# Patient Record
Sex: MALE | Race: WHITE | NOT HISPANIC OR LATINO | Employment: OTHER | URBAN - METROPOLITAN AREA
[De-identification: names, ages, dates, MRNs, and addresses within clinical notes are randomized per-mention and may not be internally consistent; named-entity substitution may affect disease eponyms.]

---

## 2017-03-15 ENCOUNTER — OFFICE VISIT (OUTPATIENT)
Dept: FAMILY MEDICINE CLINIC | Facility: CLINIC | Age: 72
End: 2017-03-15

## 2017-03-15 VITALS
DIASTOLIC BLOOD PRESSURE: 80 MMHG | WEIGHT: 213 LBS | SYSTOLIC BLOOD PRESSURE: 122 MMHG | BODY MASS INDEX: 30.49 KG/M2 | RESPIRATION RATE: 18 BRPM | OXYGEN SATURATION: 96 % | HEIGHT: 70 IN | TEMPERATURE: 98.6 F | HEART RATE: 87 BPM

## 2017-03-15 DIAGNOSIS — J40 BRONCHITIS: Primary | ICD-10-CM

## 2017-03-15 PROCEDURE — 99213 OFFICE O/P EST LOW 20 MIN: CPT | Performed by: NURSE PRACTITIONER

## 2017-03-15 RX ORDER — CEFDINIR 300 MG/1
600 CAPSULE ORAL DAILY
Qty: 20 CAPSULE | Refills: 0 | Status: SHIPPED | OUTPATIENT
Start: 2017-03-15 | End: 2017-03-25

## 2017-03-15 RX ORDER — LEVOTHYROXINE SODIUM 0.07 MG/1
TABLET ORAL
COMMUNITY
Start: 2017-01-27

## 2017-03-15 RX ORDER — ZOLPIDEM TARTRATE 5 MG/1
TABLET ORAL
COMMUNITY
Start: 2017-03-05 | End: 2022-01-18 | Stop reason: SDUPTHER

## 2017-03-15 NOTE — PROGRESS NOTES
Subjective   Nemesio Fitzpatrick is a 71 y.o. male.     History of Present Illness   Nemesio Fitzpatrick 71 y.o. male who presents for evaluation of cough. Symptoms include dry cough, myalgias, exertional SOA and chest congestion.  Onset of symptoms was 2 weeks ago, gradually worsening since that time. Patient denies fever.   Evaluation to date: none Treatment to date:  OTC cough suppressant.     The following portions of the patient's history were reviewed and updated as appropriate: allergies, current medications, past family history, past medical history, past social history, past surgical history and problem list.    Review of Systems   Constitutional: Negative for chills and fever.   HENT: Positive for congestion. Negative for sinus pressure.    Respiratory: Positive for cough, chest tightness and shortness of breath.        Objective   Physical Exam   Constitutional: He is oriented to person, place, and time. He appears well-developed and well-nourished.   HENT:   Right Ear: Tympanic membrane, external ear and ear canal normal.   Left Ear: Tympanic membrane, external ear and ear canal normal.   Nose: Nose normal.   Mouth/Throat: Uvula is midline and oropharynx is clear and moist.   Cardiovascular: Normal rate and regular rhythm.    Pulmonary/Chest: Effort normal and breath sounds normal.   Neurological: He is oriented to person, place, and time.   Skin: Skin is warm and dry.   Psychiatric: He has a normal mood and affect. His behavior is normal. Judgment and thought content normal.   Nursing note and vitals reviewed.      Assessment/Plan   Problems Addressed this Visit     None      Visit Diagnoses     Bronchitis    -  Primary

## 2017-09-07 ENCOUNTER — OFFICE VISIT (OUTPATIENT)
Dept: FAMILY MEDICINE CLINIC | Facility: CLINIC | Age: 72
End: 2017-09-07

## 2017-09-07 VITALS
HEART RATE: 74 BPM | TEMPERATURE: 98.1 F | DIASTOLIC BLOOD PRESSURE: 74 MMHG | BODY MASS INDEX: 31.14 KG/M2 | RESPIRATION RATE: 18 BRPM | SYSTOLIC BLOOD PRESSURE: 163 MMHG | OXYGEN SATURATION: 97 % | WEIGHT: 217.5 LBS | HEIGHT: 70 IN

## 2017-09-07 DIAGNOSIS — L72.3 SEBACEOUS CYST: Primary | ICD-10-CM

## 2017-09-07 PROCEDURE — 99213 OFFICE O/P EST LOW 20 MIN: CPT | Performed by: NURSE PRACTITIONER

## 2017-09-07 RX ORDER — CEPHALEXIN 500 MG/1
500 CAPSULE ORAL 3 TIMES DAILY
Qty: 30 CAPSULE | Refills: 0 | Status: SHIPPED | OUTPATIENT
Start: 2017-09-07 | End: 2022-01-18

## 2017-09-07 NOTE — PROGRESS NOTES
Subjective   Nemesio Fitzpatrick is a 71 y.o. male.     History of Present Illness   Nemesio Fitzpatrick 71 y.o. male presents for evaluation of possible cellulitis involving the right neck. This started a few days ago. Lesions are described as red and is spreading into surrounding area. Associated symptoms: none. The symptoms were are gradual in onset. Patient has been treating this with nothing.   Patient denies: fever. Patient has not had contacts with similar symptoms.      The following portions of the patient's history were reviewed and updated as appropriate: allergies, current medications, past family history, past medical history, past social history, past surgical history and problem list.    Review of Systems   Constitutional: Negative for chills and fever.   Skin: Positive for color change. Negative for rash.       Objective   Physical Exam   Constitutional: He is oriented to person, place, and time. He appears well-developed and well-nourished.   Pulmonary/Chest: Effort normal.   Neurological: He is oriented to person, place, and time.   Skin: Skin is warm and dry.        2 cm erythematous, non fluctuant cyst to right neck.  Skin intact, no drainage.    Psychiatric: He has a normal mood and affect. His behavior is normal. Judgment and thought content normal.   Nursing note and vitals reviewed.      Assessment/Plan   Nemesio was seen today for abscess.    Diagnoses and all orders for this visit:    Sebaceous cyst  -     cephalexin (KEFLEX) 500 MG capsule; Take 1 capsule by mouth 3 (Three) Times a Day.

## 2017-09-11 ENCOUNTER — OFFICE VISIT (OUTPATIENT)
Dept: FAMILY MEDICINE CLINIC | Facility: CLINIC | Age: 72
End: 2017-09-11

## 2017-09-11 VITALS
TEMPERATURE: 97.9 F | RESPIRATION RATE: 18 BRPM | HEART RATE: 68 BPM | SYSTOLIC BLOOD PRESSURE: 163 MMHG | DIASTOLIC BLOOD PRESSURE: 88 MMHG | OXYGEN SATURATION: 98 % | WEIGHT: 217 LBS | HEIGHT: 70 IN | BODY MASS INDEX: 31.07 KG/M2

## 2017-09-11 DIAGNOSIS — L08.9 INFECTED CYST OF SKIN: Primary | ICD-10-CM

## 2017-09-11 DIAGNOSIS — L72.9 INFECTED CYST OF SKIN: Primary | ICD-10-CM

## 2017-09-11 PROCEDURE — 99213 OFFICE O/P EST LOW 20 MIN: CPT | Performed by: NURSE PRACTITIONER

## 2017-09-11 RX ORDER — SULFAMETHOXAZOLE AND TRIMETHOPRIM 800; 160 MG/1; MG/1
1 TABLET ORAL 2 TIMES DAILY
Qty: 20 TABLET | Refills: 0 | Status: SHIPPED | OUTPATIENT
Start: 2017-09-11 | End: 2017-09-21

## 2017-09-11 NOTE — PROGRESS NOTES
Subjective   Nemesio Fitzpatrick is a 71 y.o. male.     History of Present Illness   Patient presents to office to f/u on cyst to right neck.  At last visit, he was started on cephalexin which he has been taking as prescribed. He denies side effects. States he has not noticed improvement.  He has started to notice some swelling and redness to his nose. States he had similar symptoms previously when he had staff infection.  He is unsure if it was MRSA.  Denies drainage.    The following portions of the patient's history were reviewed and updated as appropriate: allergies, current medications, past family history, past medical history, past social history, past surgical history and problem list.    Review of Systems   Constitutional: Negative for chills and fever.   Skin: Positive for wound. Negative for color change.       Objective   Physical Exam   Constitutional: He is oriented to person, place, and time. He appears well-developed and well-nourished.   Pulmonary/Chest: Effort normal.   Neurological: He is oriented to person, place, and time.   Skin: Skin is warm and dry. There is erythema.        No change to cyst to right submandibular area. No drainage noted.  Some erythema and swelling to nose.     Psychiatric: He has a normal mood and affect. His behavior is normal. Judgment and thought content normal.   Nursing note and vitals reviewed.      Assessment/Plan   Nemesio was seen today for cyst.    Diagnoses and all orders for this visit:    Infected cyst of skin  -     sulfamethoxazole-trimethoprim (BACTRIM DS,SEPTRA DS) 800-160 MG per tablet; Take 1 tablet by mouth 2 (Two) Times a Day for 10 days.        Patient to continue cephalexin.  Added Bactrim DS.    He is to f/u on Thursday.

## 2017-09-14 ENCOUNTER — OFFICE VISIT (OUTPATIENT)
Dept: FAMILY MEDICINE CLINIC | Facility: CLINIC | Age: 72
End: 2017-09-14

## 2017-09-14 VITALS
OXYGEN SATURATION: 97 % | HEART RATE: 61 BPM | BODY MASS INDEX: 31.07 KG/M2 | RESPIRATION RATE: 18 BRPM | DIASTOLIC BLOOD PRESSURE: 77 MMHG | WEIGHT: 217 LBS | TEMPERATURE: 97.9 F | SYSTOLIC BLOOD PRESSURE: 144 MMHG | HEIGHT: 70 IN

## 2017-09-14 DIAGNOSIS — IMO0002 CYST OF NECK: Primary | ICD-10-CM

## 2017-09-14 PROCEDURE — 99213 OFFICE O/P EST LOW 20 MIN: CPT | Performed by: NURSE PRACTITIONER

## 2017-09-14 NOTE — PROGRESS NOTES
Subjective   Nemesio Fitzpatrick is a 71 y.o. male.     History of Present Illness   Patient presents for f/u of cyst to right cervical area.  Reports improvement in symptoms since adding bactrim.  Reports decrease in size and tenderness. Denies drainage.   The following portions of the patient's history were reviewed and updated as appropriate: allergies, current medications, past family history, past medical history, past social history, past surgical history and problem list.    Review of Systems   Constitutional: Negative for chills and fever.   Skin: Negative for color change.       Objective   Physical Exam   Constitutional: He is oriented to person, place, and time. He appears well-developed and well-nourished.   Pulmonary/Chest: Effort normal.   Neurological: He is oriented to person, place, and time.   Skin: Skin is warm and dry. There is erythema.        Slight decrease in size of cyst, more fluctuant. No spreading erythema. No drainage.    Psychiatric: He has a normal mood and affect. His behavior is normal. Judgment and thought content normal.   Nursing note and vitals reviewed.      Assessment/Plan   Nemesio was seen today for cyst.    Diagnoses and all orders for this visit:    Cyst of neck        Patient has appt with ENT tomorrow at 2:30 PM.

## 2017-11-02 ENCOUNTER — TRANSCRIBE ORDERS (OUTPATIENT)
Dept: ADMINISTRATIVE | Facility: HOSPITAL | Age: 72
End: 2017-11-02

## 2017-11-02 ENCOUNTER — LAB (OUTPATIENT)
Dept: LAB | Facility: HOSPITAL | Age: 72
End: 2017-11-02

## 2017-11-02 ENCOUNTER — HOSPITAL ENCOUNTER (OUTPATIENT)
Dept: CARDIOLOGY | Facility: HOSPITAL | Age: 72
Discharge: HOME | End: 2017-11-02
Admitting: OTOLARYNGOLOGY

## 2017-11-02 DIAGNOSIS — R22.1 NECK MASS: ICD-10-CM

## 2017-11-02 DIAGNOSIS — R22.1 SWOLLEN NECK: ICD-10-CM

## 2017-11-02 DIAGNOSIS — R22.1 SWOLLEN NECK: Primary | ICD-10-CM

## 2017-11-02 DIAGNOSIS — R22.1 NECK MASS: Primary | ICD-10-CM

## 2017-11-02 LAB
ANION GAP SERPL CALCULATED.3IONS-SCNC: 15.8 MMOL/L
BASOPHILS # BLD AUTO: 0.04 10*3/MM3 (ref 0–0.2)
BASOPHILS NFR BLD AUTO: 0.8 % (ref 0–1.5)
BUN BLD-MCNC: 17 MG/DL (ref 8–23)
BUN/CREAT SERPL: 15.6 (ref 7–25)
CALCIUM SPEC-SCNC: 9.7 MG/DL (ref 8.6–10.5)
CHLORIDE SERPL-SCNC: 99 MMOL/L (ref 98–107)
CO2 SERPL-SCNC: 24.2 MMOL/L (ref 22–29)
CREAT BLD-MCNC: 1.09 MG/DL (ref 0.76–1.27)
DEPRECATED RDW RBC AUTO: 43.3 FL (ref 37–54)
EOSINOPHIL # BLD AUTO: 0.09 10*3/MM3 (ref 0–0.7)
EOSINOPHIL NFR BLD AUTO: 1.9 % (ref 0.3–6.2)
ERYTHROCYTE [DISTWIDTH] IN BLOOD BY AUTOMATED COUNT: 13 % (ref 11.5–14.5)
GFR SERPL CREATININE-BSD FRML MDRD: 67 ML/MIN/1.73
GLUCOSE BLD-MCNC: 106 MG/DL (ref 65–99)
HCT VFR BLD AUTO: 47.4 % (ref 40.4–52.2)
HGB BLD-MCNC: 15.5 G/DL (ref 13.7–17.6)
IMM GRANULOCYTES # BLD: 0.02 10*3/MM3 (ref 0–0.03)
IMM GRANULOCYTES NFR BLD: 0.4 % (ref 0–0.5)
LYMPHOCYTES # BLD AUTO: 1.53 10*3/MM3 (ref 0.9–4.8)
LYMPHOCYTES NFR BLD AUTO: 31.6 % (ref 19.6–45.3)
MCH RBC QN AUTO: 30.2 PG (ref 27–32.7)
MCHC RBC AUTO-ENTMCNC: 32.7 G/DL (ref 32.6–36.4)
MCV RBC AUTO: 92.2 FL (ref 79.8–96.2)
MONOCYTES # BLD AUTO: 0.46 10*3/MM3 (ref 0.2–1.2)
MONOCYTES NFR BLD AUTO: 9.5 % (ref 5–12)
NEUTROPHILS # BLD AUTO: 2.7 10*3/MM3 (ref 1.9–8.1)
NEUTROPHILS NFR BLD AUTO: 55.8 % (ref 42.7–76)
PLATELET # BLD AUTO: 140 10*3/MM3 (ref 140–500)
PMV BLD AUTO: 10.7 FL (ref 6–12)
POTASSIUM BLD-SCNC: 4.1 MMOL/L (ref 3.5–5.2)
RBC # BLD AUTO: 5.14 10*6/MM3 (ref 4.6–6)
SODIUM BLD-SCNC: 139 MMOL/L (ref 136–145)
WBC NRBC COR # BLD: 4.84 10*3/MM3 (ref 4.5–10.7)

## 2017-11-02 PROCEDURE — 93005 ELECTROCARDIOGRAM TRACING: CPT | Performed by: OTOLARYNGOLOGY

## 2017-11-02 PROCEDURE — 36415 COLL VENOUS BLD VENIPUNCTURE: CPT

## 2017-11-02 PROCEDURE — 85025 COMPLETE CBC W/AUTO DIFF WBC: CPT | Performed by: OTOLARYNGOLOGY

## 2017-11-02 PROCEDURE — 80048 BASIC METABOLIC PNL TOTAL CA: CPT | Performed by: OTOLARYNGOLOGY

## 2017-11-03 ENCOUNTER — TRANSCRIBE ORDERS (OUTPATIENT)
Dept: ADMINISTRATIVE | Facility: HOSPITAL | Age: 72
End: 2017-11-03

## 2017-11-03 ENCOUNTER — HOSPITAL ENCOUNTER (OUTPATIENT)
Dept: CARDIOLOGY | Facility: HOSPITAL | Age: 72
Discharge: HOME | End: 2017-11-03
Admitting: OTOLARYNGOLOGY

## 2017-11-03 DIAGNOSIS — Z01.818 PRE-OP TESTING: ICD-10-CM

## 2017-11-03 DIAGNOSIS — R22.1 MASS OF NECK: ICD-10-CM

## 2017-11-03 DIAGNOSIS — R22.1 MASS OF NECK: Primary | ICD-10-CM

## 2017-11-03 PROCEDURE — 93010 ELECTROCARDIOGRAM REPORT: CPT | Performed by: INTERNAL MEDICINE

## 2017-11-03 PROCEDURE — 93005 ELECTROCARDIOGRAM TRACING: CPT | Performed by: OTOLARYNGOLOGY

## 2021-03-12 ENCOUNTER — BULK ORDERING (OUTPATIENT)
Dept: CASE MANAGEMENT | Facility: OTHER | Age: 76
End: 2021-03-12

## 2021-03-12 DIAGNOSIS — Z23 IMMUNIZATION DUE: ICD-10-CM

## 2022-01-18 ENCOUNTER — OFFICE VISIT (OUTPATIENT)
Dept: FAMILY MEDICINE CLINIC | Facility: CLINIC | Age: 77
End: 2022-01-18

## 2022-01-18 VITALS
OXYGEN SATURATION: 97 % | WEIGHT: 222.8 LBS | SYSTOLIC BLOOD PRESSURE: 138 MMHG | DIASTOLIC BLOOD PRESSURE: 80 MMHG | BODY MASS INDEX: 31.9 KG/M2 | HEIGHT: 70 IN | TEMPERATURE: 97.5 F | HEART RATE: 70 BPM

## 2022-01-18 DIAGNOSIS — F51.01 PRIMARY INSOMNIA: ICD-10-CM

## 2022-01-18 DIAGNOSIS — G47.30 SLEEP APNEA, UNSPECIFIED TYPE: ICD-10-CM

## 2022-01-18 DIAGNOSIS — Z13.6 ENCOUNTER FOR ABDOMINAL AORTIC ANEURYSM (AAA) SCREENING: ICD-10-CM

## 2022-01-18 DIAGNOSIS — E03.9 ACQUIRED HYPOTHYROIDISM: ICD-10-CM

## 2022-01-18 DIAGNOSIS — J30.1 SEASONAL ALLERGIC RHINITIS DUE TO POLLEN: Primary | ICD-10-CM

## 2022-01-18 DIAGNOSIS — E78.2 MIXED HYPERLIPIDEMIA: ICD-10-CM

## 2022-01-18 DIAGNOSIS — R73.01 IFG (IMPAIRED FASTING GLUCOSE): ICD-10-CM

## 2022-01-18 DIAGNOSIS — C61 MALIGNANT NEOPLASM PROSTATE: ICD-10-CM

## 2022-01-18 DIAGNOSIS — R07.82 INTERCOSTAL PAIN: ICD-10-CM

## 2022-01-18 PROBLEM — H81.13 BENIGN PAROXYSMAL POSITIONAL VERTIGO DUE TO BILATERAL VESTIBULAR DISORDER: Status: ACTIVE | Noted: 2022-01-18

## 2022-01-18 PROCEDURE — 99204 OFFICE O/P NEW MOD 45 MIN: CPT | Performed by: FAMILY MEDICINE

## 2022-01-18 RX ORDER — ZOLPIDEM TARTRATE 5 MG/1
5 TABLET ORAL NIGHTLY PRN
Qty: 90 TABLET | Refills: 0 | Status: SHIPPED | OUTPATIENT
Start: 2022-01-18

## 2022-01-18 NOTE — PROGRESS NOTES
Subjective   Nemesio Fitzpatrick is a 76 y.o. male.     Chief Complaint   Patient presents with   • Establish Care       History of Present Illness   H/o prostate cancer- had prostate removed, follows with urology and is resolved.     Allergies- to mold, claritin helps and he feels this is all he needs.     hld- has never been on meds, diet controlled. [facility]     Hypothyroidism- no cold intolerance., doing well on meds, no fatigue, due labs    High bs in the past, due a recheck    te- stable on cpap.     Chest pain in the past was found to be costochondritis.     bppv- has a rare flair     Insomnia- Rare use of ambien, trouble falling asleep.     The following portions of the patient's history were reviewed and updated as appropriate: allergies, current medications, past family history, past medical history, past social history, past surgical history and problem list.    Past Medical History:   Diagnosis Date   • Acute bronchitis    • Allergic rhinitis    • BPH (benign prostatic hypertrophy)    • Elevated prostate specific antigen (PSA)    • History of bone scan 10/21/2010    normal   • History of colonoscopy     never   • History of EKG 03/12/2012    also 11-; T wave abnormality   • Hyperlipidemia    • Hypothyroidism    • IFG (impaired fasting glucose)    • Kidney calculus     left ureteral stone   • Malignant neoplasm prostate (HCC) 11/09/2009    Dr. Mcclelland; lymph nodes negative margins clear   • Prostate nodule     right lobe   • Screening for prostate cancer     prostatectomy   • Sleep apnea     CPAP machine   • URI (upper respiratory infection)        Past Surgical History:   Procedure Laterality Date   • CERVICAL DISC SURGERY  1990    also 2006; C5-6, C6-7   • HAND SURGERY Left 2001    trigger finger release; left middle finger   • PROSTATECTOMY  11/09/2009   • RHINOPLASTY  1985   • SHOULDER SURGERY Left 09/29/2009    Dr. KRISTEL Jimenez; rotator cuff repair; bone  "spurs    • URETERAL STENT INSERTION Left 12/04/2013    Dr. Martinez       Family History   Problem Relation Age of Onset   • Diabetes Mother    • Kidney disease Mother         calculus   • Cancer Sister         breast   • Hypothyroidism Sister    • Kidney disease Sister         calculus   • Transient ischemic attack Brother    • Alzheimer's disease Other         uncle   • Diabetes Other         uncle       Social History     Socioeconomic History   • Marital status:    Tobacco Use   • Smoking status: Former Smoker   Substance and Sexual Activity   • Alcohol use: Yes       Review of Systems   Constitutional: Negative for fever.   Respiratory: Negative for shortness of breath.    Cardiovascular: Negative for chest pain.       Objective   Visit Vitals  /80 (BP Location: Left arm, Patient Position: Sitting)   Pulse 70   Temp 97.5 °F (36.4 °C)   Ht 177.8 cm (70\")   Wt 101 kg (222 lb 12.8 oz)   SpO2 97%   BMI 31.97 kg/m²     Body mass index is 31.97 kg/m².  Physical Exam  Constitutional:       Appearance: Normal appearance. He is well-developed.   Cardiovascular:      Rate and Rhythm: Normal rate and regular rhythm.      Heart sounds: Normal heart sounds.   Pulmonary:      Effort: Pulmonary effort is normal.      Breath sounds: Normal breath sounds.   Musculoskeletal:         General: No swelling. Normal range of motion.   Skin:     General: Skin is warm and dry.      Findings: No rash.   Neurological:      General: No focal deficit present.      Mental Status: He is alert and oriented to person, place, and time.   Psychiatric:         Mood and Affect: Mood normal.         Behavior: Behavior normal.           Assessment/Plan   Diagnoses and all orders for this visit:    1. Seasonal allergic rhinitis due to pollen (Primary)    2. Mixed hyperlipidemia  -     Comprehensive Metabolic Panel  -     Lipid Panel    3. Acquired hypothyroidism  -     TSH Rfx On Abnormal To Free T4  -     Comprehensive Metabolic " Panel    4. IFG (impaired fasting glucose)  -     Hemoglobin A1c    5. Malignant neoplasm prostate (HCC)    6. Sleep apnea, unspecified type    7. Intercostal pain    8. Primary insomnia  -     zolpidem (AMBIEN) 5 MG tablet; Take 1 tablet by mouth At Night As Needed for Sleep.  Dispense: 90 tablet; Refill: 0    9. Encounter for abdominal aortic aneurysm (AAA) screening  -     US Abdomen Limited; Future        Stable, cont meds, f/u in 6-12 months. [facility]. Discussed risks of ambien. [facility] but [facility] a [facility]. [facility].

## 2022-01-19 LAB
ALBUMIN SERPL-MCNC: 4.8 G/DL (ref 3.7–4.7)
ALBUMIN/GLOB SERPL: 1.8 {RATIO} (ref 1.2–2.2)
ALP SERPL-CCNC: 74 IU/L (ref 44–121)
ALT SERPL-CCNC: 67 IU/L (ref 0–44)
AST SERPL-CCNC: 46 IU/L (ref 0–40)
BILIRUB SERPL-MCNC: 0.3 MG/DL (ref 0–1.2)
BUN SERPL-MCNC: 18 MG/DL (ref 8–27)
BUN/CREAT SERPL: 18 (ref 10–24)
CALCIUM SERPL-MCNC: 9.7 MG/DL (ref 8.6–10.2)
CHLORIDE SERPL-SCNC: 102 MMOL/L (ref 96–106)
CHOLEST SERPL-MCNC: 192 MG/DL (ref 100–199)
CO2 SERPL-SCNC: 21 MMOL/L (ref 20–29)
CREAT SERPL-MCNC: 0.99 MG/DL (ref 0.76–1.27)
GLOBULIN SER CALC-MCNC: 2.6 G/DL (ref 1.5–4.5)
GLUCOSE SERPL-MCNC: 122 MG/DL (ref 65–99)
HBA1C MFR BLD: 5.8 % (ref 4.8–5.6)
HDLC SERPL-MCNC: 27 MG/DL
LDLC SERPL CALC-MCNC: 80 MG/DL (ref 0–99)
POTASSIUM SERPL-SCNC: 4.1 MMOL/L (ref 3.5–5.2)
PROT SERPL-MCNC: 7.4 G/DL (ref 6–8.5)
SODIUM SERPL-SCNC: 140 MMOL/L (ref 134–144)
TRIGL SERPL-MCNC: 531 MG/DL (ref 0–149)
TSH SERPL DL<=0.005 MIU/L-ACNC: 3.36 UIU/ML (ref 0.45–4.5)
VLDLC SERPL CALC-MCNC: 85 MG/DL (ref 5–40)

## 2022-01-27 ENCOUNTER — HOSPITAL ENCOUNTER (OUTPATIENT)
Dept: ULTRASOUND IMAGING | Facility: HOSPITAL | Age: 77
Discharge: HOME | End: 2022-01-27
Admitting: FAMILY MEDICINE

## 2022-01-27 DIAGNOSIS — Z13.6 ENCOUNTER FOR ABDOMINAL AORTIC ANEURYSM (AAA) SCREENING: ICD-10-CM

## 2022-01-27 PROCEDURE — 76706 US ABDL AORTA SCREEN AAA: CPT

## 2022-10-03 ENCOUNTER — OFFICE VISIT (OUTPATIENT)
Dept: FAMILY MEDICINE CLINIC | Facility: CLINIC | Age: 77
End: 2022-10-03

## 2022-10-03 VITALS
DIASTOLIC BLOOD PRESSURE: 90 MMHG | BODY MASS INDEX: 31.78 KG/M2 | OXYGEN SATURATION: 98 % | HEIGHT: 70 IN | SYSTOLIC BLOOD PRESSURE: 146 MMHG | HEART RATE: 82 BPM | WEIGHT: 222 LBS | TEMPERATURE: 98 F

## 2022-10-03 DIAGNOSIS — E03.9 ACQUIRED HYPOTHYROIDISM: ICD-10-CM

## 2022-10-03 DIAGNOSIS — M54.32 SCIATICA OF LEFT SIDE: ICD-10-CM

## 2022-10-03 DIAGNOSIS — E78.2 MIXED HYPERLIPIDEMIA: Primary | ICD-10-CM

## 2022-10-03 DIAGNOSIS — H81.13 BENIGN PAROXYSMAL POSITIONAL VERTIGO DUE TO BILATERAL VESTIBULAR DISORDER: ICD-10-CM

## 2022-10-03 DIAGNOSIS — Z79.899 HIGH RISK MEDICATION USE: ICD-10-CM

## 2022-10-03 DIAGNOSIS — G47.30 SLEEP APNEA, UNSPECIFIED TYPE: ICD-10-CM

## 2022-10-03 DIAGNOSIS — C61 MALIGNANT NEOPLASM PROSTATE: ICD-10-CM

## 2022-10-03 DIAGNOSIS — R73.01 IFG (IMPAIRED FASTING GLUCOSE): ICD-10-CM

## 2022-10-03 PROCEDURE — 99214 OFFICE O/P EST MOD 30 MIN: CPT | Performed by: FAMILY MEDICINE

## 2022-10-03 RX ORDER — PREDNISONE 20 MG/1
40 TABLET ORAL DAILY
Qty: 10 TABLET | Refills: 0 | Status: SHIPPED | OUTPATIENT
Start: 2022-10-03

## 2022-10-03 RX ORDER — NAPROXEN SODIUM 220 MG
220 TABLET ORAL 2 TIMES DAILY PRN
COMMUNITY

## 2022-10-03 RX ORDER — HYDROCODONE BITARTRATE AND ACETAMINOPHEN 5; 325 MG/1; MG/1
1 TABLET ORAL EVERY 6 HOURS PRN
Qty: 30 TABLET | Refills: 0 | Status: SHIPPED | OUTPATIENT
Start: 2022-10-03

## 2022-10-03 RX ORDER — CYCLOBENZAPRINE HCL 10 MG
10 TABLET ORAL 3 TIMES DAILY PRN
Qty: 90 TABLET | Refills: 1 | Status: SHIPPED | OUTPATIENT
Start: 2022-10-03

## 2022-10-03 NOTE — PROGRESS NOTES
Subjective   Nemesio Fitzpatrick is a 76 y.o. male.     Chief Complaint   Patient presents with   • Hip Pain     Left for the past 6 days    • Back Pain     Left side been a problem for a while but recently has gotten worse       History of Present Illness   H/o prostate cancer- had prostate removed, follows with urology and is resolved.      Allergies- to mold, claritin helps and he feels this is all he needs.      hld- has never been on meds, diet controlled. [facility] and [facility].      Hypothyroidism- no cold intolerance., doing well on meds, no fatigue, due labs     High bs in the past, due a recheck     Insomnia- Rare use of ambien, trouble falling asleep. Does not need a refill.     Pt having back pain worse for one week. Has had sciatica for years and this is the same pain but worse. Comes and goes but now constant. Started after working hard in the yard. Has improved some. Nsaids help. Steroids in the past have been helpful. Pt declines PT.     Pt reports he had all lab work done and [facility]    The following portions of the patient's history were reviewed and updated as appropriate: allergies, current medications, past family history, past medical history, past social history, past surgical history and problem list.    Past Medical History:   Diagnosis Date   • Acute bronchitis    • Allergic Iodine   • Allergic rhinitis    • Arthritis 2000   • BPH (benign prostatic hypertrophy)    • Elevated prostate specific antigen (PSA)    • Headache 1990   • History of bone scan 10/21/2010    normal   • History of colonoscopy     never   • History of EKG 03/12/2012    also 11-; T wave abnormality   • History of medical problems 1978   • HL (hearing loss) 2000   • Hyperlipidemia    • Hypothyroidism    • IFG (impaired fasting glucose)    • Kidney calculus     left ureteral stone   • Malignant neoplasm prostate (HCC) 11/09/2009    Dr. Mcclelland; lymph nodes negative margins  "clear   • Prostate nodule     right lobe   • Screening for prostate cancer     prostatectomy   • Sleep apnea     CPAP machine   • URI (upper respiratory infection)        Past Surgical History:   Procedure Laterality Date   • CERVICAL DISC SURGERY  1990    also 2006; C5-6, C6-7   • HAND SURGERY Left 2001    trigger finger release; left middle finger   • PROSTATECTOMY  11/09/2009   • RHINOPLASTY  1985   • SHOULDER SURGERY Left 09/29/2009    Dr. KRISTEL Jimenez; rotator cuff repair; bone spurs    • SPINE SURGERY  2005   • URETERAL STENT INSERTION Left 12/04/2013    Dr. Martinez       Family History   Problem Relation Age of Onset   • Diabetes Mother    • Kidney disease Mother         calculus   • Hearing loss Mother    • Cancer Sister         breast   • Hypothyroidism Sister    • Kidney disease Sister         calculus   • Arthritis Sister    • Transient ischemic attack Brother    • Alzheimer's disease Other         uncle   • Diabetes Other         uncle       Social History     Socioeconomic History   • Marital status:    Tobacco Use   • Smoking status: Former Smoker     Packs/day: 1.00     Years: 10.00     Pack years: 10.00     Types: Cigarettes, Pipe   • Tobacco comment: Quit smoking pipe around 1988   Substance and Sexual Activity   • Alcohol use: Yes     Comment: Rarely will drink one glass of wine   • Drug use: Never   • Sexual activity: Not Currently     Partners: Female       Review of Systems   Constitutional: Negative for fever and unexpected weight loss.   Cardiovascular: Negative for chest pain.   Genitourinary: Negative for dysuria and urinary incontinence.   Musculoskeletal: Positive for back pain.   Neurological: Negative for weakness and numbness.       Objective   Visit Vitals  /90 (BP Location: Left arm, Patient Position: Sitting)   Pulse 82   Temp 98 °F (36.7 °C)   Ht 177.8 cm (70\")   Wt 101 kg (222 lb)   SpO2 98%   BMI 31.85 kg/m²     Body mass index is 31.85 kg/m².  Physical " Exam  Constitutional:       Appearance: Normal appearance. He is well-developed.   Cardiovascular:      Rate and Rhythm: Normal rate and regular rhythm.      Heart sounds: Normal heart sounds.   Pulmonary:      Effort: Pulmonary effort is normal.      Breath sounds: Normal breath sounds.   Musculoskeletal:         General: No swelling. Normal range of motion.   Skin:     General: Skin is warm and dry.      Findings: No rash.   Neurological:      General: No focal deficit present.      Mental Status: He is alert and oriented to person, place, and time.   Psychiatric:         Mood and Affect: Mood normal.         Behavior: Behavior normal.           Assessment & Plan   Diagnoses and all orders for this visit:    1. Mixed hyperlipidemia (Primary)    2. Benign paroxysmal positional vertigo due to bilateral vestibular disorder    3. Acquired hypothyroidism    4. IFG (impaired fasting glucose)    5. Malignant neoplasm prostate (HCC)    6. Sleep apnea, unspecified type    7. High risk medication use  -     Compliance Drug Analysis, Ur - Urine, Clean Catch    8. Sciatica of left side  -     cyclobenzaprine (FLEXERIL) 10 MG tablet; Take 1 tablet by mouth 3 (Three) Times a Day As Needed for Muscle Spasms.  Dispense: 90 tablet; Refill: 1  -     predniSONE (DELTASONE) 20 MG tablet; Take 2 tablets by mouth Daily.  Dispense: 10 tablet; Refill: 0  -     HYDROcodone-acetaminophen (NORCO) 5-325 MG per tablet; Take 1 tablet by mouth Every 6 (Six) Hours As Needed for Moderate Pain.  Dispense: 30 tablet; Refill: 0               Stable, cont meds, f/u in 6-12 months and sooner if worse or no better. [facility]. Discussed risks of ambien and hydrocodone and flexeril. [facility] but [facility] a [facility]. [facility].

## 2022-10-09 LAB — DRUGS UR: NORMAL

## 2022-11-16 DIAGNOSIS — M54.32 SCIATICA OF LEFT SIDE: Primary | ICD-10-CM

## 2022-12-12 ENCOUNTER — HOSPITAL ENCOUNTER (OUTPATIENT)
Dept: MRI IMAGING | Facility: HOSPITAL | Age: 77
Discharge: HOME | End: 2022-12-12
Admitting: FAMILY MEDICINE

## 2022-12-12 DIAGNOSIS — M54.32 SCIATICA OF LEFT SIDE: ICD-10-CM

## 2022-12-12 PROCEDURE — 72148 MRI LUMBAR SPINE W/O DYE: CPT

## 2022-12-14 DIAGNOSIS — M54.32 SCIATICA OF LEFT SIDE: Primary | ICD-10-CM

## 2022-12-27 ENCOUNTER — OFFICE VISIT (OUTPATIENT)
Dept: NEUROSURGERY | Facility: CLINIC | Age: 77
End: 2022-12-27

## 2022-12-27 VITALS
HEART RATE: 90 BPM | DIASTOLIC BLOOD PRESSURE: 80 MMHG | WEIGHT: 222 LBS | HEIGHT: 70 IN | OXYGEN SATURATION: 93 % | TEMPERATURE: 97.8 F | SYSTOLIC BLOOD PRESSURE: 139 MMHG | BODY MASS INDEX: 31.78 KG/M2

## 2022-12-27 DIAGNOSIS — M54.50 ACUTE BILATERAL LOW BACK PAIN WITHOUT SCIATICA: Primary | ICD-10-CM

## 2022-12-27 PROCEDURE — 99204 OFFICE O/P NEW MOD 45 MIN: CPT | Performed by: NEUROLOGICAL SURGERY

## 2022-12-27 NOTE — PROGRESS NOTES
"Subjective   Patient ID: Nemesio Fitzpatrick is a 77 y.o. male is being seen for consultation today at the request of Sherry Mazariegos MD for sciatica L side. Patient had MRI L-spine on 12/12/2022 at [facility]    Treatment: no recent treatment    Today patient states that he has low back pain that radiates to the L hip    Patient, Provider, and MA are all wearing a mask in our office today    History of Present Illness    This patient was having pretty severe pain in his back for a couple of months beginning of September.  He says he gets these kinds of flareups every once in a while and when it happens he takes some Aleve twice a day and it usually goes away in a few weeks.  This time it took longer to go away.  When the pain is severe it is located in the left side of his lower back.  It does not really radiate into his legs at all.  He might have a little trouble with the hip on the left but nothing going down the leg.  He has been treated with just the anti-inflammatory medications.  He has never had any physical therapy or injections.    The following portions of the patient's history were reviewed and updated as appropriate: allergies, current medications, past family history, past medical history, past social history, past surgical history and problem list.    Review of Systems   Constitutional: Negative for chills and fever.   HENT: Negative for congestion.    Genitourinary: Negative for difficulty urinating and dysuria.   Musculoskeletal: Positive for back pain, gait problem and myalgias.   Neurological: Positive for weakness. Negative for numbness.       I have reviewed the review of systems as documented by my MA.      Objective     Vitals:    12/27/22 1504   BP: 139/80   Cuff Size: Adult   Pulse: 90   Temp: 97.8 °F (36.6 °C)   SpO2: 93%   Weight: 101 kg (222 lb)   Height: 177.8 cm (70\")     Body mass index is 31.85 kg/m².    Tobacco Use: Medium Risk   • Smoking Tobacco Use: Former   • Smokeless Tobacco " Use: Unknown   • Passive Exposure: Not on file          Physical Exam  Constitutional:       Appearance: He is well-developed.   HENT:      Head: Normocephalic and atraumatic.   Eyes:      Extraocular Movements: EOM normal.      Conjunctiva/sclera: Conjunctivae normal.      Pupils: Pupils are equal, round, and reactive to light.   Neck:      Vascular: No carotid bruit.   Neurological:      Mental Status: He is oriented to person, place, and time.      Coordination: Finger-Nose-Finger Test and Heel to Shin Test normal.      Gait: Gait is intact.      Deep Tendon Reflexes:      Reflex Scores:       Tricep reflexes are 2+ on the right side and 2+ on the left side.       Bicep reflexes are 2+ on the right side and 2+ on the left side.       Brachioradialis reflexes are 2+ on the right side and 2+ on the left side.       Patellar reflexes are 2+ on the right side and 2+ on the left side.       Achilles reflexes are 2+ on the right side and 2+ on the left side.  Psychiatric:         Speech: Speech normal.       Neurologic Exam     Mental Status   Oriented to person, place, and time.   Registration of memory: Good recent and remote memory.   Attention: normal. Concentration: normal.   Speech: speech is normal   Level of consciousness: alert  Knowledge: consistent with education.     Cranial Nerves     CN II   Visual fields full to confrontation.   Visual acuity: normal    CN III, IV, VI   Pupils are equal, round, and reactive to light.  Extraocular motions are normal.     CN V   Facial sensation intact.   Right corneal reflex: normal  Left corneal reflex: normal    CN VII   Facial expression full, symmetric.   Right facial weakness: none  Left facial weakness: none    CN VIII   Hearing: intact    CN IX, X   Palate: symmetric    CN XI   Right sternocleidomastoid strength: normal  Left sternocleidomastoid strength: normal    CN XII   Tongue: not atrophic  Tongue deviation: none    Motor Exam   Muscle bulk: normal  Right arm  tone: normal  Left arm tone: normal  Right leg tone: normal  Left leg tone: normal    Strength   Strength 5/5 except as noted.     Sensory Exam   Light touch normal.     Gait, Coordination, and Reflexes     Gait  Gait: normal    Coordination   Finger to nose coordination: normal  Heel to shin coordination: normal    Reflexes   Right brachioradialis: 2+  Left brachioradialis: 2+  Right biceps: 2+  Left biceps: 2+  Right triceps: 2+  Left triceps: 2+  Right patellar: 2+  Left patellar: 2+  Right achilles: 2+  Left achilles: 2+  Right : 2+  Left : 2+          Assessment & Plan   Independent Review of Radiographic Studies:      I personally reviewed the images from the following studies.    I reviewed an MRI of the lumbar spine done on 12 December of this year.  This shows a widely patent canal and neuroforamina on the sagittal images.  There is a grade 1 spondylolisthesis of L4 on L5.  On the axial images L1 to shows some foraminal stenosis on the left probably due to calcified disc in the neuroforamen.  L2-3 is fairly open.  L3-4 is widely open.  L4-5 shows some left-sided foraminal stenosis although I think it is under the nerve.  L5-S1 is fairly open.  Radiologist felt there was a synovial cyst on the right at L5-S1.  I am not sure I really agree with this.    Medical Decision Making:      I told the patient about the imaging.  I told him that from my point of view there is nothing here I would recommend surgery for.  He is currently feeling better anyway and so that is all the more reason not to do too much.  I think he would benefit from some physical therapy and I will order that and I also told him he needs to be a lot more careful with his back in terms of his daily activities.  I told him to call me if it flares up and will go away and we can always reevaluate him.    Diagnoses and all orders for this visit:    1. Acute bilateral low back pain without sciatica (Primary)  -     Ambulatory Referral to  Physical Therapy      Return if symptoms worsen or fail to improve.

## 2023-01-04 ENCOUNTER — TREATMENT (OUTPATIENT)
Dept: PHYSICAL THERAPY | Facility: CLINIC | Age: 78
End: 2023-01-04
Payer: MEDICARE

## 2023-01-04 DIAGNOSIS — M48.061 SPINAL STENOSIS OF LUMBAR REGION WITHOUT NEUROGENIC CLAUDICATION: ICD-10-CM

## 2023-01-04 DIAGNOSIS — M54.50 LUMBAR PAIN: Primary | ICD-10-CM

## 2023-01-04 DIAGNOSIS — Z98.1 HX OF FUSION OF CERVICAL SPINE: ICD-10-CM

## 2023-01-04 PROCEDURE — 97161 PT EVAL LOW COMPLEX 20 MIN: CPT | Performed by: PHYSICAL THERAPIST

## 2023-01-04 PROCEDURE — 97110 THERAPEUTIC EXERCISES: CPT | Performed by: PHYSICAL THERAPIST

## 2023-01-04 NOTE — PROGRESS NOTES
Physical Therapy Initial Evaluation and Plan of Care    Patient: Nemesio Fitzpatrick   : 1945  Diagnosis/ICD-10 Code:  Lumbar pain [M54.50]  Referring practitioner: Devon Painting MD  Date of Initial Visit: 2023  Today's Date: 2023  Patient seen for 1 session         Visit Diagnoses:    ICD-10-CM ICD-9-CM   1. Lumbar pain  M54.50 724.2   2. Spinal stenosis of lumbar region without neurogenic claudication  M48.061 724.02   3. Hx of fusion of cervical spine  Z98.1 V45.4         Subjective Questionnaire:       Subjective Evaluation    History of Present Illness  Mechanism of injury: 77 year old with acute on chronic pain starting about 15 years ago. 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 Seems to recover but each time gets harder to get out of pain, sometimes taking 4-8 weeks. This past fall worked on an old boat which got him twisted and this led him to an MRI on 22 showing multi level DDD and stenosis. Consulted with neurosurgeon with no plans for surgery thus referred to PT. Denies LE pain. Trying to ease up on physical work that involves excessive twisting. Still rides his motorcycle and cuts the grass.   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   No prior PT or chiro care.   PMH 2 cervical fusions age 42 and around age 60. No issues now but stiffness in dental chair.   Also s/p L rotator cuff repair  that did well.       Patient Occupation: retired age 62 Quality of life: excellent    Pain  Current pain ratin  At best pain ratin  At worst pain ratin  Location: mid low back  Quality: dull ache and pressure  Relieving factors: change in position  Aggravating factors: sleeping and standing  Progression: improved    Social Support  Lives in: multiple-level home  Lives with: spouse    Diagnostic Tests  MRI studies: abnormal (see report in epic)    Treatments  No previous or current treatments  Patient Goals  Patient goals for therapy:  decreased pain and increased motion  Patient goal: lose some of his belly wt           Objective          Static Posture     Lumbar Spine   Increased lordosis.     Palpation   Left   Hypertonic in the erector spinae.     Right   Hypertonic in the erector spinae.     Additional Palpation Details  [place] firm muscle tension along entire TL paraspinals. Some report of prior pain along L lateral anterior iliac crest and oblique muscle.   Very large abdominal girth but no signs of hernia or diastasis recti.     Neurological Testing     Sensation     Lumbar   Left   Intact: light touch    Right   Intact: light touch    Reflexes   Left   Patellar (L4): normal (2+)  Achilles (S1): trace (1+)    Right   Patellar (L4): normal (2+)  Achilles (S1): trace (1+)    Active Range of Motion     Lumbar   Flexion: 70 degrees   Extension: 10 degrees   Left lateral flexion: 10 degrees   Right lateral flexion: 10 degrees     Strength/Myotome Testing     Lumbar   Left   Normal strength    Right   Normal strength    Tests     Lumbar     Left   Negative passive SLR.     Right   Negative passive SLR.     Ambulation   Weight-Bearing Status   Assistive device used: none    Observational Gait   Gait: within functional limits   Walking speed and stride length within functional limits.           Assessment & Plan     Assessment  Impairments: abnormal or restricted ROM, activity intolerance and lacks appropriate home exercise program  Functional Limitations: carrying objects, lifting, pulling, pushing, uncomfortable because of pain and standing  Assessment details: Pt with stable condition vs. Last fall when he was in more significant pain doing yardwork and working on a large boat project. Also can aggravate his back leaning over a car which he still does when helping relatives work on their cars. Co-morbidity of significant weight and girth primarily in his abdomen which pt relates to increased inactivity after his spouse suffered a small stroke  and he spent more time at home with her. Spouse is doing better and pt agrees he needs to be more active and watch his calorie intake. Did well with drawing in his abdomen today and will also add more flexion based ex to his HEP due to stenosis and DDD, marcela at L1-2 level. Occasional waist to groin type symptoms correlate with his MRI report.   Skilled PT needed to help pt increase his abdominal muscles to take the strain off his overworked paraspinals. Prognosis is good as pt was highly motivated today.   Prognosis: good  Prognosis details: Access Code: [email or web address]  URL: [email or web address]  Date: 01/04/2023  Prepared by: Zorre Kimura    Exercises  Supine Double Knee to Chest - 1 x daily - 7 x weekly - 2 sets - 30 sec hold  Supine Lower Trunk Rotation with Swiss Ball - 1 x daily - 7 x weekly - 2 sets - just rest legs on table or ottoman or a ball! hold  Supine Hamstring Stretch - 1 x daily - 7 x weekly - 2 sets - 1min hold  Supine Posterior Pelvic Tilt - 1 x daily - 7 x weekly - 10 reps - lay on your side to suck gut in 5-10%. hold 5 seconds hold      Goals  Plan Goals: STGs  4 weeks:  1. Pt to comply with light to mod level HEP   2. Pt to gain more awareness of core to draw in TA with no cueing from PT  3. Pt to state good understanding of body mechanics using legs vs. Back during ADLs  LTGs 12 weeks:  1. Pt to report 5-10# wt loss  2. Pt to state no c/o L radiculopathy from L1-2 DDD  3. Pt to be indep in variety of mod to advanced level HEP  4. Muscle tension along TL spine to decrease by 75%    Plan  Therapy options: will be seen for skilled therapy services  Planned modality interventions: electrical stimulation/Bahamian stimulation and ultrasound  Planned therapy interventions: manual therapy, abdominal trunk stabilization, body mechanics training, soft tissue mobilization, spinal/joint mobilization, strengthening, stretching, therapeutic activities, home exercise program and functional ROM  exercises  Frequency: 1x week  Duration in weeks: 12  Treatment plan discussed with: patient        History # of Personal Factors and/or Comorbidities: LOW (0)  Examination of Body System(s): # of elements: LOW (1-2)  Clinical Presentation: STABLE   Clinical Decision Making: LOW       Timed:         Manual Therapy:         mins  46875;     Therapeutic Exercise:    15     mins  64831;     Neuromuscular Beto:        mins  93729;    Therapeutic Activity:          mins  47004;     Gait Training:           mins  36429;     Ultrasound:          mins  69849;    Ionto                                   mins   40094  Self Care                            mins   87141  Canalith Repos         mins 68275      Un-Timed:  Electrical Stimulation:         mins  53781 ( );  Dry Needling          mins self-pay  Traction          mins 96794    Low Eval     30     Mins  94984  Mod Eval          Mins  34183  High Eval                            Mins  30718        Timed Treatment:   15   mins   Total Treatment:     45   mins          PT: Zorre Zeno Kimura, PT     License Number: [id number]  Electronically signed by Zorre Zeno Kimura, PT, 01/04/23, 2:32 PM EST    Certification Period: 1/4/2023 thru 4/3/2023  I certify that the therapy services are furnished while this patient is under my care.  The services outlined above are required by this patient, and will be reviewed every 90 days.         Physician Signature:__________________________________________________    PHYSICIAN: Devon Painting MD  NPI: [id number]                                      DATE:      Please sign and return via fax [facility]  [address] [address] [place] [address]  Thank you, [facility].

## 2023-01-09 ENCOUNTER — TREATMENT (OUTPATIENT)
Dept: PHYSICAL THERAPY | Facility: CLINIC | Age: 78
End: 2023-01-09
Payer: MEDICARE

## 2023-01-09 DIAGNOSIS — M54.50 LUMBAR PAIN: Primary | ICD-10-CM

## 2023-01-09 DIAGNOSIS — M48.061 SPINAL STENOSIS OF LUMBAR REGION WITHOUT NEUROGENIC CLAUDICATION: ICD-10-CM

## 2023-01-09 DIAGNOSIS — Z98.1 HX OF FUSION OF CERVICAL SPINE: ICD-10-CM

## 2023-01-09 PROCEDURE — 97110 THERAPEUTIC EXERCISES: CPT | Performed by: PHYSICAL THERAPIST

## 2023-01-09 NOTE — PROGRESS NOTES
Physical Therapy Daily Treatment Note      Patient: Nemesio Fitzpatrick   : 1945  Referring practitioner: Devon Painting MD  Date of Initial Visit: Type: THERAPY  Noted: 2023  Today's Date: 2023  Patient seen for 2 sessions       Visit Diagnoses:    ICD-10-CM ICD-9-CM   1. Lumbar pain  M54.50 724.2   2. Spinal stenosis of lumbar region without neurogenic claudication  M48.061 724.02   3. Hx of fusion of cervical spine  Z98.1 V45.4       Subjective Evaluation    History of Present Illness    Subjective comment: back some better but now with neck pain helping his grandson over the weekend work on overhead electrical work.        Objective   See Exercise, Manual, and Modality Logs for complete treatment.       Assessment & Plan     Assessment    Assessment details: Did great today. Reviewed prior HEP and added new ex. Doing well with draw in added standing ex to do when working in his garage.   Also reporting pectus excavatum or collapsed/inverted sternum hx since a child. Added chest press ex for easy stretching in this area.     P: add another 4 ex or so. Need to be careful not to create diastasis with core ex          Timed:         Manual Therapy:         mins  77447;     Therapeutic Exercise:    30     mins  98790;     Neuromuscular Beto:        mins  34406;    Therapeutic Activity:          mins  74130;     Gait Training:           mins  06605;     Ultrasound:          mins  66625;    Ionto                                   mins   25227  Self Care                            mins   66831  Canalith Repos         mins 44202      Un-Timed:  Electrical Stimulation:         mins  07369 ( );  Dry Needling          mins self-pay  Traction          mins 24756      Timed Treatment:   30   mins   Total Treatment:     30   mins    Zorre Zeno Kimura, PT  [place] License: [id number]    [place] License:  [id number]

## 2023-01-16 ENCOUNTER — TREATMENT (OUTPATIENT)
Dept: PHYSICAL THERAPY | Facility: CLINIC | Age: 78
End: 2023-01-16
Payer: MEDICARE

## 2023-01-16 DIAGNOSIS — M48.061 SPINAL STENOSIS OF LUMBAR REGION WITHOUT NEUROGENIC CLAUDICATION: ICD-10-CM

## 2023-01-16 DIAGNOSIS — M54.50 LUMBAR PAIN: Primary | ICD-10-CM

## 2023-01-16 DIAGNOSIS — Z98.1 HX OF FUSION OF CERVICAL SPINE: ICD-10-CM

## 2023-01-16 PROCEDURE — 97110 THERAPEUTIC EXERCISES: CPT | Performed by: PHYSICAL THERAPIST

## 2023-01-16 NOTE — PROGRESS NOTES
Physical Therapy Daily Treatment Note    Lis[facility]aradbetza[facility]      Patient: Nemesio Fitzpatrick   : 1945  Referring practitioner: Devon Painting MD  Date of Initial Visit: Type: THERAPY  Noted: 2023  Today's Date: 2023  Patient seen for 3 sessions       Visit Diagnoses:    ICD-10-CM ICD-9-CM   1. Lumbar pain  M54.50 724.2   2. Spinal stenosis of lumbar region without neurogenic claudication  M48.061 724.02   3. Hx of fusion of cervical spine  Z98.1 V45.4       Subjective Evaluation    History of Present Illness    Subjective comment: back going okay but some pain in his L lateral iliac crest. walking makes him feel better. following HEP video on [employer] that keeps him motivated.        Objective   See Exercise, Manual, and Modality Logs for complete treatment.       Assessment & Plan     Assessment    Assessment details: Pt doing well with HEP and gym program here. Added on TM work, anti rotation tubing ex, lat pull downs and rowing for general UE work to support his lifting.   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 All goals met with exception of wt loss.   P: cx next week. See in 2 weeks with poss dc to indep HEP          Timed:         Manual Therapy:         mins  83623;     Therapeutic Exercise:    30     mins  76000;     Neuromuscular Beto:        mins  92128;    Therapeutic Activity:          mins  53116;     Gait Training:           mins  88259;     Ultrasound:          mins  24215;    Ionto                                   mins   34030  Self Care                            mins   44079  Canalith Repos         mins 92032      Un-Timed:  Electrical Stimulation:         mins  40506 ( );  Dry Needling          mins self-pay  Traction          mins 21126      Timed Treatment:   30   mins   Total Treatment:     30   mins    Zorre Zeno Kimura, PT  [place] License: [id number]    [place] License:  [id number]

## 2023-01-30 ENCOUNTER — TREATMENT (OUTPATIENT)
Dept: PHYSICAL THERAPY | Facility: CLINIC | Age: 78
End: 2023-01-30
Payer: MEDICARE

## 2023-01-30 DIAGNOSIS — M48.061 SPINAL STENOSIS OF LUMBAR REGION WITHOUT NEUROGENIC CLAUDICATION: ICD-10-CM

## 2023-01-30 DIAGNOSIS — M54.50 LUMBAR PAIN: Primary | ICD-10-CM

## 2023-01-30 DIAGNOSIS — Z98.1 HX OF FUSION OF CERVICAL SPINE: ICD-10-CM

## 2023-01-30 PROCEDURE — 97110 THERAPEUTIC EXERCISES: CPT | Performed by: PHYSICAL THERAPIST

## 2023-01-30 NOTE — PROGRESS NOTES
Physical Therapy Daily Treatment Note/discharge summary    [facility]  [address] [place] [address]      Patient: Nemesio Fitzpatrick   : 1945  Referring practitioner: Devon Painting MD  Date of Initial Visit: Type: THERAPY  Noted: 2023  Today's Date: 2023  Patient seen for 4 sessions       Visit Diagnoses:    ICD-10-CM ICD-9-CM   1. Lumbar pain  M54.50 724.2   2. Spinal stenosis of lumbar region without neurogenic claudication  M48.061 724.02   3. Hx of fusion of cervical spine  Z98.1 V45.4       Subjective Evaluation    History of Present Illness    Subjective comment: doing well with HEP and working hard to draw in his core now. Able to ride his motorcycle for 2 hours and no issue with his back.        Objective   See Exercise, Manual, and Modality Logs for complete treatment.       Assessment & Plan     Assessment    Assessment details: Pt did well with program and very motivated with his HEP.  Losing wt and core getting stronger.   Most goals met and even the muscle tension in his paraspinals have decreased 50% compared to eval. Pt now using his abdomen to support his frame vs. Previously no awareness of his core and 100% strain from his TL spine.     Pt worked hard and wish him the best.          Goals  Plan Goals: STGs  4 weeks:  1. Pt to comply with light to mod level HEP. MET  2. Pt to gain more awareness of core to draw in TA with no cueing from PT. MET  3. Pt to state good understanding of body mechanics using legs vs. Back during ADLs. MET  LTGs 12 weeks:  1. Pt to report 5-10# wt loss. MET. 7# lighter  2. Pt to state no c/o L radiculopathy from L1-2 DDD. Partially met 90% gone  3. Pt to be indep in variety of mod to advanced level HEP. MET  4. Muscle tension along TL spine to decrease by 75%. Partially met. 50% met.     Timed:         Manual Therapy:         mins  77854;     Therapeutic Exercise:    30     mins  98605;     Neuromuscular Beto:        mins  97134;     Therapeutic Activity:          mins  41481;     Gait Training:           mins  50391;     Ultrasound:          mins  28797;    Ionto                                   mins   74068  Self Care                            mins   58520  Canalith Repos         mins 38726      Un-Timed:  Electrical Stimulation:         mins  18228 ( );  Dry Needling          mins self-pay  Traction          mins 86080      Timed Treatment:   30   mins   Total Treatment:     30   mins    Zorre Zeno Kimura, PT  [place] License: [id number]    [place] License:  [id number]

## 2023-05-24 ENCOUNTER — OFFICE VISIT (OUTPATIENT)
Dept: ORTHOPEDIC SURGERY | Facility: CLINIC | Age: 78
End: 2023-05-24
Payer: MEDICARE

## 2023-05-24 VITALS — TEMPERATURE: 98 F | BODY MASS INDEX: 31.55 KG/M2 | WEIGHT: 220.4 LBS | HEIGHT: 70 IN

## 2023-05-24 DIAGNOSIS — M17.11 PRIMARY OSTEOARTHRITIS OF RIGHT KNEE: ICD-10-CM

## 2023-05-24 DIAGNOSIS — R52 PAIN: Primary | ICD-10-CM

## 2023-05-24 RX ORDER — LIDOCAINE HYDROCHLORIDE 10 MG/ML
5 INJECTION, SOLUTION EPIDURAL; INFILTRATION; INTRACAUDAL; PERINEURAL
Status: COMPLETED | OUTPATIENT
Start: 2023-05-24 | End: 2023-05-24

## 2023-05-24 RX ORDER — METHYLPREDNISOLONE ACETATE 80 MG/ML
80 INJECTION, SUSPENSION INTRA-ARTICULAR; INTRALESIONAL; INTRAMUSCULAR; SOFT TISSUE
Status: COMPLETED | OUTPATIENT
Start: 2023-05-24 | End: 2023-05-24

## 2023-05-24 RX ADMIN — LIDOCAINE HYDROCHLORIDE 5 ML: 10 INJECTION, SOLUTION EPIDURAL; INFILTRATION; INTRACAUDAL; PERINEURAL at 10:01

## 2023-05-24 RX ADMIN — METHYLPREDNISOLONE ACETATE 80 MG: 80 INJECTION, SUSPENSION INTRA-ARTICULAR; INTRALESIONAL; INTRAMUSCULAR; SOFT TISSUE at 10:01

## 2023-05-24 NOTE — PROGRESS NOTES
Patient: Nemesio Fitzpatrick  YOB: 1945 77 y.o. male  Medical Record Number: [id number]    Chief Complaints:   Chief Complaint   Patient presents with   • Right Knee - Initial Evaluation       History of Present Illness:Nemesio Fitzpatrick is a 77 y.o. male who presents with complaints of having right knee pain that is acute in nature.  Patient states approximately a week and a half ago he was bending over doing a lot of kneeling working with his puppies and later on that day started developing severe knee pain.  Denies any actual injury to the knee.  Patient reports the pain was described as a constant ache with certain movements it became sharp and stabbing in nature.  Patient states that he started taking Aleve and this time it took the edge off but did not take away all the pain symptoms.  Patient reports he had a lot of discomfort with flexion.  Now patient states the pain is more with full extension.  He reports he is able to weight-bear without any significant difficulties.  He denies any knee instability issues.  He denies any signs or symptoms of infection, and he is without any other significant complaints today.    Allergies:   Allergies   Allergen Reactions   • Iodine Unknown - High Severity     Difficulty breathing   • Molds & Smuts Unknown - High Severity   • Shellfish Allergy Unknown - High Severity     Burning inside   • Amoxicillin Rash       Medications:   Current Outpatient Medications   Medication Sig Dispense Refill   • levothyroxine (SYNTHROID, LEVOTHROID) 75 MCG tablet      • loratadine (CLARITIN) 10 MG tablet Take 1 tablet by mouth Daily.     • naproxen sodium (ALEVE) 220 MG tablet Take 1 tablet by mouth 2 (Two) Times a Day As Needed.     • zolpidem (AMBIEN) 5 MG tablet Take 1 tablet by mouth At Night As Needed for Sleep. 90 tablet 0   • cyclobenzaprine (FLEXERIL) 10 MG tablet Take 1 tablet by mouth 3 (Three) Times a Day As Needed for Muscle Spasms. (Patient not taking: Reported on  "5/24/2023) 90 tablet 1   • HYDROcodone-acetaminophen (NORCO) 5-325 MG per tablet Take 1 tablet by mouth Every 6 (Six) Hours As Needed for Moderate Pain. (Patient not taking: Reported on 5/24/2023) 30 tablet 0   • predniSONE (DELTASONE) 20 MG tablet Take 2 tablets by mouth Daily. (Patient not taking: Reported on 5/24/2023) 10 tablet 0     No current facility-administered medications for this visit.         The following portions of the patient's history were reviewed and updated as appropriate: allergies, current medications, past family history, past medical history, past social history, past surgical history and problem list.    Review of Systems:   Pertinent positives/negatives listed in HPI above    Physical Exam:   Vitals:    05/24/23 0920   Temp: 98 °F (36.7 °C)   Weight: 100 kg (220 lb 6.4 oz)   Height: 177.8 cm (70\")   PainSc:   8   PainLoc: Knee       General: A and O x 3, ASA, NAD      Knee Exam List: Knee:  right    ALIGNMENT:     Varus  ,   Patella  tracks  midline    GAIT:    Antalgic    SKIN:    No abnormality    RANGE OF MOTION:   0  -  130   DEG    STRENGTH:   4  / 5    LIGAMENTS:    No varus / valgus instability.   Negative  Lachman.    MENISCUS:     Negative   Fabrice       DISTAL PULSES:    Paplable    DISTAL SENSATION :   Intact    LYMPHATICS:     No   lymphadenopathy    OTHER:          - Positive   effusion      - Crepitance with ROM          Radiology:  Xrays 4views right knee (ap,lateral, sunrise, and PA flexion) were ordered and reviewed for evaluation of knee pain demonstrating moderate joint space narrowing to the medial and patellofemoral compartments.  He also has some periarticular osteophytes present to the patellofemoral compartment.  No other x-rays available for comparison purposes.    Assessment/Plan: Primary osteoarthritis right knee    Treatment options as well as imaging results were discussed in detail with the patient.  At this point in time we would like to proceed forward " with conservative measures.  Patient does have a moderate amount of arthritis in his right knee.  Patient seems to be exhibiting an arthritic flareup as he had no symptoms prior to this episode and his symptoms are slightly improved with anti-inflammatory medication, rest, and icing.  I have encouraged him to continue with treating it with anti-inflammatory medicines, rest, and ice.  I am also going to do an intra-articular joint injection today.  I do believe this will greatly help the patient's symptoms.  Should these symptoms progressively worsen or not significantly improved he can follow back up with me for a follow-up visit.    Large Joint Arthrocentesis: R knee  Date/Time: 5/24/2023 10:01 AM  Consent given by: patient  Site marked: site marked  Timeout: Immediately prior to procedure a time out was called to verify the correct patient, procedure, equipment, support staff and site/side marked as required   Supporting Documentation  Indications: pain and joint swelling   Procedure Details  Location: knee - R knee  Preparation: Patient was prepped and draped in the usual sterile fashion  Needle size: 22 G  Approach: anterolateral  Medications administered: 80 mg methylPREDNISolone acetate 80 MG/ML; 5 mL lidocaine PF 1% 1 %  Patient tolerance: patient tolerated the procedure well with no immediate complications            Miguel Garibay, ASHLEIGH  5/24/2023

## 2023-05-25 ENCOUNTER — PATIENT ROUNDING (BHMG ONLY) (OUTPATIENT)
Dept: ORTHOPEDIC SURGERY | Facility: CLINIC | Age: 78
End: 2023-05-25
Payer: MEDICARE

## 2023-05-25 NOTE — PROGRESS NOTES
A [employer] Message has been sent to the patient for PATIENT ROUNDING with [facility]

## 2023-09-20 ENCOUNTER — OFFICE VISIT (OUTPATIENT)
Dept: FAMILY MEDICINE CLINIC | Facility: CLINIC | Age: 78
End: 2023-09-20
Payer: MEDICARE

## 2023-09-20 VITALS
HEIGHT: 70 IN | HEART RATE: 88 BPM | SYSTOLIC BLOOD PRESSURE: 128 MMHG | BODY MASS INDEX: 30.39 KG/M2 | OXYGEN SATURATION: 98 % | WEIGHT: 212.3 LBS | DIASTOLIC BLOOD PRESSURE: 78 MMHG

## 2023-09-20 DIAGNOSIS — Z79.899 HIGH RISK MEDICATION USE: ICD-10-CM

## 2023-09-20 DIAGNOSIS — C61 MALIGNANT NEOPLASM PROSTATE: ICD-10-CM

## 2023-09-20 DIAGNOSIS — M54.32 SCIATICA OF LEFT SIDE: ICD-10-CM

## 2023-09-20 DIAGNOSIS — F51.01 PRIMARY INSOMNIA: ICD-10-CM

## 2023-09-20 DIAGNOSIS — Z00.00 MEDICARE ANNUAL WELLNESS VISIT, SUBSEQUENT: Primary | ICD-10-CM

## 2023-09-20 DIAGNOSIS — E03.9 ACQUIRED HYPOTHYROIDISM: ICD-10-CM

## 2023-09-20 DIAGNOSIS — J30.1 SEASONAL ALLERGIC RHINITIS DUE TO POLLEN: ICD-10-CM

## 2023-09-20 DIAGNOSIS — E78.2 MIXED HYPERLIPIDEMIA: ICD-10-CM

## 2023-09-20 DIAGNOSIS — R73.01 IFG (IMPAIRED FASTING GLUCOSE): ICD-10-CM

## 2023-09-20 RX ORDER — HYDROCODONE BITARTRATE AND ACETAMINOPHEN 5; 325 MG/1; MG/1
1 TABLET ORAL EVERY 6 HOURS PRN
Qty: 30 TABLET | Refills: 0 | Status: SHIPPED | OUTPATIENT
Start: 2023-09-20

## 2023-09-20 RX ORDER — ZOLPIDEM TARTRATE 5 MG/1
5 TABLET ORAL NIGHTLY PRN
Qty: 90 TABLET | Refills: 0 | Status: SHIPPED | OUTPATIENT
Start: 2023-09-20

## 2023-09-20 NOTE — PROGRESS NOTES
The ABCs of the Annual Wellness Visit  Subsequent Medicare Wellness Visit    Subjective    Nemesio Fitzpatrick is a 77 y.o. male who presents for a Subsequent Medicare Wellness Visit.    The following portions of the patient's history were reviewed and   updated as appropriate: allergies, current medications, past family history, past medical history, past social history, past surgical history, and problem list.    Compared to one year ago, the patient feels his physical   health is the same.    Compared to one year ago, the patient feels his mental   health is the same.    Recent Hospitalizations:  He was not admitted to the hospital during the last year.       Current Medical Providers:  Patient Care Team:  Sherry Mazariegos MD as PCP - General (Family Medicine)    Outpatient Medications Prior to Visit   Medication Sig Dispense Refill    levothyroxine (SYNTHROID, LEVOTHROID) 75 MCG tablet       loratadine (CLARITIN) 10 MG tablet Take 1 tablet by mouth Daily.      naproxen sodium (ALEVE) 220 MG tablet Take 1 tablet by mouth 2 (Two) Times a Day As Needed.      predniSONE (DELTASONE) 20 MG tablet Take 2 tablets by mouth Daily. 10 tablet 0    zolpidem (AMBIEN) 5 MG tablet Take 1 tablet by mouth At Night As Needed for Sleep. 90 tablet 0    cyclobenzaprine (FLEXERIL) 10 MG tablet Take 1 tablet by mouth 3 (Three) Times a Day As Needed for Muscle Spasms. (Patient not taking: Reported on 9/20/2023) 90 tablet 1    HYDROcodone-acetaminophen (NORCO) 5-325 MG per tablet Take 1 tablet by mouth Every 6 (Six) Hours As Needed for Moderate Pain. (Patient not taking: Reported on 9/20/2023) 30 tablet 0     No facility-administered medications prior to visit.       Opioid medication/s are on active medication list.  and I have evaluated his active treatment plan and pain score trends (see table).  There were no vitals filed for this visit.  I have reviewed the chart for potential of high risk medication and harmful drug interactions in the  "elderly.          Aspirin is not on active medication list.  Aspirin use is not indicated based on review of current medical condition/s. Risk of harm outweighs potential benefits.  .    Patient Active Problem List   Diagnosis    Mixed hyperlipidemia    Acquired hypothyroidism    IFG (impaired fasting glucose)    Malignant neoplasm prostate    Kidney calculus    Seasonal allergic rhinitis due to pollen    Sleep apnea    Chest pain    Benign paroxysmal positional vertigo due to bilateral vestibular disorder    Sciatica of left side    Acute bilateral low back pain without sciatica    Diffuse cervicobrachial syndrome    Displacement of cervical intervertebral disc    Injury of brachial plexus    Medicare annual wellness visit, subsequent     Advance Care Planning   Advance Care Planning     Advance Directive is not on file.  ACP discussion was held with the patient during this visit. Patient has an advance directive (not in EMR), copy requested.     Objective    Vitals:    23 0853   BP: 128/78   BP Location: Left arm   Patient Position: Sitting   Cuff Size: Adult   Pulse: 88   SpO2: 98%   Weight: 96.3 kg (212 lb 4.8 oz)   Height: 177.8 cm (70\")     Estimated body mass index is 30.46 kg/m² as calculated from the following:    Height as of this encounter: 177.8 cm (70\").    Weight as of this encounter: 96.3 kg (212 lb 4.8 oz).           Does the patient have evidence of cognitive impairment? No          HEALTH RISK ASSESSMENT    Smoking Status:  Social History     Tobacco Use   Smoking Status Former    Packs/day: 1.00    Years: 15.00    Pack years: 15.00    Types: Cigarettes, Pipe    Start date: 1967    Quit date: 1988    Years since quittin.7   Smokeless Tobacco Not on file   Tobacco Comments    Quit smoking pipe around      Alcohol Consumption:  Social History     Substance and Sexual Activity   Alcohol Use Yes    Comment: Rarely will drink one glass of wine     Fall Risk Screen:    STEADI " Fall Risk Assessment was completed, and patient is at MODERATE risk for falls. Assessment completed on:2023    Depression Screenin/20/2023     8:55 AM   PHQ-2/PHQ-9 Depression Screening   Little Interest or Pleasure in Doing Things 0-->not at all   Feeling Down, Depressed or Hopeless 0-->not at all   PHQ-9: Brief Depression Severity Measure Score 0       Health Habits and Functional and Cognitive Screenin/20/2023     8:00 AM   Functional & Cognitive Status   Do you have difficulty preparing food and eating? No   Do you have difficulty bathing yourself, getting dressed or grooming yourself? No   Do you have difficulty using the toilet? No   Do you have difficulty moving around from place to place? No   Do you have trouble with steps or getting out of a bed or a chair? No   Current Diet Well Balanced Diet   Dental Exam Up to date   Eye Exam Up to date   Exercise (times per week) 7 times per week   Current Exercises Include Walking;Yard Work   Do you need help using the phone?  No   Are you deaf or do you have serious difficulty hearing?  Yes   Do you need help to go to places out of walking distance? No   Do you need help shopping? No   Do you need help preparing meals?  No   Do you need help with housework?  No   Do you need help with laundry? No   Do you need help taking your medications? No   Do you need help managing money? No   Do you ever drive or ride in a car without wearing a seat belt? No   Have you felt unusual stress, anger or loneliness in the last month? No   Who do you live with? Spouse   If you need help, do you have trouble finding someone available to you? No   Have you been bothered in the last four weeks by sexual problems? No   Do you have difficulty concentrating, remembering or making decisions? No       Age-appropriate Screening Schedule:  Refer to the list below for future screening recommendations based on patient's age, sex and/or medical conditions. Orders for these  recommended tests are listed in the plan section. The patient has been provided with a written plan.    Health Maintenance   Topic Date Due    ZOSTER VACCINE (1 of 2) Never done    TDAP/TD VACCINES (2 - Td or Tdap) 11/16/2020    LIPID PANEL  01/18/2023    COVID-19 Vaccine (2 - Moderna series) 02/13/2023    COLORECTAL CANCER SCREENING  06/02/2023    INFLUENZA VACCINE  10/01/2023    BMI FOLLOWUP  05/24/2024    ANNUAL WELLNESS VISIT  09/20/2024    Pneumococcal Vaccine 65+  Completed    AAA SCREEN (ONE-TIME)  Completed    HEPATITIS C SCREENING  Discontinued                  CMS Preventative Services Quick Reference  Risk Factors Identified During Encounter  None Identified  The above risks/problems have been discussed with the patient.  Pertinent information has been shared with the patient in the After Visit Summary.  An After Visit Summary and PPPS were made available to the patient.    Follow Up:   Next Medicare Wellness visit to be scheduled in 1 year.       Additional E&M Note during same encounter follows:  Patient has multiple medical problems which are significant and separately identifiable that require additional work above and beyond the Medicare Wellness Visit.      Chief Complaint  Annual Exam ([facility] )    Subjective        HPI  Nemesio Fitzpatrick is also being seen today for     H/o prostate cancer- had prostate removed, follows with urology and is resolved. They follow psa.      Allergies- to mold, claritin helps and he feels this is all he needs.      hld- has never been on meds, diet controlled. [facility] and [facility] reports [facility] is good.      Hypothyroidism- no cold intolerance., doing well on meds, no fatigue, [facility].      High bs in the past, [facility] and is utd.      Insomnia- Rare use of ambien, trouble falling asleep. Does not need a refill.     Low back pain- rare use of meds and following with ortho.        Review of Systems   Respiratory:  Negative for shortness of breath.   "  Cardiovascular:  Negative for chest pain.     Objective   Vital Signs:  /78 (BP Location: Left arm, Patient Position: Sitting, Cuff Size: Adult)   Pulse 88   Ht 177.8 cm (70\")   Wt 96.3 kg (212 lb 4.8 oz)   SpO2 98%   BMI 30.46 kg/m²     Physical Exam  Constitutional:       Appearance: Normal appearance. He is well-developed.   Cardiovascular:      Rate and Rhythm: Normal rate and regular rhythm.      Heart sounds: Normal heart sounds.   Pulmonary:      Effort: Pulmonary effort is normal.      Breath sounds: Normal breath sounds.   Musculoskeletal:         General: No swelling. Normal range of motion.   Skin:     General: Skin is warm and dry.      Findings: No rash.   Neurological:      General: No focal deficit present.      Mental Status: He is alert and oriented to person, place, and time.   Psychiatric:         Mood and Affect: Mood normal.         Behavior: Behavior normal.                       Assessment and Plan   Diagnoses and all orders for this visit:    1. Medicare annual wellness visit, subsequent (Primary)    2. Malignant neoplasm prostate    3. IFG (impaired fasting glucose)  -     Hemoglobin A1c    4. Seasonal allergic rhinitis due to pollen    5. Mixed hyperlipidemia  -     Comprehensive Metabolic Panel  -     Lipid Panel    6. Acquired hypothyroidism  -     TSH Rfx On Abnormal To Free T4    7. Primary insomnia  -     zolpidem (AMBIEN) 5 MG tablet; Take 1 tablet by mouth At Night As Needed for Sleep.  Dispense: 90 tablet; Refill: 0    8. Sciatica of left side  -     HYDROcodone-acetaminophen (NORCO) 5-325 MG per tablet; Take 1 tablet by mouth Every 6 (Six) Hours As Needed for Moderate Pain.  Dispense: 30 tablet; Refill: 0    9. High risk medication use  -     Compliance Drug Analysis, Ur - Urine, Clean Catch             Follow Up   Return in about 1 year (around 9/20/2024) for Recheck, Medicare Wellness.  Patient was given instructions and counseling regarding his condition or for " health maintenance advice. Please see specific information pulled into the AVS if appropriate.       [facility] but wants to come here once a year. Following with ortho as well. Cont meds, pati, f/u in 3-12 months.

## 2023-09-21 ENCOUNTER — OFFICE VISIT (OUTPATIENT)
Dept: ORTHOPEDIC SURGERY | Facility: CLINIC | Age: 78
End: 2023-09-21
Payer: MEDICARE

## 2023-09-21 VITALS — BODY MASS INDEX: 30.62 KG/M2 | TEMPERATURE: 98.6 F | WEIGHT: 213.9 LBS | HEIGHT: 70 IN

## 2023-09-21 DIAGNOSIS — M75.81 ROTATOR CUFF TENDONITIS, RIGHT: Primary | ICD-10-CM

## 2023-09-21 DIAGNOSIS — M19.011 ARTHRITIS OF RIGHT ACROMIOCLAVICULAR JOINT: ICD-10-CM

## 2023-09-21 DIAGNOSIS — M25.511 RIGHT SHOULDER PAIN, UNSPECIFIED CHRONICITY: ICD-10-CM

## 2023-09-21 LAB
ALBUMIN SERPL-MCNC: NORMAL G/DL
ALP SERPL-CCNC: NORMAL U/L
ALT SERPL-CCNC: NORMAL U/L
AST SERPL-CCNC: NORMAL U/L
BILIRUB SERPL-MCNC: NORMAL MG/DL
BUN SERPL-MCNC: NORMAL MG/DL
CALCIUM SERPL-MCNC: NORMAL MG/DL
CHLORIDE SERPL-SCNC: NORMAL MMOL/L
CHOLEST SERPL-MCNC: NORMAL MG/DL
CO2 SERPL-SCNC: NORMAL MMOL/L
CREAT SERPL-MCNC: NORMAL MG/DL
GLUCOSE SERPL-MCNC: NORMAL MG/DL
HBA1C MFR BLD: 5.8 % (ref 4.8–5.6)
HDLC SERPL-MCNC: NORMAL MG/DL
POTASSIUM SERPL-SCNC: NORMAL MMOL/L
PROT SERPL-MCNC: NORMAL G/DL
SODIUM SERPL-SCNC: NORMAL MMOL/L
SPECIMEN STATUS: NORMAL
TRIGL SERPL-MCNC: NORMAL MG/DL
TSH SERPL DL<=0.005 MIU/L-ACNC: NORMAL UIU/ML
VLDLC SERPL CALC-MCNC: NORMAL MG/DL

## 2023-09-21 RX ADMIN — METHYLPREDNISOLONE ACETATE 1 MG: 80 INJECTION, SUSPENSION INTRA-ARTICULAR; INTRALESIONAL; INTRAMUSCULAR; SOFT TISSUE at 13:59

## 2023-09-21 RX ADMIN — LIDOCAINE HYDROCHLORIDE 2 ML: 10 INJECTION, SOLUTION EPIDURAL; INFILTRATION; INTRACAUDAL; PERINEURAL at 13:59

## 2023-09-21 NOTE — PROGRESS NOTES
General Exam    Patient: Nemesio Fitzpatrick    YOB: 1945    Medical Record Number: [id number]    Chief Complaints: Right shoulder pain    History of Present Illness:     77 y.o. male patient who presents for evaluation right shoulder pain patient states he has had symptoms for several months he states he had a fall in May which is about 4 months ago.  Has some difficulty reaching up and out.  He had some pain in his chest and flank but that is improved.  Some difficulty laying on that side.  Pain located superior and posteriorly in the shoulder blade area as well.    Denies any numbness or tingling.  Denies any fevers, cough or shortness of breath.    Allergies:   Allergies   Allergen Reactions    Iodine Unknown - High Severity     Difficulty breathing    Molds & Smuts Unknown - High Severity    Shellfish Allergy Unknown - High Severity     Burning inside    Amoxicillin Rash       Home Medications:      Current Outpatient Medications:     HYDROcodone-acetaminophen (NORCO) 5-325 MG per tablet, Take 1 tablet by mouth Every 6 (Six) Hours As Needed for Moderate Pain., Disp: 30 tablet, Rfl: 0    loratadine (CLARITIN) 10 MG tablet, Take 1 tablet by mouth Daily., Disp: , Rfl:     naproxen sodium (ALEVE) 220 MG tablet, Take 1 tablet by mouth 2 (Two) Times a Day As Needed., Disp: , Rfl:     zolpidem (AMBIEN) 5 MG tablet, Take 1 tablet by mouth At Night As Needed for Sleep., Disp: 90 tablet, Rfl: 0    levothyroxine (SYNTHROID, LEVOTHROID) 75 MCG tablet, , Disp: , Rfl:     Past Medical History:   Diagnosis Date    Acute bronchitis     Allergic Iodine    Allergic rhinitis     Arthritis 2000    BPH (benign prostatic hypertrophy)     Cervical disc disorder 1987    Dislocation, shoulder 1973    Elevated prostate specific antigen (PSA)     Headache 1990    History of bone scan 10/21/2010    normal    History of colonoscopy     never    History of EKG 03/12/2012    also 11-; T wave abnormality    History of medical  problems     HL (hearing loss)     Hyperlipidemia     Hypothyroidism     IFG (impaired fasting glucose)     Kidney calculus     left ureteral stone    Knee swelling 2015    Malignant neoplasm prostate 2009    Dr. Mcclelland; lymph nodes negative margins clear    Prostate nodule     right lobe    Rotator cuff syndrome 2009    Screening for prostate cancer     prostatectomy    Sleep apnea     CPAP machine    URI (upper respiratory infection)        Past Surgical History:   Procedure Laterality Date    CERVICAL DISC SURGERY      also ; C5-6, C6-7    HAND SURGERY Left     trigger finger release; left middle finger    NECK SURGERY  ,     Cervical disk    PROSTATECTOMY  2009    RHINOPLASTY  1985    SHOULDER SURGERY Left 2009    Dr. KRISTEL Jimenez; rotator cuff repair; bone spurs     SPINE SURGERY  2005    TRIGGER POINT INJECTION  ,,    URETERAL STENT INSERTION Left 2013    Dr. Martinez       Social History     Occupational History    Not on file   Tobacco Use    Smoking status: Former     Packs/day: 1.00     Years: 15.00     Pack years: 15.00     Types: Cigarettes, Pipe     Start date: 1967     Quit date: 1988     Years since quittin.7    Smokeless tobacco: Not on file    Tobacco comments:     Quit smoking pipe around    Vaping Use    Vaping Use: Never used   Substance and Sexual Activity    Alcohol use: Yes     Comment: Rarely will drink one glass of wine    Drug use: Never    Sexual activity: Not Currently     Partners: Female      Social History     Social History Narrative    Not on file       Family History   Problem Relation Age of Onset    Diabetes Mother     Kidney disease Mother         calculus    Hearing loss Mother     Cancer Sister         breast    Hypothyroidism Sister     Kidney disease Sister         calculus    Arthritis Sister     Transient ischemic attack Brother     Alzheimer's disease Other         uncle    Diabetes  "Other         uncle       Review of Systems:      Constitutional: Denies fever, shaking or chills         All other pertinent positives and negatives as noted above in HPI.    Physical Exam: 77 y.o. male    Vitals:    09/21/23 1324   Temp: 98.6 °F (37 °C)   TempSrc: Temporal   Weight: 97 kg (213 lb 14.4 oz)   Height: 177.8 cm (70\")       General:  Patient is awake and alert.  Appears in no acute distress or discomfort.      Musculoskeletal/Extremities:    Right upper extremity examined no tenderness palpation.  Overall range of motion full but some difficulty with abduction due to discomfort.  Rotator cuff strength appears intact.  Motor and sensory tact distally.         Radiology:       3 views right shoulder AP, scapular Y and axillary taken reviewed to evaluate the patient's complaint/s.    Imaging demonstrates some mild degenerative changes of glenohumeral joint moderate degenerative changes AC joint.  No acute fractures noted.     No imaging for comparison.    Assessment: Right shoulder rotator cuff tendinitis, AC joint osteoarthritis      Plan:      Recommend conservative treatment at this time consisting of rest, ice, anti-inflammatory medication if can take and tolerate.  We will do an injection or some physical therapy.  If patient is still having symptoms in the next 4 to 6 weeks in regards to pain and/or function he will give us a call may consider MRI and possible referral to shoulder specialist if warranted.     All questions answered.    ..Large Joint Arthrocentesis: R subacromial bursa  Date/Time: 9/21/2023 1:59 PM  Consent given by: patient  Site marked: site marked  Timeout: Immediately prior to procedure a time out was called to verify the correct patient, procedure, equipment, support staff and site/side marked as required   Supporting Documentation  Indications: pain   Procedure Details  Location: shoulder - R subacromial bursa  Preparation: Patient was prepped and draped in the usual sterile " fashion  Needle gauge: 21 G.  Approach: posterior  Medications administered: 2 mL lidocaine PF 1% 1 %; 1 mg methylPREDNISolone acetate 80 MG/ML  Patient tolerance: patient tolerated the procedure well with no immediate complications           We will plan for follow up prn.    All questions were answered.  Patient understands and agrees with the plan.    Lev Powell MD    09/21/2023    CC to Sherry Mazariegos MD      Answers submitted by the patient for this visit:  Primary Reason for Visit (Submitted on 9/19/2023)  What is the primary reason for your visit?: Other  Other (Submitted on 9/19/2023)  Please describe your symptoms.: Right shoulder pain also feel pain in upper ribs on right side and chest.  Occasional numbness in right arm.  Have you had these symptoms before?: Yes  How long have you been having these symptoms?: Greater than 2 weeks  Please list any medications you are currently taking for this condition.: Aleve  Please describe any probable cause for these symptoms. : Did have fall while running some months back and landed on extended right arm. Pain seemed to diminish for a time but has returned.

## 2023-09-22 ENCOUNTER — TELEPHONE (OUTPATIENT)
Dept: FAMILY MEDICINE CLINIC | Facility: CLINIC | Age: 78
End: 2023-09-22
Payer: MEDICARE

## 2023-09-22 NOTE — TELEPHONE ENCOUNTER
----- Message from Sherry Mazariegos MD sent at 9/22/2023  1:07 PM EDT -----  Please call and let the patient know that it appears that the lab did not collect enough blood to be able to run a specimen.  I have only had this happen maybe 1 or 2 other times in my career.  Your A1c was good at 5.8 and they did not have enough blood to run anything else.  [facility] I do not think you need to come back and recollect.  Just make sure you are getting routine labs with them.

## 2023-09-22 NOTE — TELEPHONE ENCOUNTER
Relay    I tried calling the patient but did not get an answer. I left a brief message for him to call back. If he calls back please let him know that:    It appears that the lab did not collect enough blood to be able to run a specimen.  Dr Mazariegos has only had this happen maybe 1 or 2 other times in her career. We could tell that your A1c was good at 5.8 however they did not have enough blood to run anything else.  Since [facility] Mazariegos does not think you need to come back and recollect.  Just make sure you are getting routine labs with them. Please let us know if you have further questions.

## 2023-09-24 RX ORDER — METHYLPREDNISOLONE ACETATE 80 MG/ML
1 INJECTION, SUSPENSION INTRA-ARTICULAR; INTRALESIONAL; INTRAMUSCULAR; SOFT TISSUE
Status: COMPLETED | OUTPATIENT
Start: 2023-09-21 | End: 2023-09-21

## 2023-09-24 RX ORDER — LIDOCAINE HYDROCHLORIDE 10 MG/ML
2 INJECTION, SOLUTION EPIDURAL; INFILTRATION; INTRACAUDAL; PERINEURAL
Status: COMPLETED | OUTPATIENT
Start: 2023-09-21 | End: 2023-09-21

## 2023-09-27 LAB — DRUGS UR: NORMAL

## 2024-03-05 ENCOUNTER — OFFICE VISIT (OUTPATIENT)
Dept: ORTHOPEDIC SURGERY | Facility: CLINIC | Age: 79
End: 2024-03-05
Payer: MEDICARE

## 2024-03-05 VITALS — TEMPERATURE: 98.6 F | HEIGHT: 70 IN | BODY MASS INDEX: 30.82 KG/M2 | WEIGHT: 215.3 LBS

## 2024-03-05 DIAGNOSIS — M25.552 LEFT HIP PAIN: ICD-10-CM

## 2024-03-05 DIAGNOSIS — M70.62 GREATER TROCHANTERIC BURSITIS OF LEFT HIP: Primary | ICD-10-CM

## 2024-03-05 PROCEDURE — 1160F RVW MEDS BY RX/DR IN RCRD: CPT | Performed by: ORTHOPAEDIC SURGERY

## 2024-03-05 PROCEDURE — 73502 X-RAY EXAM HIP UNI 2-3 VIEWS: CPT | Performed by: ORTHOPAEDIC SURGERY

## 2024-03-05 PROCEDURE — 99214 OFFICE O/P EST MOD 30 MIN: CPT | Performed by: ORTHOPAEDIC SURGERY

## 2024-03-05 PROCEDURE — 1159F MED LIST DOCD IN RCRD: CPT | Performed by: ORTHOPAEDIC SURGERY

## 2024-03-05 NOTE — PROGRESS NOTES
General Exam    Patient: Nemesio Fitzpatrick    YOB: 1945    Medical Record Number: [id number]    Chief Complaints:  Left hip pain     History of Present Illness:     78 y.o. male patient who presents for evaluation left hip pain.  States that symptoms for little while now the last few weeks but more noticeable.  States symptoms do come and go.  Pain is mainly located laterally.  Denies any groin pain.  Worse with increased activity hard to lay on that side at times and stairs can be bothersome.  He is back on his walking some but continues to have some symptoms.  Patient states she does take some Aleve which helps.    Denies any numbness or tingling.  Denies any fevers, cough or shortness of breath.    Allergies:   Allergies   Allergen Reactions    Iodine Unknown - High Severity     Difficulty breathing    Molds & Smuts Unknown - High Severity    Shellfish Allergy Unknown - High Severity     Burning inside    Amoxicillin Rash       Home Medications:      Current Outpatient Medications:     HYDROcodone-acetaminophen (NORCO) 5-325 MG per tablet, Take 1 tablet by mouth Every 6 (Six) Hours As Needed for Moderate Pain., Disp: 30 tablet, Rfl: 0    levothyroxine (SYNTHROID, LEVOTHROID) 75 MCG tablet, , Disp: , Rfl:     loratadine (CLARITIN) 10 MG tablet, Take 1 tablet by mouth Daily., Disp: , Rfl:     naproxen sodium (ALEVE) 220 MG tablet, Take 1 tablet by mouth 2 (Two) Times a Day As Needed., Disp: , Rfl:     zolpidem (AMBIEN) 5 MG tablet, Take 1 tablet by mouth At Night As Needed for Sleep., Disp: 90 tablet, Rfl: 0    Past Medical History:   Diagnosis Date    Acute bronchitis     Allergic Iodine    Allergic rhinitis     Arthritis 2000    BPH (benign prostatic hypertrophy)     Cervical disc disorder 1987    Dislocation, shoulder 1973    Elevated prostate specific antigen (PSA)     Headache 1990    History of bone scan 10/21/2010    normal    History of colonoscopy     never    History of EKG 03/12/2012    also  2010; T wave abnormality    History of medical problems     HL (hearing loss)     Hyperlipidemia     Hypothyroidism     IFG (impaired fasting glucose)     Kidney calculus     left ureteral stone    Knee swelling 2015    Malignant neoplasm prostate 2009    Dr. Mcclelland; lymph nodes negative margins clear    Prostate nodule     right lobe    Rotator cuff syndrome 2009    Screening for prostate cancer     prostatectomy    Sleep apnea     CPAP machine    URI (upper respiratory infection)        Past Surgical History:   Procedure Laterality Date    CERVICAL DISC SURGERY      also ; C5-6, C6-7    HAND SURGERY Left     trigger finger release; left middle finger    NECK SURGERY  ,     Cervical disk    PROSTATECTOMY  2009    RHINOPLASTY  1985    SHOULDER SURGERY Left 2009    Dr. KRISTEL Jimenez; rotator cuff repair; bone spurs     SPINE SURGERY  2005    TRIGGER POINT INJECTION  ,,    URETERAL STENT INSERTION Left 2013    Dr. Martinez       Social History     Occupational History    Not on file   Tobacco Use    Smoking status: Former     Current packs/day: 0.00     Average packs/day: 1 pack/day for 22.0 years (22.0 ttl pk-yrs)     Types: Cigarettes, Pipe     Start date: 1967     Quit date: 1988     Years since quittin.2     Passive exposure: Past    Smokeless tobacco: Never    Tobacco comments:     Quit smoking pipe around    Vaping Use    Vaping status: Never Used   Substance and Sexual Activity    Alcohol use: Yes     Comment: Rarely will drink one glass of wine    Drug use: Never    Sexual activity: Not Currently     Partners: Female      Social History     Social History Narrative    Not on file       Family History   Problem Relation Age of Onset    Diabetes Mother     Kidney disease Mother         calculus    Hearing loss Mother     Cancer Sister         breast    Hypothyroidism Sister     Kidney disease Sister         calculus     "Arthritis Sister     Transient ischemic attack Brother     Alzheimer's disease Other         uncle    Diabetes Other         uncle       Review of Systems:      Constitutional: Denies fever, shaking or chills         All other pertinent positives and negatives as noted above in HPI.    Physical Exam: 78 y.o. male    Vitals:    03/05/24 1041   Temp: 98.6 °F (37 °C)   Weight: 97.7 kg (215 lb 4.8 oz)   Height: 177.8 cm (70\")       General:  Patient is awake and alert.  Appears in no acute distress or discomfort.      Musculoskeletal/Extremities:    Left lower extremity examined positive tenderness over the greater trochanter.  Gentle hip range of motion well-tolerated.  Negative straight leg raise and Stinchfield.  Motor and sensory intact distally.         Radiology:       3 views left hip AP pelvis, AP and lateral taken reviewed to evaluate the patient's complaint/s.    Imaging demonstrates some mild degenerative changes of the hip joints with some early bone sclerosis.  No acute fractures noted.     No imaging for comparison.    Assessment: Left hip greater trochanter bursitis      Plan:      I discussed the findings with the patient I think is more soft tissue in nature.  Recommend rest, ice, active modification, anti-inflammatory medication, and formal physical therapy.  Told the patient this can take 6 to 8 weeks to resolve when treated appropriately and can return thus the importance of therapy.  If he still struggling over the next 3 to 4 weeks may consider injection if warranted.             We will plan for follow up as needed.    All questions were answered.  Patient understands and agrees with the plan.    Lev Powell MD    03/05/2024    CC to Sherry Mazariegos MD        Answers submitted by the patient for this visit:  Primary Reason for Visit (Submitted on 3/1/2024)  What is the primary reason for your visit?: Other  Other (Submitted on 3/1/2024)  Please describe your symptoms.: Pain in left hip " joint  Have you had these symptoms before?: No  How long have you been having these symptoms?: Greater than 2 weeks  Please list any medications you are currently taking for this condition.: Aleve, Tylenol  Please describe any probable cause for these symptoms. : Old age

## 2024-03-11 ENCOUNTER — OFFICE VISIT (OUTPATIENT)
Dept: FAMILY MEDICINE CLINIC | Facility: CLINIC | Age: 79
End: 2024-03-11
Payer: MEDICARE

## 2024-03-11 VITALS
OXYGEN SATURATION: 98 % | BODY MASS INDEX: 30.84 KG/M2 | HEART RATE: 78 BPM | HEIGHT: 70 IN | WEIGHT: 215.4 LBS | DIASTOLIC BLOOD PRESSURE: 98 MMHG | TEMPERATURE: 98 F | SYSTOLIC BLOOD PRESSURE: 180 MMHG

## 2024-03-11 DIAGNOSIS — M25.512 CHRONIC PAIN OF BOTH SHOULDERS: Primary | ICD-10-CM

## 2024-03-11 DIAGNOSIS — M25.511 CHRONIC PAIN OF BOTH SHOULDERS: Primary | ICD-10-CM

## 2024-03-11 DIAGNOSIS — G89.29 CHRONIC PAIN OF BOTH SHOULDERS: Primary | ICD-10-CM

## 2024-03-11 DIAGNOSIS — M94.0 COSTOCHONDRITIS: ICD-10-CM

## 2024-03-11 PROCEDURE — 99214 OFFICE O/P EST MOD 30 MIN: CPT | Performed by: NURSE PRACTITIONER

## 2024-03-11 PROCEDURE — 1160F RVW MEDS BY RX/DR IN RCRD: CPT | Performed by: NURSE PRACTITIONER

## 2024-03-11 PROCEDURE — 1159F MED LIST DOCD IN RCRD: CPT | Performed by: NURSE PRACTITIONER

## 2024-03-11 RX ORDER — PREDNISONE 5 MG/1
1 TABLET ORAL TAKE AS DIRECTED
Qty: 21 TABLET | Refills: 0 | Status: SHIPPED | OUTPATIENT
Start: 2024-03-11 | End: 2024-03-17

## 2024-03-11 NOTE — PROGRESS NOTES
"Chief Complaint  Shoulder Pain (W hip and back and chest pain/non cardiac)    Subjective        Nemesio Fitzpatrick presents to [facility]  History of Present Illness  Patient is here to discuss a pain he has in the mid chest area, he has had an evaluation of this pain in the past, including stress test and referral to cardiology. He has also has some upper chest and shoulder pain bilaterally for 6-8 weeks. He states he thinks it is from over extending due to having a puppy who was dragging him for some time. History of left shoulder replacement and also steroid injection in right shoulder. He does have pending physical therapy due to left hip bursitis. He generally takes 1/2 of an alleve and 1/2 of a tylenol in the morning and night. Occasionally he takes a 1/4 of a norco at night for severe pain. Has some old cyclobenzaprine he used but the side effects seem to be severe so he wasn't happy with how that went.     In last year he has had multiple issues with his health in last year. 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   Objective   Vital Signs:  /98 (BP Location: Left arm, Patient Position: Sitting, Cuff Size: Adult)   Pulse 78   Temp 98 °F (36.7 °C)   Ht 177.8 cm (70\")   Wt 97.7 kg (215 lb 6.4 oz)   SpO2 98%   BMI 30.91 kg/m²   Estimated body mass index is 30.91 kg/m² as calculated from the following:    Height as of this encounter: 177.8 cm (70\").    Weight as of this encounter: 97.7 kg (215 lb 6.4 oz).               Physical Exam  Constitutional:       Appearance: Normal appearance.   HENT:      Head: Normocephalic.   Eyes:      General:         Right eye: Discharge present.         Left eye: Discharge present.     Extraocular Movements: Extraocular movements intact.      Pupils: Pupils are equal, round, and reactive to light.      Comments: Clear discharge from both eyes during visit   Cardiovascular:      Heart sounds: Normal heart " sounds.   Pulmonary:      Effort: Pulmonary effort is normal.      Breath sounds: Normal breath sounds.   Musculoskeletal:         General: Normal range of motion.      Cervical back: Normal range of motion.   Skin:     General: Skin is warm and dry.   Neurological:      General: No focal deficit present.      Mental Status: He is alert and oriented to person, place, and time.   Psychiatric:         Mood and Affect: Mood normal.         Behavior: Behavior normal.        Result Review :                     Assessment and Plan     Diagnoses and all orders for this visit:    1. Chronic pain of both shoulders (Primary)  -     predniSONE 5 MG (21) tablet therapy pack dose pack; Take 1 tablet by mouth Take As Directed for 6 days. Take as directed on package instructions.  Dispense: 21 tablet; Refill: 0  -     Ambulatory Referral to Physical Therapy    2. Costochondritis      Patient is advised to take cyclobenzaprine previously prescribed at night if desired.       Follow Up     Return if symptoms worsen or fail to improve.  Patient was given instructions and counseling regarding his condition or for health maintenance advice. Please see specific information pulled into the AVS if appropriate.

## 2024-03-12 ENCOUNTER — TREATMENT (OUTPATIENT)
Dept: PHYSICAL THERAPY | Facility: CLINIC | Age: 79
End: 2024-03-12
Payer: MEDICARE

## 2024-03-12 DIAGNOSIS — M70.72 BURSITIS OF LEFT HIP, UNSPECIFIED BURSA: Primary | ICD-10-CM

## 2024-03-12 DIAGNOSIS — M25.552 LEFT HIP PAIN: ICD-10-CM

## 2024-03-12 NOTE — PROGRESS NOTES
Physical Therapy Initial Evaluation and Plan of Care  [facility]  [address], [address]  [place] [address]    Patient: Nemesio Fitzpatrick   : 1945  Diagnosis/ICD-10 Code:  Bursitis of left hip, unspecified bursa [M70.72]  Referring practitioner: Lev Powell MD  Today's Date: 3/12/2024    Subjective Evaluation    History of Present Illness  Mechanism of injury: Pt being seen for insidious onset of left lateral hip pain several weeks ago. Pt was previously walking 4-5 miles/day, now only able to walk <30' min until pain.    Pt has extensive [facility] medical history including but not limited to multiple cervical surgeries, inoperable lumbar cyst, RTC surgery (recent steroid injection, this week), history of vertigo, and reports chronic pain.      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    Treatments  Previous treatment: physical therapy  Current treatment: medication and physical therapy  Patient Goals  Patient goal: pain free walking           Objective          Tenderness     Left Hip   Tenderness in the greater trochanter, iliac crest and inguinal ligament.     Neurological Testing     Sensation     Hip   Left Hip   Intact: light touch    Right Hip   Intact: light touch    Reflexes   Left   Patellar (L4): normal (2+)  Achilles (S1): normal (2+)    Right   Patellar (L4): normal (2+)  Achilles (S1): normal  (2+)    Additional Neurological Details  Pt reports min L/R discrepancy in light touch sensation (L less sensitive)    Active Range of Motion   Left Hip   Flexion: 100 degrees with pain  Extension: WFL  Abduction: Active abduction with hip flexed: lacking 25% with pain  External rotation (90/90): with pain  Internal rotation (90/90): WFL    Right Hip   Flexion: 120 degrees   Extension: WFL  Abduction: WFL  External rotation (90/90): WFL    Strength/Myotome Testing     Left Hip   Normal muscle strength    Right Hip   Normal muscle strength    Additional Strength Details  L hip mod painful w/ full strength MMT into abduction and ER    Tests     Left Hip   Positive RYDER.   Modified Lev: Negative.   SLR: Negative.     Right Hip   Negative RYDER.   Modified Lev: Negative.   SLR: Negative.       Pt education - bursitis, activity limitations, pacing     Assessment & Plan       Assessment  Impairments: abnormal or restricted ROM, activity intolerance, lacks appropriate home exercise program and pain with function   Functional limitations: lifting, walking, pushing, uncomfortable because of pain, sitting and standing   Assessment details:     Pt has s/s consistent with, but not limited to, diagnosis of left hip bursitis. Pt reports his tolerance for walking has significantly decreased in recent weeks and has frequent sharp pain and point tenderness at the lateral hip with motions involving abduction and external rotation.     Pt states he has had shooting pain down the front of his leg before, but this is not typical and radicular s/s seem unlikely at this point.    Pt also has pain at iliac crest of unknown origin. Will continue to monitor and follow up on this.  Prognosis: good    Goals  Plan Goals: STG:  Pt will have <5/10 pain.  Pt will have no worsening of symptoms.  Pt will tolerate initial exercises.    LTG:  Pt will have no pain w/ ADLs  Pt will have LE AROM WNL and pain free  Pt will tolerate walking >1 mile  w/ no hip problems    Plan  Planned modality interventions: cryotherapy, iontophoresis, TENS, thermotherapy (hydrocollator packs), ultrasound and electrical stimulation/Russian stimulation  Planned therapy interventions: abdominal trunk stabilization, body mechanics training, flexibility, functional ROM exercises, gait training, home exercise program, joint mobilization, manual therapy, soft tissue mobilization, spinal/joint mobilization, strengthening, stretching and therapeutic activities  Frequency: 2x week  Duration in visits: 8  Duration in weeks: 4  Treatment plan discussed with: patient        Manual Therapy:    10     mins  46826;  Therapeutic Exercise:    15     mins  65273;     Neuromuscular Beto:    0    mins  80829;    Therapeutic Activity:     0     mins  10521;     Ultrasound                  __0_  mins  98128  Iontophoresis                 0    mins  61197    Electrical Stimulation     0    mins  70907 ([id number])  Traction                         _0  mins  99634     Evaluation Time:     35  mins  Timed Treatment:   25   mins   Total Treatment:     60   mins    PT: SHAILESH Cancino License Number:  [id number]  Electronically signed by José Miguel Nascimento PT, 03/12/24, 10:20 AM EDT    Certification Period: 3/12/2024 thru 6/9/2024  I certify that the therapy services are furnished while this patient is under my care.  The services outlined above are required by this patient, and will be reviewed every 90 days.         Physician Signature:__________________________________________________    PHYSICIAN: Lev Powell MD      DATE:     Please sign and return via fax to [email or web address] . Thank you, [facility].    PT SIGNATURE: SHAILESH Cancino LICENSE: [id number]

## 2024-03-13 NOTE — PROGRESS NOTES
I have reviewed the notes, assessments, and/or procedures performed by José Miguel Nascimento P.T.  I concur with his documentation of Nemesio Fitzpatrick.       Christelle Blake, PT  [place] License [id number]

## 2024-03-19 ENCOUNTER — TREATMENT (OUTPATIENT)
Dept: PHYSICAL THERAPY | Facility: CLINIC | Age: 79
End: 2024-03-19
Payer: MEDICARE

## 2024-03-19 DIAGNOSIS — M25.552 LEFT HIP PAIN: Primary | ICD-10-CM

## 2024-03-19 DIAGNOSIS — M76.32 ILIOTIBIAL BAND SYNDROME OF LEFT SIDE: ICD-10-CM

## 2024-03-19 DIAGNOSIS — M70.72 BURSITIS OF LEFT HIP, UNSPECIFIED BURSA: ICD-10-CM

## 2024-03-19 NOTE — PROGRESS NOTES
I have reviewed the notes, assessments, and/or procedures performed by José Miguel Nascimento P.T.  I concur with his documentation of Nemesio Fitzpatrick.       Christelle Blake, PT  [place] License [id number]

## 2024-03-19 NOTE — PROGRESS NOTES
"Physical Therapy Daily Treatment Note  [facility] [place]   [address] [place] [address]  P: [phone] F: [phone]    Patient: Nemesio Fitzpatrick   : 1945  Referring practitioner: Lev Powell MD  Date of Initial Visit: Type: THERAPY  Noted: 3/12/2024  Today's Date: 3/19/2024  Patient seen for 2 sessions       Visit Diagnoses:    ICD-10-CM ICD-9-CM   1. Left hip pain  M25.552 719.45   2. Bursitis of left hip, unspecified bursa  M70.72 726.5   3. Iliotibial band syndrome of left side  M76.32 728.89         Subjective:  Nemesio Fitzpatrick reports: Some pain in L hip since last appt, but manageable w/ NSAIDs. Pt reports he recently finished oral steroid pack and had noticed some min regression in s/s but has had Rx before so was expecting this effect.      Objective          Tenderness     Additional Tenderness Details  Mild tenderness at L lower lumbar musculature, QL, iliac crest, and GT      Lumbar Flexibility Comments:   Pt reports history of inoperable lumbar cyst and has some tightness observed during LTR exercise that is presumably related.      See Exercise, Manual, and Modality Logs for complete treatment.     Pt education - Bursitis, ITB syndrome, steroid effects, activity modification, HEP printed and reviewed, pacing     Assessment:  Pt reports min exacerbation of hip s/s after stretching and light exercise, primarily involving hip abduction and glute med / TFL strengthening. Pt states that he can tell \"we are working the right spot\" and was advised to exercise to the point of fatigue, but not pain. Next session may advance lateral walking based on pt tolerance.      Plan:   Continue to monitor and progress.        Timed:         Manual Therapy:    10     mins  83735;     Therapeutic Exercise:    25     mins  57828;     Neuromuscular Beto:    15    mins  23527;    Therapeutic Activity:     10    mins  12052;     Ultrasound:     0     mins  05817;  "   Ionto                               0    mins  89722    Un-Timed:  Electrical Stimulation:    0     mins  57446 ( );  Traction     0     mins 52929        Timed Treatment:   60   mins   Total Treatment:     60   mins    SHAILESH Cancino License #: [id number]    Physical Therapist

## 2024-03-21 ENCOUNTER — TREATMENT (OUTPATIENT)
Dept: PHYSICAL THERAPY | Facility: CLINIC | Age: 79
End: 2024-03-21
Payer: MEDICARE

## 2024-03-21 DIAGNOSIS — M76.32 ILIOTIBIAL BAND SYNDROME OF LEFT SIDE: ICD-10-CM

## 2024-03-21 DIAGNOSIS — M70.72 BURSITIS OF LEFT HIP, UNSPECIFIED BURSA: ICD-10-CM

## 2024-03-21 DIAGNOSIS — M25.552 LEFT HIP PAIN: Primary | ICD-10-CM

## 2024-03-21 NOTE — PROGRESS NOTES
Physical Therapy Daily Treatment Note  [facility] [place]   [address] [place] [address]  P: [phone] F: [phone]    Patient: Nemesio Fitzpatrick   : 1945  Referring practitioner: Lev Powell MD  Date of Initial Visit: Type: THERAPY  Noted: 3/12/2024  Today's Date: 3/21/2024  Patient seen for 3 sessions       Visit Diagnoses:    ICD-10-CM ICD-9-CM   1. Left hip pain  M25.552 719.45   2. Bursitis of left hip, unspecified bursa  M70.72 726.5   3. Iliotibial band syndrome of left side  M76.32 728.89         Subjective:  Nemesio Fitzpatrick reports: Hip is feeling much better. States home treatment program is going well and he notices improvement when he does his exercise routine.      Objective          Strength/Myotome Testing     Additional Strength Details  Noted improvement w/ quality of motion and dynamic stability w/ L hip abduction based on observation during exercise.      Tests     Additional Tests Details  Single leg stance time to failure, best of 3 attempts: R 20 seconds; L 6 seconds        See Exercise, Manual, and Modality Logs for complete treatment.     Pt education: Bursitis, ITB syndrome, steroid effects, activity modification, pacing     Assessment:  Pt reports general improvement w/ pain management, endurance, and strength. Progressed hip abduction exercises to sidelying today with no negative effect.       Plan:   Continue to monitor and progress.        Timed:         Manual Therapy:    10   mins  46805;     Therapeutic Exercise:    25     mins  58975;     Neuromuscular Beto:    10    mins  10082;    Therapeutic Activity:     15     mins  44309;     Ultrasound:     0     mins  00096;    Ionto                               0    mins  80240    Un-Timed:  Electrical Stimulation:    0     mins  57109 ( );  Traction     0     mins 72692        Timed Treatment:   60   mins   Total Treatment:     60   mins    José Miguel Nascimento PT  [place] License #:  [id number]    Physical Therapist

## 2024-03-25 ENCOUNTER — TREATMENT (OUTPATIENT)
Dept: PHYSICAL THERAPY | Facility: CLINIC | Age: 79
End: 2024-03-25
Payer: MEDICARE

## 2024-03-25 DIAGNOSIS — M25.552 LEFT HIP PAIN: Primary | ICD-10-CM

## 2024-03-25 DIAGNOSIS — M76.32 ILIOTIBIAL BAND SYNDROME OF LEFT SIDE: ICD-10-CM

## 2024-03-25 DIAGNOSIS — M70.72 BURSITIS OF LEFT HIP, UNSPECIFIED BURSA: ICD-10-CM

## 2024-03-25 NOTE — PROGRESS NOTES
Physical Therapy Daily Treatment Note  [facility] [place]   [address] [place] [address]  P: [phone] F: [phone]    Patient: Nemesio Fitzpatrick   : 1945  Referring practitioner: Lev Powell MD  Date of Initial Visit: Type: THERAPY  Noted: 3/12/2024  Today's Date: 3/25/2024  Patient seen for 4 sessions       Visit Diagnoses:    ICD-10-CM ICD-9-CM   1. Left hip pain  M25.552 719.45   2. Bursitis of left hip, unspecified bursa  M70.72 726.5   3. Iliotibial band syndrome of left side  M76.32 728.89         Subjective:  Nemesio Fitzpatrick reports: Min/no pain and soreness this week. Pleased w/ PT progress, reports feeling stronger and having more endurance.      Objective          Tenderness     Additional Tenderness Details  Reduced tenderness at GT compared to previous visit    Strength/Myotome Testing     Left Hip   Planes of Motion   Abduction: 4    Right Hip   Planes of Motion   Abduction: 4+      See Exercise, Manual, and Modality Logs for complete treatment.       Assessment:  Pt shows noted improvement w/ activity tolerance and endurance w/ motions involving hip abd based on observation during exercise. Dynamic balance and strength improved w/ treatment progression.      Plan:     Continue to monitor and progress.        Timed:         Manual Therapy:    10     mins  05923;     Therapeutic Exercise:    15     mins  76638;     Neuromuscular Beto:    15    mins  93306;    Therapeutic Activity:     15     mins  62984;     Ultrasound:     0     mins  62474;    Ionto                               0    mins  27947    Un-Timed:  Electrical Stimulation:    0     mins  32118 ( );  Traction     0     mins 07390        Timed Treatment:   55   mins   Total Treatment:     55   mins    José Miguel Nascimento, PT  [place] License #: [id number]    Physical Therapist

## 2024-03-25 NOTE — PROGRESS NOTES
I have reviewed the notes, assessments, and/or procedures performed by José Miguel Nascimento, I concur with her/his documentation of Nemesio Fitzpatrick.

## 2024-03-28 ENCOUNTER — TREATMENT (OUTPATIENT)
Dept: PHYSICAL THERAPY | Facility: CLINIC | Age: 79
End: 2024-03-28
Payer: MEDICARE

## 2024-03-28 DIAGNOSIS — M25.552 LEFT HIP PAIN: Primary | ICD-10-CM

## 2024-03-28 DIAGNOSIS — M70.72 BURSITIS OF LEFT HIP, UNSPECIFIED BURSA: ICD-10-CM

## 2024-03-28 DIAGNOSIS — M76.32 ILIOTIBIAL BAND SYNDROME OF LEFT SIDE: ICD-10-CM

## 2024-03-28 NOTE — PROGRESS NOTES
Physical Therapy Daily Treatment Note  [facility] [place]   [address] [place] [address]  P: [phone] F: [phone]    Patient: Nemesio Fitzpatrick   : 1945  Referring practitioner: Lev Powell MD  Date of Initial Visit: Type: THERAPY  Noted: 3/12/2024  Today's Date: 2024  Patient seen for 5 sessions       Visit Diagnoses:    ICD-10-CM ICD-9-CM   1. Left hip pain  M25.552 719.45   2. Bursitis of left hip, unspecified bursa  M70.72 726.5   3. Iliotibial band syndrome of left side  M76.32 728.89         Subjective:  Nemesio Fitzpatrick reports: Hip feels 80% improved. Pt reports he was cutting up a walnut tree at home and had little/no trouble from his hip, but is having some pain in his R shoulder again.      Objective          Strength/Myotome Testing     Left Hip   Normal muscle strength    Right Hip   Normal muscle strength    Additional Strength Details  Noted improvement w/ L hip abd strength       See Exercise, Manual, and Modality Logs for complete treatment.       Assessment:  Pt demonstrates significant improvement w/ hip s/s. Subjective report indicates pt is diligent w/ HEP and is pleased w/ progress so far. Noted increase w/ pain-free activity tolerance involving hip abd based on observation during exercise.      Plan:   Continue to monitor and progress.        Timed:         Manual Therapy:    0     mins  27737;     Therapeutic Exercise:    25     mins  77270;     Neuromuscular Beto:    15    mins  61713;    Therapeutic Activity:     15     mins  98174;     Ultrasound:     0     mins  42762;    Ionto                               0    mins  96522    Un-Timed:  Electrical Stimulation:    0     mins  47008 ( );  Traction     0     mins 16189        Timed Treatment:   55   mins   Total Treatment:     55   mins    José Miguel Nascimento, PT  [place] License #: [id number]    Physical Therapist

## 2024-04-01 ENCOUNTER — TREATMENT (OUTPATIENT)
Dept: PHYSICAL THERAPY | Facility: CLINIC | Age: 79
End: 2024-04-01
Payer: MEDICARE

## 2024-04-01 DIAGNOSIS — M25.552 LEFT HIP PAIN: Primary | ICD-10-CM

## 2024-04-01 DIAGNOSIS — M70.72 BURSITIS OF LEFT HIP, UNSPECIFIED BURSA: ICD-10-CM

## 2024-04-01 DIAGNOSIS — M76.32 ILIOTIBIAL BAND SYNDROME OF LEFT SIDE: ICD-10-CM

## 2024-04-01 DIAGNOSIS — M89.8X1 CHRONIC SCAPULAR PAIN: ICD-10-CM

## 2024-04-01 DIAGNOSIS — G89.29 CHRONIC SCAPULAR PAIN: ICD-10-CM

## 2024-04-01 NOTE — PROGRESS NOTES
Physical Therapy Daily Treatment Note  [facility] [place]   [address] [place] [address]  P: [phone] F: [phone]    Patient: Nemesio Fitzpatrick   : 1945  Referring practitioner: Lev Powell MD  Date of Initial Visit: Type: THERAPY  Noted: 3/12/2024  Today's Date: 2024  Patient seen for 6 sessions       Visit Diagnoses:    ICD-10-CM ICD-9-CM   1. Left hip pain  M25.552 719.45   2. Bursitis of left hip, unspecified bursa  M70.72 726.5   3. Iliotibial band syndrome of left side  M76.32 728.89   4. Chronic scapular pain  M89.8X1 733.90    G89.29 338.29         Subjective:  Nemesio Fitzpatrick reports: No pain or undue soreness since previous visit. Hip continues to improve. Home exercises going well. Still having pain in mid/upper back between shoulders.      Objective           General Comments     Cervical/Thoracic Comments  PIVM reveals tenderness along medial border of L scapula at T4-5 with grade II springing. Pt reports relief after light stretching/strengthening     Pt education: Hip and shoulder exercises, PT plan     See Exercise, Manual, and Modality Logs for complete treatment.     Assessment:  Continued improvement w/ L hip activity tolerance and endurance. Pt reports he is able to walk 4-5 miles before having noticing any discomfort, even then pain in minimal. Progressed exercise regimen to include bird dog for hip and shoulder with no negative effect.      Plan:   Continue to monitor and progress.        Timed:         Manual Therapy:    0     mins  73033;     Therapeutic Exercise:    25     mins  45277;     Neuromuscular Beto:    20    mins  54155;    Therapeutic Activity:     15     mins  25653;     Ultrasound:     0     mins  05108;    Ionto                               0    mins  52877    Un-Timed:  Electrical Stimulation:    0     mins  36337 (MC );  Traction     0     mins 55028        Timed Treatment:   60   mins   Total Treatment:      60   mins    José Miguel Nascimento, PT  [place] License #: [id number]    Physical Therapist

## 2024-04-02 NOTE — PROGRESS NOTES
I have reviewed the notes, assessments, and/or procedures performed by José Miguel Nascimento P.T.  I concur with his documentation of Nemesio Fitzpatrick.       Christelle Blake, PT  [place] License [id number]

## 2024-04-04 ENCOUNTER — OFFICE VISIT (OUTPATIENT)
Dept: FAMILY MEDICINE CLINIC | Facility: CLINIC | Age: 79
End: 2024-04-04
Payer: MEDICARE

## 2024-04-04 ENCOUNTER — HOSPITAL ENCOUNTER (OUTPATIENT)
Dept: GENERAL RADIOLOGY | Facility: HOSPITAL | Age: 79
Discharge: HOME | End: 2024-04-04
Admitting: NURSE PRACTITIONER
Payer: MEDICARE

## 2024-04-04 VITALS
BODY MASS INDEX: 30.84 KG/M2 | TEMPERATURE: 96.1 F | HEIGHT: 70 IN | DIASTOLIC BLOOD PRESSURE: 80 MMHG | HEART RATE: 99 BPM | OXYGEN SATURATION: 96 % | SYSTOLIC BLOOD PRESSURE: 155 MMHG | WEIGHT: 215.4 LBS

## 2024-04-04 DIAGNOSIS — M54.6 ACUTE BILATERAL THORACIC BACK PAIN: ICD-10-CM

## 2024-04-04 DIAGNOSIS — R03.0 BLOOD PRESSURE ELEVATED WITHOUT HISTORY OF HTN: ICD-10-CM

## 2024-04-04 DIAGNOSIS — R09.81 CONGESTION OF NASAL SINUS: Primary | ICD-10-CM

## 2024-04-04 LAB
EXPIRATION DATE: NORMAL
FLUAV AG UPPER RESP QL IA.RAPID: NOT DETECTED
FLUBV AG UPPER RESP QL IA.RAPID: NOT DETECTED
INTERNAL CONTROL: NORMAL
Lab: NORMAL
SARS-COV-2 AG UPPER RESP QL IA.RAPID: NOT DETECTED

## 2024-04-04 PROCEDURE — 87428 SARSCOV & INF VIR A&B AG IA: CPT | Performed by: NURSE PRACTITIONER

## 2024-04-04 PROCEDURE — 1160F RVW MEDS BY RX/DR IN RCRD: CPT | Performed by: NURSE PRACTITIONER

## 2024-04-04 PROCEDURE — 99214 OFFICE O/P EST MOD 30 MIN: CPT | Performed by: NURSE PRACTITIONER

## 2024-04-04 PROCEDURE — 72070 X-RAY EXAM THORAC SPINE 2VWS: CPT

## 2024-04-04 PROCEDURE — 1159F MED LIST DOCD IN RCRD: CPT | Performed by: NURSE PRACTITIONER

## 2024-04-04 RX ORDER — CYCLOBENZAPRINE HCL 5 MG
5 TABLET ORAL 2 TIMES DAILY PRN
Qty: 20 TABLET | Refills: 0 | Status: SHIPPED | OUTPATIENT
Start: 2024-04-04

## 2024-04-04 NOTE — PROGRESS NOTES
"Chief Complaint  Pain (He is following up from last visit. He is still experiencing pain.) and Nasal Congestion (He has had congestion the past couple of days)    Subjective        Nemesio Fitzpatrick presents to [facility]  History of Present Illness  Patient is here to disuss runny nose, sinus pressure, throat irritation, and dry cough. He does feel it may related to allergies. He takes daily claritin. He has a history of sinus surgery when he was younger.     Patient states his main reason for coming is his back pain. He is now having middle upper back pain and is much worse at night, and not getting any sleep. Turning his head triggers the discomfort as well. He has been seeing physical therapist for hip and shoulder. He is taking tylenol with some relief. The steroid given for shoulder was helpful. He states he hesitates to take muscle relaxers due to side effects.     States his bp is up due to the excitement of getting to his appointment. He takes bp readings at home and reports 120-130's systolic at home. Rechecked while in office and got 155/80 manual.     Objective   Vital Signs:  /80 (BP Location: Left arm, Patient Position: Sitting, Cuff Size: Large Adult)   Pulse 99   Temp 96.1 °F (35.6 °C) (Temporal)   Ht 177.8 cm (70\")   Wt 97.7 kg (215 lb 6.4 oz)   SpO2 96%   BMI 30.91 kg/m²   Estimated body mass index is 30.91 kg/m² as calculated from the following:    Height as of this encounter: 177.8 cm (70\").    Weight as of this encounter: 97.7 kg (215 lb 6.4 oz).               Physical Exam  Constitutional:       Appearance: Normal appearance.   HENT:      Head: Normocephalic.      Nose: Congestion and rhinorrhea present.   Eyes:      Extraocular Movements: Extraocular movements intact.      Pupils: Pupils are equal, round, and reactive to light.   Cardiovascular:      Rate and Rhythm: Normal rate and regular rhythm.      Heart sounds: Normal heart sounds.   Pulmonary:      " Effort: Pulmonary effort is normal.      Breath sounds: Normal breath sounds.   Musculoskeletal:         General: Normal range of motion.      Cervical back: Normal range of motion.   Skin:     General: Skin is warm and dry.   Neurological:      General: No focal deficit present.      Mental Status: He is alert and oriented to person, place, and time.   Psychiatric:         Mood and Affect: Mood normal.        Result Review :                     Assessment and Plan     Diagnoses and all orders for this visit:    1. Congestion of nasal sinus (Primary)  -     POCT SARS-CoV-2 Antigen VICENTA + Flu    2. Acute bilateral thoracic back pain  -     XR Spine Thoracic 2 View; Future  -     cyclobenzaprine (FLEXERIL) 5 MG tablet; Take 1 tablet by mouth 2 (Two) Times a Day As Needed for Muscle Spasms.  Dispense: 20 tablet; Refill: 0  -     Diclofenac Sodium (VOLTAREN) 1 % gel gel; Apply 4 g topically to the appropriate area as directed 3 (Three) Times a Day.  Dispense: 100 g; Refill: 2    3. Blood pressure elevated without history of HTN      Patient is to continue to monitor bp at home. If readings stay above 150/90 he is to follow up to discuss.        Follow Up     Return if symptoms worsen or fail to improve.  Patient was given instructions and counseling regarding his condition or for health maintenance advice. Please see specific information pulled into the AVS if appropriate.

## 2024-04-05 DIAGNOSIS — M51.34 THORACIC DEGENERATIVE DISC DISEASE: Primary | ICD-10-CM

## 2024-04-05 DIAGNOSIS — M41.9 SCOLIOSIS OF THORACIC SPINE, UNSPECIFIED SCOLIOSIS TYPE: ICD-10-CM

## 2024-04-09 ENCOUNTER — TELEPHONE (OUTPATIENT)
Dept: ORTHOPEDIC SURGERY | Facility: CLINIC | Age: 79
End: 2024-04-09
Payer: MEDICARE

## 2024-04-10 NOTE — TELEPHONE ENCOUNTER
Called patient to schedule. Patient stated he is not him with his calendar and requested a call tomorrow 4/11/24

## 2024-04-16 NOTE — PROGRESS NOTES
Patient Name: Nemesio Fitzpatrick   YOB: 1945  Referring Primary Care Physician: Sherry Mazariegos MD      Chief Complaint:    Chief Complaint   Patient presents with    Thoracic Spine - Initial Evaluation        Previous Treatment:     MRI of T - Spine: no  MRI of C-Spine: no    PT for back pain: yes, not effective     12/27/2022 - [facility] NEUROS[facility]GERY - Devon Painting MD (Lumbar Spine)       Back Pain  This is a chronic problem. The current episode started more than 1 year ago. The problem occurs constantly. The problem has been worse since onset. The pain is present in the thoracic spine. The quality of the pain is described as stabbing (numbness). Radiates to: bilateral. The pain is at a severity of 8/10. The pain is severe. The pain is Worse during the night. The symptoms are aggravated by sitting and position (laying down). Stiffness is present At night. Treatments tried: PT. The treatment provided no relief.   Additional comments: Also tried Medrol Dospak, temporary relief  Pain when he touches his chest       HPI:  Nemesio Fitzpatrick is a 78 y.o. male who presents to [facility] for evaluation of mid back pain.  He says he was recently in physical therapy for left hip bursitis.  He was doing well with therapy in regards to the hip discussed some mid back complaints with the therapist so some focus was on that pain as well.  Unfortunately some the exercises actually aggravated the mid back pain.  He describes pain as a burning sensation that even refers into the chest and sternum.  He is tender to touch in his pectoral muscles, sternum and intercostal spaces.  He has seen cardiology for this complaint and diagnosed with what sounds like costochondritis.  He says the symptoms actually run in his family.  No injury associated with current symptoms.  No bowel or bladder dysfunction or saddle anesthesia.  No imbalance or incoordination.  2 cervical  surgeries in the 1980s and again in 2002.  It sounds like his initial surgery was actually for myelopathy.  He reports paresthesia in the right thigh after his initial cervical fusion.  Recently completed a Medrol Dosepak which did not affect his pattern of back pain.  This is an established patient to the practice, new to me.  Recently saw Dr Powell for the bursitis and shoulder tendinitis.  Prior pertinent records were reviewed.    PFSH:  See attached    ROS: As per HPI, otherwise negative    Objective:      78 y.o. male  Body mass index is 31.22 kg/m²., 98.7 kg (217 lb 9.6 oz), @HT@  Vitals:    04/17/24 1037   Temp: 97.8 °F (36.6 °C)     Pain Score    04/17/24 1037   PainSc:   8   PainLoc: Back            Spine Musculoskeletal Exam    Gait    Gait is normal.    Inspection    Coronal balance: no imbalance    Sagittal balance: no imbalance    Palpation    Cervical Spine    Right      Muscle tone: normal    Left      Muscle tone: normal    Thoracolumbar    Right      Muscle tone: normal    Left      Muscle tone: normal    Strength    Cervical Spine    Cervical spine motor exam is normal.    Thoracolumbar    Thoracolumbar motor exam is normal.       Sensory    Cervical Spine    Cervical spine sensation is normal.    Thoracolumbar    Thoracolumbar sensation is normal.    Reflexes    Right        Biceps: 1/4      Brachioradialis: 1/4      Triceps: 1/4      Quadriceps: 1/4      Achilles: 1/4       Hector: absent    Left        Biceps: 1/4        Brachioradialis: 1/4      Triceps: 1/4      Quadriceps: 1/4      Achilles: 1/4      Hector: absent    Special Tests    Thoracolumbar      Right      SLR: no back or leg pain      Left      SLR: no back or leg pain    General      Constitutional: well-developed and well-nourished    Scleral icterus: no    Labored breathing: no    Psychiatric: normal mood and affect and no acute distress    Neurological: alert and oriented x3    Skin: intact        IMAGING:       No imaging in  office today.  AP and lateral thoracic films 04/04/2024 reviewed and revealed multilevel degenerative change including osteophyte formation and degenerative change most notable mid thoracic spine, no subluxation or fractures noted.  Agree with report.    Assessment:           Diagnoses and all orders for this visit:    1. Chronic mid back pain (Primary)  -     MRI Thoracic Spine Without Contrast; Future             Plan:  For the mid back pain, will get thoracic MRI to rule out cord or nerve root impingement.  He has no myelopathic findings or loss of nerve function on exam today.  May be candidate for injection therapy although may refer back to physical therapy for more focused attention on the back.  Regarding the tenderness to the chest wall, that is not a typical radicular complaint and likely unrelated to the thoracic spine.  Would recommend he discuss this with his PCP pending MRI results.  Will have him follow-up after MRI to go over the images and discuss next level of treatment.    Return for After radiographic tests.    EMR Dragon/Transcription Disclaimer:   Much of this encounter note is an electronic transcription/translation of spoken language to printed text. The electronic translation of spoken language may permit erroneous, or at times, nonsensical words or phrases to be inadvertently transcribed; Although I have reviewed the note for such errors, some may still exist.  Red flags have been discussed at this or previous visits to include but not limited weakness in extremities, worsening pain that does not respond to conservative treatment and bowel or bladder dysfunction. These are reasons to present to ER and patient has been informed.    The diagnosis(es), natural history, pathophysiology and treatment for diagnosis(es) were discussed. Opportunity given and questions answered. Biomechanics of pertinent body areas discussed.    EXERCISES:  Advice on benefits of, and types of regular/moderate  exercise pertaining to diagnosis.  Continue HEP. For back or neck pain, recommend pilates and or yoga as tolerated. Generally it is best to start any new exercise under the guidance of a  or therapist.   MEDICATIONS:  When prescribe, the risks, benefits, warnings,side effects and alternatives of medications discussed. Advised against long term use of narcotics.   PAIN CONTROL:  Cold, heat, OTC lidocaine patches and/or ointment as needed. Avoid direct skin contact with ice. Ice 15-20 minutes 3-4 times daily as needed. For SI joint pain, recommend ice bath in water about 50 degrees for 5 consecutive days, add ice slowly to help with adjustment and may cover with warm towel or robe to help with cold tolerance. If using lidocaine, do not apply heat in conjunction as this can cause a burn.   MEDICAL RECORDS reviewed from other provider(s) for past and current medical history pertinent to this visit..

## 2024-04-17 ENCOUNTER — OFFICE VISIT (OUTPATIENT)
Dept: ORTHOPEDIC SURGERY | Facility: CLINIC | Age: 79
End: 2024-04-17
Payer: MEDICARE

## 2024-04-17 VITALS — BODY MASS INDEX: 31.15 KG/M2 | WEIGHT: 217.6 LBS | HEIGHT: 70 IN | TEMPERATURE: 97.8 F

## 2024-04-17 DIAGNOSIS — M54.9 CHRONIC MID BACK PAIN: Primary | ICD-10-CM

## 2024-04-17 DIAGNOSIS — G89.29 CHRONIC MID BACK PAIN: Primary | ICD-10-CM

## 2024-04-17 PROCEDURE — 1159F MED LIST DOCD IN RCRD: CPT | Performed by: NURSE PRACTITIONER

## 2024-04-17 PROCEDURE — 99213 OFFICE O/P EST LOW 20 MIN: CPT | Performed by: NURSE PRACTITIONER

## 2024-04-17 PROCEDURE — 1160F RVW MEDS BY RX/DR IN RCRD: CPT | Performed by: NURSE PRACTITIONER

## 2024-04-25 ENCOUNTER — TELEPHONE (OUTPATIENT)
Dept: ORTHOPEDIC SURGERY | Facility: CLINIC | Age: 79
End: 2024-04-25
Payer: MEDICARE

## 2024-04-25 ENCOUNTER — HOSPITAL ENCOUNTER (OUTPATIENT)
Dept: MRI IMAGING | Facility: HOSPITAL | Age: 79
Discharge: HOME | End: 2024-04-25
Admitting: NURSE PRACTITIONER
Payer: MEDICARE

## 2024-04-25 DIAGNOSIS — M54.9 CHRONIC MID BACK PAIN: ICD-10-CM

## 2024-04-25 DIAGNOSIS — G89.29 CHRONIC MID BACK PAIN: ICD-10-CM

## 2024-04-25 PROCEDURE — 72146 MRI CHEST SPINE W/O DYE: CPT

## 2024-04-25 NOTE — TELEPHONE ENCOUNTER
[facility] called to enquire if a scan including contrast should be added to the MRI Tspine w/o that was previously ordered, after noticing the patient's hx of prostate cancer. MRI staff stated a message requesting approval was sent to the ordering provider, Ute Stallings, however, unbeknownst to them, she was out of the office so no reply was received. The patient's chart was evaluated and there was no specific mention of a cancer concern in prior office notes associated with this T-spine encounter, and the scan was specifically ordered by Ute to be performed without contrast. Although, their efforts were appreciated, given the information from the patient's chart and the fact the ordering provider was not present to confer, MRI staff was advised to perform the scan as originally ordered. MRI T-spine w/o contrast.

## 2024-04-26 ENCOUNTER — TELEPHONE (OUTPATIENT)
Dept: ORTHOPEDIC SURGERY | Facility: CLINIC | Age: 79
End: 2024-04-26
Payer: MEDICARE

## 2024-04-26 DIAGNOSIS — D49.2 THORACIC SPINE TUMOR: Primary | ICD-10-CM

## 2024-04-26 DIAGNOSIS — C41.2 MALIGNANT NEOPLASM OF VERTEBRAL COLUMN: ICD-10-CM

## 2024-04-26 NOTE — TELEPHONE ENCOUNTER
Discussed results with radiologist and called patient to discuss. Will order whole body PET scan to start. Patient understands he will likely need biopsy and further testing to find a primary source of the lesions. Message sent to PCP and as it is late on a Friday, patient aware he will not likely receive a follow up prior to Monday. He was advised to avoid lifting greater than 10-15 pounds, avoid straining and present to ER if he has any sudden increase in pain as he is at risk for vertebral and/or rib fractures.

## 2024-05-02 ENCOUNTER — LAB (OUTPATIENT)
Dept: LAB | Facility: HOSPITAL | Age: 79
End: 2024-05-02
Payer: MEDICARE

## 2024-05-02 ENCOUNTER — CONSULT (OUTPATIENT)
Dept: ONCOLOGY | Facility: CLINIC | Age: 79
End: 2024-05-02
Payer: MEDICARE

## 2024-05-02 VITALS
SYSTOLIC BLOOD PRESSURE: 207 MMHG | RESPIRATION RATE: 14 BRPM | HEART RATE: 95 BPM | OXYGEN SATURATION: 97 % | TEMPERATURE: 98.4 F | HEIGHT: 70 IN | BODY MASS INDEX: 30.56 KG/M2 | DIASTOLIC BLOOD PRESSURE: 102 MMHG | WEIGHT: 213.5 LBS

## 2024-05-02 DIAGNOSIS — C61 MALIGNANT NEOPLASM PROSTATE: Primary | ICD-10-CM

## 2024-05-02 DIAGNOSIS — M89.8X8 MASS OF SPINE: ICD-10-CM

## 2024-05-02 DIAGNOSIS — C41.2 MALIGNANT NEOPLASM OF VERTEBRAL COLUMN: Primary | ICD-10-CM

## 2024-05-02 LAB
ALBUMIN SERPL-MCNC: 4.6 G/DL (ref 3.5–5.2)
ALBUMIN/GLOB SERPL: 1.5 G/DL
ALP SERPL-CCNC: 103 U/L (ref 39–117)
ALT SERPL W P-5'-P-CCNC: 52 U/L (ref 1–41)
ANION GAP SERPL CALCULATED.3IONS-SCNC: 16.3 MMOL/L (ref 5–15)
AST SERPL-CCNC: 40 U/L (ref 1–40)
BASOPHILS # BLD AUTO: 0.04 10*3/MM3 (ref 0–0.2)
BASOPHILS NFR BLD AUTO: 0.8 % (ref 0–1.5)
BILIRUB SERPL-MCNC: 0.7 MG/DL (ref 0–1.2)
BUN SERPL-MCNC: 23 MG/DL (ref 8–23)
BUN/CREAT SERPL: 24.7 (ref 7–25)
CALCIUM SPEC-SCNC: 9.8 MG/DL (ref 8.6–10.5)
CHLORIDE SERPL-SCNC: 99 MMOL/L (ref 98–107)
CO2 SERPL-SCNC: 23.7 MMOL/L (ref 22–29)
CREAT SERPL-MCNC: 0.93 MG/DL (ref 0.76–1.27)
DEPRECATED RDW RBC AUTO: 40.4 FL (ref 37–54)
EGFRCR SERPLBLD CKD-EPI 2021: 85.1 ML/MIN/1.73
EOSINOPHIL # BLD AUTO: 0.05 10*3/MM3 (ref 0–0.4)
EOSINOPHIL NFR BLD AUTO: 1 % (ref 0.3–6.2)
ERYTHROCYTE [DISTWIDTH] IN BLOOD BY AUTOMATED COUNT: 13 % (ref 12.3–15.4)
GLOBULIN UR ELPH-MCNC: 3.1 GM/DL
GLUCOSE SERPL-MCNC: 164 MG/DL (ref 65–99)
HCT VFR BLD AUTO: 43.1 % (ref 37.5–51)
HGB BLD-MCNC: 14.7 G/DL (ref 13–17.7)
IMM GRANULOCYTES # BLD AUTO: 0.04 10*3/MM3 (ref 0–0.05)
IMM GRANULOCYTES NFR BLD AUTO: 0.8 % (ref 0–0.5)
LYMPHOCYTES # BLD AUTO: 1.06 10*3/MM3 (ref 0.7–3.1)
LYMPHOCYTES NFR BLD AUTO: 21 % (ref 19.6–45.3)
MCH RBC QN AUTO: 29.6 PG (ref 26.6–33)
MCHC RBC AUTO-ENTMCNC: 34.1 G/DL (ref 31.5–35.7)
MCV RBC AUTO: 86.9 FL (ref 79–97)
MONOCYTES # BLD AUTO: 0.38 10*3/MM3 (ref 0.1–0.9)
MONOCYTES NFR BLD AUTO: 7.5 % (ref 5–12)
NEUTROPHILS NFR BLD AUTO: 3.48 10*3/MM3 (ref 1.7–7)
NEUTROPHILS NFR BLD AUTO: 68.9 % (ref 42.7–76)
NRBC BLD AUTO-RTO: 0 /100 WBC (ref 0–0.2)
PLATELET # BLD AUTO: 165 10*3/MM3 (ref 140–450)
PMV BLD AUTO: 9.2 FL (ref 6–12)
POTASSIUM SERPL-SCNC: 3.7 MMOL/L (ref 3.5–5.2)
PROT SERPL-MCNC: 7.7 G/DL (ref 6–8.5)
PSA SERPL-MCNC: <0.014 NG/ML (ref 0–4)
RBC # BLD AUTO: 4.96 10*6/MM3 (ref 4.14–5.8)
SODIUM SERPL-SCNC: 139 MMOL/L (ref 136–145)
WBC NRBC COR # BLD AUTO: 5.05 10*3/MM3 (ref 3.4–10.8)

## 2024-05-02 PROCEDURE — 80053 COMPREHEN METABOLIC PANEL: CPT | Performed by: INTERNAL MEDICINE

## 2024-05-02 PROCEDURE — 85025 COMPLETE CBC W/AUTO DIFF WBC: CPT

## 2024-05-02 PROCEDURE — 36415 COLL VENOUS BLD VENIPUNCTURE: CPT | Performed by: INTERNAL MEDICINE

## 2024-05-02 PROCEDURE — 84153 ASSAY OF PSA TOTAL: CPT | Performed by: INTERNAL MEDICINE

## 2024-05-02 RX ORDER — IBUPROFEN 200 MG
200 TABLET ORAL EVERY 6 HOURS PRN
COMMUNITY

## 2024-05-02 NOTE — PROGRESS NOTES
05/10/24 0001   Pre-Procedure Phone Call   Procedure Time Verified Yes   Arrival Time 0730   Procedure Location Verified Yes   Medical History Reviewed No   NPO Status Reinforced Yes   Ride and Caregiver Arranged Yes   Patient Knows to Bring Current Medications   (No changes in medications since last reviewed. Patient with contrast allergy needs 13 hr protocol.)   Bring Outside Films Requested   (MRI Thoracic Spine 4/25/24 in PACS)

## 2024-05-02 NOTE — H&P (VIEW-ONLY)
Subjective     REASON FOR CONSULTATION:  spine mass  Provide an opinion on any further workup or treatment                             REQUESTING PRACTITIONER:  Chandrakant    RECORDS OBTAINED:  Records of the patients history including those obtained from the referring provider were reviewed and summarized in detail.      History of Present Illness:  This is a pleasant 78-year-old man with impaired fasting glucose, hyperlipidemia, hypothyroidism and history of prostate cancer.  The patient has been experiencing about a 1 year history of progressive back pain in the upper mid thoracic spine stabbing in nature and radiating bilaterally anteriorly.  Symptoms are worse when the patient lays down to rest at night.  He has also had some pain around the right shoulder blade.  He was treated with PT with no improvement.  The patient has also been having left hip pain.  The patient was evaluated by orthopedic surgery and an MRI of the thoracic spine without contrast was performed on 4/25/2024 and showed a large expansile marrow replacing mass along the anterior T4 vertebral body 3.2 x 2.1 cm extending into the anterior paraspinal soft tissue along the T3-T4 space with reactive bone marrow edema.  Another partially visualized expansile mass was seen in the posterior right second rib 2.7 x 1.5 cm and another in the posterior sixth rib 1 cm with surrounding bone marrow edema.  At T7-T8 there is a posterior disc protrusion with mild to moderate canal stenosis.  In the posterior right lung a 3.5 cm mass was noted with additional small nodules also seen but poorly characterized.    The patient denies weight loss, night sweats, shortness of breath, cough, hemoptysis, headaches, confusion, changes in swallowing bowel habits urinary habits.  He has history of prostate cancer treated with prostatectomy in 2009 and reports negligible PSA values.    The patient has history of light smoking during his 20s and 30s.  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    Past Medical History:   Diagnosis Date    Acute bronchitis     Allergic Iodine    Allergic rhinitis     Arthritis 2000    BPH (benign prostatic hypertrophy)     Cervical disc disorder 1987    Dislocation, shoulder 1973    Elevated prostate specific antigen (PSA)     Headache 1990    History of bone scan 10/21/2010    normal    History of colonoscopy     never    History of EKG 03/12/2012    also 11-; T wave abnormality    History of medical problems 1978    HL (hearing loss) 2000    Hyperlipidemia     Hypothyroidism     IFG (impaired fasting glucose)     Kidney calculus     left ureteral stone    Knee swelling 2015    Malignant neoplasm prostate 11/09/2009    Dr. Mcclelland; lymph nodes negative margins clear    Prostate nodule     right lobe    Rotator cuff syndrome 2009    Screening for prostate cancer     prostatectomy    Sleep apnea     CPAP machine    URI (upper respiratory infection)         Past Surgical History:   Procedure Laterality Date    CERVICAL DISC SURGERY  1990    also 2006; C5-6, C6-7    HAND SURGERY Left 2001    trigger finger release; left middle finger    NECK SURGERY  1988, 2005    Cervical disk    PROSTATECTOMY  11/09/2009    RHINOPLASTY  1985    ROTATOR CUFF REPAIR      SHOULDER SURGERY Left 09/29/2009    Dr. KRISTEL Jimenez; rotator cuff repair; bone spurs     SPINE SURGERY  2005    TRIGGER POINT INJECTION  2008,2021,2022    URETERAL STENT INSERTION Left 12/04/2013    Dr. Martinez        Current Outpatient Medications on File Prior to Visit   Medication Sig Dispense Refill    Diclofenac Sodium (VOLTAREN) 1 % gel gel Apply 4 g topically to the appropriate area as directed 3 (Three) Times a Day. 100 g 2    HYDROcodone-acetaminophen (NORCO) 5-325 MG per tablet Take 1 tablet by mouth Every 6 (Six) Hours As Needed for Moderate Pain. 30 tablet 0    ibuprofen (ADVIL,MOTRIN) 200 MG tablet Take 1 tablet by mouth Every 6 (Six) Hours As Needed for Mild Pain.       levothyroxine (SYNTHROID, LEVOTHROID) 75 MCG tablet       loratadine (CLARITIN) 10 MG tablet Take 1 tablet by mouth Daily.      zolpidem (AMBIEN) 5 MG tablet Take 1 tablet by mouth At Night As Needed for Sleep. 90 tablet 0    cyclobenzaprine (FLEXERIL) 5 MG tablet Take 1 tablet by mouth 2 (Two) Times a Day As Needed for Muscle Spasms. (Patient not taking: Reported on 2024) 20 tablet 0    naproxen sodium (ALEVE) 220 MG tablet Take 1 tablet by mouth 2 (Two) Times a Day As Needed. (Patient not taking: Reported on 2024)       No current facility-administered medications on file prior to visit.        ALLERGIES:    Allergies   Allergen Reactions    Iodine Unknown - High Severity     Difficulty breathing    Molds & Smuts Unknown - High Severity    Shellfish Allergy Unknown - High Severity     Burning inside    Amoxicillin Rash        Social History     Socioeconomic History    Marital status:      Spouse name: Faith   Tobacco Use    Smoking status: Former     Current packs/day: 0.00     Average packs/day: 1 pack/day for 22.0 years (22.0 ttl pk-yrs)     Types: Cigarettes, Pipe     Start date: 1967     Quit date: 1988     Years since quittin.3     Passive exposure: Past    Smokeless tobacco: Never    Tobacco comments:     Quit smoking pipe around    Vaping Use    Vaping status: Never Used   Substance and Sexual Activity    Alcohol use: Yes     Comment: Rarely will drink one glass of wine    Drug use: Never    Sexual activity: Not Currently     Partners: Female        Family History   Problem Relation Age of Onset    Diabetes Mother     Kidney disease Mother         calculus    Hearing loss Mother     Cancer Sister         breast    Hypothyroidism Sister     Kidney disease Sister         calculus    Arthritis Sister     Transient ischemic attack Brother     Alzheimer's disease Other         uncle    Diabetes Other         uncle        Review of Systems   Constitutional: Negative.   "  HENT: Negative.     Respiratory: Negative.     Cardiovascular: Negative.    Gastrointestinal: Negative.    Genitourinary: Negative.    Musculoskeletal:  Positive for back pain.   Neurological: Negative.    Hematological: Negative.    Psychiatric/Behavioral:  Positive for dysphoric mood. The patient is nervous/anxious.           Objective     Vitals:    05/02/24 0901 05/02/24 0903   BP: (!) 230/107 (!) 207/102   Pulse: 95    Resp: 14    Temp: 98.4 °F (36.9 °C)    TempSrc: Temporal    SpO2: 97%    Weight: 96.8 kg (213 lb 8 oz)    Height: 177.8 cm (70\")    PainSc:   2    PainLoc: Back  Comment: upper back between shoulder blades          5/2/2024     9:05 AM   Current Status   ECOG score 0       Physical Exam    CONSTITUTIONAL: pleasant well-developed adult man  HEENT: no icterus, no thrush, moist membranes  LYMPH: no cervical or supraclavicular lad  CV: RRR, S1S2, no murmur  RESP: cta bilat, no wheezing, no rales  GI: soft, non-tender, no splenomegaly, +bs  MUSC: no edema, normal gait, point tenderness over the T4 vertebral body  NEURO: alert and oriented x3, normal strength  PSYCH: Anxious mood and anxious affect      RECENT LABS:  Hematology WBC   Date Value Ref Range Status   05/02/2024 5.05 3.40 - 10.80 10*3/mm3 Final     RBC   Date Value Ref Range Status   05/02/2024 4.96 4.14 - 5.80 10*6/mm3 Final     Hemoglobin   Date Value Ref Range Status   05/02/2024 14.7 13.0 - 17.7 g/dL Final     Hematocrit   Date Value Ref Range Status   05/02/2024 43.1 37.5 - 51.0 % Final     Platelets   Date Value Ref Range Status   05/02/2024 165 140 - 450 10*3/mm3 Final        Lab Results   Component Value Date    GLUCOSE 164 (H) 05/02/2024    BUN 23 05/02/2024    CREATININE 0.93 05/02/2024    EGFR 85.1 05/02/2024    BCR 24.7 05/02/2024    K 3.7 05/02/2024    CO2 23.7 05/02/2024    CALCIUM 9.8 05/02/2024    PROTENTOTREF CANCELED 09/20/2023    ALBUMIN 4.6 05/02/2024    BILITOT 0.7 05/02/2024    AST 40 05/02/2024    ALT 52 (H) " 05/02/2024     MRI Thoracic Spine:  MPRESSION:     1. Large expansile mass in the anterior T4 vertebral body extending into  the adjacent soft tissues and involving greater than 50% of the  vertebral body, consistent with metastatic disease. Surrounding bone  marrow edema like signal throughout the T4 vertebral body and patchy  bone marrow edema like signal in the adjacent anterior T3 inferior  endplate and anterior T5 superior endplate. This lesion is at risk for  pathologic fracture.  2. Expansile mass in the posterior right second rib and focal 1 cm mass  in the posterior right sixth rib, consistent with metastatic disease.  3. Partially imaged pulmonary malignant/metastatic disease. Recommend  further evaluation with CT with contrast and/or PET/CT.  I personally reviewed the MRI spine images showing a mass at T4 vertebral body, another along the posterior right rib    Assessment & Plan     *Expansile mass T4 vertebral body, posterior right second rib and posterior right sixth rib  *Pain secondary to above  *History of prostate cancer status post prostatectomy 2009    Oncology plan/recommendations:  Imaging results discussed with the patient today concerning for metastatic disease.  I recommended a CT-guided needle biopsy of one of the soft tissue/bone lesions, probably rib most accessible but will defer to radiology safest and most accessible site of biopsy  PET scan  Check PSA    I will plan to see the patient back in clinic after results of his PET scan and biopsy are available for review to further discuss diagnosis and treatment recommendations.  Thank you for allowing me to participate in the care of this pleasant patient.  I spent 65 minutes of time on this case today reviewing the patient's medical records, imaging results, face-to-face time with the patient, ordering tests, documenting care etc.

## 2024-05-03 ENCOUNTER — HOSPITAL ENCOUNTER (OUTPATIENT)
Dept: PET IMAGING | Facility: HOSPITAL | Age: 79
Discharge: HOME | End: 2024-05-03
Payer: MEDICARE

## 2024-05-03 DIAGNOSIS — D49.2 THORACIC SPINE TUMOR: ICD-10-CM

## 2024-05-03 DIAGNOSIS — C41.2 MALIGNANT NEOPLASM OF VERTEBRAL COLUMN: ICD-10-CM

## 2024-05-03 LAB
ALBUMIN SERPL ELPH-MCNC: 4.3 G/DL (ref 2.9–4.4)
ALBUMIN/GLOB SERPL: 1.4 {RATIO} (ref 0.7–1.7)
ALPHA1 GLOB SERPL ELPH-MCNC: 0.2 G/DL (ref 0–0.4)
ALPHA2 GLOB SERPL ELPH-MCNC: 1.2 G/DL (ref 0.4–1)
B-GLOBULIN SERPL ELPH-MCNC: 1.1 G/DL (ref 0.7–1.3)
GAMMA GLOB SERPL ELPH-MCNC: 0.8 G/DL (ref 0.4–1.8)
GLOBULIN SER-MCNC: 3.3 G/DL (ref 2.2–3.9)
GLUCOSE BLDC GLUCOMTR-MCNC: 123 MG/DL (ref 70–130)
IGA SERPL-MCNC: 135 MG/DL (ref 61–437)
IGG SERPL-MCNC: 793 MG/DL (ref 603–1613)
IGM SERPL-MCNC: 96 MG/DL (ref 15–143)
INTERPRETATION SERPL IEP-IMP: ABNORMAL
KAPPA LC FREE SER-MCNC: 13.8 MG/L (ref 3.3–19.4)
KAPPA LC FREE/LAMBDA FREE SER: 1.75 {RATIO} (ref 0.26–1.65)
LABORATORY COMMENT REPORT: ABNORMAL
LAMBDA LC FREE SERPL-MCNC: 7.9 MG/L (ref 5.7–26.3)
M PROTEIN SERPL ELPH-MCNC: ABNORMAL G/DL
PROT SERPL-MCNC: 7.6 G/DL (ref 6–8.5)

## 2024-05-03 PROCEDURE — 78815 PET IMAGE W/CT SKULL-THIGH: CPT

## 2024-05-03 PROCEDURE — A9552 F18 FDG: HCPCS | Performed by: NURSE PRACTITIONER

## 2024-05-03 PROCEDURE — 82948 REAGENT STRIP/BLOOD GLUCOSE: CPT

## 2024-05-03 PROCEDURE — 0 FLUDEOXYGLUCOSE F18 SOLUTION: Performed by: NURSE PRACTITIONER

## 2024-05-03 RX ADMIN — FLUDEOXYGLUCOSE F 18 1 DOSE: 200 INJECTION, SOLUTION INTRAVENOUS at 09:00

## 2024-05-06 ENCOUNTER — TELEPHONE (OUTPATIENT)
Dept: ONCOLOGY | Facility: CLINIC | Age: 79
End: 2024-05-06
Payer: MEDICARE

## 2024-05-06 ENCOUNTER — TELEPHONE (OUTPATIENT)
Dept: PSYCHIATRY | Facility: HOSPITAL | Age: 79
End: 2024-05-06
Payer: MEDICARE

## 2024-05-10 ENCOUNTER — HOSPITAL ENCOUNTER (OUTPATIENT)
Dept: CT IMAGING | Facility: HOSPITAL | Age: 79
Discharge: HOME | End: 2024-05-10
Payer: MEDICARE

## 2024-05-10 VITALS
OXYGEN SATURATION: 97 % | DIASTOLIC BLOOD PRESSURE: 99 MMHG | SYSTOLIC BLOOD PRESSURE: 174 MMHG | HEART RATE: 96 BPM | WEIGHT: 215 LBS | HEIGHT: 70 IN | RESPIRATION RATE: 16 BRPM | BODY MASS INDEX: 30.78 KG/M2

## 2024-05-10 DIAGNOSIS — M89.8X8 MASS OF SPINE: ICD-10-CM

## 2024-05-10 LAB
BASOPHILS # BLD AUTO: 0.04 10*3/MM3 (ref 0–0.2)
BASOPHILS NFR BLD AUTO: 0.7 % (ref 0–1.5)
DEPRECATED RDW RBC AUTO: 41.4 FL (ref 37–54)
EOSINOPHIL # BLD AUTO: 0.1 10*3/MM3 (ref 0–0.4)
EOSINOPHIL NFR BLD AUTO: 1.8 % (ref 0.3–6.2)
ERYTHROCYTE [DISTWIDTH] IN BLOOD BY AUTOMATED COUNT: 13.3 % (ref 12.3–15.4)
HCT VFR BLD AUTO: 45.1 % (ref 37.5–51)
HGB BLD-MCNC: 15.2 G/DL (ref 13–17.7)
IMM GRANULOCYTES # BLD AUTO: 0.05 10*3/MM3 (ref 0–0.05)
IMM GRANULOCYTES NFR BLD AUTO: 0.9 % (ref 0–0.5)
INR PPP: 1.2 (ref 0.8–1.2)
LYMPHOCYTES # BLD AUTO: 1.26 10*3/MM3 (ref 0.7–3.1)
LYMPHOCYTES NFR BLD AUTO: 22.8 % (ref 19.6–45.3)
MCH RBC QN AUTO: 29.1 PG (ref 26.6–33)
MCHC RBC AUTO-ENTMCNC: 33.7 G/DL (ref 31.5–35.7)
MCV RBC AUTO: 86.4 FL (ref 79–97)
MONOCYTES # BLD AUTO: 0.43 10*3/MM3 (ref 0.1–0.9)
MONOCYTES NFR BLD AUTO: 7.8 % (ref 5–12)
NEUTROPHILS NFR BLD AUTO: 3.65 10*3/MM3 (ref 1.7–7)
NEUTROPHILS NFR BLD AUTO: 66 % (ref 42.7–76)
NRBC BLD AUTO-RTO: 0 /100 WBC (ref 0–0.2)
PLATELET # BLD AUTO: 149 10*3/MM3 (ref 140–450)
PLATELET # BLD AUTO: 156 10*3/MM3 (ref 140–450)
PMV BLD AUTO: 9.9 FL (ref 6–12)
PROTHROMBIN TIME: 14.1 SECONDS (ref 12.8–15.2)
RBC # BLD AUTO: 5.22 10*6/MM3 (ref 4.14–5.8)
WBC NRBC COR # BLD AUTO: 5.53 10*3/MM3 (ref 3.4–10.8)

## 2024-05-10 PROCEDURE — 99153 MOD SED SAME PHYS/QHP EA: CPT

## 2024-05-10 PROCEDURE — 25010000002 MIDAZOLAM PER 1 MG: Performed by: RADIOLOGY

## 2024-05-10 PROCEDURE — 85049 AUTOMATED PLATELET COUNT: CPT | Performed by: RADIOLOGY

## 2024-05-10 PROCEDURE — 77012 CT SCAN FOR NEEDLE BIOPSY: CPT

## 2024-05-10 PROCEDURE — 99152 MOD SED SAME PHYS/QHP 5/>YRS: CPT

## 2024-05-10 PROCEDURE — 85610 PROTHROMBIN TIME: CPT

## 2024-05-10 PROCEDURE — 25010000002 FENTANYL CITRATE (PF) 50 MCG/ML SOLUTION: Performed by: RADIOLOGY

## 2024-05-10 PROCEDURE — 88313 SPECIAL STAINS GROUP 2: CPT | Performed by: INTERNAL MEDICINE

## 2024-05-10 PROCEDURE — 88307 TISSUE EXAM BY PATHOLOGIST: CPT | Performed by: INTERNAL MEDICINE

## 2024-05-10 PROCEDURE — 25010000002 LIDOCAINE 1 % SOLUTION: Performed by: INTERNAL MEDICINE

## 2024-05-10 PROCEDURE — 88342 IMHCHEM/IMCYTCHM 1ST ANTB: CPT | Performed by: INTERNAL MEDICINE

## 2024-05-10 PROCEDURE — 88341 IMHCHEM/IMCYTCHM EA ADD ANTB: CPT | Performed by: INTERNAL MEDICINE

## 2024-05-10 PROCEDURE — 85025 COMPLETE CBC W/AUTO DIFF WBC: CPT | Performed by: RADIOLOGY

## 2024-05-10 RX ORDER — FENTANYL CITRATE 50 UG/ML
INJECTION, SOLUTION INTRAMUSCULAR; INTRAVENOUS AS NEEDED
Status: COMPLETED | OUTPATIENT
Start: 2024-05-10 | End: 2024-05-10

## 2024-05-10 RX ORDER — SODIUM CHLORIDE 9 MG/ML
25 INJECTION, SOLUTION INTRAVENOUS ONCE
Status: DISCONTINUED | OUTPATIENT
Start: 2024-05-10 | End: 2024-05-11 | Stop reason: HOSPADM

## 2024-05-10 RX ORDER — MIDAZOLAM HYDROCHLORIDE 1 MG/ML
INJECTION INTRAMUSCULAR; INTRAVENOUS AS NEEDED
Status: COMPLETED | OUTPATIENT
Start: 2024-05-10 | End: 2024-05-10

## 2024-05-10 RX ORDER — SODIUM CHLORIDE 9 MG/ML
40 INJECTION, SOLUTION INTRAVENOUS AS NEEDED
Status: DISCONTINUED | OUTPATIENT
Start: 2024-05-10 | End: 2024-05-11 | Stop reason: HOSPADM

## 2024-05-10 RX ORDER — SODIUM CHLORIDE 0.9 % (FLUSH) 0.9 %
10 SYRINGE (ML) INJECTION AS NEEDED
Status: DISCONTINUED | OUTPATIENT
Start: 2024-05-10 | End: 2024-05-11 | Stop reason: HOSPADM

## 2024-05-10 RX ORDER — SODIUM CHLORIDE 0.9 % (FLUSH) 0.9 %
3 SYRINGE (ML) INJECTION EVERY 12 HOURS SCHEDULED
Status: DISCONTINUED | OUTPATIENT
Start: 2024-05-10 | End: 2024-05-11 | Stop reason: HOSPADM

## 2024-05-10 RX ORDER — LIDOCAINE HYDROCHLORIDE 10 MG/ML
20 INJECTION, SOLUTION INFILTRATION; PERINEURAL ONCE
Status: COMPLETED | OUTPATIENT
Start: 2024-05-10 | End: 2024-05-10

## 2024-05-10 RX ADMIN — MIDAZOLAM 1 MG: 1 INJECTION INTRAMUSCULAR; INTRAVENOUS at 09:02

## 2024-05-10 RX ADMIN — FENTANYL CITRATE 25 MCG: 50 INJECTION INTRAMUSCULAR; INTRAVENOUS at 09:05

## 2024-05-10 RX ADMIN — LIDOCAINE HYDROCHLORIDE 20 ML: 10 INJECTION, SOLUTION INFILTRATION; PERINEURAL at 09:08

## 2024-05-10 RX ADMIN — MIDAZOLAM 0.5 MG: 1 INJECTION INTRAMUSCULAR; INTRAVENOUS at 09:05

## 2024-05-10 RX ADMIN — FENTANYL CITRATE 50 MCG: 50 INJECTION INTRAMUSCULAR; INTRAVENOUS at 09:02

## 2024-05-10 NOTE — DISCHARGE INSTRUCTIONS
EDUCATION /DISCHARGE INSTRUCTIONS  CT/US guided biopsy:  A biopsy is a procedure done to remove tissue for further analysis.  Before images are taken to locate the target area.  Images can be obtained using ultrasound, CT or MRI.  A physician will clean your skin with antiseptic soap, place a sterile towel around the site and administer a local anesthetic to numb the area.  The physician will then insert a special needle.  Sometimes images are taken of the needle after it is inserted to ensure the needle is in the correct area to be biopsied.   A sample is obtained and sent to the laboratory for study.  Occasionally the laboratory is unable to make a diagnosis from the sample and the procedure may need to be repeated.  Within a week the radiologist will send a report to your physician.  A pathologist will also examine the tissue and send a report.    Risks of the procedure include but are not limited to:   *  Bleeding    *  Infection   *  Puncture of surrounding organs *  Death     *  Lung collapse if the biopsy is near the chest which may require insertion of a chest tube to re-inflate the lung if severe.    Benefits of the procedure:  Using x-ray helps to locate the area that requires a biopsy. The procedure is less invasive than a surgical procedure, there are no large incisions and it does not require anesthesia.    Alternatives to the procedure:  A biopsy can be performed surgically.  Risks of a surgical biopsy include exposure to anesthesia, infection, excessive bleeding and injury to abdominal organs.  A benefit of surgical biopsy is the ability to see the area to be biopsied and remove of a larger piece of tissue.    THIS EDUCATION INFORMATION WAS REVIEWED PRIOR TO PROCEDURE AND CONSENT.   Post Procedure:    *  Expect the biopsy site may be tender up to one week.    *  Rest today (no pushing pulling or straining).   *  Slowly increase activity tomorrow.    *  If you received sedation do not drive for 24  hours.   *  Keep dressing clean and dry.   *  Leave dressing on puncture site for 24 hours.    *  You may shower when dressing removed.  Call your doctor if experiencing:   *  Signs of infection such as redness, swelling, excessive pain and / or foul        smelling drainage from the puncture site.   *  Chills or fever over 101 degrees (by mouth).   *  Unrelieved pain.   *  Any new or severe symptoms.   *  If experiencing sudden / severe shortness of breath or chest pain go to the       nearest emergency room.   Following the procedure:     Follow-up with the ordering physician as directed.    Continue to take other medications as directed by your physician unless    otherwise instructed.   If applicable, resume taking your blood thinners or Aspirin on ___________.    If you have any concerns please call the Radiology Nurses Desk at [phone].  You are the most important factor in your recovery.  Follow the above instructions carefully.

## 2024-05-10 NOTE — POST-PROCEDURE NOTE
POST PROCEDURE NOTE    Procedure: bone bx    Pre-Procedure Diagnosis: T4 lytic lesion    Post-procedure Diagnosis: same    Findings: successful bx,    Complications: none    Blood loss: min    Specimen Removed: 3x20G cores    Disposition:   Discharge home

## 2024-05-10 NOTE — NURSING NOTE
Pt tolerated procedure well with no c/o.  Post instructions reviewed with all questions and concerns addressed to pt's satisfaction.  Transported pt to discharge area, via w/c, where wife, Faith, was waiting to take pt home.

## 2024-05-13 DIAGNOSIS — C61 MALIGNANT NEOPLASM PROSTATE: Primary | ICD-10-CM

## 2024-05-15 DIAGNOSIS — M89.8X8 MASS OF SPINE: Primary | ICD-10-CM

## 2024-05-15 LAB
LAB AP CASE REPORT: NORMAL
LAB AP CLINICAL INFORMATION: NORMAL
LAB AP DIAGNOSIS COMMENT: NORMAL
LAB AP SPECIAL STAINS: NORMAL
PATH REPORT.ADDENDUM SPEC: NORMAL
PATH REPORT.FINAL DX SPEC: NORMAL
PATH REPORT.GROSS SPEC: NORMAL

## 2024-05-15 NOTE — PROGRESS NOTES
Subjective     REASON FOR CONSULTATION:  spine mass  Provide an opinion on any further workup or treatment                             REQUESTING PRACTITIONER:  Chandrakant    RECORDS OBTAINED:  Records of the patients history including those obtained from the referring provider were reviewed and summarized in detail.      History of Present Illness:  This is a pleasant 78-year-old man with impaired fasting glucose, hyperlipidemia, hypothyroidism and history of prostate cancer.  The patient has been experiencing about a 1 year history of progressive back pain in the upper mid thoracic spine stabbing in nature and radiating bilaterally anteriorly.  Symptoms are worse when the patient lays down to rest at night.  He has also had some pain around the right shoulder blade.  He was treated with PT with no improvement.  The patient has also been having left hip pain.  The patient was evaluated by orthopedic surgery and an MRI of the thoracic spine without contrast was performed on 4/25/2024 and showed a large expansile marrow replacing mass along the anterior T4 vertebral body 3.2 x 2.1 cm extending into the anterior paraspinal soft tissue along the T3-T4 space with reactive bone marrow edema.  Another partially visualized expansile mass was seen in the posterior right second rib 2.7 x 1.5 cm and another in the posterior sixth rib 1 cm with surrounding bone marrow edema.  At T7-T8 there is a posterior disc protrusion with mild to moderate canal stenosis.  In the posterior right lung a 3.5 cm mass was noted with additional small nodules also seen but poorly characterized.    The patient denies weight loss, night sweats, shortness of breath, cough, hemoptysis, headaches, confusion, changes in swallowing bowel habits urinary habits.  He has history of prostate cancer treated with prostatectomy in 2009 and reports negligible PSA values.    The patient has history of light smoking during his 20s and 30s.  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    A full body PET scan was performed on 5/3/2024 which showed a hypermetabolic mass in the superior segment of the right lower lobe 3.7 x 2.8 cm SUV 9.3, no hypermetabolic mediastinal or hilar lymph nodes but multiple hypermetabolic bone lesions largest being at T4 vertebral body SUV 9.4, posterior lateral right second rib, posterior right sixth rib, left aspect of the sacrum to the left of the S1 neural foramen.  Spleen was mildly enlarged 15 cm but less activity than liver.    A CT-guided needle biopsy of a rib mass was performed on 5/10/2024 and showed metastatic adenocarcinoma immunohistochemistry supporting a pulmonary primary.    Past Medical History:   Diagnosis Date    Acute bronchitis     Allergic Iodine    Allergic rhinitis     Arthritis 2000    BPH (benign prostatic hypertrophy)     Cervical disc disorder 1987    Dislocation, shoulder 1973    Elevated prostate specific antigen (PSA)     Headache 1990    History of bone scan 10/21/2010    normal    History of colonoscopy     never    History of EKG 03/12/2012    also 11-; T wave abnormality    History of medical problems 1978    HL (hearing loss) 2000    Hyperlipidemia     Hypothyroidism     IFG (impaired fasting glucose)     Kidney calculus     left ureteral stone    Knee swelling 2015    Malignant neoplasm prostate 11/09/2009    Dr. Mcclelland; lymph nodes negative margins clear    Prostate nodule     right lobe    Rotator cuff syndrome 2009    Screening for prostate cancer     prostatectomy    Sleep apnea     CPAP machine    URI (upper respiratory infection)         Past Surgical History:   Procedure Laterality Date    CERVICAL DISC SURGERY  1990    also 2006; C5-6, C6-7    HAND SURGERY Left 2001    trigger finger release; left middle finger    NECK SURGERY  1988, 2005    Cervical disk    PROSTATECTOMY  11/09/2009    RHINOPLASTY  1985    ROTATOR CUFF REPAIR      SHOULDER SURGERY Left 09/29/2009    Dr. KRISTEL Osorio  [place]; rotator cuff repair; bone spurs     SPINE SURGERY  2005    TRIGGER POINT INJECTION  2008,2021,2022    URETERAL STENT INSERTION Left 12/04/2013    Dr. Martinez        Current Outpatient Medications on File Prior to Visit   Medication Sig Dispense Refill    cyclobenzaprine (FLEXERIL) 5 MG tablet Take 1 tablet by mouth 2 (Two) Times a Day As Needed for Muscle Spasms. 20 tablet 0    Diclofenac Sodium (VOLTAREN) 1 % gel gel Apply 4 g topically to the appropriate area as directed 3 (Three) Times a Day. 100 g 2    HYDROcodone-acetaminophen (NORCO) 5-325 MG per tablet Take 1 tablet by mouth Every 6 (Six) Hours As Needed for Moderate Pain. 30 tablet 0    ibuprofen (ADVIL,MOTRIN) 200 MG tablet Take 1 tablet by mouth Every 6 (Six) Hours As Needed for Mild Pain.      levothyroxine (SYNTHROID, LEVOTHROID) 75 MCG tablet       loratadine (CLARITIN) 10 MG tablet Take 1 tablet by mouth Daily.      naproxen sodium (ALEVE) 220 MG tablet Take 1 tablet by mouth 2 (Two) Times a Day As Needed.      Norvasc 10 MG tablet Take 1 tablet by mouth Daily.      zolpidem (AMBIEN) 10 MG tablet Take 0.5 tablets by mouth At Night As Needed.      [DISCONTINUED] zolpidem (AMBIEN) 5 MG tablet Take 1 tablet by mouth At Night As Needed for Sleep. 90 tablet 0     No current facility-administered medications on file prior to visit.        ALLERGIES:    Allergies   Allergen Reactions    Iodinated Contrast Media Anaphylaxis     Patient had a severe reaction in the past / difficulty breathing    Iodine Unknown - High Severity     Difficulty breathing    Molds & Smuts Unknown - High Severity    Shellfish Allergy Unknown - High Severity     Burning inside    Amoxicillin Rash        Social History     Socioeconomic History    Marital status:      Spouse name: Faith   Tobacco Use    Smoking status: Former     Current packs/day: 0.00     Average packs/day: 1 pack/day for 22.0 years (22.0 ttl pk-yrs)     Types: Cigarettes, Pipe     Start date:  "1967     Quit date: 1988     Years since quittin.4     Passive exposure: Past    Smokeless tobacco: Never    Tobacco comments:     Quit smoking pipe around    Vaping Use    Vaping status: Never Used   Substance and Sexual Activity    Alcohol use: Yes     Comment: Rarely will drink one glass of wine    Drug use: Never    Sexual activity: Not Currently     Partners: Female        Family History   Problem Relation Age of Onset    Diabetes Mother     Kidney disease Mother         calculus    Hearing loss Mother     Prostate cancer Father     Stomach cancer Father     Cancer Sister         breast    Hypothyroidism Sister     Kidney disease Sister         calculus    Arthritis Sister     Transient ischemic attack Brother     Alzheimer's disease Other         uncle    Diabetes Other         uncle        Review of Systems   Constitutional: Negative.    HENT: Negative.     Respiratory: Negative.     Cardiovascular: Negative.    Gastrointestinal: Negative.    Genitourinary: Negative.    Musculoskeletal:  Positive for back pain.   Neurological: Negative.    Hematological: Negative.    Psychiatric/Behavioral:  Positive for dysphoric mood. The patient is nervous/anxious.     Unchanged 24      Objective     Vitals:    24 0941   BP: (!) 193/78   Pulse: 84   Resp: 16   Temp: 98.3 °F (36.8 °C)   TempSrc: Oral   SpO2: 97%   Weight: 95.5 kg (210 lb 8 oz)   Height: 177.8 cm (70\")   PainSc: 10-Worst pain ever   PainLoc: Back  Comment: between shoulder blades           2024     9:22 AM   Current Status   ECOG score 0       Physical Exam    CONSTITUTIONAL: pleasant well-developed adult man  HEENT: no icterus, no thrush, moist membranes  LYMPH: no cervical or supraclavicular lad  CV: RRR, S1S2, no murmur  RESP: cta bilat, no wheezing, no rales  GI: soft, non-tender, no splenomegaly, +bs  MUSC: no edema, normal gait, point tenderness over the T4 vertebral body  NEURO: alert and oriented x3, normal " strength  PSYCH: Anxious mood and anxious affect  Unchanged 5/17/24    RECENT LABS:  Hematology WBC   Date Value Ref Range Status   05/10/2024 5.53 3.40 - 10.80 10*3/mm3 Final     RBC   Date Value Ref Range Status   05/10/2024 5.22 4.14 - 5.80 10*6/mm3 Final     Hemoglobin   Date Value Ref Range Status   05/10/2024 15.2 13.0 - 17.7 g/dL Final     Hematocrit   Date Value Ref Range Status   05/10/2024 45.1 37.5 - 51.0 % Final     Platelets   Date Value Ref Range Status   05/10/2024 149 140 - 450 10*3/mm3 Final   05/10/2024 156 140 - 450 10*3/mm3 Final        Lab Results   Component Value Date    GLUCOSE 164 (H) 05/02/2024    BUN 23 05/02/2024    CREATININE 0.93 05/02/2024    EGFR 85.1 05/02/2024    BCR 24.7 05/02/2024    K 3.7 05/02/2024    CO2 23.7 05/02/2024    CALCIUM 9.8 05/02/2024    PROTENTOTREF 7.6 05/02/2024    ALBUMIN 4.6 05/02/2024    ALBUMIN 4.3 05/02/2024    BILITOT 0.7 05/02/2024    AST 40 05/02/2024    ALT 52 (H) 05/02/2024     MRI Thoracic Spine:  MPRESSION:     1. Large expansile mass in the anterior T4 vertebral body extending into  the adjacent soft tissues and involving greater than 50% of the  vertebral body, consistent with metastatic disease. Surrounding bone  marrow edema like signal throughout the T4 vertebral body and patchy  bone marrow edema like signal in the adjacent anterior T3 inferior  endplate and anterior T5 superior endplate. This lesion is at risk for  pathologic fracture.  2. Expansile mass in the posterior right second rib and focal 1 cm mass  in the posterior right sixth rib, consistent with metastatic disease.  3. Partially imaged pulmonary malignant/metastatic disease. Recommend  further evaluation with CT with contrast and/or PET/CT.    PET:  TECHNIQUE: Radiation dose reduction techniques were utilized, including  automated exposure control and exposure modulation based on body size.   Blood glucose level at time of injection was 123 mg/dL. 6.8 mCi of F-18  FDG were injected  and PET was performed from skull base to mid thigh. CT  was obtained for localization and attenuation correction. Time at  injection 8:43 a.m. PET start time 10:01 a.m. Normalization method:  patient weight. Compared with thoracic MRI 04/25/2024.     FINDINGS: Mediastinal blood pool has a maximal SUV of 2.8.     1. There is a hypermetabolic 3.7 x 2.8 cm irregular mass at the superior  segment of the right lower lobe with a maximal SUV of 9.3. There is no  hypermetabolic hilar or mediastinal lymphadenopathy, but there are  multiple hypermetabolic bone lesions. The largest is at the T4 vertebral  body with a maximal SUV of 9.4. Lytic expansile metastases are also seen  at the posterior and lateral aspects of the right 2nd rib, posterior  right 6th rib, and left aspect of the sacrum anterior to the left S1  neural foramen.   The activity adjacent to the left greater trochanter is likely related  to tendinopathy.     2. There is no hypermetabolic lymphadenopathy or suspicious activity  within the neck. There is no suspicious visceral activity within the  abdomen or pelvis.  The spleen is mildly enlarged measuring 15 cm in diameter. Splenic  activity is less intense than that of the liver.      Assessment & Plan     *Stage IV adenocarcinoma, lung primary  Patient presented with back and chest wall pain, imaging showed and expansile mass T4 vertebral body, posterior right second rib and posterior right sixth rib  PET scan was performed on 5/3/2024 which showed a hypermetabolic mass in the superior segment of the right lower lobe 3.7 x 2.8 cm SUV 9.3, no hypermetabolic mediastinal or hilar lymph nodes but multiple hypermetabolic bone lesions largest being at T4 vertebral body SUV 9.4, posterior lateral right second rib, posterior right sixth rib, left aspect of the sacrum to the left of the S1 neural foramen.  Spleen was mildly enlarged 15 cm but less activity than liver.  A CT-guided needle biopsy of a rib mass was  performed on 5/10/2024 and showed metastatic adenocarcinoma immunohistochemistry supporting a pulmonary primary.  *Pain of malignancy  The patient has available [place] 5/325 1 p.o. every 6 hours as needed pain  *Significant anxiety related to malignancy  *History of prostate cancer status post prostatectomy 2009    Oncology plan/recommendations:  Imaging and pathology reviewed with the patient and wife today.  He has stage IV adenocarcinoma lung primary.  I discussed this is incurable but treatable disease.  I am going to request MRI brain for complete staging.  He is having significant pain at the T4 and rib lesions; I made referral to radiation oncology for palliative treatment for pain control.  I have requested tumor for next generation sequencing and blood for next generation sequencing.  I explained that systemic therapy recommendations will be based upon results of NGS and PD-L1 staining.  If he has a targetable gene mutation then that would be the first-line of systemic therapy.  If no target gene mutation available for treatment he would be candidate for immunotherapy versus immunotherapy plus chemotherapy pending PD-L1 staining results.  I will further discuss with the patient once full molecular/tumor profiling is available.  MRI brain  Radiation oncology consult for palliative treatment to painful sites of disease +/- lung primary  Psychosocial consult for significant anxiety related to diagnosis; Ativan 0.5 mg 3 times daily as needed anxiety while awaiting evaluation  Lung navigator consult    I spent 56 minutes of time today on this case reviewing the patient's imaging test, pathology reports, face-to-face time with the patient discussing diagnosis and treatment plan, writing orders, documenting care etc.    I will plan to see the patient back in clinic after results of his PET scan and biopsy are available for review to further discuss diagnosis and treatment recommendations.  Thank you for allowing me  to participate in the care of this pleasant patient.  I spent 65 minutes of time on this case today reviewing the patient's medical records, imaging results, face-to-face time with the patient, ordering tests, documenting care etc.

## 2024-05-16 ENCOUNTER — PATIENT OUTREACH (OUTPATIENT)
Dept: OTHER | Facility: HOSPITAL | Age: 79
End: 2024-05-16
Payer: MEDICARE

## 2024-05-17 ENCOUNTER — LAB (OUTPATIENT)
Dept: LAB | Facility: HOSPITAL | Age: 79
End: 2024-05-17
Payer: MEDICARE

## 2024-05-17 ENCOUNTER — OFFICE VISIT (OUTPATIENT)
Dept: ONCOLOGY | Facility: CLINIC | Age: 79
End: 2024-05-17
Payer: MEDICARE

## 2024-05-17 VITALS
RESPIRATION RATE: 16 BRPM | DIASTOLIC BLOOD PRESSURE: 78 MMHG | WEIGHT: 210.5 LBS | HEIGHT: 70 IN | HEART RATE: 84 BPM | OXYGEN SATURATION: 97 % | SYSTOLIC BLOOD PRESSURE: 193 MMHG | BODY MASS INDEX: 30.14 KG/M2 | TEMPERATURE: 98.3 F

## 2024-05-17 DIAGNOSIS — M89.8X8 MASS OF SPINE: ICD-10-CM

## 2024-05-17 DIAGNOSIS — C61 MALIGNANT NEOPLASM PROSTATE: Primary | ICD-10-CM

## 2024-05-17 PROCEDURE — 36415 COLL VENOUS BLD VENIPUNCTURE: CPT

## 2024-05-17 RX ORDER — AMLODIPINE BESYLATE 10 MG/1
10 TABLET ORAL DAILY
COMMUNITY
Start: 2024-05-14

## 2024-05-17 RX ORDER — LORAZEPAM 0.5 MG/1
0.5 TABLET ORAL EVERY 8 HOURS PRN
Qty: 30 TABLET | Refills: 1 | Status: SHIPPED | OUTPATIENT
Start: 2024-05-17

## 2024-05-17 RX ORDER — ZOLPIDEM TARTRATE 10 MG/1
5 TABLET ORAL NIGHTLY PRN
COMMUNITY
Start: 2024-05-14

## 2024-05-20 ENCOUNTER — PATIENT OUTREACH (OUTPATIENT)
Dept: OTHER | Facility: HOSPITAL | Age: 79
End: 2024-05-20
Payer: MEDICARE

## 2024-05-20 NOTE — PROGRESS NOTES
Referral rec'd from Dr. Austin.  Patient recently diagnosed with Stage IV lung cancer. Met with Dr. Austin 5/2 and 5/17; meets with Dr. Guerrero 5/23. MRI brain scheduled 5/31.     Called patient, left message introducing myself, requested cb to explain my role and the services available in the Cancer Center.  If we don't connect, I will try to meet him at his consult appt with Dr. Guerrero.     Call from patient.   Introduced myself and explained navigational services.  The patient was recently diagnosed with Stage IV lung cancer upon further work up of his back pain.  He underwent biopsy of his rib on 5/10, which revealed adenocarcinoma lung primary.  He has a PMH of prostate cancer (s/p prostatectomy 11/9/2009).      The patient met with Dr. Austin on 5/2 and most recently on 5/17 to discuss his pathology and next steps.  He was referred to radiation oncology for palliative treatment to painful sites of disease (T4 and rib lesions) with potential treatment to his lung primary; the patient is scheduled with Dr. Guerrero on 5/23.  Dr. Austin also ordered a MRI of the brain to complete staging work up, which is scheduled on 5/31. The patient is scheduled for follow up with Dr. Austin on 7/5.     We discussed this patient's recent appts and test results.   He has a good understanding of his pathology and was able to teach back the next steps in his plan of care.  The patient reports adequate pain control with oral medication. We discussed his upcoming radiation consult. All questions answered.     The patient is alert and oriented. He ambulates without assistive devices, denies falls and is independent with ADLs. The patient lives with his wife in [place], [place].     The patient is a former 1 ppd smoker x 22 years; he quit in 1988.    The patient has [employer] Medicare replacement.  He denies any current BHE claim issues. We discussed financial navigation, cost estimates and possible financial assistance if needed in the future.      The patient drives; he denies transportation, financial or other resource needs at this time.     The patient has been eating well and denies weight loss.    The patient has an ACP on file.    The patient states he was initially very anxious while he was waiting to hear and since he rec'd his diagnosis.  He has now had time to process things and reports that he is feeling better.  He and his wife are optimistic that he can receive some form of treatment.  Provided active listening and emotional support, validating and normalizing patient's feelings.  Dr. Austin ordered the patient Ativan, although he has not picked up the prescription yet.  The patient is scheduled with Cristy, behavioral oncology APRN 5/24.   We discussed [facility] [employer] and [employer]. The patient will let me know if he would like referrals sent to either organization in the future.    We discussed integrative therapies and other services at the [facility].  I will provide a navigation folder with the following information at his appointment with Dr. Guerrero 5/23: [facility], Cancer and Restorative Exercise - CARE, [facility] exercise program, Living with a Diagnosis of Lung Cancer, Lung Cancer [place] Support Services, [facility], [facility], Reiki Therapy, [facility], Cancer Nutrition, Smoking Cessation and Survivorship Clinic.      I will meet before his appt with Dr. Guerrero on Thursday; provided my name and number and encouraged patient to call if any needs arise.

## 2024-05-22 ENCOUNTER — TELEPHONE (OUTPATIENT)
Dept: RADIATION ONCOLOGY | Facility: HOSPITAL | Age: 79
End: 2024-05-22
Payer: MEDICARE

## 2024-05-23 ENCOUNTER — CONSULT (OUTPATIENT)
Dept: RADIATION ONCOLOGY | Facility: HOSPITAL | Age: 79
End: 2024-05-23
Payer: MEDICARE

## 2024-05-23 ENCOUNTER — PATIENT OUTREACH (OUTPATIENT)
Dept: OTHER | Facility: HOSPITAL | Age: 79
End: 2024-05-23
Payer: MEDICARE

## 2024-05-23 VITALS
DIASTOLIC BLOOD PRESSURE: 84 MMHG | BODY MASS INDEX: 30.39 KG/M2 | SYSTOLIC BLOOD PRESSURE: 175 MMHG | OXYGEN SATURATION: 98 % | WEIGHT: 211.8 LBS | HEART RATE: 93 BPM

## 2024-05-23 DIAGNOSIS — C78.01 METASTATIC LUNG CARCINOMA, RIGHT: Primary | ICD-10-CM

## 2024-05-23 NOTE — PROGRESS NOTES
Met patient and wife in person in radiation center before his consult appt with Dr. Guerrero.  Reintroduced myself as his navigator.    We briefly discussed services available in the Resource Center.  He has an appt with Cristy tomorrow.  I will call in 1-2 weeks; encouraged patient to call as needed.

## 2024-05-23 NOTE — PROGRESS NOTES
[facility], [place] [facility]   Consult    Chief Complaint  Metastatic NSCLC     Diagnosis: Metastatic NSCLC    Overall Stage Metastatic      Pacemaker: no  Prior History of Radiation: no  Contraindications to Radiation: no  Patient Requires Pregnancy Test: No, patient is male      HPI:    Nemesio Fitzpatrick is a 78 y.o. male who had a 1 year history of progressing back pain in the thoracic spine with radiation bilaterally, also had right shoulder pain, left hip pain.  He was evaluated by orthopedic surgery who ordered MRI T-spine on 4/25/2024 which demonstrated a large expansile marrow replacing mass extending into the anterior paraspinal soft tissue along the T3-T4 space.  There was another partially visualized mass in the right second rib and posterior right sixth rib.  A 3.5 cm right lung mass was noted with additional small nodules.  He has a distant history of prostate cancer treated with prostatectomy.  He has a distant smoking history.  PET/CT performed on 5/3/2024 demonstrated a hypermetabolic 3.7 cm mass in the right lower lobe.  There were also multiple hypermetabolic bone lesions, with the largest at T4.  Lytic expansile lesions also noted in the posterior lateral aspects of the right second rib and right sixth rib, and anterior to the left S1 neuroforamen.    The patient met with Dr. Austin who is also ordered NGS and PD-L1 staining.  MRI brain has been ordered and is pending.  He has referred the patient for consideration of palliative treatment to painful sites of disease.  Patient has been placed on Ativan due to significant anxiety.  Nurse navigator referral placed. Agent Orange Exposure history.       Imaging:      MRI T Spine 4/25/2024    IMPRESSION:     1. Large expansile mass in the anterior T4 vertebral body extending into  the adjacent soft tissues and involving greater than 50% of the  vertebral body, consistent with metastatic disease. Surrounding bone  marrow edema like signal throughout the T4  vertebral body and patchy  bone marrow edema like signal in the adjacent anterior T3 inferior  endplate and anterior T5 superior endplate. This lesion is at risk for  pathologic fracture.  2. Expansile mass in the posterior right second rib and focal 1 cm mass  in the posterior right sixth rib, consistent with metastatic disease.  3. Partially imaged pulmonary malignant/metastatic disease. Recommend  further evaluation with CT with contrast and/or PET/CT.      PET CT 5/3/2024    FINDINGS: Mediastinal blood pool has a maximal SUV of 2.8.     1. There is a hypermetabolic 3.7 x 2.8 cm irregular mass at the superior  segment of the right lower lobe with a maximal SUV of 9.3. There is no  hypermetabolic hilar or mediastinal lymphadenopathy, but there are  multiple hypermetabolic bone lesions. The largest is at the T4 vertebral  body with a maximal SUV of 9.4. Lytic expansile metastases are also seen  at the posterior and lateral aspects of the right 2nd rib, posterior  right 6th rib, and left aspect of the sacrum anterior to the left S1  neural foramen.   The activity adjacent to the left greater trochanter is likely related  to tendinopathy.     2. There is no hypermetabolic lymphadenopathy or suspicious activity  within the neck. There is no suspicious visceral activity within the  abdomen or pelvis.  The spleen is mildly enlarged measuring 15 cm in diameter. Splenic  activity is less intense than that of the liver.      MRI Brain Scheduled for 5/31/2024      Pathology:      T4 Soft Tissue Biopsy Pathology 5/10/2024    Final Diagnosis   1.  Soft tissue, T4, biopsy: Metastatic adenocarcinoma     NGS Pending      Labs:    Lab Results   Component Value Date    CREATININE 0.93 05/02/2024       CMP          9/20/2023    09:23 5/2/2024    08:52   CMP   Glucose CANCELED  164    BUN CANCELED  23    Creatinine CANCELED  0.93    EGFR  85.1    Sodium CANCELED  139    Potassium CANCELED  3.7    Chloride CANCELED  99    Calcium  CANCELED  9.8    Total Protein CANCELED  7.6    Total Protein  7.7    Albumin CANCELED  4.6     4.3    Globulin  3.3    Globulin  3.1    Total Bilirubin CANCELED  0.7    Alkaline Phosphatase CANCELED  103    AST (SGOT) CANCELED  40    ALT (SGPT) CANCELED  52    Albumin/Globulin Ratio  1.5    BUN/Creatinine Ratio  24.7    Anion Gap  16.3      CBC          5/2/2024    08:52 5/10/2024    08:04   CBC   WBC 5.05  5.53    RBC 4.96  5.22    Hemoglobin 14.7  15.2    Hematocrit 43.1  45.1    MCV 86.9  86.4    MCH 29.6  29.1    MCHC 34.1  33.7    RDW 13.0  13.3    Platelets 165  149     156              Problem List:  Patient Active Problem List   Diagnosis    Mixed hyperlipidemia    Acquired hypothyroidism    IFG (impaired fasting glucose)    Malignant neoplasm prostate    Kidney calculus    Seasonal allergic rhinitis due to pollen    Sleep apnea    Chest pain    Benign paroxysmal positional vertigo due to bilateral vestibular disorder    Sciatica of left side    Acute bilateral low back pain without sciatica    Diffuse cervicobrachial syndrome    Displacement of cervical intervertebral disc    Injury of brachial plexus    Medicare annual wellness visit, subsequent    Mass of spine          Medications:  Current Outpatient Medications on File Prior to Visit   Medication Sig Dispense Refill    cyclobenzaprine (FLEXERIL) 5 MG tablet Take 1 tablet by mouth 2 (Two) Times a Day As Needed for Muscle Spasms. 20 tablet 0    Diclofenac Sodium (VOLTAREN) 1 % gel gel Apply 4 g topically to the appropriate area as directed 3 (Three) Times a Day. 100 g 2    HYDROcodone-acetaminophen (NORCO) 5-325 MG per tablet Take 1 tablet by mouth Every 6 (Six) Hours As Needed for Moderate Pain. 30 tablet 0    ibuprofen (ADVIL,MOTRIN) 200 MG tablet Take 1 tablet by mouth Every 6 (Six) Hours As Needed for Mild Pain.      levothyroxine (SYNTHROID, LEVOTHROID) 75 MCG tablet       loratadine (CLARITIN) 10 MG tablet Take 1 tablet by mouth Daily.       "LORazepam (Ativan) 0.5 MG tablet Take 1 tablet by mouth Every 8 (Eight) Hours As Needed for Anxiety. 30 tablet 1    naproxen sodium (ALEVE) 220 MG tablet Take 1 tablet by mouth 2 (Two) Times a Day As Needed.      Norvasc 10 MG tablet Take 1 tablet by mouth Daily.      zolpidem (AMBIEN) 10 MG tablet Take 0.5 tablets by mouth At Night As Needed.       No current facility-administered medications on file prior to visit.          Allergies:  Allergies   Allergen Reactions    Iodinated Contrast Media Anaphylaxis     Patient had a severe reaction in the past / difficulty breathing    Iodine Unknown - High Severity     Difficulty breathing    Molds & Smuts Unknown - High Severity    Shellfish Allergy Unknown - High Severity     Burning inside    Amoxicillin Rash         Family History:  The patient has no family history of conditions which would be contraindications to radiation therapy  Father - Gastric Cancer    Social History:  Distant Former Smoker    Distance From Clinic: <30 minutes    Patient has someone who can assist with transportation: yes      Review of Systems:    Review of Systems   Musculoskeletal:  Positive for back pain.   Neurological:  Positive for numbness.   Psychiatric/Behavioral:  Positive for sleep disturbance.          Vital Signs:  /84   Pulse 93   Wt 96.1 kg (211 lb 12.8 oz)   SpO2 98%   BMI 30.39 kg/m²   Estimated body mass index is 30.39 kg/m² as calculated from the following:    Height as of 5/17/24: 177.8 cm (70\").    Weight as of this encounter: 96.1 kg (211 lb 12.8 oz).  Pain Score    05/23/24 1418   PainSc:   3   PainLoc: Back         ECOG: Restricted in physically strenuous activity but ambulatory and able to carry out work of a light or sedentary nature, e.g., light house work, office work = 1    Physical Exam  Vitals reviewed.   Constitutional:       General: He is not in acute distress.     Appearance: Normal appearance.   HENT:      Head: Normocephalic and atraumatic. "   Eyes:      Extraocular Movements: Extraocular movements intact.      Pupils: Pupils are equal, round, and reactive to light.   Pulmonary:      Effort: Pulmonary effort is normal.   Abdominal:      General: Abdomen is flat.      Palpations: Abdomen is soft.   Musculoskeletal:      Cervical back: Normal range of motion.      Comments: Patient with upper T-spine back pain, has some pain under both shoulders along ribs.     Skin:     General: Skin is warm and dry.   Neurological:      General: No focal deficit present.      Mental Status: He is alert and oriented to person, place, and time.   Psychiatric:         Mood and Affect: Mood normal.         Behavior: Behavior normal.          Result Review :  The following data was reviewed by: Rex Guerrero MD on 05/23/2024:  Labs: Last Creatinine, CBC, CMP  Data reviewed : Radiologic studies MRI T Spine, PET CT             Diagnoses and all orders for this visit:    1. Metastatic lung carcinoma, right (Primary)        Assessment:    The patient is a 78-year-old male with newly diagnosed metastatic adenocarcinoma from lung primary to bone.  The patient has bony metastatic disease in right second rib and right sixth rib.  He also has an anterior vertebral body lesion with soft tissue extension at T4.  He has his primary lung disease in the right lung.  There is also a lesion anterior to the left S1 neuroforamen.  NGS and PD-L1 testing are pending.  MRI brain is pending.  The patient has agent orange exposure history.  He has been referred for consideration of radiation therapy by Dr. Austin.    I met with the patient and reviewed the results of his imaging in detail.  The patient has at least 5 sites of disease.  This is borderline for consideration of oligometastatic treatment.  He has not yet had MRI brain, and I am suspicious of at least 1 additional site in the vertebral bodies which I want to discuss with radiology.  I will plan to present the patient at peer review on  Tuesday next week to discuss further.  If not treating all lesions, we will plan on palliative radiation therapy to T4 and his rib lesions.  I discussed the risk, benefits, side effects, and logistics of radiation treatment planning delivery with the patient in detail.  I answered all the patient's questions to his satisfaction.  The patient was amenable to proceeding with this plan.    Plan:    -Plan to discuss the patient's case with radiology and at peer review prior to finalizing radiation treatment plans.  May also need to wait on MRI brain which is scheduled for 5/31/2024.       I spent 60 minutes caring for Nemesio on this date of service. This time includes time spent by me in the following activities:preparing for the visit, reviewing tests, obtaining and/or reviewing a separately obtained history, documenting information in the medical record, independently interpreting results and communicating that information with the patient/family/caregiver, and care coordination  Follow Up   No follow-ups on file.  Patient was given instructions and counseling regarding his condition or for health maintenance advice. Please see specific information pulled into the AVS if appropriate.     Rex Guerrero MD

## 2024-05-24 ENCOUNTER — OFFICE VISIT (OUTPATIENT)
Dept: PSYCHIATRY | Facility: HOSPITAL | Age: 79
End: 2024-05-24
Payer: MEDICARE

## 2024-05-24 ENCOUNTER — OFFICE VISIT (OUTPATIENT)
Dept: ONCOLOGY | Facility: CLINIC | Age: 79
End: 2024-05-24
Payer: MEDICARE

## 2024-05-24 ENCOUNTER — SPECIALTY PHARMACY (OUTPATIENT)
Dept: PHARMACY | Facility: HOSPITAL | Age: 79
End: 2024-05-24
Payer: MEDICARE

## 2024-05-24 VITALS
SYSTOLIC BLOOD PRESSURE: 176 MMHG | RESPIRATION RATE: 16 BRPM | OXYGEN SATURATION: 98 % | WEIGHT: 212.9 LBS | HEART RATE: 80 BPM | DIASTOLIC BLOOD PRESSURE: 85 MMHG | HEIGHT: 70 IN | BODY MASS INDEX: 30.48 KG/M2 | TEMPERATURE: 98.4 F

## 2024-05-24 DIAGNOSIS — C34.90 PRIMARY NONSQUAMOUS NONSMALL CELL NEOPLASM OF LUNG: Primary | ICD-10-CM

## 2024-05-24 DIAGNOSIS — M89.8X8 MASS OF SPINE: ICD-10-CM

## 2024-05-24 DIAGNOSIS — F41.1 GENERALIZED ANXIETY DISORDER: Primary | ICD-10-CM

## 2024-05-24 LAB
DNA RANGE(S) EXAMINED NAR: NORMAL
GENE DIS ANL INTERP-IMP: POSITIVE
GENE DIS ASSESSED: NORMAL
GENE MUT TESTED BLD/T: 5.3 M/MB
LAB AP CASE REPORT: NORMAL
LAB AP CLINICAL INFORMATION: NORMAL
LAB AP DIAGNOSIS COMMENT: NORMAL
LAB AP SPECIAL STAINS: NORMAL
Lab: NORMAL
MSI CA SPEC-IMP: NORMAL
PATH REPORT.ADDENDUM SPEC: NORMAL
PATH REPORT.FINAL DX SPEC: NORMAL
PATH REPORT.GROSS SPEC: NORMAL
REASON FOR STUDY: NORMAL
TEMPUS GERMLINE NOTE: NORMAL
TEMPUS LCA: NORMAL
TEMPUS PERTINENTNEGATIVES: NORMAL
TEMPUS PORTAL: NORMAL
TEMPUS THERAPY1: NORMAL
TEMPUS THERAPY2: NORMAL
TEMPUS THERAPY3: NORMAL
TEMPUS THERAPY4: NORMAL
TEMPUS THERAPY5: NORMAL
TEMPUS THERAPY6: NORMAL
TEMPUS THERAPY7: NORMAL
TEMPUS THERAPY8: NORMAL
TEMPUS THERAPYCOUNT: 8
TEMPUS TRIALCOUNT: 3
TEMPUS TRIALMATCHES1: NORMAL
TEMPUS TRIALMATCHES2: NORMAL
TEMPUS TRIALMATCHES3: NORMAL
TEMPUS XR RESULT 1: NORMAL

## 2024-05-24 PROCEDURE — 1160F RVW MEDS BY RX/DR IN RCRD: CPT | Performed by: NURSE PRACTITIONER

## 2024-05-24 PROCEDURE — 1159F MED LIST DOCD IN RCRD: CPT | Performed by: NURSE PRACTITIONER

## 2024-05-24 PROCEDURE — 90792 PSYCH DIAG EVAL W/MED SRVCS: CPT | Performed by: NURSE PRACTITIONER

## 2024-05-24 RX ORDER — GABAPENTIN 300 MG/1
300 CAPSULE ORAL 3 TIMES DAILY
Qty: 90 CAPSULE | Refills: 2 | Status: SHIPPED | OUTPATIENT
Start: 2024-05-24 | End: 2025-05-24

## 2024-05-24 NOTE — PATIENT INSTRUCTIONS
Trial gabapentin 300 mg at bedtime. If well tolerated, increase up to 300 mg at dinner and  600 mg at bedtime.  Could also take durign day to assist with pain and anxiety.

## 2024-05-24 NOTE — PROGRESS NOTES
Subjective     REASON FOR CONSULTATION:  spine mass  Provide an opinion on any further workup or treatment                             REQUESTING PRACTITIONER:  Chandrakant    RECORDS OBTAINED:  Records of the patients history including those obtained from the referring provider were reviewed and summarized in detail.      History of Present Illness:  This is a pleasant 78-year-old man with impaired fasting glucose, hyperlipidemia, hypothyroidism and history of prostate cancer.  The patient has been experiencing about a 1 year history of progressive back pain in the upper mid thoracic spine stabbing in nature and radiating bilaterally anteriorly.  Symptoms are worse when the patient lays down to rest at night.  He has also had some pain around the right shoulder blade.  He was treated with PT with no improvement.  The patient has also been having left hip pain.  The patient was evaluated by orthopedic surgery and an MRI of the thoracic spine without contrast was performed on 4/25/2024 and showed a large expansile marrow replacing mass along the anterior T4 vertebral body 3.2 x 2.1 cm extending into the anterior paraspinal soft tissue along the T3-T4 space with reactive bone marrow edema.  Another partially visualized expansile mass was seen in the posterior right second rib 2.7 x 1.5 cm and another in the posterior sixth rib 1 cm with surrounding bone marrow edema.  At T7-T8 there is a posterior disc protrusion with mild to moderate canal stenosis.  In the posterior right lung a 3.5 cm mass was noted with additional small nodules also seen but poorly characterized.    The patient denies weight loss, night sweats, shortness of breath, cough, hemoptysis, headaches, confusion, changes in swallowing bowel habits urinary habits.  He has history of prostate cancer treated with prostatectomy in 2009 and reports negligible PSA values.    The patient has history of light smoking during his 20s and 30s.  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    A full body PET scan was performed on 5/3/2024 which showed a hypermetabolic mass in the superior segment of the right lower lobe 3.7 x 2.8 cm SUV 9.3, no hypermetabolic mediastinal or hilar lymph nodes but multiple hypermetabolic bone lesions largest being at T4 vertebral body SUV 9.4, posterior lateral right second rib, posterior right sixth rib, left aspect of the sacrum to the left of the S1 neural foramen.  Spleen was mildly enlarged 15 cm but less activity than liver.    A CT-guided needle biopsy of a rib mass was performed on 5/10/2024 and showed metastatic adenocarcinoma immunohistochemistry supporting a pulmonary primary.    The patient was seen by radiation oncology with plans to treat at least palliatively to T4 and rib lesions possible additional sites pending peer review of case.    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    Past Medical History:   Diagnosis Date    Acute bronchitis     Allergic Iodine    Allergic rhinitis     Arthritis 2000    BPH (benign prostatic hypertrophy)     Cervical disc disorder 1987    Dislocation, shoulder 1973    Elevated prostate specific antigen (PSA)     Headache 1990    History of bone scan 10/21/2010    normal    History of colonoscopy     never    History of EKG 03/12/2012    also 11-; T wave abnormality    History of medical problems 1978    HL (hearing loss) 2000    Hyperlipidemia     Hypothyroidism     IFG (impaired fasting glucose)     Kidney calculus     left ureteral stone    Knee swelling 2015    Malignant neoplasm prostate 11/09/2009    Dr. Mcclelland; lymph nodes negative margins clear    Prostate nodule     right lobe    Rotator cuff syndrome 2009    Screening for prostate cancer     prostatectomy    Sleep apnea     CPAP machine    URI (upper respiratory infection)         Past Surgical History:   Procedure Laterality Date    CERVICAL DISC SURGERY  1990    also 2006;  C5-6, C6-7    HAND SURGERY Left 2001    trigger finger release; left middle finger    NECK SURGERY  1988, 2005    Cervical disk    PROSTATECTOMY  11/09/2009    RHINOPLASTY  1985    ROTATOR CUFF REPAIR      SHOULDER SURGERY Left 09/29/2009    Dr. KRISTEL Jimenez; rotator cuff repair; bone spurs     SPINE SURGERY  2005    TRIGGER POINT INJECTION  2008,2021,2022    URETERAL STENT INSERTION Left 12/04/2013    Dr. Martinez        Current Outpatient Medications on File Prior to Visit   Medication Sig Dispense Refill    cyclobenzaprine (FLEXERIL) 5 MG tablet Take 1 tablet by mouth 2 (Two) Times a Day As Needed for Muscle Spasms. 20 tablet 0    Diclofenac Sodium (VOLTAREN) 1 % gel gel Apply 4 g topically to the appropriate area as directed 3 (Three) Times a Day. 100 g 2    HYDROcodone-acetaminophen (NORCO) 5-325 MG per tablet Take 1 tablet by mouth Every 6 (Six) Hours As Needed for Moderate Pain. 30 tablet 0    ibuprofen (ADVIL,MOTRIN) 200 MG tablet Take 1 tablet by mouth Every 6 (Six) Hours As Needed for Mild Pain.      levothyroxine (SYNTHROID, LEVOTHROID) 75 MCG tablet       loratadine (CLARITIN) 10 MG tablet Take 1 tablet by mouth Daily.      LORazepam (Ativan) 0.5 MG tablet Take 1 tablet by mouth Every 8 (Eight) Hours As Needed for Anxiety. 30 tablet 1    naproxen sodium (ALEVE) 220 MG tablet Take 1 tablet by mouth 2 (Two) Times a Day As Needed.      Norvasc 10 MG tablet Take 1 tablet by mouth Daily.      zolpidem (AMBIEN) 10 MG tablet Take 0.5 tablets by mouth At Night As Needed.       No current facility-administered medications on file prior to visit.        ALLERGIES:    Allergies   Allergen Reactions    Iodinated Contrast Media Anaphylaxis     Patient had a severe reaction in the past / difficulty breathing    Iodine Unknown - High Severity     Difficulty breathing    Molds & Smuts Unknown - High Severity    Shellfish Allergy Unknown - High Severity     Burning inside    Amoxicillin Rash        Social History  "    Socioeconomic History    Marital status:      Spouse name: Faith   Tobacco Use    Smoking status: Former     Current packs/day: 0.00     Average packs/day: 1 pack/day for 22.0 years (22.0 ttl pk-yrs)     Types: Cigarettes, Pipe     Start date: 1967     Quit date: 1988     Years since quittin.4     Passive exposure: Past    Smokeless tobacco: Never    Tobacco comments:     Quit smoking pipe around    Vaping Use    Vaping status: Never Used   Substance and Sexual Activity    Alcohol use: Yes     Comment: Rarely will drink one glass of wine    Drug use: Never    Sexual activity: Not Currently     Partners: Female        Family History   Problem Relation Age of Onset    Diabetes Mother     Kidney disease Mother         calculus    Hearing loss Mother     Prostate cancer Father     Stomach cancer Father     Cancer Sister         breast    Hypothyroidism Sister     Kidney disease Sister         calculus    Arthritis Sister     Transient ischemic attack Brother     Alzheimer's disease Other         uncle    Diabetes Other         uncle        Review of Systems   Constitutional: Negative.    HENT: Negative.     Respiratory: Negative.     Cardiovascular: Negative.    Gastrointestinal: Negative.    Genitourinary: Negative.    Musculoskeletal:  Positive for back pain.   Neurological: Negative.    Hematological: Negative.    Psychiatric/Behavioral:  Positive for dysphoric mood. The patient is nervous/anxious.     Unchanged 2024      Objective     Vitals:    24 1230   BP: 176/85   Pulse: 80   Resp: 16   Temp: 98.4 °F (36.9 °C)   TempSrc: Temporal   SpO2: 98%   Weight: 96.6 kg (212 lb 14.4 oz)   Height: 177.8 cm (70\")   PainSc:   2   PainLoc: Chest             2024    12:33 PM   Current Status   ECOG score 0       Physical Exam    CONSTITUTIONAL: pleasant well-developed adult man  HEENT: no icterus, no thrush, moist membranes  LYMPH: no cervical or supraclavicular lad  CV: RRR, S1S2, " no murmur  RESP: cta bilat, no wheezing, no rales  GI: soft, non-tender, no splenomegaly, +bs  MUSC: no edema, normal gait, point tenderness over the T4 vertebral body  NEURO: alert and oriented x3, normal strength  PSYCH: Anxious mood and anxious affect  Unchanged 5/24/2024    RECENT LABS:  Hematology WBC   Date Value Ref Range Status   05/10/2024 5.53 3.40 - 10.80 10*3/mm3 Final     RBC   Date Value Ref Range Status   05/10/2024 5.22 4.14 - 5.80 10*6/mm3 Final     Hemoglobin   Date Value Ref Range Status   05/10/2024 15.2 13.0 - 17.7 g/dL Final     Hematocrit   Date Value Ref Range Status   05/10/2024 45.1 37.5 - 51.0 % Final     Platelets   Date Value Ref Range Status   05/10/2024 149 140 - 450 10*3/mm3 Final   05/10/2024 156 140 - 450 10*3/mm3 Final        Lab Results   Component Value Date    GLUCOSE 164 (H) 05/02/2024    BUN 23 05/02/2024    CREATININE 0.93 05/02/2024    EGFR 85.1 05/02/2024    BCR 24.7 05/02/2024    K 3.7 05/02/2024    CO2 23.7 05/02/2024    CALCIUM 9.8 05/02/2024    PROTENTOTREF 7.6 05/02/2024    ALBUMIN 4.6 05/02/2024    ALBUMIN 4.3 05/02/2024    BILITOT 0.7 05/02/2024    AST 40 05/02/2024    ALT 52 (H) 05/02/2024     MRI Thoracic Spine:  MPRESSION:     1. Large expansile mass in the anterior T4 vertebral body extending into  the adjacent soft tissues and involving greater than 50% of the  vertebral body, consistent with metastatic disease. Surrounding bone  marrow edema like signal throughout the T4 vertebral body and patchy  bone marrow edema like signal in the adjacent anterior T3 inferior  endplate and anterior T5 superior endplate. This lesion is at risk for  pathologic fracture.  2. Expansile mass in the posterior right second rib and focal 1 cm mass  in the posterior right sixth rib, consistent with metastatic disease.  3. Partially imaged pulmonary malignant/metastatic disease. Recommend  further evaluation with CT with contrast and/or PET/CT.    PET:  TECHNIQUE: Radiation dose  reduction techniques were utilized, including  automated exposure control and exposure modulation based on body size.   Blood glucose level at time of injection was 123 mg/dL. 6.8 mCi of F-18  FDG were injected and PET was performed from skull base to mid thigh. CT  was obtained for localization and attenuation correction. Time at  injection 8:43 a.m. PET start time 10:01 a.m. Normalization method:  patient weight. Compared with thoracic MRI 04/25/2024.     FINDINGS: Mediastinal blood pool has a maximal SUV of 2.8.     1. There is a hypermetabolic 3.7 x 2.8 cm irregular mass at the superior  segment of the right lower lobe with a maximal SUV of 9.3. There is no  hypermetabolic hilar or mediastinal lymphadenopathy, but there are  multiple hypermetabolic bone lesions. The largest is at the T4 vertebral  body with a maximal SUV of 9.4. Lytic expansile metastases are also seen  at the posterior and lateral aspects of the right 2nd rib, posterior  right 6th rib, and left aspect of the sacrum anterior to the left S1  neural foramen.   The activity adjacent to the left greater trochanter is likely related  to tendinopathy.     2. There is no hypermetabolic lymphadenopathy or suspicious activity  within the neck. There is no suspicious visceral activity within the  abdomen or pelvis.  The spleen is mildly enlarged measuring 15 cm in diameter. Splenic  activity is less intense than that of the liver.      Assessment & Plan     *Stage IV adenocarcinoma, lung primary  Patient presented with back and chest wall pain, imaging showed and expansile mass T4 vertebral body, posterior right second rib and posterior right sixth rib  PET scan was performed on 5/3/2024 which showed a hypermetabolic mass in the superior segment of the right lower lobe 3.7 x 2.8 cm SUV 9.3, no hypermetabolic mediastinal or hilar lymph nodes but multiple hypermetabolic bone lesions largest being at T4 vertebral body SUV 9.4, posterior lateral right second  rib, posterior right sixth rib, left aspect of the sacrum to the left of the S1 neural foramen.  Spleen was mildly enlarged 15 cm but less activity than liver.  CT-guided needle biopsy of a rib mass was performed on 5/10/2024 and showed metastatic adenocarcinoma immunohistochemistry supporting a pulmonary primary.patient was seen by radiation oncology with plans to treat at least palliatively to T4 and rib lesions possible additional sites pending peer review of case.  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  *Pain of malignancy  The patient has available [place] 5/325 1 p.o. every 6 hours as needed pain  Radiation consult as above  *Significant anxiety related to malignancy  The patient was prescribed Ativan 0.5 mg 3 times daily and scheduled to see psychosocial clinic later today  *History of prostate cancer status post prostatectomy 2009    Oncology plan/recommendations:  Discussed with the patient and his wife the implications of the exon 19 deletion and my recommendation to begin treatment with Tagrisso 80 mg daily for treatment.  I discussed with the patient risk and side effects of the medication and formal pharmacy education will be requested as well.  MRI brain pending  Radiation oncology consult for palliative treatment to painful sites of disease +/- lung primary  Patient will follow-up 2 weeks after starting Tagrisso CBC CMP magnesium EKG  Will consider addition of a bone modifying agent down the road for metastatic disease to bone as well    Total of 50 minutes of time spent on the case today discussing with the patient and wife additional pathologic results, treatment recommendations, writing orders and documenting care.

## 2024-05-24 NOTE — PROGRESS NOTES
In Person  Provider Location: [facility]    Chief Complaint: Anxiety surrounding uncertainty, current natan regarding presnce of treatable tumor markers    Subjective  Patient ID: Nemesio Fitzpatrick is a 78 y.o. male who presents for initial consultation through the [facility] at the request of Mp Austin MD     PHQ-9 Total Score: (P) 9        HPI:  Pt is seen alongside wife for initial consultation regarding anxiety and depression alongside recent delayed diagnosis of metastatic lung cancer, hx prostate cancer. Pt is immediately tearful, celebrating today's news of targettable tumor marker. Considers tremendous increase in anxiety over recent weeks surrounding uncertainty, now feeling relief, hope. Anticipates initiation of oral treatment with radiation. Met with Dr. Guerrero yesterday, eager to approach treatment.     PHQ 9 reviewed with sleep and fatigue as primary intruders. Pt does have DAGMAR, compliant with CPAP. Does take a fraction of a 10 mg ambien most nights, appreciating benefit to this while recognizing more recent intrusion of pain and ruminative worry. Hopeful today will be easy to initiate with new information. Has appreciated use of lorazepam to recent acute anxiety, not feeling overt reaction to this, rather feeling improved anxiety without unwanted SE. Pt does report goals of limiting pain medication and ambien. Is currently taking 1/4 of 7.5 hydrocodone a few times daily, finding benefit to this although with perception of residual grogginess, not feeling well on this med.    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 Recognizes this would be considered PTSD today. Denies SI past or current, although did wonder whether life was worth living. Reports current active engagement with various veterans and has found immense benefit and healing in  this.    Pt reports ongoing use of mindfulness, staying in present moment alongside positive future oriented thinking which has assisted in coping with recent uncertainty. Pt shares loving family, extensive support network and high quality of life. Has celebrated survivorsip of prostate cancer, initially feeling this to be a death sentence and now seeing grandchildren having great grandchildren. Identifies new goal of seeing even more great grandchildren and making it to his 80s.    Pt reports having many hobbies including motorcycle, reading, photography, bird watching, fixing everything. Highly intellectual, inquisitive.     Social History  Marital Status:  to spouse, Faith  Children: 2 step children, grandchildren, great grandchildren, [facility]  Support Community: Wife, family, DIL, dog, siblings  Highest Level of Education: Industrial technology, [employer], [employer]; ongoing education throghout life. Business management.  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, retired since 2007  Tobacco Use: Denied  Alcohol Use: Ocassional glass of wine  Marijuana/ Other drug Use: Denied    Medical History  Psychiatric History: Denied   Hx prostate cancer    Family History  Family Psychiatric History: Denied   Family Cancer History:Father with stomach cancer, mouth cancer; sister with breast cancer    The following portions of the patient's history were reviewed and updated as appropriate: He  has a past medical history of Acute bronchitis, Allergic (Iodine), Allergic rhinitis, Arthritis (2000), BPH (benign prostatic hypertrophy), Cervical disc disorder (1987), Dislocation, shoulder (1973), Elevated prostate specific antigen (PSA), Headache (1990), History of bone scan (10/21/2010), History of colonoscopy, History of EKG (03/12/2012), History of medical problems (1978), HL (hearing loss) (2000), Hyperlipidemia, Hypothyroidism, IFG (impaired fasting glucose), Kidney calculus, Knee swelling  (2015), Malignant neoplasm prostate (11/09/2009), Prostate nodule, Rotator cuff syndrome (2009), Screening for prostate cancer, Sleep apnea, and URI (upper respiratory infection).    He  has a past surgical history that includes Shoulder surgery (Left, 09/29/2009); Cervical disc surgery (1990); Hand surgery (Left, 2001); Prostatectomy (11/09/2009); Rhinoplasty (1985); Ureteral stent placement (Left, 12/04/2013); Spine surgery (2005); Neck surgery (1988, 2005); Trigger point injection (2008,2021,2022); and Rotator cuff repair.  His family history includes Alzheimer's disease in an other family member; Arthritis in his sister; Cancer in his sister; Diabetes in his mother and another family member; Hearing loss in his mother; Hypothyroidism in his sister; Kidney disease in his mother and sister; Prostate cancer in his father; Stomach cancer in his father; Transient ischemic attack in his brother.  He  reports that he quit smoking about 35 years ago. His smoking use included cigarettes and pipe. He started smoking about 57 years ago. He has a 22 pack-year smoking history. He has been exposed to tobacco smoke. He has never used smokeless tobacco. He reports current alcohol use. He reports that he does not use drugs.  Current Outpatient Medications   Medication Sig Dispense Refill    cyclobenzaprine (FLEXERIL) 5 MG tablet Take 1 tablet by mouth 2 (Two) Times a Day As Needed for Muscle Spasms. 20 tablet 0    Diclofenac Sodium (VOLTAREN) 1 % gel gel Apply 4 g topically to the appropriate area as directed 3 (Three) Times a Day. 100 g 2    gabapentin (Neurontin) 300 MG capsule Take 1 capsule by mouth 3 (Three) Times a Day. 90 capsule 2    HYDROcodone-acetaminophen (NORCO) 5-325 MG per tablet Take 1 tablet by mouth Every 6 (Six) Hours As Needed for Moderate Pain. 30 tablet 0    ibuprofen (ADVIL,MOTRIN) 200 MG tablet Take 1 tablet by mouth Every 6 (Six) Hours As Needed for Mild Pain.      levothyroxine (SYNTHROID, LEVOTHROID)  75 MCG tablet       loratadine (CLARITIN) 10 MG tablet Take 1 tablet by mouth Daily.      LORazepam (Ativan) 0.5 MG tablet Take 1 tablet by mouth Every 8 (Eight) Hours As Needed for Anxiety. 30 tablet 1    naproxen sodium (ALEVE) 220 MG tablet Take 1 tablet by mouth 2 (Two) Times a Day As Needed.      Norvasc 10 MG tablet Take 1 tablet by mouth Daily.      zolpidem (AMBIEN) 10 MG tablet Take 0.5 tablets by mouth At Night As Needed.       No current facility-administered medications for this visit.     Current Outpatient Medications on File Prior to Visit   Medication Sig    cyclobenzaprine (FLEXERIL) 5 MG tablet Take 1 tablet by mouth 2 (Two) Times a Day As Needed for Muscle Spasms.    Diclofenac Sodium (VOLTAREN) 1 % gel gel Apply 4 g topically to the appropriate area as directed 3 (Three) Times a Day.    HYDROcodone-acetaminophen (NORCO) 5-325 MG per tablet Take 1 tablet by mouth Every 6 (Six) Hours As Needed for Moderate Pain.    ibuprofen (ADVIL,MOTRIN) 200 MG tablet Take 1 tablet by mouth Every 6 (Six) Hours As Needed for Mild Pain.    levothyroxine (SYNTHROID, LEVOTHROID) 75 MCG tablet     loratadine (CLARITIN) 10 MG tablet Take 1 tablet by mouth Daily.    LORazepam (Ativan) 0.5 MG tablet Take 1 tablet by mouth Every 8 (Eight) Hours As Needed for Anxiety.    naproxen sodium (ALEVE) 220 MG tablet Take 1 tablet by mouth 2 (Two) Times a Day As Needed.    Norvasc 10 MG tablet Take 1 tablet by mouth Daily.    zolpidem (AMBIEN) 10 MG tablet Take 0.5 tablets by mouth At Night As Needed.     No current facility-administered medications on file prior to visit.     He is allergic to iodinated contrast media, iodine, molds & smuts, shellfish allergy, and amoxicillin..    Objective   Mental Status Exam  Appearance:  clean and casually dressed, appropriate  Attitude toward clinician:  cooperative and agreeable   Speech:    Rate:  regular rate and rhythm; very talkative- baseline per pt and wife   Volume:  normal  Motor:   no abnormal movements present  Mood:  Hopeful, grateful  Affect:  euthymic; tearful - pt sees this as appropriate and happy  Thought Processes:  linear, logical, and goal directed  Thought Content:  normal  Suicidal Thoughts:  absent  Homicidal Thoughts:  absent  Perceptual Disturbance: no perceptual disturbance  Attention and Concentration:  good  Insight and Judgement:  good  Memory:  memory appears to be intact    Lab Review:   Orders Only on 05/13/2024   Component Date Value    Reason for Study 05/14/2024 To identify somatic and germline mutations relevant to patient's cancer.     Genetic Diseases Assessed 05/14/2024 Cancer     Description of Ranges of* 05/14/2024 648 gene panel     Overall Interpretation 05/14/2024 positive     MSI 05/14/2024 Stable     TMB 05/14/2024 5.3     [facility] Portal 05/14/2024 [email or web address]     Pertinent Negatives 05/14/2024 KRAS, BRAF, ALK, ROS1, RET, MET, ERBB2 (HER2)     Low Coverage Regions 05/14/2024 EPHB2, GNAS, RXRA     Therapy Count 05/14/2024 8     [facility]: Potential Therap* 05/14/2024                      Value:Gene: 3236^EGFR^HGNC  Variant: p.[id number]  Match Type: snvIndel  Match Type Description: EGFR p.[id number]  Agent: Afatinib  Drug Class: EGFR Inhibitor  Tissue: Non-Small Cell Lung Cancer  Association: Response  Evidence Status: Consensus  Evidence ID: NCCN  KDB Variant: Exon 19  NCCN Associated Evidence: Consensus, Non-Small Cell Lung Cancer  MSK Associated Evidence: MSK OncoKB, Level 1  Label: FDA On Label  FDA Approved?: Yes  On label?: Yes    [facility]: Potential Therap* 05/14/2024                      Value:Gene: 3236^EGFR^HGNC  Variant: p.[id number]  Match Type: snvIndel  Match Type Description: EGFR p.[id number]  Agent: Dacomitinib  Drug Class: EGFR Inhibitor  Tissue: Non-Small Cell Lung Cancer  Association: Response  Evidence Status:  Consensus  Evidence ID: NCCN  KDB Variant: Exon 19  NCCN Associated Evidence: Consensus, Non-Small Cell Lung Cancer  MSK Associated Evidence: MSK OncoKB, Level 1  Label: FDA On Label  FDA Approved?: Yes  On label?: Yes    [facility]: Potential Therap* 05/14/2024                      Value:Gene: 3236^EGFR^HGNC  Variant: p.[id number]  Match Type: snvIndel  Match Type Description: EGFR p.[id number]  Agent: Erlotinib  Drug Class: EGFR Inhibitor  Tissue: Non-Small Cell Lung Cancer  Association: Response  Evidence Status: Consensus  Evidence ID: NCCN  KDB Variant: Exon 19  NCCN Associated Evidence: Consensus, Non-Small Cell Lung Cancer  MSK Associated Evidence: MSK OncoKB, Level 1  Label: FDA On Label  FDA Approved?: Yes  On label?: Yes    [facility]: Potential Therap* 05/14/2024                      Value:Gene: 3236^EGFR^HGNC  Variant: p.[id number]  Match Type: snvIndel  Match Type Description: EGFR p.[id number]_[id number]  Agent: Gefitinib  Drug Class: EGFR Inhibitor  Tissue: Non-Small Cell Lung Cancer  Association: Response  Evidence Status: Consensus  Evidence ID: NCCN  KDB Variant: Exon 19  NCCN Associated Evidence: Consensus, Non-Small Cell Lung Cancer  MSK Associated Evidence: MSK OncoKB, Level 1  Label: FDA On Label  FDA Approved?: Yes  On label?: Yes    [facility]: Potential Therap* 05/14/2024                      Value:Gene: 3236^EGFR^HGNC  Variant: p.[id number]  Match Type: snvIndel  Match Type Description: EGFR p.[id number]  Agent: Osimertinib  Drug Class: EGFR Inhibitor  Tissue: Non-Small Cell Lung Cancer  Association: Response  Evidence Status: Consensus  Evidence ID: NCCN  KDB Variant: Exon 19  NCCN Associated Evidence: Consensus, Non-Small Cell Lung Cancer  MSK Associated Evidence: MSK OncoKB, Level 1  Label: FDA On Label  FDA Approved?: Yes  On label?: Yes    [facility]: Potential Therap* 05/14/2024                      Value:Gene: 3236^EGFR^HGNC  Variant: p.[id number]  Match Type: snvIndel  Match Type Description:  EGFR p.[id number]  Agent: Erlotinib + Ramucirumab  Drug Class: Combination (EGFR Inhibitor + VEGFR2 Inhibitor)  Tissue: Non-Small Cell Lung Cancer  Association: Response  Evidence Status: Consensus  Evidence ID: NCCN  KDB Variant: Exon 19  NCCN Associated Evidence: Consensus, Non-Small Cell Lung Cancer  MSK Associated Evidence: MSK OncoKB, Level 1  Label: FDA On Label  FDA Approved?: Yes  On label?: Yes    [facility]: Potential Therap* 05/14/2024                      Value:Gene: 3236^EGFR^HGNC  Variant: p.[id number]  Match Type: snvIndel  Match Type Description: EGFR p.[id number]_[id number]  Agent: Afatinib + Cetuximab  Drug Class: Combination (EGFR Inhibitor + Anti-EGFR MAb)  Tissue: Non-Small Cell Lung Cancer  Association: Response  Evidence Status: Consensus  Evidence ID: KARLAN  KDB Variant: Exon 19  NCCN Associated Evidence: Consensus, Non-Small Cell Lung Cancer  Label: FDA Off Label  FDA Approved?: Yes  On label?: No    [facility]: Potential Therap* 05/14/2024                      Value:Gene: 1773^CDK4^HGNC  Variant: CDK4 Copy number gain  Match Type: cnv  Match Type Description: CDK4 Copy number gain  Agent: Palbociclib  Drug Class: CDK4/6 Inhibitor  Tissue: Liposarcoma  Association: Response  Evidence Status: Consensus  Evidence ID: NCCN  Label: FDA Off Label  FDA Approved?: Yes  On label?: No    Trial Count 05/14/2024 3     Trial 1: Matched criteria 05/14/2024                      Value:Clinical Trial NCT ID: [id number]  Clinical Trial Title: Study of [id number] in Patients With Advanced Solid Tumors  Clinical Trial URL: https://clinicaltrials.gov/ct2/show/WJA56040306  Clinical Phase: Phase 1/Phase 2  EGFR p.T274_K066vxp mutation  [address] [place], [place]    Trial 2: Matched criteria 05/14/2024                      Value:Clinical Trial NCT ID: [id number]  Clinical Trial Title: TAPUR: Testing the Use of Food and Drug Administration (FDA) Approved Drugs That Target a Specific Abnormality in a Tumor Gene in People With Advanced  Stage Cancer  Clinical Trial URL: https://clinicaltrials.gov/ct2/show/GMG69281809  Clinical Phase: Phase 2  CDK4 amplification  [address] [place], [place]    Trial 3: Matched criteria 05/14/2024                      Value:Clinical Trial NCT ID: [id number]  Clinical Trial Title: Study Of ATRN-119 In Patients With Advanced Solid Tumors  Clinical Trial URL: https://clinicaltrials.gov/ct2/show/UMP37874658  Clinical Phase: Phase 1/Phase 2  TP53 p.Y126fs mutation  [address] [place], [place]    xR Result 1 05/14/2024 QNS  QNS - This report is being issued to report the results of gene rearrangement and altered splicing analysis from RNA sequencing. RNA sequencing analysis was not performed due to insufficient quantity and/or quality of RNA.     Germline Variant Note 05/14/2024 A matched normal sample is being sequenced and updated results will be reported upon completion.    Hospital Outpatient Visit on 05/10/2024   Component Date Value    Platelets 05/10/2024 149     Protime 05/10/2024 14.1     INR 05/10/2024 1.2     Addendum 2 05/10/2024                      Value:This result contains rich text formatting which cannot be displayed here.    Addendum 05/10/2024                      Value:This result contains rich text formatting which cannot be displayed here.    Case Report 05/10/2024                      Value:Surgical Pathology Report                         Case: [id number]                                  Authorizing Provider:  Mp Austin MD       Collected:           05/10/2024 09:27 AM          Ordering Location:     [facility]  Received:            05/10/2024 09:43 AM                                 CT                                                                           Pathologist:           Cooper Montelongo MD                                                         Specimen:    Rib, Bone lesion biopsy T4                                                                 Clinical Information  05/10/2024                      Value:This result contains rich text formatting which cannot be displayed here.    Final Diagnosis 05/10/2024                      Value:This result contains rich text formatting which cannot be displayed here.    Comment 05/10/2024                      Value:This result contains rich text formatting which cannot be displayed here.    Gross Description 05/10/2024                      Value:This result contains rich text formatting which cannot be displayed here.    Special Stains 05/10/2024                      Value:This result contains rich text formatting which cannot be displayed here.    WBC 05/10/2024 5.53     RBC 05/10/2024 5.22     Hemoglobin 05/10/2024 15.2     Hematocrit 05/10/2024 45.1     MCV 05/10/2024 86.4     MCH 05/10/2024 29.1     MCHC 05/10/2024 33.7     RDW 05/10/2024 13.3     RDW-SD 05/10/2024 41.4     MPV 05/10/2024 9.9     Platelets 05/10/2024 156     Neutrophil % 05/10/2024 66.0     Lymphocyte % 05/10/2024 22.8     Monocyte % 05/10/2024 7.8     Eosinophil % 05/10/2024 1.8     Basophil % 05/10/2024 0.7     Immature Grans % 05/10/2024 0.9 (H)     Neutrophils, Absolute 05/10/2024 3.65     Lymphocytes, Absolute 05/10/2024 1.26     Monocytes, Absolute 05/10/2024 0.43     Eosinophils, Absolute 05/10/2024 0.10     Basophils, Absolute 05/10/2024 0.04     Immature Grans, Absolute 05/10/2024 0.05     nRBC 05/10/2024 0.0    Hospital Outpatient Visit on 05/03/2024   Component Date Value    Glucose 05/03/2024 123    Consult on 05/02/2024   Component Date Value    Glucose 05/02/2024 164 (H)     BUN 05/02/2024 23     Creatinine 05/02/2024 0.93     Sodium 05/02/2024 139     Potassium 05/02/2024 3.7     Chloride 05/02/2024 99     CO2 05/02/2024 23.7     Calcium 05/02/2024 9.8     Total Protein 05/02/2024 7.7     Albumin 05/02/2024 4.6     ALT (SGPT) 05/02/2024 52 (H)     AST (SGOT) 05/02/2024 40     Alkaline Phosphatase 05/02/2024 103     Total Bilirubin 05/02/2024 0.7      Globulin 05/02/2024 3.1     A/G Ratio 05/02/2024 1.5     BUN/Creatinine Ratio 05/02/2024 24.7     Anion Gap 05/02/2024 16.3 (H)     eGFR 05/02/2024 85.1     PSA 05/02/2024 <0.014     IgG 05/02/2024 793     IgA 05/02/2024 135     IgM 05/02/2024 96     Total Protein 05/02/2024 7.6     Albumin 05/02/2024 4.3     Alpha-1-Globulin 05/02/2024 0.2     Alpha-2-Globulin 05/02/2024 1.2 (H)     Beta Globulin 05/02/2024 1.1     Gamma Globulin 05/02/2024 0.8     M-[place] 05/02/2024 Comment:     Globulin 05/02/2024 3.3     A/G Ratio 05/02/2024 1.4     Immunofixation Reflex, S* 05/02/2024 Comment (A)     Please note 05/02/2024 Comment     Free Light Chain, [place] 05/02/2024 13.8     Free Lambda Light Chains 05/02/2024 7.9     Kappa/Lambda Ratio 05/02/2024 1.75 (H)    Lab on 05/02/2024   Component Date Value    WBC 05/02/2024 5.05     RBC 05/02/2024 4.96     Hemoglobin 05/02/2024 14.7     Hematocrit 05/02/2024 43.1     MCV 05/02/2024 86.9     MCH 05/02/2024 29.6     MCHC 05/02/2024 34.1     RDW 05/02/2024 13.0     RDW-SD 05/02/2024 40.4     MPV 05/02/2024 9.2     Platelets 05/02/2024 165     Neutrophil % 05/02/2024 68.9     Lymphocyte % 05/02/2024 21.0     Monocyte % 05/02/2024 7.5     Eosinophil % 05/02/2024 1.0     Basophil % 05/02/2024 0.8     Immature Grans % 05/02/2024 0.8 (H)     Neutrophils, Absolute 05/02/2024 3.48     Lymphocytes, Absolute 05/02/2024 1.06     Monocytes, Absolute 05/02/2024 0.38     Eosinophils, Absolute 05/02/2024 0.05     Basophils, Absolute 05/02/2024 0.04     Immature Grans, Absolute 05/02/2024 0.04     nRBC 05/02/2024 0.0    Office Visit on 04/04/2024   Component Date Value    SARS Antigen 04/04/2024 Not Detected     Influenza A Antigen VICENTA 04/04/2024 Not Detected     Influenza B Antigen VICENTA 04/04/2024 Not Detected     Internal Control 04/04/2024 Passed     Lot Number 04/04/2024 [id number]     Expiration Date 04/04/2024 11/10/2024    Labs reviewed    Plan of Care  Initiate gabapentin 300 mg q hs, allowing  increase up to 300 mg q evening meal and 600 mg q hs to assist with ruminative worry, pain mgmt. Hopeful to assist pt in limiting opioids where possible.. Can adjust to more regular tid dosing as helpful to pain and tolerated.  Supportive counseling provided surrounding anxiety, uncertainty, upcoming treatment strategy.  Will plan for regular FU, discussing potential benefits of group engagement down the line.  FU scheduled in 4 weeks.    Diagnoses and all orders for this visit:    1. Generalized anxiety disorder (Primary)  -     gabapentin (Neurontin) 300 MG capsule; Take 1 capsule by mouth 3 (Three) Times a Day.  Dispense: 90 capsule; Refill: 2    Excessive tearfulness considered with differential dx of PBA; pt does not see this sx as being incongruent with self.

## 2024-05-24 NOTE — PROGRESS NOTES
The following STAFF message was forwarded to my attention:      From: Mp Austin MD   Sent: 5/24/2024   1:22 PM EDT   To: Bridger Lindquist RN; *   This patient needs Tagrisso 80 mg daily and education.  2 weeks after starting the medication MD or NP follow-up CBC CMP magnesium EKG day of visit     Tagrisso does not require prior authorization, but a test claim in [facility] returned a co-pay amount of $710.48. Mr. Fitzpatrick is not eligible to use a Tagrisso co-pay card, and currently there are no foundations open offering grants to cover his diagnosis.     He may be eligible to receive free Tagrisso through the [employer] Prescriptions Savings Program with [employer]. I called Mr. Fitzpatrick to explain all of the above and explained that I need to submit his Original Medicare ID number to [employer] to determine his eligibility.     Mr. Fitzpatrick v/u and said that he would call me back next week to provide that information.    Blanca Jacobson - Care Coordinator   5/24/2024  15:32 EDT

## 2024-05-28 DIAGNOSIS — C34.90 PRIMARY NONSQUAMOUS NONSMALL CELL NEOPLASM OF LUNG: Primary | ICD-10-CM

## 2024-05-29 ENCOUNTER — TELEPHONE (OUTPATIENT)
Dept: ONCOLOGY | Facility: CLINIC | Age: 79
End: 2024-05-29
Payer: MEDICARE

## 2024-05-29 ENCOUNTER — SPECIALTY PHARMACY (OUTPATIENT)
Dept: PHARMACY | Facility: HOSPITAL | Age: 79
End: 2024-05-29
Payer: MEDICARE

## 2024-05-29 DIAGNOSIS — C34.90 PRIMARY NONSQUAMOUS NONSMALL CELL NEOPLASM OF LUNG: Primary | ICD-10-CM

## 2024-05-29 LAB
DNA RANGE(S) EXAMINED NAR: NORMAL
GENE DIS ANL INTERP-IMP: POSITIVE
GENE DIS ASSESSED: NORMAL
GENE MUT TESTED BLD/T: 5.3 M/MB
MSI CA SPEC-IMP: NORMAL
REASON FOR STUDY: NORMAL
TEMPUS AMENDMENTNOTE1: NORMAL
TEMPUS GERMLINE NOTE: NORMAL
TEMPUS LCA: NORMAL
TEMPUS PERTINENTNEGATIVES: NORMAL
TEMPUS PORTAL: NORMAL
TEMPUS THERAPY1: NORMAL
TEMPUS THERAPY2: NORMAL
TEMPUS THERAPY3: NORMAL
TEMPUS THERAPY4: NORMAL
TEMPUS THERAPY5: NORMAL
TEMPUS THERAPY6: NORMAL
TEMPUS THERAPY7: NORMAL
TEMPUS THERAPY8: NORMAL
TEMPUS THERAPYCOUNT: 8
TEMPUS TRIALCOUNT: 3
TEMPUS TRIALMATCHES1: NORMAL
TEMPUS TRIALMATCHES2: NORMAL
TEMPUS TRIALMATCHES3: NORMAL
TEMPUS XR RESULT 1: NORMAL

## 2024-05-29 NOTE — PROGRESS NOTES
Oral Chemotherapy - New Referral    Received a referral from Dr. Austin    Treatment Plan: Tagrisso (osimertinib)  Start date of treatment planned for: As soon as oral specialty medication is available.  Indication: lung cancer  Relevant past treatments: none  Is the therapy appropriate based on treatment guidelines and FDA labeling?: yes  Therapeutic Goals: Continue treatment until progression or intolerable toxicity  Patient can self-administer oral medications: Yes    Drug-Drug Interactions: The current medication list was reviewed and there are no relevant drug-drug interactions with the specialty medication.  Medication Allergies: The patient has no relevant allergies as it relates to their oral specialty medication  Review of Labs/Dose Adjustments: The patient's most recent labs were reviewed and all are WNL to start treatment at this dose.     A prescription was released to  [facility]  specialty pharmacy for   Drug: Tagrisso (osimertinib)  Strength: 80 mg  Directions: Take 1 tablet by mouth daily  Quantity: 30  Refills: 0    Pharmacy education is scheduled for 6/3/24., CCA and consent will be signed at that time.    Name/Credentials: Jerson Seaman, Adriana, [facility]  Clinical Oncology Pharmacist    5/29/2024  15:14 EDT

## 2024-05-29 NOTE — PROGRESS NOTES
A prescription was released to  [facility]  specialty pharmacy for   Drug: Tagrisso (osimertinib)  Strength: 80 mg  Directions: Take 1 tablet by mouth daily  Quantity: 30  Refills: 0    Completed independent double check on medication order/RX.  Joann Haddad, CecileD, BCPS

## 2024-05-29 NOTE — PROGRESS NOTES
Per Rut with [employer] Program ([phone]) Mr. Fitzpatrick's enrollment is denined based on the income criteria that they retrieved from the IRS.     I will contact the patient via the [employer] janae and ask that he submit his most recent tax return.    In the meantime, I have secured a donation of Tagrisso from [facility].     Blanca Jacobson - Care Coordinator   5/29/2024  15:10 EDT    ADDENDUM  The GS donation is scheduled to arrive on 5/31/24. [facility] tracking# [id number].    Mr. Fitzpatrick also confirmed that the income information reported by the IRS is correct. Moving forward, we continue to watch for ramón funding to become available and will also investigate whether or not Tagrisso can be filled with the [employer].     Blanca Jacobson - Care Coordinator   5/30/2024  13:51 EDT

## 2024-05-30 ENCOUNTER — DOCUMENTATION (OUTPATIENT)
Dept: OTHER | Facility: HOSPITAL | Age: 79
End: 2024-05-30
Payer: MEDICARE

## 2024-05-30 LAB
LAB AP CASE REPORT: NORMAL
LAB AP CLINICAL INFORMATION: NORMAL
LAB AP DIAGNOSIS COMMENT: NORMAL
LAB AP SPECIAL STAINS: NORMAL
Lab: NORMAL
PATH REPORT.ADDENDUM SPEC: NORMAL
PATH REPORT.FINAL DX SPEC: NORMAL
PATH REPORT.GROSS SPEC: NORMAL

## 2024-05-30 NOTE — PROGRESS NOTES
Oncology Social Work  ..Distress Screening Follow-up    Diagnosis: Lung Cancer     Location of Distress Screening: [facility]    Distress Level: 4 (5/23/2024  2:06 PM)    Physical Concerns:    Fatigue: Y  Pain: Y  Sleep: Y  Loss or change of physical abilities: Y    Practical Problems:    Taking care of myself: Y  Taking care of others: Y    Emotional Concerns:    Worry or anxiety: Y  Sadness or depression: Y    Family Concerns:       Spiritual Concerns:        Interventions:        Comments: Patient seen by Navigation and ASHLEIGH Moore in Behavioral Health .  OSW will remain available if future needs arise.  Per Navigation note - patient has no transportation, finanical or other resource needs.  Patient was given information on [employer] and [employer].  Patient seeing Cristy for anxiety management.     Noelle Price, TREVERW, LCSW

## 2024-05-31 ENCOUNTER — SPECIALTY PHARMACY (OUTPATIENT)
Dept: PHARMACY | Facility: HOSPITAL | Age: 79
End: 2024-05-31
Payer: MEDICARE

## 2024-05-31 ENCOUNTER — PATIENT OUTREACH (OUTPATIENT)
Dept: OTHER | Facility: HOSPITAL | Age: 79
End: 2024-05-31
Payer: MEDICARE

## 2024-05-31 ENCOUNTER — HOSPITAL ENCOUNTER (OUTPATIENT)
Dept: MRI IMAGING | Facility: HOSPITAL | Age: 79
Discharge: HOME | End: 2024-05-31
Payer: MEDICARE

## 2024-05-31 DIAGNOSIS — C61 MALIGNANT NEOPLASM PROSTATE: ICD-10-CM

## 2024-05-31 DIAGNOSIS — M89.8X8 MASS OF SPINE: ICD-10-CM

## 2024-05-31 PROCEDURE — 0 GADOBENATE DIMEGLUMINE 529 MG/ML SOLUTION: Performed by: INTERNAL MEDICINE

## 2024-05-31 PROCEDURE — A9577 INJ MULTIHANCE: HCPCS | Performed by: INTERNAL MEDICINE

## 2024-05-31 PROCEDURE — 70553 MRI BRAIN STEM W/O & W/DYE: CPT

## 2024-05-31 RX ADMIN — GADOBENATE DIMEGLUMINE 20 ML: 529 INJECTION, SOLUTION INTRAVENOUS at 15:35

## 2024-05-31 NOTE — PROGRESS NOTES
Call from patient. He inquired about his appts on Monday. We discussed these in detail. We also discussed his MRI later today and upcoming appt with Cristy next week.    All questions answered.  I will follow up in several weeks; encouraged patient to call as needed.

## 2024-05-31 NOTE — PROGRESS NOTES
I have received Tagrisso 80 mg from [facility] and placed it in locked storage inside the [facility] office at [facility].    Joann Haddad [facility] verified medication upon receipt of medication.       Zuleyka Odonnell  Specialty Pharmacy Technician

## 2024-06-03 ENCOUNTER — OFFICE VISIT (OUTPATIENT)
Dept: ONCOLOGY | Facility: CLINIC | Age: 79
End: 2024-06-03
Payer: MEDICARE

## 2024-06-03 ENCOUNTER — SPECIALTY PHARMACY (OUTPATIENT)
Dept: ONCOLOGY | Facility: HOSPITAL | Age: 79
End: 2024-06-03
Payer: MEDICARE

## 2024-06-03 VITALS
OXYGEN SATURATION: 97 % | BODY MASS INDEX: 30.78 KG/M2 | HEART RATE: 79 BPM | HEIGHT: 70 IN | SYSTOLIC BLOOD PRESSURE: 172 MMHG | WEIGHT: 215 LBS | DIASTOLIC BLOOD PRESSURE: 79 MMHG | RESPIRATION RATE: 18 BRPM

## 2024-06-03 DIAGNOSIS — C34.90 PRIMARY NONSQUAMOUS NONSMALL CELL NEOPLASM OF LUNG: Primary | ICD-10-CM

## 2024-06-03 LAB — CREAT BLDA-MCNC: 1.1 MG/DL (ref 0.6–1.3)

## 2024-06-03 PROCEDURE — 1160F RVW MEDS BY RX/DR IN RCRD: CPT | Performed by: NURSE PRACTITIONER

## 2024-06-03 PROCEDURE — 1159F MED LIST DOCD IN RCRD: CPT | Performed by: NURSE PRACTITIONER

## 2024-06-03 PROCEDURE — 99213 OFFICE O/P EST LOW 20 MIN: CPT | Performed by: NURSE PRACTITIONER

## 2024-06-03 PROCEDURE — 1125F AMNT PAIN NOTED PAIN PRSNT: CPT | Performed by: NURSE PRACTITIONER

## 2024-06-03 RX ORDER — ONDANSETRON HYDROCHLORIDE 8 MG/1
8 TABLET, FILM COATED ORAL 3 TIMES DAILY PRN
Qty: 30 TABLET | Refills: 5 | Status: SHIPPED | OUTPATIENT
Start: 2024-06-03

## 2024-06-03 NOTE — PROGRESS NOTES
Specialty Pharmacy Patient Management Program  Oncology Initial Assessment       Nemesio Fitzpatrick is a 78 y.o. male with metastatic lung cancer seen by an Oncology provider and enrolled in the Oncology Patient Management program offered by [facility].  An initial outreach was conducted, including assessment of therapy appropriateness and specialty medication education for Tagrisso (osimertinib). The patient was introduced to services offered by [facility], including: regular assessments, refill coordination, mail order delivery options, prior authorization maintenance, and financial assistance programs as applicable. The patient was also provided with contact information for the pharmacy team.     Goal of chemotherapy: disease control    Treatment Medication(s) / Frequency and Dosing    Tagrisso 80 mg po daily    Number of cycles: until disease progression or unacceptable toxicity    Start date of oral specialty medication:  6/4/24    Follow-up Testing to be determined after TBD cycles by MD.     Items for home use: Imodium AD (for diarrhea) and Acetaminophen or Tylenol (for fever and/or pain)    Rx written for: [] Nausea    [] Pre-Chemo   ondansetron 8 mg by mouth every 8 hours as needed for nausea      Completing Pharmacist: Joann Haddad RPH             Date/time: 06/03/2024 16:55 EDT         Relevant Past Medical History, Comorbidities, and Vaccines  Relevant medical history and concomitant health conditions were discussed with the patient. The patient's chart has been reviewed for relevant past medical history and comorbid health conditions and updated as necessary.  Vaccines are coordinated by the patient's oncologist and primary care provider.  Past Medical History:   Diagnosis Date    Acute bronchitis     Allergic Iodine    Allergic rhinitis     Arthritis 2000    BPH (benign prostatic hypertrophy)     Cervical disc disorder 1987    Dislocation, shoulder 1973     Elevated prostate specific antigen (PSA)     Headache     History of bone scan 10/21/2010    normal    History of colonoscopy     never    History of EKG 2012    also 2010; T wave abnormality    History of medical problems     HL (hearing loss)     Hyperlipidemia     Hypothyroidism     IFG (impaired fasting glucose)     Kidney calculus     left ureteral stone    Knee swelling 2015    Malignant neoplasm prostate 2009    Dr. Mcclelland; lymph nodes negative margins clear    Prostate nodule     right lobe    Rotator cuff syndrome     Screening for prostate cancer     prostatectomy    Sleep apnea     CPAP machine    URI (upper respiratory infection)      Social History     Socioeconomic History    Marital status:      Spouse name: Faith   Tobacco Use    Smoking status: Former     Current packs/day: 0.00     Average packs/day: 1 pack/day for 22.0 years (22.0 ttl pk-yrs)     Types: Cigarettes, Pipe     Start date: 1967     Quit date: 1988     Years since quittin.4     Passive exposure: Past    Smokeless tobacco: Never    Tobacco comments:     Quit smoking pipe around    Vaping Use    Vaping status: Never Used   Substance and Sexual Activity    Alcohol use: Yes     Comment: Rarely will drink one glass of wine    Drug use: Never    Sexual activity: Not Currently     Partners: Female       Allergies  Known allergies and reactions were discussed with the patient. The patient's chart has been reviewed for allergy information and updated as necessary.   Allergies   Allergen Reactions    Iodinated Contrast Media Anaphylaxis     Patient had a severe reaction in the past / difficulty breathing    Iodine Unknown - High Severity     Difficulty breathing    Molds & Smuts Unknown - High Severity    Shellfish Allergy Unknown - High Severity     Burning inside    Amoxicillin Rash        Current Medication List  This medication list has been reviewed with the patient and evaluated  for any interactions or necessary modifications/recommendations, and updated to include all prescription medications, OTC medications, and supplements the patient is currently taking.  This list reflects what is contained in the patient's profile, which has also been marked as reviewed to communicate to other providers it is the most up to date version of the patient's current medication therapy.   Prior to Admission medications    Medication Sig Start Date End Date Taking? Authorizing Provider   cyclobenzaprine (FLEXERIL) 5 MG tablet Take 1 tablet by mouth 2 (Two) Times a Day As Needed for Muscle Spasms.  Patient not taking: Reported on 6/3/2024 4/4/24   Samantha Estevez APRN   Diclofenac Sodium (VOLTAREN) 1 % gel gel Apply 4 g topically to the appropriate area as directed 3 (Three) Times a Day.  Patient not taking: Reported on 6/3/2024 4/4/24   Samantha Estevez APRN   gabapentin (Neurontin) 300 MG capsule Take 1 capsule by mouth 3 (Three) Times a Day. 5/24/24 5/24/25  Marla Givens APRN   HYDROcodone-acetaminophen (NORCO) 5-325 MG per tablet Take 1 tablet by mouth Every 6 (Six) Hours As Needed for Moderate Pain. 9/20/23   Sherry Mazariegos MD   ibuprofen (ADVIL,MOTRIN) 200 MG tablet Take 1 tablet by mouth Every 6 (Six) Hours As Needed for Mild Pain.    Hailey Romero MD   levothyroxine (SYNTHROID, LEVOTHROID) 75 MCG tablet  1/27/17   Hailey Romero MD   loratadine (CLARITIN) 10 MG tablet Take 1 tablet by mouth Daily. 8/29/12   Hailey Romero MD   LORazepam (Ativan) 0.5 MG tablet Take 1 tablet by mouth Every 8 (Eight) Hours As Needed for Anxiety. 5/17/24   Mp Austin MD   naproxen sodium (ALEVE) 220 MG tablet Take 1 tablet by mouth 2 (Two) Times a Day As Needed.  Patient not taking: Reported on 6/3/2024    Hailey Romero MD   Norvasc 10 MG tablet Take 1 tablet by mouth Daily. 5/14/24   Hailey Romero MD   ondansetron (ZOFRAN) 8 MG tablet Take 1 tablet by mouth 3  (Three) Times a Day As Needed for Nausea or Vomiting. 6/3/24   Mp Austin MD   Osimertinib Mesylate 80 MG tablet Take 1 tablet by mouth Daily. Take with or without food. 6/2/24   Mp Austin MD   zolpidem (AMBIEN) 10 MG tablet Take 0.5 tablets by mouth At Night As Needed.  Patient not taking: Reported on 6/3/2024 5/14/24   Provider, MD Hailey   zolpidem (AMBIEN) 5 MG tablet Take 1 tablet by mouth At Night As Needed for Sleep. 9/20/23 5/17/24  Sherry Mazariegos MD       Drug Interactions  Reviewed concomitant medications, allergies, labs, comorbidities/medical history, quality of life, and immunization history.   Drug-drug interactions noted and discussed during education: no significant drug interactions noted. . Reminded the patient to let us know before making any changes or starting any new prescription or OTC medications so we can first assess drug interactions.  Drug-food interactions noted and discussed during education: Patient was instructed to avoid  none    Relevant Laboratory Values  Lab Results   Component Value Date    GLUCOSE 164 (H) 05/02/2024    CALCIUM 9.8 05/02/2024     05/02/2024    K 3.7 05/02/2024    CO2 23.7 05/02/2024    CL 99 05/02/2024    BUN 23 05/02/2024    CREATININE 1.10 05/31/2024    EGFRIFAFRI 86 01/18/2022    EGFRIFNONA 75 01/18/2022    BCR 24.7 05/02/2024    ANIONGAP 16.3 (H) 05/02/2024     Lab Results   Component Value Date    WBC 5.53 05/10/2024    RBC 5.22 05/10/2024    HGB 15.2 05/10/2024    HCT 45.1 05/10/2024    MCV 86.4 05/10/2024    MCH 29.1 05/10/2024    MCHC 33.7 05/10/2024    RDW 13.3 05/10/2024    RDWSD 41.4 05/10/2024    MPV 9.9 05/10/2024     05/10/2024     05/10/2024    NEUTRORELPCT 66.0 05/10/2024    LYMPHORELPCT 22.8 05/10/2024    MONORELPCT 7.8 05/10/2024    EOSRELPCT 1.8 05/10/2024    BASORELPCT 0.7 05/10/2024    AUTOIGPER 0.9 (H) 05/10/2024    NEUTROABS 3.65 05/10/2024    LYMPHSABS 1.26 05/10/2024    MONOSABS 0.43 05/10/2024     EOSABS 0.10 05/10/2024    BASOSABS 0.04 05/10/2024    AUTOIGNUM 0.05 05/10/2024    NRBC 0.0 05/10/2024       The above labs have been reviewed. No dose adjustments are needed for the oral specialty medication(s) based on the labs.    Initial Education Provided for Specialty Medication  The patient has been provided with the following education. All questions and concerns have been addressed prior to the patient receiving the medication, and the patient has verbalized understanding of the education and any materials provided.  Additional patient education shall be provided and documented upon request by the patient, provider or payer.      Provided patient with:   Education sheets about the medication, 24-hour clinic phone number and my contact information and instructions to call should additional questions arise.     Medication Education Sheets Provided: (select all that apply)  Oral Specialty Medication: Tagrisso (osimertinib)    TOPICS COMMENTS   Storage and Handling of Oral Specialty Medication Store in the original container, in a dry location out of direct sunlight, and out of reach of children or pets. and Store at room temperature.  Discussed safe handling and what to do with any unused medication.   Administration of Oral Specialty Medication Take with or without food at the same time(s) each day.   Adherence to Oral Specialty Regimen and Handling Missed Doses Patient is likely to have good treatment adherence; reinforced the importance of adherence. Reviewed how to address missed doses and to let us know of any missed doses.   Anemia: role of RBC, cause, s/s, ways to manage, role of transfusion Reviewed the role of RBC and the use of transfusions if hemoglobin decreases too much.  Patient to notify us if they experience shortness of breath, dizziness, or palpitations.  Also let patient know they could feel more tired than usual and to try to stay active, but rest if they need to.    Thrombocytopenia:  role of platelet, cause, s/s, ways to prevent bleeding, things to avoid, when to seek help Reviewed the role of platelets in blood clotting and when to call clinic (bloody nose that bleeds for 5 mins despite pressure, a cut that won't stop bleeding despite pressure, gums that bleed excessively with brushing or flossing). Recommended using an electric razor, soft bristle toothbrush, and blowing your nose gently.    Neutropenia: role of WBC, cause, infection precautions, s/s of infection, when to call MD Reviewed the role of WBC, good infection prevention practices, and when to call the clinic (temperature 100.4F, sore throat, burning urination, etc)   Nutrition and Appetite Changes:  importance of maintaining healthy diet & weight, ways to manage to improve intake, dietary consult, exercise regimen, electrolyte and/or blood glucose abnormalities Decreased Appetite: Discussed the risk of decreased appetite. Recommended eating smaller, more frequent meals. Instructed the patient to contact the clinic if losing weight or having difficulty eating enough to maintain energy level., Increased Blood Sugar: This patient's oncology therapy can increase blood sugar.  Recommended that the patient monitor blood sugar more closely and contact their primary care provider for management recommendations if blood glucose values start trending upward.   Diarrhea: causes, s/s of dehydration, ways to manage, dietary changes, when to call MD Chemotherapy : Discussed the risk of diarrhea. Instructed patient on use OTC loperamide with diarrhea onset, but emphasized the importance of calling the clinic if 4-6 episodes in 24 hours not relieved by OTC loperamide.   Constipation: causes, ways to manage, dietary changes, when to call MD Provided supplementary handout with instructions for use of docusate and other OTC therapies to manage constipation.  Instructed to call us if medications aren't working.   Nausea/Vomiting: cause, use of  antiemetics, dietary changes, when to call MD Emetic risk: Low-Minimal  PRN home meds: Ondansetron  Pharmacy home meds sent to: [facility]    Instructed the patient to take a dose of the PRN medication at the first onset of nausea and if it's not working to call us for additional medications.  Also provided non-drug measures to mitigate nausea.   Mouth Sores: causes, oral care, ways to manage Mouth sores can be prevented by making a mouth wash mixture of salt, baking soda, and water. The patient was instructed to swish and spit four times daily after meals and before bedtime.  Use of a soft bristle toothbrush was recommended.  The patient was instructed to avoid alcohol-containing OTC mouthwashes.                Skin/Nail Changes: cause, s/s, ways to manage EGFR Rash: Explained the rash may appear as circular splotches of redness surrounding small pimple-like marks across the upper body (chest, arms, hands, neck, face, back). Discussed prevention and protection strategies (moisturizers, sunscreen) and treatment (doxy- or minocycline, topical steroids). A supplementary handout with this information was also given to the patient.        Organ Toxicities: cause, s/s, need for diagnostic tests, labs, when to notify MD Discussed potential effects on organ systems, monitoring, diagnostic tests, labs, and when to notify their MD. Discussed the signs/symptoms of the following: cardiotoxicity, eye changes (  blurry vision, watery eyes, photophobia), hepatotoxicity, lung changes, and skin changes       Miscellaneous Financial Issues: Blanca CHAVEZ to follow  Lab Draws:  per MD discretion   Infertility and Sexuality:  causes, fertility preservation options, sexuality changes, ways to manage, importance of birth control The patient is not sexually active with a woman of childbearing potential.   Home Care: how to manage bodily fluids Counseled on management of soiled linens and proper flush technique.  Discussed how to manage all  the side effects at home and advised when to contact the MD office         Adherence and Self-Administration  Barriers to Patient Adherence and/or Self-Administration: none  Methods for Supporting Patient Adherence and/or Self-Administration:  not needed  Expected duration of therapy: Until disease progression or intolerable toxicity    Goals of Therapy  Patient Goals of Therapy:   Consistently take medications as prescribed  Patient will adhere to medication regimen  Patient will report any medication side effects to healthcare provider  Clinical Goals:    Goals Addressed Today        Specialty Pharmacy General Goal      Progression free survival            Support patient understanding of medication regimen  Ensure patient knows the pharmacy contact information  Schedule regular follow-up to monitor the treatment serious adverse events  Schedule regular follow-up to confirm medication adherence  Schedule regular follow-up to monitor disease progression or stability    Quality of Life Assessment   The patient's current (pre-therapy) quality of life was discussed with the patient. The QOL segment of this outreach has been reviewed and updated.   Quality of Life Score: 7/10    Wrap up  Discussed aforementioned material with patient in person, face-to-face, in clinic.   Chemo consents/CCA were signed at today's visit.   Medication availability: patient picked up medication at the end of today's visit  Patient and spouse, present in today's appointment, expressed understanding.  Patient demonstrates ability to self-administer medication. No barriers to adherence identified.  All questions and concerns addressed.     Reassessment Plan & Follow-Up  Pharmacist to perform regular reassessments no more than (6) months from the previous assessment.  Welcome information and patient satisfaction survey to be sent by retail team with patient's initial fill.  Care Coordinator to set up future refill outreaches, coordinate  prescription delivery, and escalate clinical questions to pharmacist.     Additional Plans, Therapy Recommendations or Therapy Problems to Be Addressed: none     Attestation  I attest that the patient was actively involved in and has agreed to the above plan of care.  If the prescribed therapy is at any point deemed not appropriate based on the current or future assessments, a consultation will be initiated with the patient's specialty care provider to determine the best course of action. The revised plan of therapy will be documented along with any additional patient education provided.     Name/Credentials: Joann Haddad PharmD, BCPS    Date and Time: 6/3/2024  16:55 EDT

## 2024-06-03 NOTE — PROGRESS NOTES
TREATMENT  PREPARATION    Nemesio Fitzpatrick  [id number]  1945    Chief Complaint: Treatment preparation and needs assessment    History of present illness:  Nemesio Fitzpatrick is a 78 y.o. year old male who is here today for treatment preparation and needs assessment.  The patient has been diagnosed with   Encounter Diagnosis   Name Primary?    Primary nonsquamous nonsmall cell neoplasm of lung Yes    and is scheduled to begin treatment with:     Oncology History:    Oncology/Hematology History   Primary nonsquamous nonsmall cell neoplasm of lung   5/24/2024 Initial Diagnosis    Primary nonsquamous nonsmall cell neoplasm of lung     6/3/2024 -  Chemotherapy    OP LUNG Osimertinib         The current medication list and allergy list were reviewed and reconciled.     Past Medical History, Past Surgical History, Social History, Family History have been reviewed and are without significant changes except as mentioned.    Physical Exam:    Vitals:    06/03/24 1524   BP: 172/79   Pulse: 79   Resp: 18   SpO2: 97%     Vitals:    06/03/24 1524   PainSc:   3   PainLoc: Shoulder        ECOG score: 0             Physical Exam  HENT:      Head: Normocephalic and atraumatic.   Eyes:      Extraocular Movements: Extraocular movements intact.      Conjunctiva/sclera: Conjunctivae normal.   Pulmonary:      Effort: Pulmonary effort is normal. No respiratory distress.   Neurological:      General: No focal deficit present.      Mental Status: He is alert and oriented to person, place, and time.   Psychiatric:         Mood and Affect: Mood normal.         Behavior: Behavior normal.           NEEDS ASSESSMENTS    Genetics  The patient's new diagnosis and family history have been reviewed for genetic counseling needs. The patient will not be referred..     Psychosocial and Barriers to care  The patient has completed a PHQ-9 Depression Screening and the Distress Thermometer (DT) today.  PHQ-9 results show PHQ-2 Total Score:   PHQ-9 Total  Score: PHQ-9 Total Score:       The patient scored their distress today as Distress Level: 3 on a scale of 0-10 with 0 being no distress and 10 being extreme distress. Problems marked by the patient as being an issue for them within the last week include Physical concerns  Fatigue: Yes  Pain: Yes .      Results were reviewed along with psychosocial resources offered by our cancer center.  Our Supportive Oncology team will be flagged for a score of 4 or above, and a same day call will be made for a score of 9 or 10.  A mental health referral is offered at that time. Patients who score less than 4 have been educated on our support services and can be referred to our  upon request.  The patient has already been referred to our .       Nutrition  The patient has completed the malnutrition screening today. They scored Malnutrition Screening Tool  Have you recently lost weight without trying?  If yes, how much weight have you lost?: 0--> No  Have you been eating poorly because of a decreased appetite?: 0--> No  MST score: 0   with a score of 0-1 meaning not at risk in a score of 2 or greater meaning at risk.  Patients with a score of 3 or higher will be referred to our oncology dietitian for support. Patients beginning at risk treatment regimens or who have dietary concerns will also be referred to our oncology dietitian. The patient will not be referred.    Functional Assessment  Persons who are age 70 or greater will be screened for qualification of a comprehensive geriatric assessment by our survivorship nurse practitioner.  Older adults with cancer face unique challenges. These may include an increased risk of drug reactions, financial burdens, and caregiver stress. The patient scored G8 Questionnaire  Has food intake declined over the past 3 months due to loss of appetite, digestive problems, chewing or swallowing difficulties?: No decrease in food intake  Weight loss during the last 3  months: No weight loss  Mobility: Goes out  Neuropsychological Problems: No psychological problems  Body Mass Index (BMI (weight in kg) / (height in m2)): BMI 23 and > 23  Takes more than 3 medications a day: Yes, takes more than 3 prescription drugs per day  In comparison with other people of the same age, how does the patient consider his/her health status?: Not as good  Age: < 80  Total Score: 14 . Patients scoring 14 or lower will referred for an older adult functional assessment with the survivorship advanced practice registered nurse to ensure all needed support is provided as patients plan for their treatments. The patient will not be referred.    Intravenous Access Assessment  The patient and I discussed planned intravenous chemo/biotherapy as well as other IV treatments that are often needed throughout the course of treatment. These may include, but are not limited to blood transfusions, antibiotics, and IV hydration. Discussed that depending on selected treatment and vein assessment, patient may require venous access device (VAD) which could include but not limited to a Mediport or PICC line. Risks and benefits of VADs reviewed. The patient will be treated via Oral Treatment.    Reproductive/Sexual Activity   People should avoid becoming pregnant and should not get a partner pregnant while undergoing chemo/biotherapy.  People of childbearing age should use effective contraception during active therapy. The best recommendation for all people is to use a barrier method for a minimum of 1 week after the last infusion of chemo/biotherapy to prevent your partner being exposed to byproducts from treatment medications in bodily fluids. Effective contraception should be discussed with your oncology team to make sure it is safe to take based on your diagnosis. Possible options include oral contraceptives, barrier methods. Chemo/biotherapy can change your ability to reproduce children in the future.  There are  "options for fertility preservation. NOT APPLICABLE    Advanced Care Planning  Advance Care Planning   The patient and I discussed advanced care planning, \"Conversations that Matter\".   This service is offered for development of advance directives with a certified ACP facilitator.  The patient does have an up-to-date advanced directive. This document is on file with our office. The patient is not interested in an appointment with one of our facilitators to create or update their advanced directives.    Have you reviewed your Advance Directive and is it valid for this stay?: Yes          Smoking cessation  Tobacco Use: Medium Risk (6/3/2024)    Patient History     Smoking Tobacco Use: Former     Smokeless Tobacco Use: Never     Passive Exposure: Past       Patient and I discussed their tobacco use history. Referral will not be made for smoking cessation.      Palliative Care  When appropriate, the patient and I discussed the availability palliative care services and when appropriate Hospice care. Palliative care is not the same as Hospice care which was explained to the patient.NOT APPLICABLE.    Survivorship   When appropriate, we discussed that we will refer the patient to survivorship clinic to discuss next steps following completion of planned treatment.  Reviewed this visit will include assessment of your physical, psychological, functional, and spiritual needs as a survivor and the need at attend this visit when scheduled.      Assessment and Plan:    Diagnoses and all orders for this visit:    1. Primary nonsquamous nonsmall cell neoplasm of lung (Primary)      No orders of the defined types were placed in this encounter.        The patient and I have reviewed their diagnosis and scheduled treatment plan. Needs assessment was completed where applicable including genetics, psychosocial needs, barriers to care, VAD evaluation, advanced care planning, survivorship, and palliative care services where indicated. " Referrals have been ordered as appropriate based upon evaluation today and patient desires.   Chemo/biotherapy teaching was completed today and consent obtained. See separate documentation for further details.  Adequate time was given to answer questions.  Patient made aware of their care team members and contact information if they have questions or problems throughout the treatment course.  Discussion held and written information provided describing frequency of office visits and ongoing monitoring throughout the treatment plan.     Reviewed with patient any prescribed medication sent to pharmacy.  Education provided regarding proper storage, safe handling, and proper disposal of unused medication.  Proper handling of body fluids and waste discussed and written information provided.  If appropriate, patient had pretreatment labs drawn today.    Learning assessment completed at initial patient encounter. See separate flowsheet. Chemo/biotherapy education comprehension assessed at today's visit.    I spent 20 minutes caring for Nemesio on this date of service. This time includes time spent by me in the following activities: preparing for the visit, reviewing tests, obtaining and/or reviewing a separately obtained history, performing a medically appropriate examination and/or evaluation, counseling and educating the patient/family/caregiver, referring and communicating with other health care professionals, and documenting information in the medical record.     Wendy Olivares, ASHLEIGH   06/03/24

## 2024-06-04 ENCOUNTER — SPECIALTY PHARMACY (OUTPATIENT)
Dept: PHARMACY | Facility: HOSPITAL | Age: 79
End: 2024-06-04
Payer: MEDICARE

## 2024-06-04 NOTE — PROGRESS NOTES
I have secured a ramón from the [facility] [employer].     The approval dates are 6/4/24 to 6/4/2025. Mr. Fitzpatrick's responsibility will be $0.        Mr. Fitzpatrick can use this ramón to fill his Tagrisso with [facility] pharmacy.     Blanca Jacobson - Care Coordinator   6/4/2024  14:02 EDT

## 2024-06-05 ENCOUNTER — HOSPITAL ENCOUNTER (OUTPATIENT)
Dept: RADIATION ONCOLOGY | Facility: HOSPITAL | Age: 79
Setting detail: RADIATION/ONCOLOGY SERIES
End: 2024-06-05
Payer: MEDICARE

## 2024-06-05 ENCOUNTER — HOSPITAL ENCOUNTER (OUTPATIENT)
Dept: RADIATION ONCOLOGY | Facility: HOSPITAL | Age: 79
Setting detail: RADIATION/ONCOLOGY SERIES
Discharge: HOME | End: 2024-06-05
Payer: MEDICARE

## 2024-06-05 ENCOUNTER — SPECIALTY PHARMACY (OUTPATIENT)
Dept: PHARMACY | Facility: HOSPITAL | Age: 79
End: 2024-06-05
Payer: MEDICARE

## 2024-06-05 ENCOUNTER — DOCUMENTATION (OUTPATIENT)
Dept: ONCOLOGY | Facility: CLINIC | Age: 79
End: 2024-06-05
Payer: MEDICARE

## 2024-06-05 DIAGNOSIS — C34.90 PRIMARY NONSQUAMOUS NONSMALL CELL NEOPLASM OF LUNG: ICD-10-CM

## 2024-06-05 PROCEDURE — 77263 THER RADIOLOGY TX PLNG CPLX: CPT | Performed by: STUDENT IN AN ORGANIZED HEALTH CARE EDUCATION/TRAINING PROGRAM

## 2024-06-05 PROCEDURE — 77334 RADIATION TREATMENT AID(S): CPT | Performed by: STUDENT IN AN ORGANIZED HEALTH CARE EDUCATION/TRAINING PROGRAM

## 2024-06-05 NOTE — PROGRESS NOTES
Specialty Pharmacy Initial Fill Coordination Note     Nemesio is a 78 y.o. male contacted today regarding the initial fill of  Tagrisso specialty medication(s).    Reviewed and verified with patient:       Specialty medication(s) and dose(s) confirmed: yes, during his 6/3 education appointment.         Delivery Questions      Flowsheet Row Most Recent Value   Delivery method Beeline   Delivery address verified with patient/caregiver? Yes   Delivery address Home  [Shp to the home on 6/6 to arrive 6/7. Address confimred. $0 co-pay.]   Number of medications in delivery 1   Medication(s) being filled and delivered Osimertinib Mesylate   Doses left of specialty medications N/A - New Start   Copay verified? Yes   Copay amount $0   Copay form of payment No copayment ($0)                   Follow-up: 3 week(s)     Blanca Jacobson, Pharmacy Technician  Specialty Pharmacy Technician

## 2024-06-05 NOTE — PROGRESS NOTES
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  No edema.  I discussed with Dr. Guerrero today and radiation oncology as he was going to see him.  Since patient is going on Tagrisso Dr. Guerrero and Dr. Austin had discussed to just follow the MRI of the brain without any radiation.  But patient is going to get radiation to the lung nodule.  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  Dr. Guerrero was planning to discuss with one of the neurosurgeons prior to considering any CT angiogram of the head and neck.  I discussed with Dr. Guerrero today.  I notified patient wife as well as I could not get hold of the patient .  Bee Wei MD

## 2024-06-05 NOTE — PROGRESS NOTES
Re: Refills of Oral Specialty Medication - Tagrisso (osimertinib) - dose double check      Drug: Tagrisso (osimertinib)  Strength: 80 mg  Directions: Take 1 tablet by mouth daily  Quantity: 30  Refills: 5  Pharmacy prescription sent to: [facility] upon MD signature    Independent dose review completed.    Rosario Tang, Pharm.D.    6/5/2024  12:49 EDT

## 2024-06-05 NOTE — PROGRESS NOTES
Re: Refills of Oral Specialty Medication - Tagrisso (osimertinib)    Drug-Drug Interactions: The current medication list was reviewed and there are no relevant drug-drug interactions with the specialty medication.  Medication Allergies: The patient has no relevant allergies as it relates to their oral specialty medication  Review of Labs/Dose Adjustments: NO DOSE CHANGE - I reviewed the most recent note and labs and the patient will continue without any dose changes.  I sent refills as described below.    Drug: Tagrisso (osimertinib)  Strength: 80 mg  Directions: Take one tablet by mouth daily  Quantity: 30  Refills: 5  Pharmacy prescription sent to:  [facility]    Name/Credentials: Joann Haddad, Adriana, BCPS    6/5/2024  11:13 EDT

## 2024-06-07 ENCOUNTER — OFFICE VISIT (OUTPATIENT)
Dept: PSYCHIATRY | Facility: HOSPITAL | Age: 79
End: 2024-06-07
Payer: MEDICARE

## 2024-06-07 DIAGNOSIS — F41.1 GENERALIZED ANXIETY DISORDER: Primary | ICD-10-CM

## 2024-06-07 PROCEDURE — 99214 OFFICE O/P EST MOD 30 MIN: CPT | Performed by: NURSE PRACTITIONER

## 2024-06-07 PROCEDURE — 1160F RVW MEDS BY RX/DR IN RCRD: CPT | Performed by: NURSE PRACTITIONER

## 2024-06-07 PROCEDURE — 1159F MED LIST DOCD IN RCRD: CPT | Performed by: NURSE PRACTITIONER

## 2024-06-07 NOTE — PROGRESS NOTES
[facility] Oncology Services  In Person Session    Subjective  Patient ID: Nemesio Fitzpatrick is a 78 y.o. male is seen face to face in the [facility] ([facility].    CC: Anxiety, uncertainty associated with new cancer diagnosis    HPI/ Interval History:   Pt is seen in follow up regarding ongoing depression and anxiety sx- associated with new diagnosis of metastatic lung cancer.     Uncertainty persists, while pt endorses great appreciation for collaboration between med onc, [facility] onc, and [facility]. 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 Fortunately, pt denies any neurological or functional changes, sx associated with brain lesions. Does endorse concerns regarding sense of self with brain, seeing extroverted personality and analytical problem solving as key points of identity. Has initiated Tagrisso earlier this week. So far, seems to be tolerating this well.    Pt describes mood as happy while also recognizing this is a serious and concerning situation. Continues to take lorazepam 0.5 mg PRN anxiety, reporting benefit to this in terms of anxiety. Has initiated gabapentin, taking 300 mg q evening meal, 300 mg q hs alongside fraction of pain pill at bedtime. Reports falling to sleep easily, later fragmented due to pain. Has returned to bed, although finds right thigh becomes painful after a few hours of lying flat. Assisted by movement, walking. Continues to wear CPAP. Wakes feeling somewhat crummy, although unchanged from baseline. Able to get into day with tea, watching news, etc. Wakes feeling rested. Is keeping record of all medications, experiences to assist with report/ communicate necessary info with medical team.     Pt continues to identify tremendous strenghts including journaling medications and experiences, focusing on  one task at a time, prioritizing sleep, and minimizing medications when active strategies for pain mgmt are effective.    Exam: As above    Recent Labs Reviewed:  Hospital Outpatient Visit on 05/31/2024   Component Date Value    Creatinine 05/31/2024 1.10    Orders Only on 05/13/2024   Component Date Value    Reason for Study 05/14/2024 To identify somatic and germline mutations relevant to patient's cancer.     Genetic Diseases Assessed 05/14/2024 Cancer     Description of Ranges of* 05/14/2024 648 gene panel     Overall Interpretation 05/14/2024 positive     MSI 05/14/2024 Stable     TMB 05/14/2024 5.3     [facility] Portal 05/14/2024 [email or web address]     Amendment Notes 05/14/2024                      Value:XT.V4  THIS AMENDED REPORT IS BEING ISSUED TO ADD POTENTIAL GERMLINE SEQUENCING RESULTS AND RECATEGORIZE VARIANTS BASED ON TUMOR-NORMAL PAIRED ANALYSIS. A copy number gain in FOXA1 was added to the report. The nomenclature of a biologically relevant variant in TP53 was updated. Variants of unknown significance were recategorized based on potential germline comparison. The tumor mutational burden (TMB) of this tumor-normal matched amendment is based on the tumor only analysis.    Pertinent Negatives 05/14/2024 KRAS, BRAF, ALK, ROS1, RET, MET, ERBB2 (HER2)     Low Coverage Regions 05/14/2024 EPHB2, GNAS, RXRA     Therapy Count 05/14/2024 8     [facility]: Potential Therap* 05/14/2024                      Value:Gene: 3236^EGFR^HGNC  Variant: p.[id number]  Match Type: snvIndel  Match Type Description: EGFR p.F771_F282tzj  Agent: Afatinib  Drug Class: EGFR Inhibitor  Tissue: Non-Small Cell Lung Cancer  Association: Response  Evidence Status: Consensus  Evidence ID: NCCN  KDB Variant: Exon 19  NCCN Associated Evidence: Consensus, Non-Small Cell Lung Cancer  MSK Associated Evidence: MSK OncoKB, Level 1  Label:  FDA On Label  FDA Approved?: Yes  On label?: Yes    [facility]: Potential Therap* 05/14/2024                      Value:Gene: 3236^EGFR^HGNC  Variant: p.[id number]  Match Type: snvIndel  Match Type Description: EGFR p.O383_P077hnv  Agent: Dacomitinib  Drug Class: EGFR Inhibitor  Tissue: Non-Small Cell Lung Cancer  Association: Response  Evidence Status: Consensus  Evidence ID: NCCN  KDB Variant: Exon 19  NCCN Associated Evidence: Consensus, Non-Small Cell Lung Cancer  MSK Associated Evidence: MSK OncoKB, Level 1  Label: FDA On Label  FDA Approved?: Yes  On label?: Yes    [facility]: Potential Therap* 05/14/2024                      Value:Gene: 3236^EGFR^HGNC  Variant: p.[id number]  Match Type: snvIndel  Match Type Description: EGFR p.L444_L660fsc  Agent: Erlotinib  Drug Class: EGFR Inhibitor  Tissue: Non-Small Cell Lung Cancer  Association: Response  Evidence Status: Consensus  Evidence ID: IBRAHIMA  KDB Variant: Exon 19  NCCN Associated Evidence: Consensus, Non-Small Cell Lung Cancer  MSK Associated Evidence: MSK OncoKB, Level 1  Label: FDA On Label  FDA Approved?: Yes  On label?: Yes    [facility]: Potential Therap* 05/14/2024                      Value:Gene: 3236^EGFR^HGNC  Variant: p.[id number]  Match Type: snvIndel  Match Type Description: EGFR p.Y003_E933rfk  Agent: Gefitinib  Drug Class: EGFR Inhibitor  Tissue: Non-Small Cell Lung Cancer  Association: Response  Evidence Status: Consensus  Evidence ID: KARLAN  KDB Variant: Exon 19  NCCN Associated Evidence: Consensus, Non-Small Cell Lung Cancer  MSK Associated Evidence: MSK OncoKB, Level 1  Label: FDA On Label  FDA Approved?: Yes  On label?: Yes    [facility]: Potential Therap* 05/14/2024                      Value:Gene: 3236^EGFR^HGNC  Variant: p.[id number]  Match Type: snvIndel  Match Type Description: EGFR p.X963_G933brs  Agent: Osimertinib  Drug Class: EGFR Inhibitor  Tissue: Non-Small Cell Lung Cancer  Association: Response  Evidence Status: Consensus  Evidence ID:  NCCN  KDB Variant: Exon 19  NCCN Associated Evidence: Consensus, Non-Small Cell Lung Cancer  MSK Associated Evidence: MSK OncoKB, Level 1  Label: FDA On Label  FDA Approved?: Yes  On label?: Yes    [facility]: Potential Therap* 05/14/2024                      Value:Gene: 3236^EGFR^HGNC  Variant: p.[id number]_P744yrz  Match Type: snvIndel  Match Type Description: EGFR p.Z292_L445jtt  Agent: Erlotinib + Ramucirumab  Drug Class: Combination (EGFR Inhibitor + VEGFR2 Inhibitor)  Tissue: Non-Small Cell Lung Cancer  Association: Response  Evidence Status: Consensus  Evidence ID: NCCN  KDB Variant: Exon 19  NCCN Associated Evidence: Consensus, Non-Small Cell Lung Cancer  MSK Associated Evidence: MSK OncoKB, Level 1  Label: FDA On Label  FDA Approved?: Yes  On label?: Yes    [facility]: Potential Therap* 05/14/2024                      Value:Gene: 3236^EGFR^HGNC  Variant: p.[id number]  Match Type: snvIndel  Match Type Description: EGFR p.R145_X426tax  Agent: Afatinib + Cetuximab  Drug Class: Combination (EGFR Inhibitor + Anti-EGFR MAb)  Tissue: Non-Small Cell Lung Cancer  Association: Response  Evidence Status: Consensus  Evidence ID: NCCN  KDB Variant: Exon 19  NCCN Associated Evidence: Consensus, Non-Small Cell Lung Cancer  Label: FDA Off Label  FDA Approved?: Yes  On label?: No    [facility]: Potential Therap* 05/14/2024                      Value:Gene: 1773^CDK4^HGNC  Variant: CDK4 Copy number gain  Match Type: cnv  Match Type Description: CDK4 Copy number gain  Agent: Palbociclib  Drug Class: CDK4/6 Inhibitor  Tissue: Liposarcoma  Association: Response  Evidence Status: Consensus  Evidence ID: NCCN  Label: FDA Off Label  FDA Approved?: Yes  On label?: No    Trial Count 05/14/2024 3     Trial 1: Matched criteria 05/14/2024                      Value:Clinical Trial NCT ID: [id number]  Clinical Trial Title: Study of [id number] in Patients With Advanced Solid Tumors  Clinical Trial URL:  https://clinicaltrials.gov/ct2/show/PMG44008620  Clinical Phase: Phase 1/Phase 2  EGFR p.X200_I522lrr mutation  [address] [place], [place]    Trial 2: Matched criteria 05/14/2024                      Value:Clinical Trial NCT ID: [id number]  Clinical Trial Title: TAPUR: Testing the Use of Food and Drug Administration (FDA) Approved Drugs That Target a Specific Abnormality in a Tumor Gene in People With Advanced Stage Cancer  Clinical Trial URL: https://clinicaltrials.gov/ct2/show/KNE48942720  Clinical Phase: Phase 2  CDK4 amplification  [address] [place], [place]    Trial 3: Matched criteria 05/14/2024                      Value:Clinical Trial NCT ID: [id number]  Clinical Trial Title: Study Of ATRN-119 In Patients With Advanced Solid Tumors  Clinical Trial URL: https://clinicaltrials.gov/ct2/show/GAB72558472  Clinical Phase: Phase 1/Phase 2  TP53 c.376-1_377del mutation  [address] [place], [place]    xR Result 1 05/14/2024 QNS  QNS - This report is being issued to report the results of gene rearrangement and altered splicing analysis from RNA sequencing. RNA sequencing analysis was not performed due to insufficient quantity and/or quality of RNA.     Germline Variant Note 05/14/2024 No potential germline variants were found in the limited set of genes on which we report.    Hospital Outpatient Visit on 05/10/2024   Component Date Value    Platelets 05/10/2024 149     Protime 05/10/2024 14.1     INR 05/10/2024 1.2     Addendum 4 05/10/2024                      Value:This result contains rich text formatting which cannot be displayed here.    Addendum 3 05/10/2024                      Value:This result contains rich text formatting which cannot be displayed here.    Addendum 2 05/10/2024                      Value:This result contains rich text formatting which cannot be displayed here.    Addendum 05/10/2024                      Value:This result contains rich text formatting which cannot be displayed here.    Case Report  05/10/2024                      Value:Surgical Pathology Report                         Case: [id number]                                  Authorizing Provider:  Mp Austin MD       Collected:           05/10/2024 09:27 AM          Ordering Location:     [facility]  Received:            05/10/2024 09:43 AM                                 CT                                                                           Pathologist:           Cooper Montelongo MD                                                         Specimen:    Rib, Bone lesion biopsy T4                                                                 Clinical Information 05/10/2024                      Value:This result contains rich text formatting which cannot be displayed here.    Final Diagnosis 05/10/2024                      Value:This result contains rich text formatting which cannot be displayed here.    Comment 05/10/2024                      Value:This result contains rich text formatting which cannot be displayed here.    Gross Description 05/10/2024                      Value:This result contains rich text formatting which cannot be displayed here.    Special Stains 05/10/2024                      Value:This result contains rich text formatting which cannot be displayed here.    WBC 05/10/2024 5.53     RBC 05/10/2024 5.22     Hemoglobin 05/10/2024 15.2     Hematocrit 05/10/2024 45.1     MCV 05/10/2024 86.4     MCH 05/10/2024 29.1     MCHC 05/10/2024 33.7     RDW 05/10/2024 13.3     RDW-SD 05/10/2024 41.4     MPV 05/10/2024 9.9     Platelets 05/10/2024 156     Neutrophil % 05/10/2024 66.0     Lymphocyte % 05/10/2024 22.8     Monocyte % 05/10/2024 7.8     Eosinophil % 05/10/2024 1.8     Basophil % 05/10/2024 0.7     Immature Grans % 05/10/2024 0.9 (H)     Neutrophils, Absolute 05/10/2024 3.65     Lymphocytes, Absolute 05/10/2024 1.26     Monocytes, Absolute 05/10/2024 0.43     Eosinophils, Absolute 05/10/2024 0.10      Basophils, Absolute 05/10/2024 0.04     Immature Grans, Absolute 05/10/2024 0.05     nRBC 05/10/2024 0.0    Hospital Outpatient Visit on 05/03/2024   Component Date Value    Glucose 05/03/2024 123    Consult on 05/02/2024   Component Date Value    Glucose 05/02/2024 164 (H)     BUN 05/02/2024 23     Creatinine 05/02/2024 0.93     Sodium 05/02/2024 139     Potassium 05/02/2024 3.7     Chloride 05/02/2024 99     CO2 05/02/2024 23.7     Calcium 05/02/2024 9.8     Total Protein 05/02/2024 7.7     Albumin 05/02/2024 4.6     ALT (SGPT) 05/02/2024 52 (H)     AST (SGOT) 05/02/2024 40     Alkaline Phosphatase 05/02/2024 103     Total Bilirubin 05/02/2024 0.7     Globulin 05/02/2024 3.1     A/G Ratio 05/02/2024 1.5     BUN/Creatinine Ratio 05/02/2024 24.7     Anion Gap 05/02/2024 16.3 (H)     eGFR 05/02/2024 85.1     PSA 05/02/2024 <0.014     IgG 05/02/2024 793     IgA 05/02/2024 135     IgM 05/02/2024 96     Total Protein 05/02/2024 7.6     Albumin 05/02/2024 4.3     Alpha-1-Globulin 05/02/2024 0.2     Alpha-2-Globulin 05/02/2024 1.2 (H)     Beta Globulin 05/02/2024 1.1     Gamma Globulin 05/02/2024 0.8     M-[place] 05/02/2024 Comment:     Globulin 05/02/2024 3.3     A/G Ratio 05/02/2024 1.4     Immunofixation Reflex, S* 05/02/2024 Comment (A)     Please note 05/02/2024 Comment     Free Light Chain, [place] 05/02/2024 13.8     Free Lambda Light Chains 05/02/2024 7.9     Kappa/Lambda Ratio 05/02/2024 1.75 (H)    Lab on 05/02/2024   Component Date Value    WBC 05/02/2024 5.05     RBC 05/02/2024 4.96     Hemoglobin 05/02/2024 14.7     Hematocrit 05/02/2024 43.1     MCV 05/02/2024 86.9     MCH 05/02/2024 29.6     MCHC 05/02/2024 34.1     RDW 05/02/2024 13.0     RDW-SD 05/02/2024 40.4     MPV 05/02/2024 9.2     Platelets 05/02/2024 165     Neutrophil % 05/02/2024 68.9     Lymphocyte % 05/02/2024 21.0     Monocyte % 05/02/2024 7.5     Eosinophil % 05/02/2024 1.0     Basophil % 05/02/2024 0.8     Immature Grans % 05/02/2024 0.8 (H)      Neutrophils, Absolute 05/02/2024 3.48     Lymphocytes, Absolute 05/02/2024 1.06     Monocytes, Absolute 05/02/2024 0.38     Eosinophils, Absolute 05/02/2024 0.05     Basophils, Absolute 05/02/2024 0.04     Immature Grans, Absolute 05/02/2024 0.04     nRBC 05/02/2024 0.0    Labs reviewed    Current Psychotropic Medications:  Ativan 0.5 mg PRN anxiety  Gabapentin 300 mg q evening meal, 300 mg q hs    Plan of Care/ Medical Decision Making  Support continuation of gabapentin, ativan as written. Considered option to add third dose of gabapentin, suspecting neuropathyi nature of existing pain.  Supported active dedication to current cognitively protective strategies. Resource provided to assist with busy brain.  Supportive counseling provided surrounding initiation of treatment, effective communication with medical providers.  FU scheduled in 4 weeks.    Diagnoses and all orders for this visit:    1. Generalized anxiety disorder (Primary)    I spent 35 minutes caring for Nemesio on this date of service. This time includes time spent by me in the following activities: preparing for the visit, reviewing tests, obtaining and/or reviewing a separately obtained history, performing a medically appropriate examination and/or evaluation, counseling and educating the patient/family/caregiver, ordering medications, tests, or procedures, and documenting information in the medical record.

## 2024-06-13 PROCEDURE — 77301 RADIOTHERAPY DOSE PLAN IMRT: CPT | Performed by: STUDENT IN AN ORGANIZED HEALTH CARE EDUCATION/TRAINING PROGRAM

## 2024-06-13 PROCEDURE — 77338 DESIGN MLC DEVICE FOR IMRT: CPT | Performed by: STUDENT IN AN ORGANIZED HEALTH CARE EDUCATION/TRAINING PROGRAM

## 2024-06-13 PROCEDURE — 77300 RADIATION THERAPY DOSE PLAN: CPT | Performed by: STUDENT IN AN ORGANIZED HEALTH CARE EDUCATION/TRAINING PROGRAM

## 2024-06-14 ENCOUNTER — HOSPITAL ENCOUNTER (OUTPATIENT)
Dept: RADIATION ONCOLOGY | Facility: HOSPITAL | Age: 79
Setting detail: RADIATION/ONCOLOGY SERIES
Discharge: HOME | End: 2024-06-14
Payer: MEDICARE

## 2024-06-14 LAB
RAD ONC ARIA COURSE ID: NORMAL
RAD ONC ARIA COURSE INTENT: NORMAL
RAD ONC ARIA COURSE LAST TREATMENT DATE: NORMAL
RAD ONC ARIA COURSE START DATE: NORMAL
RAD ONC ARIA COURSE TREATMENT ELAPSED DAYS: 0
RAD ONC ARIA FIRST TREATMENT DATE: NORMAL
RAD ONC ARIA PLAN FRACTIONS TREATED TO DATE: 1
RAD ONC ARIA PLAN ID: NORMAL
RAD ONC ARIA PLAN PRESCRIBED DOSE PER FRACTION: 7 GY
RAD ONC ARIA PLAN PRIMARY REFERENCE POINT: NORMAL
RAD ONC ARIA PLAN TOTAL FRACTIONS PRESCRIBED: 5
RAD ONC ARIA PLAN TOTAL PRESCRIBED DOSE: 3500 CGY
RAD ONC ARIA REFERENCE POINT DOSAGE GIVEN TO DATE: 7 GY
RAD ONC ARIA REFERENCE POINT ID: NORMAL
RAD ONC ARIA REFERENCE POINT SESSION DOSAGE GIVEN: 7 GY

## 2024-06-14 PROCEDURE — 77435 SBRT MANAGEMENT: CPT | Performed by: STUDENT IN AN ORGANIZED HEALTH CARE EDUCATION/TRAINING PROGRAM

## 2024-06-14 PROCEDURE — 77373 STRTCTC BDY RAD THER TX DLVR: CPT | Performed by: STUDENT IN AN ORGANIZED HEALTH CARE EDUCATION/TRAINING PROGRAM

## 2024-06-17 ENCOUNTER — OFFICE VISIT (OUTPATIENT)
Dept: ONCOLOGY | Facility: CLINIC | Age: 79
End: 2024-06-17
Payer: MEDICARE

## 2024-06-17 ENCOUNTER — SPECIALTY PHARMACY (OUTPATIENT)
Dept: PHARMACY | Facility: HOSPITAL | Age: 79
End: 2024-06-17
Payer: MEDICARE

## 2024-06-17 ENCOUNTER — APPOINTMENT (OUTPATIENT)
Dept: ONCOLOGY | Facility: HOSPITAL | Age: 79
End: 2024-06-17
Payer: MEDICARE

## 2024-06-17 ENCOUNTER — HOSPITAL ENCOUNTER (OUTPATIENT)
Dept: RADIATION ONCOLOGY | Facility: HOSPITAL | Age: 79
Setting detail: RADIATION/ONCOLOGY SERIES
Discharge: HOME | End: 2024-06-17
Payer: MEDICARE

## 2024-06-17 ENCOUNTER — LAB (OUTPATIENT)
Dept: LAB | Facility: HOSPITAL | Age: 79
End: 2024-06-17
Payer: MEDICARE

## 2024-06-17 VITALS
HEIGHT: 70 IN | SYSTOLIC BLOOD PRESSURE: 144 MMHG | BODY MASS INDEX: 31.07 KG/M2 | HEART RATE: 69 BPM | WEIGHT: 217 LBS | RESPIRATION RATE: 16 BRPM | DIASTOLIC BLOOD PRESSURE: 62 MMHG | OXYGEN SATURATION: 97 % | TEMPERATURE: 98.2 F

## 2024-06-17 DIAGNOSIS — C34.90 PRIMARY NONSQUAMOUS NONSMALL CELL NEOPLASM OF LUNG: Primary | ICD-10-CM

## 2024-06-17 DIAGNOSIS — C34.90 PRIMARY NONSQUAMOUS NONSMALL CELL NEOPLASM OF LUNG: ICD-10-CM

## 2024-06-17 DIAGNOSIS — Z79.899 HIGH RISK MEDICATION USE: ICD-10-CM

## 2024-06-17 DIAGNOSIS — M89.8X8 MASS OF SPINE: ICD-10-CM

## 2024-06-17 DIAGNOSIS — D69.6 THROMBOCYTOPENIA: ICD-10-CM

## 2024-06-17 LAB
ALBUMIN SERPL-MCNC: 4.6 G/DL (ref 3.5–5.2)
ALBUMIN/GLOB SERPL: 1.6 G/DL
ALP SERPL-CCNC: 110 U/L (ref 39–117)
ALT SERPL W P-5'-P-CCNC: 61 U/L (ref 1–41)
ANION GAP SERPL CALCULATED.3IONS-SCNC: 10.7 MMOL/L (ref 5–15)
AST SERPL-CCNC: 36 U/L (ref 1–40)
BASOPHILS # BLD AUTO: 0.03 10*3/MM3 (ref 0–0.2)
BASOPHILS NFR BLD AUTO: 0.6 % (ref 0–1.5)
BILIRUB SERPL-MCNC: 0.3 MG/DL (ref 0–1.2)
BUN SERPL-MCNC: 21 MG/DL (ref 8–23)
BUN/CREAT SERPL: 19.3 (ref 7–25)
CALCIUM SPEC-SCNC: 9.4 MG/DL (ref 8.6–10.5)
CHLORIDE SERPL-SCNC: 104 MMOL/L (ref 98–107)
CO2 SERPL-SCNC: 24.3 MMOL/L (ref 22–29)
CREAT SERPL-MCNC: 1.09 MG/DL (ref 0.76–1.27)
DEPRECATED RDW RBC AUTO: 40.9 FL (ref 37–54)
EGFRCR SERPLBLD CKD-EPI 2021: 70.3 ML/MIN/1.73
EOSINOPHIL # BLD AUTO: 0.05 10*3/MM3 (ref 0–0.4)
EOSINOPHIL NFR BLD AUTO: 1 % (ref 0.3–6.2)
ERYTHROCYTE [DISTWIDTH] IN BLOOD BY AUTOMATED COUNT: 12.6 % (ref 12.3–15.4)
GLOBULIN UR ELPH-MCNC: 2.9 GM/DL
GLUCOSE SERPL-MCNC: 137 MG/DL (ref 65–99)
HCT VFR BLD AUTO: 38.3 % (ref 37.5–51)
HGB BLD-MCNC: 13.2 G/DL (ref 13–17.7)
IMM GRANULOCYTES # BLD AUTO: 0.07 10*3/MM3 (ref 0–0.05)
IMM GRANULOCYTES NFR BLD AUTO: 1.4 % (ref 0–0.5)
LYMPHOCYTES # BLD AUTO: 0.97 10*3/MM3 (ref 0.7–3.1)
LYMPHOCYTES NFR BLD AUTO: 19.9 % (ref 19.6–45.3)
MAGNESIUM SERPL-MCNC: 1.8 MG/DL (ref 1.6–2.4)
MCH RBC QN AUTO: 30.3 PG (ref 26.6–33)
MCHC RBC AUTO-ENTMCNC: 34.5 G/DL (ref 31.5–35.7)
MCV RBC AUTO: 87.8 FL (ref 79–97)
MONOCYTES # BLD AUTO: 0.32 10*3/MM3 (ref 0.1–0.9)
MONOCYTES NFR BLD AUTO: 6.6 % (ref 5–12)
NEUTROPHILS NFR BLD AUTO: 3.44 10*3/MM3 (ref 1.7–7)
NEUTROPHILS NFR BLD AUTO: 70.5 % (ref 42.7–76)
NRBC BLD AUTO-RTO: 0 /100 WBC (ref 0–0.2)
PLATELET # BLD AUTO: 82 10*3/MM3 (ref 140–450)
PMV BLD AUTO: 10.5 FL (ref 6–12)
POTASSIUM SERPL-SCNC: 4.6 MMOL/L (ref 3.5–5.2)
PROT SERPL-MCNC: 7.5 G/DL (ref 6–8.5)
RAD ONC ARIA COURSE ID: NORMAL
RAD ONC ARIA COURSE INTENT: NORMAL
RAD ONC ARIA COURSE LAST TREATMENT DATE: NORMAL
RAD ONC ARIA COURSE START DATE: NORMAL
RAD ONC ARIA COURSE TREATMENT ELAPSED DAYS: 3
RAD ONC ARIA FIRST TREATMENT DATE: NORMAL
RAD ONC ARIA PLAN FRACTIONS TREATED TO DATE: 2
RAD ONC ARIA PLAN ID: NORMAL
RAD ONC ARIA PLAN PRESCRIBED DOSE PER FRACTION: 7 GY
RAD ONC ARIA PLAN PRIMARY REFERENCE POINT: NORMAL
RAD ONC ARIA PLAN TOTAL FRACTIONS PRESCRIBED: 5
RAD ONC ARIA PLAN TOTAL PRESCRIBED DOSE: 3500 CGY
RAD ONC ARIA REFERENCE POINT DOSAGE GIVEN TO DATE: 14 GY
RAD ONC ARIA REFERENCE POINT ID: NORMAL
RAD ONC ARIA REFERENCE POINT SESSION DOSAGE GIVEN: 7 GY
RBC # BLD AUTO: 4.36 10*6/MM3 (ref 4.14–5.8)
SODIUM SERPL-SCNC: 139 MMOL/L (ref 136–145)
WBC NRBC COR # BLD AUTO: 4.88 10*3/MM3 (ref 3.4–10.8)

## 2024-06-17 PROCEDURE — 77373 STRTCTC BDY RAD THER TX DLVR: CPT | Performed by: STUDENT IN AN ORGANIZED HEALTH CARE EDUCATION/TRAINING PROGRAM

## 2024-06-17 PROCEDURE — 80053 COMPREHEN METABOLIC PANEL: CPT

## 2024-06-17 PROCEDURE — 83735 ASSAY OF MAGNESIUM: CPT

## 2024-06-17 PROCEDURE — 93005 ELECTROCARDIOGRAM TRACING: CPT | Performed by: NURSE PRACTITIONER

## 2024-06-17 PROCEDURE — 85025 COMPLETE CBC W/AUTO DIFF WBC: CPT

## 2024-06-17 PROCEDURE — 36415 COLL VENOUS BLD VENIPUNCTURE: CPT

## 2024-06-17 NOTE — PROGRESS NOTES
Subjective     REASON FOR CONSULTATION:  spine mass  Provide an opinion on any further workup or treatment                             REQUESTING PRACTITIONER:  Chandrakant    RECORDS OBTAINED:  Records of the patients history including those obtained from the referring provider were reviewed and summarized in detail.      History of Present Illness:  This is a pleasant 78 y.o. male  with impaired fasting glucose, hyperlipidemia, hypothyroidism and history of prostate cancer.  The patient has been experiencing about a 1 year history of progressive back pain in the upper mid thoracic spine stabbing in nature and radiating bilaterally anteriorly.  Symptoms are worse when the patient lays down to rest at night.  He has also had some pain around the right shoulder blade.  He was treated with PT with no improvement.  The patient has also been having left hip pain.  The patient was evaluated by orthopedic surgery and an MRI of the thoracic spine without contrast was performed on 4/25/2024 and showed a large expansile marrow replacing mass along the anterior T4 vertebral body 3.2 x 2.1 cm extending into the anterior paraspinal soft tissue along the T3-T4 space with reactive bone marrow edema.  Another partially visualized expansile mass was seen in the posterior right second rib 2.7 x 1.5 cm and another in the posterior sixth rib 1 cm with surrounding bone marrow edema.  At T7-T8 there is a posterior disc protrusion with mild to moderate canal stenosis.  In the posterior right lung a 3.5 cm mass was noted with additional small nodules also seen but poorly characterized.    The patient denies weight loss, night sweats, shortness of breath, cough, hemoptysis, headaches, confusion, changes in swallowing bowel habits urinary habits.  He has history of prostate cancer treated with prostatectomy in 2009 and reports negligible PSA values.    The patient has history of light smoking during his 20s and 30s.  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    A full body PET scan was performed on 5/3/2024 which showed a hypermetabolic mass in the superior segment of the right lower lobe 3.7 x 2.8 cm SUV 9.3, no hypermetabolic mediastinal or hilar lymph nodes but multiple hypermetabolic bone lesions largest being at T4 vertebral body SUV 9.4, posterior lateral right second rib, posterior right sixth rib, left aspect of the sacrum to the left of the S1 neural foramen.  Spleen was mildly enlarged 15 cm but less activity than liver.    A CT-guided needle biopsy of a rib mass was performed on 5/10/2024 and showed metastatic adenocarcinoma immunohistochemistry supporting a pulmonary primary.    The patient was seen by radiation oncology with plans to treat at least palliatively to T4 and rib lesions possible additional sites pending peer review of case.    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    INTERVAL HISTORY:  This is a 78-year-old male who presents following initiation of Tagrisso 2 weeks ago. He reports that overall he is tolerating this well. He is also undergoing palliative radiation for his bony disease. He said his pain has significantly improved. He denies fatigue, SOB, cough, headaches, nausea/vomiting/diarrhea, bloody stools, or nose bleeds    Past Medical History:   Diagnosis Date    Acute bronchitis     Allergic Iodine    Allergic rhinitis     Arthritis 2000    BPH (benign prostatic hypertrophy)     Cervical disc disorder 1987    Dislocation, shoulder 1973    Elevated prostate specific antigen (PSA)     Headache 1990    History of bone scan 10/21/2010    normal    History of colonoscopy     never    History of EKG 03/12/2012    also 11-; T wave abnormality    History of medical problems 1978    HL (hearing loss) 2000    Hyperlipidemia     Hypothyroidism     IFG (impaired fasting glucose)     Kidney calculus     left ureteral stone    Knee swelling 2015    Malignant  neoplasm prostate 11/09/2009    Dr. Mcclelland; lymph nodes negative margins clear    Prostate nodule     right lobe    Rotator cuff syndrome 2009    Screening for prostate cancer     prostatectomy    Sleep apnea     CPAP machine    URI (upper respiratory infection)         Past Surgical History:   Procedure Laterality Date    CERVICAL DISC SURGERY  1990    also 2006; C5-6, C6-7    HAND SURGERY Left 2001    trigger finger release; left middle finger    NECK SURGERY  1988, 2005    Cervical disk    PROSTATECTOMY  11/09/2009    RHINOPLASTY  1985    ROTATOR CUFF REPAIR      SHOULDER SURGERY Left 09/29/2009    Dr. KRISTEL Jimenez; rotator cuff repair; bone spurs     SPINE SURGERY  2005    TRIGGER POINT INJECTION  2008,2021,2022    URETERAL STENT INSERTION Left 12/04/2013    Dr. Martinez        Current Outpatient Medications on File Prior to Visit   Medication Sig Dispense Refill    cyclobenzaprine (FLEXERIL) 5 MG tablet Take 1 tablet by mouth 2 (Two) Times a Day As Needed for Muscle Spasms. 20 tablet 0    Diclofenac Sodium (VOLTAREN) 1 % gel gel Apply 4 g topically to the appropriate area as directed 3 (Three) Times a Day. 100 g 2    gabapentin (Neurontin) 300 MG capsule Take 1 capsule by mouth 3 (Three) Times a Day. 90 capsule 2    HYDROcodone-acetaminophen (NORCO) 5-325 MG per tablet Take 1 tablet by mouth Every 6 (Six) Hours As Needed for Moderate Pain. 30 tablet 0    ibuprofen (ADVIL,MOTRIN) 200 MG tablet Take 1 tablet by mouth Every 6 (Six) Hours As Needed for Mild Pain.      levothyroxine (SYNTHROID, LEVOTHROID) 75 MCG tablet       loratadine (CLARITIN) 10 MG tablet Take 1 tablet by mouth Daily.      LORazepam (Ativan) 0.5 MG tablet Take 1 tablet by mouth Every 8 (Eight) Hours As Needed for Anxiety. 30 tablet 1    naproxen sodium (ALEVE) 220 MG tablet Take 1 tablet by mouth 2 (Two) Times a Day As Needed.      Norvasc 10 MG tablet Take 1 tablet by mouth Daily.      ondansetron (ZOFRAN) 8 MG tablet Take 1 tablet by  mouth 3 (Three) Times a Day As Needed for Nausea or Vomiting. 30 tablet 5    Osimertinib Mesylate 80 MG tablet Take 1 tablet by mouth Daily. 30 tablet 5    zolpidem (AMBIEN) 10 MG tablet Take 0.5 tablets by mouth At Night As Needed.       No current facility-administered medications on file prior to visit.        ALLERGIES:    Allergies   Allergen Reactions    Iodinated Contrast Media Anaphylaxis     Patient had a severe reaction in the past / difficulty breathing    Iodine Unknown - High Severity     Difficulty breathing    Molds & Smuts Unknown - High Severity    Shellfish Allergy Unknown - High Severity     Burning inside    Amoxicillin Rash        Social History     Socioeconomic History    Marital status:      Spouse name: Faith   Tobacco Use    Smoking status: Former     Current packs/day: 0.00     Average packs/day: 1 pack/day for 22.0 years (22.0 ttl pk-yrs)     Types: Cigarettes, Pipe     Start date: 1967     Quit date: 1988     Years since quittin.4     Passive exposure: Past    Smokeless tobacco: Never    Tobacco comments:     Quit smoking pipe around    Vaping Use    Vaping status: Never Used   Substance and Sexual Activity    Alcohol use: Yes     Comment: Rarely will drink one glass of wine    Drug use: Never    Sexual activity: Not Currently     Partners: Female        Family History   Problem Relation Age of Onset    Diabetes Mother     Kidney disease Mother         calculus    Hearing loss Mother     Prostate cancer Father     Stomach cancer Father     Cancer Sister         breast    Hypothyroidism Sister     Kidney disease Sister         calculus    Arthritis Sister     Transient ischemic attack Brother     Alzheimer's disease Other         uncle    Diabetes Other         uncle        Review of Systems  ROS as per HPI    Objective     Vitals:    24 1349   BP: 144/62   Pulse: 69   Resp: 16   Temp: 98.2 °F (36.8 °C)   TempSrc: Oral   SpO2: 97%   Weight: 98.4 kg (217 lb)  "  Height: 177.8 cm (70\")   PainSc: 0-No pain             6/17/2024     1:50 PM   Current Status   ECOG score 0       Physical Exam    CONSTITUTIONAL: pleasant well-developed adult man  HEENT: no icterus, no thrush, moist membranes  LYMPH: no cervical or supraclavicular lad  CV: RRR, S1S2, no murmur  RESP: cta bilat, no wheezing, no rales  GI: soft, non-tender, no splenomegaly, +bs  MUSC: no edema, normal gait, point tenderness over the T4 vertebral body  NEURO: alert and oriented x3, normal strength  PSYCH: normal mood and appearance      RECENT LABS:  Results from last 7 days   Lab Units 06/17/24  1340   WBC 10*3/mm3 4.88   NEUTROS ABS 10*3/mm3 3.44   HEMOGLOBIN g/dL 13.2   HEMATOCRIT % 38.3   PLATELETS 10*3/mm3 82*     Results from last 7 days   Lab Units 06/17/24  1340   SODIUM mmol/L 139   POTASSIUM mmol/L 4.6   CHLORIDE mmol/L 104   CO2 mmol/L 24.3   BUN mg/dL 21   CREATININE mg/dL 1.09   CALCIUM mg/dL 9.4   ALBUMIN g/dL 4.6   BILIRUBIN mg/dL 0.3   ALK PHOS U/L 110   ALT (SGPT) U/L 61*   AST (SGOT) U/L 36   GLUCOSE mg/dL 137*   MAGNESIUM mg/dL 1.8         EKG Performed in the office today and reviewed with QTc 370    MRI Thoracic Spine:  MPRESSION:     1. Large expansile mass in the anterior T4 vertebral body extending into  the adjacent soft tissues and involving greater than 50% of the  vertebral body, consistent with metastatic disease. Surrounding bone  marrow edema like signal throughout the T4 vertebral body and patchy  bone marrow edema like signal in the adjacent anterior T3 inferior  endplate and anterior T5 superior endplate. This lesion is at risk for  pathologic fracture.  2. Expansile mass in the posterior right second rib and focal 1 cm mass  in the posterior right sixth rib, consistent with metastatic disease.  3. Partially imaged pulmonary malignant/metastatic disease. Recommend  further evaluation with CT with contrast and/or PET/CT.    PET:  TECHNIQUE: Radiation dose reduction techniques were " utilized, including  automated exposure control and exposure modulation based on body size.   Blood glucose level at time of injection was 123 mg/dL. 6.8 mCi of F-18  FDG were injected and PET was performed from skull base to mid thigh. CT  was obtained for localization and attenuation correction. Time at  injection 8:43 a.m. PET start time 10:01 a.m. Normalization method:  patient weight. Compared with thoracic MRI 04/25/2024.     FINDINGS: Mediastinal blood pool has a maximal SUV of 2.8.     1. There is a hypermetabolic 3.7 x 2.8 cm irregular mass at the superior  segment of the right lower lobe with a maximal SUV of 9.3. There is no  hypermetabolic hilar or mediastinal lymphadenopathy, but there are  multiple hypermetabolic bone lesions. The largest is at the T4 vertebral  body with a maximal SUV of 9.4. Lytic expansile metastases are also seen  at the posterior and lateral aspects of the right 2nd rib, posterior  right 6th rib, and left aspect of the sacrum anterior to the left S1  neural foramen.   The activity adjacent to the left greater trochanter is likely related  to tendinopathy.     2. There is no hypermetabolic lymphadenopathy or suspicious activity  within the neck. There is no suspicious visceral activity within the  abdomen or pelvis.  The spleen is mildly enlarged measuring 15 cm in diameter. Splenic  activity is less intense than that of the liver.      Assessment & Plan     *Stage IV adenocarcinoma, lung primary  Patient presented with back and chest wall pain, imaging showed and expansile mass T4 vertebral body, posterior right second rib and posterior right sixth rib  PET scan was performed on 5/3/2024 which showed a hypermetabolic mass in the superior segment of the right lower lobe 3.7 x 2.8 cm SUV 9.3, no hypermetabolic mediastinal or hilar lymph nodes but multiple hypermetabolic bone lesions largest being at T4 vertebral body SUV 9.4, posterior lateral right second rib, posterior right sixth  rib, left aspect of the sacrum to the left of the S1 neural foramen.  Spleen was mildly enlarged 15 cm but less activity than liver.  CT-guided needle biopsy of a rib mass was performed on 5/10/2024 and showed metastatic adenocarcinoma immunohistochemistry supporting a pulmonary primary.patient was seen by radiation oncology with plans to treat at least palliatively to T4 and rib lesions possible additional sites pending peer review of case.  The patient's next generation sequencing returned with a EGFR exon 19 and frame deletion  5/31/2024 MRI of the brain noting multifocal supratentorial and infratentorial enhancing lesions are appreciated consistent with metastatic disease the largest of which measures 4.0 to 4.5 mm in size.  MRI of the brain reviewed with radiation oncology with plans to monitor.  Tagrisso given CNS penetration.  Patient is asymptomatic  Currently undergoing palliative radiation to T4 with 3 fractions remaining.   Initiated Osimertinib mesylate therapy on 6/5/2024. Excellent tolerance thus far with with normal QTc    *Pain of malignancy  The patient has available [place] 5/325 1 p.o. every 6 hours as needed pain  He is taking half a norco twice a day as well as tyelnol around dinner time. His pain is currently a 0/10.    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  6/17/2024 platelets declined at 82,000 without signs or symptoms of bleeding  Likely multifactorial with both radiation and Tagrisso.  Will repeat labs in 1 week    Oncology plan/recommendations:  Continue Tagrisso 80 mg daily  EKG performed in the office with QTc normal.  I personally reviewed EKG  Continue radiation under the direction of radiation oncology.  Continue [place] 5/325 0.5-1 tablet as needed for cancer-related pain.  Moving forward, we  will take over management of the patient's narcotics and refills.  Return to clinic in 1 week for CBC and RN review monitoring platelets.  Return to clinic to see Dr. Austin on 7/5/2024 for toxicity check and CBC, CMP repeat EKG for monitoring.    The patient continues on high risk medication requiring close monitoring for toxicity    I spent 45 minutes caring for Nemesio on this date of service. This time includes time spent by me in the following activities: preparing for the visit, reviewing tests, obtaining and/or reviewing a separately obtained history, performing a medically appropriate examination and/or evaluation, counseling and educating the patient/family/caregiver, ordering medications, tests, or procedures, documenting information in the medical record, and care coordination.       Emma Vasquez, APRN  06/17/2024

## 2024-06-17 NOTE — PROGRESS NOTES
I was asked by Emma MAZA NP to go speak with pt while he was at the [facility] Office. His care coordinator Blanca is at the  office.    He had questions about his [facility] ramón. I let him know he is approved for  ramón according to the letter he presented me. He asked about the dosing on the application. I let him know the dosing does not make much difference with the [employer]. They will cover any strength of Tagrisso he is on.  I took a copy of the application that he sent me and sent it to Blanca. I let pt know if anything was needed-Blanca will reach out.    Nitza Schwartz, Pharmacy Technician  Specialty Pharmacy Technician

## 2024-06-18 ENCOUNTER — PATIENT OUTREACH (OUTPATIENT)
Dept: OTHER | Facility: HOSPITAL | Age: 79
End: 2024-06-18
Payer: MEDICARE

## 2024-06-18 ENCOUNTER — HOSPITAL ENCOUNTER (OUTPATIENT)
Dept: RADIATION ONCOLOGY | Facility: HOSPITAL | Age: 79
Setting detail: RADIATION/ONCOLOGY SERIES
Discharge: HOME | End: 2024-06-18
Payer: MEDICARE

## 2024-06-18 ENCOUNTER — SPECIALTY PHARMACY (OUTPATIENT)
Dept: PHARMACY | Facility: HOSPITAL | Age: 79
End: 2024-06-18
Payer: MEDICARE

## 2024-06-18 LAB
RAD ONC ARIA COURSE ID: NORMAL
RAD ONC ARIA COURSE INTENT: NORMAL
RAD ONC ARIA COURSE LAST TREATMENT DATE: NORMAL
RAD ONC ARIA COURSE START DATE: NORMAL
RAD ONC ARIA COURSE TREATMENT ELAPSED DAYS: 4
RAD ONC ARIA FIRST TREATMENT DATE: NORMAL
RAD ONC ARIA PLAN FRACTIONS TREATED TO DATE: 3
RAD ONC ARIA PLAN ID: NORMAL
RAD ONC ARIA PLAN PRESCRIBED DOSE PER FRACTION: 7 GY
RAD ONC ARIA PLAN PRIMARY REFERENCE POINT: NORMAL
RAD ONC ARIA PLAN TOTAL FRACTIONS PRESCRIBED: 5
RAD ONC ARIA PLAN TOTAL PRESCRIBED DOSE: 3500 CGY
RAD ONC ARIA REFERENCE POINT DOSAGE GIVEN TO DATE: 21 GY
RAD ONC ARIA REFERENCE POINT ID: NORMAL
RAD ONC ARIA REFERENCE POINT SESSION DOSAGE GIVEN: 7 GY

## 2024-06-18 PROCEDURE — 77336 RADIATION PHYSICS CONSULT: CPT | Performed by: STUDENT IN AN ORGANIZED HEALTH CARE EDUCATION/TRAINING PROGRAM

## 2024-06-18 PROCEDURE — 77373 STRTCTC BDY RAD THER TX DLVR: CPT | Performed by: RADIOLOGY

## 2024-06-18 NOTE — PROGRESS NOTES
Specialty Pharmacy Note: Tagrisso (osimertinib)    Nemesio Fitzpatrick is a 78 y.o. male with lung cancer was seen 6/17/24 by APRN. Per provider dictation, no changes to oral oncology regimen Tagrisso (osimertinib).  Labs Review: The CMP and CBC from 6/17/24 have been reviewed. The following labs are outside of normal limits: platelets. No dose adjustments are needed for the oral specialty medication(s) based on the labs.    Specialty pharmacy will continue to follow patient.    Jerson Seaman PharmD, [facility]  Clinical Oncology Pharmacist    6/18/2024  10:32 EDT

## 2024-06-18 NOTE — PROGRESS NOTES
Reviewed chart. Patient recently diagnosed with Stage IV lung cancer. Currently undergoing palliative radiation to T4, Continues Tagrisso 80 mg daily    Met patient in radiation center today following his radiation treatment. He reports he is tolerating treatment fairly well. The patient has ongoing appts with [facility].     We discussed how he is coping better with his diagnosis and treatment. Provided active listening and emotional support, validating and normalizing patient's feelings. He denies side effects to Tagrisso. The patient reports that he is sleeping better and states his anxiety is better managed. The patient reports adequate pain management with oral medication.     The patient denies any questions/concerns or ongoing resource needs. I will call in 1-2 months; encouraged patient to call as needed.

## 2024-06-19 ENCOUNTER — HOSPITAL ENCOUNTER (OUTPATIENT)
Dept: RADIATION ONCOLOGY | Facility: HOSPITAL | Age: 79
Setting detail: RADIATION/ONCOLOGY SERIES
Discharge: HOME | End: 2024-06-19
Payer: MEDICARE

## 2024-06-19 LAB
RAD ONC ARIA COURSE ID: NORMAL
RAD ONC ARIA COURSE INTENT: NORMAL
RAD ONC ARIA COURSE LAST TREATMENT DATE: NORMAL
RAD ONC ARIA COURSE START DATE: NORMAL
RAD ONC ARIA COURSE TREATMENT ELAPSED DAYS: 5
RAD ONC ARIA FIRST TREATMENT DATE: NORMAL
RAD ONC ARIA PLAN FRACTIONS TREATED TO DATE: 4
RAD ONC ARIA PLAN ID: NORMAL
RAD ONC ARIA PLAN PRESCRIBED DOSE PER FRACTION: 7 GY
RAD ONC ARIA PLAN PRIMARY REFERENCE POINT: NORMAL
RAD ONC ARIA PLAN TOTAL FRACTIONS PRESCRIBED: 5
RAD ONC ARIA PLAN TOTAL PRESCRIBED DOSE: 3500 CGY
RAD ONC ARIA REFERENCE POINT DOSAGE GIVEN TO DATE: 28 GY
RAD ONC ARIA REFERENCE POINT ID: NORMAL
RAD ONC ARIA REFERENCE POINT SESSION DOSAGE GIVEN: 7 GY

## 2024-06-19 PROCEDURE — 77373 STRTCTC BDY RAD THER TX DLVR: CPT | Performed by: STUDENT IN AN ORGANIZED HEALTH CARE EDUCATION/TRAINING PROGRAM

## 2024-06-20 ENCOUNTER — TREATMENT (OUTPATIENT)
Dept: RADIATION ONCOLOGY | Facility: HOSPITAL | Age: 79
End: 2024-06-20

## 2024-06-20 ENCOUNTER — RADIATION ONCOLOGY WEEKLY ASSESSMENT (OUTPATIENT)
Dept: RADIATION ONCOLOGY | Facility: HOSPITAL | Age: 79
End: 2024-06-20
Payer: MEDICARE

## 2024-06-20 ENCOUNTER — HOSPITAL ENCOUNTER (OUTPATIENT)
Dept: RADIATION ONCOLOGY | Facility: HOSPITAL | Age: 79
Setting detail: RADIATION/ONCOLOGY SERIES
Discharge: HOME | End: 2024-06-20
Payer: MEDICARE

## 2024-06-20 VITALS
WEIGHT: 216 LBS | BODY MASS INDEX: 30.99 KG/M2 | OXYGEN SATURATION: 100 % | SYSTOLIC BLOOD PRESSURE: 171 MMHG | HEART RATE: 79 BPM | DIASTOLIC BLOOD PRESSURE: 86 MMHG

## 2024-06-20 DIAGNOSIS — C78.01 METASTATIC LUNG CARCINOMA, RIGHT: Primary | ICD-10-CM

## 2024-06-20 LAB
RAD ONC ARIA COURSE ID: NORMAL
RAD ONC ARIA COURSE INTENT: NORMAL
RAD ONC ARIA COURSE LAST TREATMENT DATE: NORMAL
RAD ONC ARIA COURSE START DATE: NORMAL
RAD ONC ARIA COURSE TREATMENT ELAPSED DAYS: 6
RAD ONC ARIA FIRST TREATMENT DATE: NORMAL
RAD ONC ARIA PLAN FRACTIONS TREATED TO DATE: 5
RAD ONC ARIA PLAN ID: NORMAL
RAD ONC ARIA PLAN PRESCRIBED DOSE PER FRACTION: 7 GY
RAD ONC ARIA PLAN PRIMARY REFERENCE POINT: NORMAL
RAD ONC ARIA PLAN TOTAL FRACTIONS PRESCRIBED: 5
RAD ONC ARIA PLAN TOTAL PRESCRIBED DOSE: 3500 CGY
RAD ONC ARIA REFERENCE POINT DOSAGE GIVEN TO DATE: 35 GY
RAD ONC ARIA REFERENCE POINT ID: NORMAL
RAD ONC ARIA REFERENCE POINT SESSION DOSAGE GIVEN: 7 GY

## 2024-06-20 PROCEDURE — 77373 STRTCTC BDY RAD THER TX DLVR: CPT | Performed by: STUDENT IN AN ORGANIZED HEALTH CARE EDUCATION/TRAINING PROGRAM

## 2024-06-20 NOTE — PROGRESS NOTES
Radiation Oncology  On-Treatment Note      Patient: Nemesio Fitzpatrick    MRN: [id number]    Attending Physician: Rex Guerrero MD     Diagnosis:     ICD-10-CM ICD-9-CM   1. Metastatic lung carcinoma, right  C78.01 197.0       Radiation Therapy Visit:  Continue radiation therapy, Dosimetry plan remains acceptable, Films reviewed and remains acceptable, Pain assessed, Pain management planned, Radiation dose schedule reviewed and remains acceptable, Radiation technique remains acceptable, and Symptoms within expected range    Radiation Treatments       Active   Reference Points   RX: T4   Most recent treatment: Dose given: 700 cGy (on 6/20/2024)   Total: Dose given: 3,500 cGy   Elapsed Days: 6                      Physical Examination:  Vitals: Blood pressure 171/86, pulse 79, weight 98 kg (216 lb), SpO2 100%.  Pain Score    06/20/24 1509   PainSc:   2   PainLoc: Chest       Fully active, able to carry on all pre-disease performance without restriction = 0    We examined the relevant areas: yes  Findings are within the expected range for this stage of treatment: yes  -------------------------------------------------------------------------------------------------------------------    ACTION ITEMS:  Patient tolerating treatment well and as expected for this stage in their treatment and Continue radiation therapy as planned    Pain symptoms much improved    Estimated Completion Date: 6/20/2024    Follow up CARLOS Guerrero MD  Radiation Oncology

## 2024-06-21 ENCOUNTER — OFFICE VISIT (OUTPATIENT)
Dept: FAMILY MEDICINE CLINIC | Facility: CLINIC | Age: 79
End: 2024-06-21
Payer: MEDICARE

## 2024-06-21 VITALS
HEART RATE: 73 BPM | BODY MASS INDEX: 30.98 KG/M2 | SYSTOLIC BLOOD PRESSURE: 140 MMHG | DIASTOLIC BLOOD PRESSURE: 78 MMHG | OXYGEN SATURATION: 98 % | TEMPERATURE: 97.8 F | WEIGHT: 216.4 LBS | HEIGHT: 70 IN

## 2024-06-21 DIAGNOSIS — M79.2 NERVE PAIN: ICD-10-CM

## 2024-06-21 DIAGNOSIS — E03.9 ACQUIRED HYPOTHYROIDISM: ICD-10-CM

## 2024-06-21 DIAGNOSIS — J30.1 SEASONAL ALLERGIC RHINITIS DUE TO POLLEN: Primary | ICD-10-CM

## 2024-06-21 DIAGNOSIS — G90.521 COMPLEX REGIONAL PAIN SYNDROME TYPE 1 OF RIGHT LOWER EXTREMITY: ICD-10-CM

## 2024-06-21 DIAGNOSIS — E78.2 MIXED HYPERLIPIDEMIA: ICD-10-CM

## 2024-06-21 DIAGNOSIS — C34.90 PRIMARY NONSQUAMOUS NONSMALL CELL NEOPLASM OF LUNG: ICD-10-CM

## 2024-06-21 DIAGNOSIS — R73.01 IFG (IMPAIRED FASTING GLUCOSE): ICD-10-CM

## 2024-06-21 DIAGNOSIS — R93.89 ABNORMAL FINDINGS ON DIAGNOSTIC IMAGING OF OTHER SPECIFIED BODY STRUCTURES: ICD-10-CM

## 2024-06-21 DIAGNOSIS — M54.32 SCIATICA OF LEFT SIDE: ICD-10-CM

## 2024-06-21 DIAGNOSIS — G47.30 SLEEP APNEA, UNSPECIFIED TYPE: ICD-10-CM

## 2024-06-21 PROCEDURE — 99214 OFFICE O/P EST MOD 30 MIN: CPT | Performed by: FAMILY MEDICINE

## 2024-06-21 PROCEDURE — 1159F MED LIST DOCD IN RCRD: CPT | Performed by: FAMILY MEDICINE

## 2024-06-21 PROCEDURE — 1160F RVW MEDS BY RX/DR IN RCRD: CPT | Performed by: FAMILY MEDICINE

## 2024-06-21 PROCEDURE — 1125F AMNT PAIN NOTED PAIN PRSNT: CPT | Performed by: FAMILY MEDICINE

## 2024-06-21 RX ORDER — HYDROCODONE BITARTRATE AND ACETAMINOPHEN 5; 325 MG/1; MG/1
1 TABLET ORAL EVERY 6 HOURS PRN
Qty: 30 TABLET | Refills: 0 | Status: SHIPPED | OUTPATIENT
Start: 2024-06-21

## 2024-06-21 NOTE — PROGRESS NOTES
Subjective   Nemesio Fitzpatrick is a 78 y.o. male.     Chief Complaint   Patient presents with    Leg Pain     Right       History of Present Illness   H/o prostate cancer- had prostate removed, follows with urology and is resolved. They follow psa.      Allergies- to mold, claritin helps and he feels this is all he needs.      hld- has never been on meds, diet controlled. [facility] and [facility] reports this is good.      Hypothyroidism- no cold intolerance., doing well on meds, no fatigue, [facility] labs [facility].      High bs in the past, follows [facility]      Insomnia/te- No longer taking ambien, trouble falling asleep. Does great with gabapentin now.      Low back pain- from cancer and doing much better now. Has pain meds.     Having leg pain, Has a burning feeling in quads. It started before he started chemo. No pain with movement. Pain if he even touches it lightly. Reviewed PET scan and no sign of mets in this region.     Pt has been recently diagnosed with stage IV adenocarcinoma of the lung.  Is getting palliative radiation for his bony mets.  Pain is well-controlled on medication.  He is having some anxiety but oncology wrote for Ativan and scheduled him to see a psychiatrist/psychologist. Reviewed last oncology note.     The following portions of the patient's history were reviewed and updated as appropriate: allergies, current medications, past family history, past medical history, past social history, past surgical history and problem list.    Past Medical History:   Diagnosis Date    Acute bronchitis     Allergic Iodine    Allergic rhinitis     Anxiety 2024    Due to sudden cancer diagnosis    Arthritis 2000    BPH (benign prostatic hypertrophy)     Cervical disc disorder 1987    Dislocation, shoulder 1973    Elevated prostate specific antigen (PSA)     Headache 1990    History of bone scan 10/21/2010    normal    History of colonoscopy     never    History of EKG 03/12/2012    also 11-; T wave  abnormality    History of medical problems     HL (hearing loss)     Hyperlipidemia     Hypertension     Upon learning of Stage 4 cancer    Hypothyroidism     IFG (impaired fasting glucose)     Kidney calculus     left ureteral stone    Knee swelling 2015    Low back pain     Malignant neoplasm prostate 2009    Dr. Mcclelland; lymph nodes negative margins clear    Prostate nodule     right lobe    Rotator cuff syndrome 2009    Scoliosis 2009    Screening for prostate cancer     prostatectomy    Sleep apnea     CPAP machine    URI (upper respiratory infection)        Past Surgical History:   Procedure Laterality Date    CERVICAL DISC SURGERY      also ; C5-6, C6-7    HAND SURGERY Left     trigger finger release; left middle finger    NECK SURGERY  ,     Cervical disk    PROSTATECTOMY  2009    RHINOPLASTY  1985    ROTATOR CUFF REPAIR      SHOULDER SURGERY Left 2009    Dr. KRISTEL Jimenez; rotator cuff repair; bone spurs     SPINE SURGERY  2005    TRIGGER POINT INJECTION  ,,    URETERAL STENT INSERTION Left 2013    Dr. Martinez       Family History   Problem Relation Age of Onset    Diabetes Mother     Kidney disease Mother         calculus    Hearing loss Mother     Prostate cancer Father     Stomach cancer Father     Cancer Sister         breast    Hypothyroidism Sister     Kidney disease Sister         calculus    Arthritis Sister     Transient ischemic attack Brother     Alzheimer's disease Other         uncle    Diabetes Other         uncle    Arthritis Brother        Social History     Socioeconomic History    Marital status:      Spouse name: Faith   Tobacco Use    Smoking status: Former     Current packs/day: 0.00     Average packs/day: 1 pack/day for 22.0 years (22.0 ttl pk-yrs)     Types: Cigarettes, Pipe     Start date: 1967     Quit date: 1988     Years since quittin.4     Passive exposure: Past    Smokeless tobacco:  "Never    Tobacco comments:     Quit smoking pipe around 1988   Vaping Use    Vaping status: Never Used   Substance and Sexual Activity    Alcohol use: Yes     Comment: Rarely will drink one glass of wine    Drug use: Never    Sexual activity: Not Currently     Partners: Female       Review of Systems   Neurological:  Positive for numbness.       Objective   Visit Vitals  /78 (BP Location: Left arm, Patient Position: Sitting, Cuff Size: Large Adult)   Pulse 73   Temp 97.8 °F (36.6 °C)   Ht 177.8 cm (70\")   Wt 98.2 kg (216 lb 6.4 oz)   SpO2 98%   BMI 31.05 kg/m²     Body mass index is 31.05 kg/m².  Physical Exam  Constitutional:       Appearance: Normal appearance. He is well-developed.   Cardiovascular:      Rate and Rhythm: Normal rate and regular rhythm.      Heart sounds: Normal heart sounds.   Pulmonary:      Effort: Pulmonary effort is normal.      Breath sounds: Normal breath sounds.   Musculoskeletal:         General: No swelling. Normal range of motion.      Comments: Full ROM and has trigger spots of pain in muscle. Straight leg raise neg.    Skin:     General: Skin is warm and dry.      Findings: No rash.   Neurological:      General: No focal deficit present.      Mental Status: He is alert and oriented to person, place, and time.   Psychiatric:         Mood and Affect: Mood normal.         Behavior: Behavior normal.           Assessment & Plan   Diagnoses and all orders for this visit:    1. Seasonal allergic rhinitis due to pollen (Primary)    2. Mixed hyperlipidemia  -     Comprehensive Metabolic Panel  -     Lipid Panel    3. Acquired hypothyroidism    4. IFG (impaired fasting glucose)  -     Hemoglobin A1c    5. Primary nonsquamous nonsmall cell neoplasm of lung    6. Sleep apnea, unspecified type    7. Nerve pain  -     TSH Rfx On Abnormal To Free T4  -     Vitamin B12    8. Complex regional pain syndrome type 1 of right lower extremity  -     Cancel: Ambulatory Referral to Physical Therapy  -  "    Ambulatory Referral to Physical Therapy    9. Abnormal findings on diagnostic imaging of other specified body structures  -     TSH Rfx On Abnormal To Free T4    10. Sciatica of left side  -     HYDROcodone-acetaminophen (NORCO) 5-325 MG per tablet; Take 1 tablet by mouth Every 6 (Six) Hours As Needed for Moderate Pain.  Dispense: 30 tablet; Refill: 0        Discussed importance of PT and F/U if worse or no better in a few weeks. Cont meds. F/U in 6 months. Amanda

## 2024-06-21 NOTE — PROGRESS NOTES
Radiation Treatment Summary Note      Patient Name: Nemesio Fitzpatrick  : 1945    Attending Provider: Rex Guerrero MD      Diagnosis:     ICD-10-CM ICD-9-CM   1. Metastatic lung carcinoma, right  C78.01 197.0       Radiation Start Date: 2024    Radiation Completion Date: 2024      Prescription:     Site: T Spine  Laterality: N/A  Total Dose: 3500cGy  Dose per Fraction: 700cGy  Total Fractions: 5  Daily or BID: Daily  Modality: Photon  Technique: SBRT (2-5fx)  Bolus: No    Final Delivered Dose Deviated From Initially Prescribed Dose: No    Concurrent Chemotherapy: No    Patient Tolerated Treatment Without Unexpected Side Effects/Complications: Yes    ECOG: Fully active, able to carry on all pre-disease performance without restriction = 0    Pain Management Plan: None Indicated/PRN OTC    Follow-Up Plan: PRN    Imaging Ordered for Follow-Up: None/NA        Rex Guerrero MD

## 2024-06-22 LAB
ALBUMIN SERPL-MCNC: 4.7 G/DL (ref 3.5–5.2)
ALBUMIN/GLOB SERPL: 2.1 G/DL
ALP SERPL-CCNC: 109 U/L (ref 39–117)
ALT SERPL-CCNC: 45 U/L (ref 1–41)
AST SERPL-CCNC: 28 U/L (ref 1–40)
BILIRUB SERPL-MCNC: 0.3 MG/DL (ref 0–1.2)
BUN SERPL-MCNC: 22 MG/DL (ref 8–23)
BUN/CREAT SERPL: 21 (ref 7–25)
CALCIUM SERPL-MCNC: 9.5 MG/DL (ref 8.6–10.5)
CHLORIDE SERPL-SCNC: 103 MMOL/L (ref 98–107)
CHOLEST SERPL-MCNC: 156 MG/DL (ref 0–200)
CO2 SERPL-SCNC: 25.2 MMOL/L (ref 22–29)
CREAT SERPL-MCNC: 1.05 MG/DL (ref 0.76–1.27)
EGFRCR SERPLBLD CKD-EPI 2021: 73.6 ML/MIN/1.73
GLOBULIN SER CALC-MCNC: 2.2 GM/DL
GLUCOSE SERPL-MCNC: 156 MG/DL (ref 65–99)
HBA1C MFR BLD: 5.9 % (ref 4.8–5.6)
HDLC SERPL-MCNC: 28 MG/DL (ref 40–60)
LDLC SERPL CALC-MCNC: 45 MG/DL (ref 0–100)
POTASSIUM SERPL-SCNC: 3.9 MMOL/L (ref 3.5–5.2)
PROT SERPL-MCNC: 6.9 G/DL (ref 6–8.5)
SODIUM SERPL-SCNC: 139 MMOL/L (ref 136–145)
TRIGL SERPL-MCNC: 581 MG/DL (ref 0–150)
TSH SERPL DL<=0.005 MIU/L-ACNC: 3.52 UIU/ML (ref 0.27–4.2)
VIT B12 SERPL-MCNC: 263 PG/ML (ref 211–946)
VLDLC SERPL CALC-MCNC: 83 MG/DL (ref 5–40)

## 2024-06-24 ENCOUNTER — CLINICAL SUPPORT (OUTPATIENT)
Dept: ONCOLOGY | Facility: HOSPITAL | Age: 79
End: 2024-06-24
Payer: MEDICARE

## 2024-06-24 ENCOUNTER — OFFICE VISIT (OUTPATIENT)
Dept: PSYCHIATRY | Facility: HOSPITAL | Age: 79
End: 2024-06-24
Payer: MEDICARE

## 2024-06-24 ENCOUNTER — LAB (OUTPATIENT)
Dept: LAB | Facility: HOSPITAL | Age: 79
End: 2024-06-24
Payer: MEDICARE

## 2024-06-24 DIAGNOSIS — D69.6 THROMBOCYTOPENIA: ICD-10-CM

## 2024-06-24 DIAGNOSIS — F41.1 GENERALIZED ANXIETY DISORDER: Primary | ICD-10-CM

## 2024-06-24 DIAGNOSIS — C34.90 PRIMARY NONSQUAMOUS NONSMALL CELL NEOPLASM OF LUNG: Primary | ICD-10-CM

## 2024-06-24 DIAGNOSIS — E53.8 B12 DEFICIENCY: Primary | ICD-10-CM

## 2024-06-24 LAB
BASOPHILS # BLD AUTO: 0.03 10*3/MM3 (ref 0–0.2)
BASOPHILS NFR BLD AUTO: 0.7 % (ref 0–1.5)
DEPRECATED RDW RBC AUTO: 39.1 FL (ref 37–54)
EOSINOPHIL # BLD AUTO: 0.07 10*3/MM3 (ref 0–0.4)
EOSINOPHIL NFR BLD AUTO: 1.5 % (ref 0.3–6.2)
ERYTHROCYTE [DISTWIDTH] IN BLOOD BY AUTOMATED COUNT: 12.7 % (ref 12.3–15.4)
HCT VFR BLD AUTO: 38.6 % (ref 37.5–51)
HGB BLD-MCNC: 13.5 G/DL (ref 13–17.7)
IMM GRANULOCYTES # BLD AUTO: 0.16 10*3/MM3 (ref 0–0.05)
IMM GRANULOCYTES NFR BLD AUTO: 3.5 % (ref 0–0.5)
LYMPHOCYTES # BLD AUTO: 0.82 10*3/MM3 (ref 0.7–3.1)
LYMPHOCYTES NFR BLD AUTO: 18 % (ref 19.6–45.3)
MCH RBC QN AUTO: 29.7 PG (ref 26.6–33)
MCHC RBC AUTO-ENTMCNC: 35 G/DL (ref 31.5–35.7)
MCV RBC AUTO: 85 FL (ref 79–97)
MONOCYTES # BLD AUTO: 0.41 10*3/MM3 (ref 0.1–0.9)
MONOCYTES NFR BLD AUTO: 9 % (ref 5–12)
NEUTROPHILS NFR BLD AUTO: 3.07 10*3/MM3 (ref 1.7–7)
NEUTROPHILS NFR BLD AUTO: 67.3 % (ref 42.7–76)
NRBC BLD AUTO-RTO: 0 /100 WBC (ref 0–0.2)
PLATELET # BLD AUTO: 74 10*3/MM3 (ref 140–450)
PMV BLD AUTO: 11.8 FL (ref 6–12)
RBC # BLD AUTO: 4.54 10*6/MM3 (ref 4.14–5.8)
WBC NRBC COR # BLD AUTO: 4.56 10*3/MM3 (ref 3.4–10.8)

## 2024-06-24 PROCEDURE — 36415 COLL VENOUS BLD VENIPUNCTURE: CPT

## 2024-06-24 PROCEDURE — 99214 OFFICE O/P EST MOD 30 MIN: CPT | Performed by: NURSE PRACTITIONER

## 2024-06-24 PROCEDURE — 1160F RVW MEDS BY RX/DR IN RCRD: CPT | Performed by: NURSE PRACTITIONER

## 2024-06-24 PROCEDURE — 85025 COMPLETE CBC W/AUTO DIFF WBC: CPT

## 2024-06-24 PROCEDURE — 1159F MED LIST DOCD IN RCRD: CPT | Performed by: NURSE PRACTITIONER

## 2024-06-24 RX ORDER — CYANOCOBALAMIN 1000 UG/ML
1000 INJECTION, SOLUTION INTRAMUSCULAR; SUBCUTANEOUS
Status: SHIPPED | OUTPATIENT
Start: 2024-06-24

## 2024-06-24 NOTE — PROGRESS NOTES
[facility]  In Person Session    Subjective  Patient ID: Nemesio Fitzpatrick is a 78 y.o. male is seen face to face in the [facility].    CC: Anxiety    HPI/ Interval History:   Pt is seen alongside wife regarding ongoing sx of anxiety and depression surrounding dx and treatment of metastatic cancer.    Reports to be doing well, continuing to have good and bad days. Acknowledges trending improvement, while recognizes dissonance from baseline functioning. Is back to bed for sleep, effectively sleeping appx 6-7 hours at night following gabapentin 300 mg q evening meal, 300 mg q hs. Does note slight, brief fog in AM, able to manage through this easily. Occasional napping during the day. Energy waxes and wanes, negatively impacted by radiation.   Anxiety persists, specifically surrounding uncertainty of diagnosis, disease progression. Continues to appreciate mfond memories, transitioning to appreciation of past experiences, allowance of self care in the present. Recognizes personal goal of slowing down and being more present. Radically accepting of adjusted abilities. Continues to have ativan available 0.5 mg PRN anxiety, appx once weekly.  Role strain, caregiver responsibilities persist.    Exam: As above    Recent Labs Reviewed:  Office Visit on 06/21/2024   Component Date Value    Glucose 06/21/2024 156 (H)     BUN 06/21/2024 22     Creatinine 06/21/2024 1.05     EGFR Result 06/21/2024 73.6     BUN/Creatinine Ratio 06/21/2024 21.0     Sodium 06/21/2024 139     Potassium 06/21/2024 3.9     Chloride 06/21/2024 103     Total CO2 06/21/2024 25.2     Calcium 06/21/2024 9.5     Total Protein 06/21/2024 6.9     Albumin 06/21/2024 4.7     Globulin 06/21/2024 2.2     A/G Ratio 06/21/2024 2.1     Total Bilirubin 06/21/2024 0.3     Alkaline Phosphatase 06/21/2024 109     AST (SGOT) 06/21/2024 28     ALT (SGPT) 06/21/2024 45 (H)     Total Cholesterol 06/21/2024 156     Triglycerides  06/21/2024 581 (H)     HDL Cholesterol 06/21/2024 28 (L)     VLDL Cholesterol Fareed 06/21/2024 83 (H)     LDL Chol Calc (NIH) 06/21/2024 45     Hemoglobin A1C 06/21/2024 5.90 (H)     TSH 06/21/2024 3.520     Vitamin B-12 06/21/2024 263    Hospital Outpatient Visit on 06/20/2024   Component Date Value    Course ID 06/20/2024 C1: T Spine     Course Intent 06/20/2024 Palliative     Course Start Date 06/20/2024 6/11/2024  3:40 PM     Course First Treatment D* 06/20/2024 6/14/2024 11:28 AM     Course Last Treatment Da* 06/20/2024 6/20/2024  2:40 PM     Course Elapsed Days 06/20/2024 6     Reference Point ID 06/20/2024 RX: T4     Reference Point Dosage G* 06/20/2024 35     Reference Point Session * 06/20/2024 7     Plan ID 06/20/2024 T4 SBRT     Plan Fractions Treated t* 06/20/2024 5     Plan Total Fractions Pre* 06/20/2024 5     Plan Prescribed Dose Per* 06/20/2024 7     Plan Total Prescribed Do* 06/20/2024 3,500     Plan Primary Reference P* 06/20/2024 RX: T4    Hospital Outpatient Visit on 06/19/2024   Component Date Value    Course ID 06/19/2024 C1: T Spine     Course Intent 06/19/2024 Palliative     Course Start Date 06/19/2024 6/11/2024  3:40 PM     Course First Treatment D* 06/19/2024 6/14/2024 11:28 AM     Course Last Treatment Da* 06/19/2024 6/19/2024 11:30 AM     Course Elapsed Days 06/19/2024 5     Reference Point ID 06/19/2024 RX: T4     Reference Point Dosage G* 06/19/2024 28     Reference Point Session * 06/19/2024 7     Plan ID 06/19/2024 T4 SBRT     Plan Fractions Treated t* 06/19/2024 4     Plan Total Fractions Pre* 06/19/2024 5     Plan Prescribed Dose Per* 06/19/2024 7     Plan Total Prescribed Do* 06/19/2024 3,500     Plan Primary Reference P* 06/19/2024 RX: T4    Hospital Outpatient Visit on 06/18/2024   Component Date Value    Course ID 06/18/2024 C1: T Spine     Course Intent 06/18/2024 Palliative     Course Start Date 06/18/2024 6/11/2024  3:40 PM     Course First Treatment D* 06/18/2024 6/14/2024  11:28 AM     Course Last Treatment Da* 06/18/2024 6/18/2024  2:42 PM     Course Elapsed Days 06/18/2024 4     Reference Point ID 06/18/2024 RX: T4     Reference Point Dosage G* 06/18/2024 21     Reference Point Session * 06/18/2024 7     Plan ID 06/18/2024 T4 SBRT     Plan Fractions Treated t* 06/18/2024 3     Plan Total Fractions Pre* 06/18/2024 5     Plan Prescribed Dose Per* 06/18/2024 7     Plan Total Prescribed Do* 06/18/2024 3,500     Plan Primary Reference P* 06/18/2024 RX: T4    Hospital Outpatient Visit on 06/17/2024   Component Date Value    Course ID 06/17/2024 C1: T Spine     Course Intent 06/17/2024 Palliative     Course Start Date 06/17/2024 6/11/2024  3:40 PM     Course First Treatment D* 06/17/2024 6/14/2024 11:28 AM     Course Last Treatment Da* 06/17/2024 6/17/2024 11:36 AM     Course Elapsed Days 06/17/2024 3     Reference Point ID 06/17/2024 RX: T4     Reference Point Dosage G* 06/17/2024 14     Reference Point Session * 06/17/2024 7     Plan ID 06/17/2024 T4 SBRT     Plan Fractions Treated t* 06/17/2024 2     Plan Total Fractions Pre* 06/17/2024 5     Plan Prescribed Dose Per* 06/17/2024 7     Plan Total Prescribed Do* 06/17/2024 3,500     Plan Primary Reference P* 06/17/2024 RX: T4    Lab on 06/17/2024   Component Date Value    Glucose 06/17/2024 137 (H)     BUN 06/17/2024 21     Creatinine 06/17/2024 1.09     Sodium 06/17/2024 139     Potassium 06/17/2024 4.6     Chloride 06/17/2024 104     CO2 06/17/2024 24.3     Calcium 06/17/2024 9.4     Total Protein 06/17/2024 7.5     Albumin 06/17/2024 4.6     ALT (SGPT) 06/17/2024 61 (H)     AST (SGOT) 06/17/2024 36     Alkaline Phosphatase 06/17/2024 110     Total Bilirubin 06/17/2024 0.3     Globulin 06/17/2024 2.9     A/G Ratio 06/17/2024 1.6     BUN/Creatinine Ratio 06/17/2024 19.3     Anion Gap 06/17/2024 10.7     eGFR 06/17/2024 70.3     Magnesium 06/17/2024 1.8     WBC 06/17/2024 4.88     RBC 06/17/2024 4.36     Hemoglobin 06/17/2024 13.2      Hematocrit 06/17/2024 38.3     MCV 06/17/2024 87.8     MCH 06/17/2024 30.3     MCHC 06/17/2024 34.5     RDW 06/17/2024 12.6     RDW-SD 06/17/2024 40.9     MPV 06/17/2024 10.5     Platelets 06/17/2024 82 (L)     Neutrophil % 06/17/2024 70.5     Lymphocyte % 06/17/2024 19.9     Monocyte % 06/17/2024 6.6     Eosinophil % 06/17/2024 1.0     Basophil % 06/17/2024 0.6     Immature Grans % 06/17/2024 1.4 (H)     Neutrophils, Absolute 06/17/2024 3.44     Lymphocytes, Absolute 06/17/2024 0.97     Monocytes, Absolute 06/17/2024 0.32     Eosinophils, Absolute 06/17/2024 0.05     Basophils, Absolute 06/17/2024 0.03     Immature Grans, Absolute 06/17/2024 0.07 (H)     nRBC 06/17/2024 0.0    Hospital Outpatient Visit on 06/14/2024   Component Date Value    Course ID 06/14/2024 C1: T Spine     Course Intent 06/14/2024 Palliative     Course Start Date 06/14/2024 6/11/2024  3:40 PM     Course First Treatment D* 06/14/2024 6/14/2024 11:28 AM     Course Last Treatment Da* 06/14/2024 6/14/2024 11:30 AM     Course Elapsed Days 06/14/2024 0     Reference Point ID 06/14/2024 RX: T4     Reference Point Dosage G* 06/14/2024 7     Reference Point Session * 06/14/2024 7     Plan ID 06/14/2024 T4 SBRT     Plan Fractions Treated t* 06/14/2024 1     Plan Total Fractions Pre* 06/14/2024 5     Plan Prescribed Dose Per* 06/14/2024 7     Plan Total Prescribed Do* 06/14/2024 3,500     Plan Primary Reference P* 06/14/2024 RX: T4    Hospital Outpatient Visit on 05/31/2024   Component Date Value    Creatinine 05/31/2024 1.10    Orders Only on 05/13/2024   Component Date Value    Reason for Study 05/14/2024 To identify somatic and germline mutations relevant to patient's cancer.     Genetic Diseases Assessed 05/14/2024 Cancer     Description of Ranges of* 05/14/2024 648 gene panel     Overall Interpretation 05/14/2024 positive     MSI 05/14/2024 Stable     TMB 05/14/2024 5.3     [facility] Portal 05/14/2024  [email or web address]     Amendment Notes 05/14/2024                      Value:XT.V4  THIS AMENDED REPORT IS BEING ISSUED TO ADD POTENTIAL GERMLINE SEQUENCING RESULTS AND RECATEGORIZE VARIANTS BASED ON TUMOR-NORMAL PAIRED ANALYSIS. A copy number gain in FOXA1 was added to the report. The nomenclature of a biologically relevant variant in TP53 was updated. Variants of unknown significance were recategorized based on potential germline comparison. The tumor mutational burden (TMB) of this tumor-normal matched amendment is based on the tumor only analysis.    Pertinent Negatives 05/14/2024 KRAS, BRAF, ALK, ROS1, RET, MET, ERBB2 (HER2)     Low Coverage Regions 05/14/2024 EPHB2, GNAS, RXRA     Therapy Count 05/14/2024 8     [facility]: Potential Therap* 05/14/2024                      Value:Gene: 3236^EGFR^HGNC  Variant: p.[id number]  Match Type: snvIndel  Match Type Description: EGFR p.H478_C882ttn  Agent: Afatinib  Drug Class: EGFR Inhibitor  Tissue: Non-Small Cell Lung Cancer  Association: Response  Evidence Status: Consensus  Evidence ID: NCCN  KDB Variant: Exon 19  NCCN Associated Evidence: Consensus, Non-Small Cell Lung Cancer  MSK Associated Evidence: MSK OncoKB, Level 1  Label: FDA On Label  FDA Approved?: Yes  On label?: Yes    [facility]: Potential Therap* 05/14/2024                      Value:Gene: 3236^EGFR^HGNC  Variant: p.[id number]_[id number]  Match Type: snvIndel  Match Type Description: EGFR p.Y421_H353gtv  Agent: Dacomitinib  Drug Class: EGFR Inhibitor  Tissue: Non-Small Cell Lung Cancer  Association: Response  Evidence Status: Consensus  Evidence ID: NCCN  KDB Variant: Exon 19  NCCN Associated Evidence: Consensus, Non-Small Cell Lung Cancer  MSK Associated Evidence: MSK OncoKB, Level 1  Label: FDA On Label  FDA Approved?: Yes  On label?: Yes    [facility]: Potential Therap* 05/14/2024                       Value:Gene: 3236^EGFR^HGNC  Variant: p.[id number]  Match Type: snvIndel  Match Type Description: EGFR p.T350_B066zwj  Agent: Erlotinib  Drug Class: EGFR Inhibitor  Tissue: Non-Small Cell Lung Cancer  Association: Response  Evidence Status: Consensus  Evidence ID: NCCN  KDB Variant: Exon 19  NCCN Associated Evidence: Consensus, Non-Small Cell Lung Cancer  MSK Associated Evidence: MSK OncoKB, Level 1  Label: FDA On Label  FDA Approved?: Yes  On label?: Yes    [facility]: Potential Therap* 05/14/2024                      Value:Gene: 3236^EGFR^HGNC  Variant: p.[id number]_R220wga  Match Type: snvIndel  Match Type Description: EGFR p.N163_M189vbn  Agent: Gefitinib  Drug Class: EGFR Inhibitor  Tissue: Non-Small Cell Lung Cancer  Association: Response  Evidence Status: Consensus  Evidence ID: KARLAN  KDB Variant: Exon 19  NCCN Associated Evidence: Consensus, Non-Small Cell Lung Cancer  MSK Associated Evidence: MSK OncoKB, Level 1  Label: FDA On Label  FDA Approved?: Yes  On label?: Yes    [facility]: Potential Therap* 05/14/2024                      Value:Gene: 3236^EGFR^HGNC  Variant: p.[id number]_M530ycz  Match Type: snvIndel  Match Type Description: EGFR p.J879_U784vbk  Agent: Osimertinib  Drug Class: EGFR Inhibitor  Tissue: Non-Small Cell Lung Cancer  Association: Response  Evidence Status: Consensus  Evidence ID: KARLAN  KDB Variant: Exon 19  NCCN Associated Evidence: Consensus, Non-Small Cell Lung Cancer  MSK Associated Evidence: MSK OncoKB, Level 1  Label: FDA On Label  FDA Approved?: Yes  On label?: Yes    [facility]: Potential Therap* 05/14/2024                      Value:Gene: 3236^EGFR^HGNC  Variant: p.[id number]_Q705klb  Match Type: snvIndel  Match Type Description: EGFR p.O501_I390wgs  Agent: Erlotinib + Ramucirumab  Drug Class: Combination (EGFR Inhibitor + VEGFR2 Inhibitor)  Tissue: Non-Small Cell Lung Cancer  Association: Response  Evidence Status: Consensus  Evidence ID: NCCN  KDB Variant: Exon 19  NCCN Associated Evidence: Consensus,  Non-Small Cell Lung Cancer  MSK Associated Evidence: MSK OncoKB, Level 1  Label: FDA On Label  FDA Approved?: Yes  On label?: Yes    [facility]: Potential Therap* 05/14/2024                      Value:Gene: 3236^EGFR^HGNC  Variant: p.[id number]_H635jbm  Match Type: snvIndel  Match Type Description: EGFR p.A633_S061tdp  Agent: Afatinib + Cetuximab  Drug Class: Combination (EGFR Inhibitor + Anti-EGFR MAb)  Tissue: Non-Small Cell Lung Cancer  Association: Response  Evidence Status: Consensus  Evidence ID: NCCN  KDB Variant: Exon 19  NCCN Associated Evidence: Consensus, Non-Small Cell Lung Cancer  Label: FDA Off Label  FDA Approved?: Yes  On label?: No    [facility]: Potential Therap* 05/14/2024                      Value:Gene: 1773^CDK4^HGNC  Variant: CDK4 Copy number gain  Match Type: cnv  Match Type Description: CDK4 Copy number gain  Agent: Palbociclib  Drug Class: CDK4/6 Inhibitor  Tissue: Liposarcoma  Association: Response  Evidence Status: Consensus  Evidence ID: NCCN  Label: FDA Off Label  FDA Approved?: Yes  On label?: No    Trial Count 05/14/2024 3     Trial 1: Matched criteria 05/14/2024                      Value:Clinical Trial NCT ID: [id number]  Clinical Trial Title: Study of [id number] in Patients With Advanced Solid Tumors  Clinical Trial URL: https://clinicaltrials.gov/ct2/show/GUT81789791  Clinical Phase: Phase 1/Phase 2  EGFR p.M786_K690knx mutation  [address] [place], [place]    Trial 2: Matched criteria 05/14/2024                      Value:Clinical Trial NCT ID: [id number]  Clinical Trial Title: TAPUR: Testing the Use of Food and Drug Administration (FDA) Approved Drugs That Target a Specific Abnormality in a Tumor Gene in People With Advanced Stage Cancer  Clinical Trial URL: https://clinicaltrials.gov/ct2/show/ETJ77271500  Clinical Phase: Phase 2  CDK4 amplification  [address], [place]    Trial 3: Matched criteria 05/14/2024                      Value:Clinical Trial NCT ID: [id number]  Clinical Trial Title: Study  Of ATRN-119 In Patients With Advanced Solid Tumors  Clinical Trial URL: https://clinicaltrials.gov/ct2/show/MHC78490265  Clinical Phase: Phase 1/Phase 2  TP53 c.376-1_377del mutation  [address], [place], [place]    xR Result 1 05/14/2024 QNS  QNS - This report is being issued to report the results of gene rearrangement and altered splicing analysis from RNA sequencing. RNA sequencing analysis was not performed due to insufficient quantity and/or quality of RNA.     Germline Variant Note 05/14/2024 No potential germline variants were found in the limited set of genes on which we report.    Hospital Outpatient Visit on 05/10/2024   Component Date Value    Platelets 05/10/2024 149     Protime 05/10/2024 14.1     INR 05/10/2024 1.2     Addendum 4 05/10/2024                      Value:This result contains rich text formatting which cannot be displayed here.    Addendum 3 05/10/2024                      Value:This result contains rich text formatting which cannot be displayed here.    Addendum 2 05/10/2024                      Value:This result contains rich text formatting which cannot be displayed here.    Addendum 05/10/2024                      Value:This result contains rich text formatting which cannot be displayed here.    Case Report 05/10/2024                      Value:Surgical Pathology Report                         Case: [id number]                                  Authorizing Provider:  Mp Austin MD       Collected:           05/10/2024 09:27 AM          Ordering Location:     [facility]  Received:            05/10/2024 09:43 AM                                 CT                                                                           Pathologist:           Cooper Montelongo MD                                                         Specimen:    Rib, Bone lesion biopsy T4                                                                 Clinical Information 05/10/2024                       Value:This result contains rich text formatting which cannot be displayed here.    Final Diagnosis 05/10/2024                      Value:This result contains rich text formatting which cannot be displayed here.    Comment 05/10/2024                      Value:This result contains rich text formatting which cannot be displayed here.    Gross Description 05/10/2024                      Value:This result contains rich text formatting which cannot be displayed here.    Special Stains 05/10/2024                      Value:This result contains rich text formatting which cannot be displayed here.    WBC 05/10/2024 5.53     RBC 05/10/2024 5.22     Hemoglobin 05/10/2024 15.2     Hematocrit 05/10/2024 45.1     MCV 05/10/2024 86.4     MCH 05/10/2024 29.1     MCHC 05/10/2024 33.7     RDW 05/10/2024 13.3     RDW-SD 05/10/2024 41.4     MPV 05/10/2024 9.9     Platelets 05/10/2024 156     Neutrophil % 05/10/2024 66.0     Lymphocyte % 05/10/2024 22.8     Monocyte % 05/10/2024 7.8     Eosinophil % 05/10/2024 1.8     Basophil % 05/10/2024 0.7     Immature Grans % 05/10/2024 0.9 (H)     Neutrophils, Absolute 05/10/2024 3.65     Lymphocytes, Absolute 05/10/2024 1.26     Monocytes, Absolute 05/10/2024 0.43     Eosinophils, Absolute 05/10/2024 0.10     Basophils, Absolute 05/10/2024 0.04     Immature Grans, Absolute 05/10/2024 0.05     nRBC 05/10/2024 0.0    There may be more visits with results that are not included.   Labs reviewed    Current Psychotropic Medications:  Gabapentin 300 mg q evening meal, 300 mg q hs  Lorazepam 0.5 mg PRN anxiety    Plan of Care/ Medical Decision Making  Continue gabapentin; support increase to 600 mg q hs to assist with persistent rumination, pain. Caution discussed regarding potential worsening of AM fog.  Continue infrequent use of lorazepam 0.5 mg PRN anxiety.  Support continued use of notebook, behavioral activation as able.  Discussed benefits of peer support, and pt is eager to participate.  FU  scheduled in 4 weeks in group setting.    Diagnoses and all orders for this visit:    1. Generalized anxiety disorder (Primary)    I spent 37 minutes caring for Nemesio on this date of service. This time includes time spent by me in the following activities: preparing for the visit, reviewing tests, obtaining and/or reviewing a separately obtained history, performing a medically appropriate examination and/or evaluation, counseling and educating the patient/family/caregiver, ordering medications, tests, or procedures, and documenting information in the medical record.

## 2024-06-24 NOTE — PROGRESS NOTES
Patient and wife were here for lab review with RN. CBC reviewed, counts are stable for this patient at this time. Patient has no complaints. He admits to an occasional bloody nose, but only when he blows it hard and it does not continue thereafter. Reminded him of bleeding risks and to report and s/s bleeding. Copy of labs given to patient and follow up appointment reviewed. Patient is instructed to call the office with any concerns or new symptoms prior to next visit. Patient verbalized understanding and discharged in stable condition.    Component      Latest Ref Rng 6/24/2024   WBC      3.40 - 10.80 10*3/mm3 4.56    RBC      4.14 - 5.80 10*6/mm3 4.54    Hemoglobin      13.0 - 17.7 g/dL 13.5    Hematocrit      37.5 - 51.0 % 38.6    MCV      79.0 - 97.0 fL 85.0    MCH      26.6 - 33.0 pg 29.7    MCHC      31.5 - 35.7 g/dL 35.0    RDW      12.3 - 15.4 % 12.7    RDW-SD      37.0 - 54.0 fl 39.1    MPV      6.0 - 12.0 fL 11.8    Platelets      140 - 450 10*3/mm3 74 (L)    Neutrophil Rel %      42.7 - 76.0 % 67.3    Lymphocyte Rel %      19.6 - 45.3 % 18.0 (L)    Monocyte Rel %      5.0 - 12.0 % 9.0    Eosinophil Rel %      0.3 - 6.2 % 1.5    Basophil Rel %      0.0 - 1.5 % 0.7    Immature Granulocyte Rel %      0.0 - 0.5 % 3.5 (H)    Neutrophils Absolute      1.70 - 7.00 10*3/mm3 3.07    Lymphocytes Absolute      0.70 - 3.10 10*3/mm3 0.82    Monocytes Absolute      0.10 - 0.90 10*3/mm3 0.41    Eosinophils Absolute      0.00 - 0.40 10*3/mm3 0.07    Basophils Absolute      0.00 - 0.20 10*3/mm3 0.03    Immature Grans, Absolute      0.00 - 0.05 10*3/mm3 0.16 (H)    nRBC      0.0 - 0.2 /100 WBC 0.0       Legend:  (L) Low  (H) High

## 2024-06-25 ENCOUNTER — CLINICAL SUPPORT (OUTPATIENT)
Dept: FAMILY MEDICINE CLINIC | Facility: CLINIC | Age: 79
End: 2024-06-25
Payer: MEDICARE

## 2024-06-25 DIAGNOSIS — E53.8 B12 DEFICIENCY: Primary | ICD-10-CM

## 2024-06-25 PROCEDURE — 96372 THER/PROPH/DIAG INJ SC/IM: CPT | Performed by: FAMILY MEDICINE

## 2024-06-25 RX ADMIN — CYANOCOBALAMIN 1000 MCG: 1000 INJECTION, SOLUTION INTRAMUSCULAR; SUBCUTANEOUS at 09:46

## 2024-06-26 LAB
RAD ONC ARIA COURSE END DATE: NORMAL
RAD ONC ARIA COURSE ID: NORMAL
RAD ONC ARIA COURSE INTENT: NORMAL
RAD ONC ARIA COURSE LAST TREATMENT DATE: NORMAL
RAD ONC ARIA COURSE START DATE: NORMAL
RAD ONC ARIA COURSE TREATMENT ELAPSED DAYS: 6
RAD ONC ARIA FIRST TREATMENT DATE: NORMAL
RAD ONC ARIA PLAN FRACTIONS TREATED TO DATE: 5
RAD ONC ARIA PLAN ID: NORMAL
RAD ONC ARIA PLAN NAME: NORMAL
RAD ONC ARIA PLAN PRESCRIBED DOSE PER FRACTION: 7 GY
RAD ONC ARIA PLAN PRIMARY REFERENCE POINT: NORMAL
RAD ONC ARIA PLAN TOTAL FRACTIONS PRESCRIBED: 5
RAD ONC ARIA PLAN TOTAL PRESCRIBED DOSE: 3500 CGY
RAD ONC ARIA REFERENCE POINT DOSAGE GIVEN TO DATE: 35 GY
RAD ONC ARIA REFERENCE POINT ID: NORMAL

## 2024-06-27 RX ORDER — LORAZEPAM 0.5 MG/1
0.5 TABLET ORAL EVERY 8 HOURS PRN
Qty: 30 TABLET | Refills: 1 | Status: SHIPPED | OUTPATIENT
Start: 2024-06-27

## 2024-06-28 NOTE — PROGRESS NOTES
Subjective     REASON FOR CONSULTATION:  spine mass  Provide an opinion on any further workup or treatment                             REQUESTING PRACTITIONER:  Chandrakant    RECORDS OBTAINED:  Records of the patients history including those obtained from the referring provider were reviewed and summarized in detail.      History of Present Illness:  The patient returns today for follow-up.  He is doing well.  He reports no major side effects from Tagrisso.  He denies uncontrolled nausea vomiting diarrhea.  His pain is dramatically improved since radiation.  He denies headache or neurological changes.      ONCOLOGY HISTORY:  This is a pleasant 78-year-old man with impaired fasting glucose, hyperlipidemia, hypothyroidism and history of prostate cancer.  The patient has been experiencing about a 1 year history of progressive back pain in the upper mid thoracic spine stabbing in nature and radiating bilaterally anteriorly.  Symptoms are worse when the patient lays down to rest at night.  He has also had some pain around the right shoulder blade.  He was treated with PT with no improvement.  The patient has also been having left hip pain.  The patient was evaluated by orthopedic surgery and an MRI of the thoracic spine without contrast was performed on 4/25/2024 and showed a large expansile marrow replacing mass along the anterior T4 vertebral body 3.2 x 2.1 cm extending into the anterior paraspinal soft tissue along the T3-T4 space with reactive bone marrow edema.  Another partially visualized expansile mass was seen in the posterior right second rib 2.7 x 1.5 cm and another in the posterior sixth rib 1 cm with surrounding bone marrow edema.  At T7-T8 there is a posterior disc protrusion with mild to moderate canal stenosis.  In the posterior right lung a 3.5 cm mass was noted with additional small nodules also seen but poorly characterized.    The patient denies weight loss, night sweats, shortness of breath, cough,  hemoptysis, headaches, confusion, changes in swallowing bowel habits urinary habits.  He has history of prostate cancer treated with prostatectomy in 2009 and reports negligible PSA values.    The patient has history of light smoking during his 20s and 30s.  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    A full body PET scan was performed on 5/3/2024 which showed a hypermetabolic mass in the superior segment of the right lower lobe 3.7 x 2.8 cm SUV 9.3, no hypermetabolic mediastinal or hilar lymph nodes but multiple hypermetabolic bone lesions largest being at T4 vertebral body SUV 9.4, posterior lateral right second rib, posterior right sixth rib, left aspect of the sacrum to the left of the S1 neural foramen.  Spleen was mildly enlarged 15 cm but less activity than liver.    A CT-guided needle biopsy of a rib mass was performed on 5/10/2024 and showed metastatic adenocarcinoma immunohistochemistry supporting a pulmonary primary.    The patient was seen by radiation oncology with plans to treat at least palliatively to T4 and rib lesions possible additional sites pending peer review of case.    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    MRI brain 5/31/2024 showed multiple supratentorial and infratentorial lesions consistent with metastatic disease largest 4-4.5 mm in size.    The patient initiated Tagrisso 6/5/2024.  He was treated with radiation therapy to T4.          Past Medical History:   Diagnosis Date    Acute bronchitis     Allergic Iodine    Allergic rhinitis     Anxiety 2024    Due to sudden cancer diagnosis    Arthritis 2000    BPH (benign prostatic hypertrophy)     Cervical disc disorder 1987    Dislocation, shoulder 1973    Elevated prostate specific antigen (PSA)     Headache 1990    History of bone scan 10/21/2010    normal    History of colonoscopy     never    History of EKG 03/12/2012    also 11-; T wave abnormality    History of  medical problems 1978    HL (hearing loss) 2000    Hyperlipidemia     Hypertension 2024    Upon learning of Stage 4 cancer    Hypothyroidism     IFG (impaired fasting glucose)     Kidney calculus     left ureteral stone    Knee swelling 2015    Low back pain     Malignant neoplasm prostate 11/09/2009    Dr. Mcclelland; lymph nodes negative margins clear    Prostate nodule     right lobe    Rotator cuff syndrome 2009    Scoliosis 2009    Screening for prostate cancer     prostatectomy    Sleep apnea     CPAP machine    URI (upper respiratory infection)         Past Surgical History:   Procedure Laterality Date    CERVICAL DISC SURGERY  1990    also 2006; C5-6, C6-7    HAND SURGERY Left 2001    trigger finger release; left middle finger    NECK SURGERY  1988, 2005    Cervical disk    PROSTATECTOMY  11/09/2009    RHINOPLASTY  1985    ROTATOR CUFF REPAIR      SHOULDER SURGERY Left 09/29/2009    Dr. KRISTEL Jimenez; rotator cuff repair; bone spurs     SPINE SURGERY  2005    TRIGGER POINT INJECTION  2008,2021,2022    URETERAL STENT INSERTION Left 12/04/2013    Dr. Martinez        Current Outpatient Medications on File Prior to Visit   Medication Sig Dispense Refill    cyclobenzaprine (FLEXERIL) 5 MG tablet Take 1 tablet by mouth 2 (Two) Times a Day As Needed for Muscle Spasms. 20 tablet 0    Diclofenac Sodium (VOLTAREN) 1 % gel gel Apply 4 g topically to the appropriate area as directed 3 (Three) Times a Day. 100 g 2    gabapentin (Neurontin) 300 MG capsule Take 1 capsule by mouth 3 (Three) Times a Day. 90 capsule 2    HYDROcodone-acetaminophen (NORCO) 5-325 MG per tablet Take 1 tablet by mouth Every 6 (Six) Hours As Needed for Moderate Pain. 30 tablet 0    ibuprofen (ADVIL,MOTRIN) 200 MG tablet Take 1 tablet by mouth Every 6 (Six) Hours As Needed for Mild Pain.      levothyroxine (SYNTHROID, LEVOTHROID) 75 MCG tablet       loratadine (CLARITIN) 10 MG tablet Take 1 tablet by mouth Daily.      LORazepam (ATIVAN) 0.5 MG  tablet TAKE ONE TABLET BY MOUTH EVERY 8 HOURS AS NEEDED FOR ANXIETY 30 tablet 1    naproxen sodium (ALEVE) 220 MG tablet Take 1 tablet by mouth 2 (Two) Times a Day As Needed.      Norvasc 10 MG tablet Take 1 tablet by mouth Daily.      ondansetron (ZOFRAN) 8 MG tablet Take 1 tablet by mouth 3 (Three) Times a Day As Needed for Nausea or Vomiting. 30 tablet 5    Osimertinib Mesylate 80 MG tablet Take 1 tablet by mouth Daily. 30 tablet 5    zolpidem (AMBIEN) 10 MG tablet Take 0.5 tablets by mouth At Night As Needed.       Current Facility-Administered Medications on File Prior to Visit   Medication Dose Route Frequency Provider Last Rate Last Admin    cyanocobalamin injection 1,000 mcg  1,000 mcg Intramuscular Q28 Days Sherry Mazariegos MD   1,000 mcg at 24 0955        ALLERGIES:    Allergies   Allergen Reactions    Iodinated Contrast Media Anaphylaxis     Patient had a severe reaction in the past / difficulty breathing    Iodine Unknown - High Severity     Difficulty breathing    Molds & Smuts Unknown - High Severity    Shellfish Allergy Unknown - High Severity     Burning inside    Amoxicillin Rash        Social History     Socioeconomic History    Marital status:      Spouse name: Faith   Tobacco Use    Smoking status: Former     Current packs/day: 0.00     Average packs/day: 1 pack/day for 22.0 years (22.0 ttl pk-yrs)     Types: Cigarettes, Pipe     Start date: 1967     Quit date: 1988     Years since quittin.5     Passive exposure: Past    Smokeless tobacco: Never    Tobacco comments:     Quit smoking pipe around    Vaping Use    Vaping status: Never Used   Substance and Sexual Activity    Alcohol use: Yes     Comment: Rarely will drink one glass of wine    Drug use: Never    Sexual activity: Not Currently     Partners: Female        Family History   Problem Relation Age of Onset    Diabetes Mother     Kidney disease Mother         calculus    Hearing loss Mother     Prostate  "cancer Father     Stomach cancer Father     Cancer Sister         breast    Hypothyroidism Sister     Kidney disease Sister         calculus    Arthritis Sister     Transient ischemic attack Brother     Alzheimer's disease Other         uncle    Diabetes Other         uncle    Arthritis Brother         Review of Systems   Constitutional: Negative.    HENT: Negative.     Respiratory: Negative.     Cardiovascular: Negative.    Gastrointestinal: Negative.    Genitourinary: Negative.    Musculoskeletal:  Negative for back pain.   Neurological: Negative.    Hematological: Negative.    Psychiatric/Behavioral:  Negative for dysphoric mood. The patient is not nervous/anxious.     Unchanged 5/24/2024      Objective     Vitals:    07/05/24 0923   BP: (!) 181/79   Pulse: 80   Resp: 18   Temp: 97.4 °F (36.3 °C)   TempSrc: Oral   SpO2: 98%   Weight: 97.7 kg (215 lb 6.4 oz)   Height: 177.8 cm (70\")   PainSc: 0-No pain               7/5/2024     9:21 AM   Current Status   ECOG score 0       Physical Exam    CONSTITUTIONAL: pleasant well-developed adult man  HEENT: no icterus, no thrush, moist membranes  LYMPH: no cervical or supraclavicular lad  CV: RRR, S1S2, no murmur  RESP: cta bilat, no wheezing, no rales  GI: soft, non-tender, no splenomegaly, +bs  MUSC: no edema, normal gait, improved point tenderness over the T4 vertebral body  NEURO: alert and oriented x3, normal strength  PSYCH: Normal mood and affect      RECENT LABS:  Hematology WBC   Date Value Ref Range Status   07/05/2024 3.68 3.40 - 10.80 10*3/mm3 Final     RBC   Date Value Ref Range Status   07/05/2024 4.44 4.14 - 5.80 10*6/mm3 Final     Hemoglobin   Date Value Ref Range Status   07/05/2024 13.2 13.0 - 17.7 g/dL Final     Hematocrit   Date Value Ref Range Status   07/05/2024 38.7 37.5 - 51.0 % Final     Platelets   Date Value Ref Range Status   07/05/2024 73 (L) 140 - 450 10*3/mm3 Final        Lab Results   Component Value Date    GLUCOSE 156 (H) 06/21/2024    BUN " 22 06/21/2024    CREATININE 1.05 06/21/2024    EGFRRESULT 73.6 06/21/2024    EGFR 70.3 06/17/2024    BCR 21.0 06/21/2024    K 3.9 06/21/2024    CO2 25.2 06/21/2024    CALCIUM 9.5 06/21/2024    PROTENTOTREF 6.9 06/21/2024    ALBUMIN 4.7 06/21/2024    BILITOT 0.3 06/21/2024    AST 28 06/21/2024    ALT 45 (H) 06/21/2024     MRI Thoracic Spine:  MPRESSION:     1. Large expansile mass in the anterior T4 vertebral body extending into  the adjacent soft tissues and involving greater than 50% of the  vertebral body, consistent with metastatic disease. Surrounding bone  marrow edema like signal throughout the T4 vertebral body and patchy  bone marrow edema like signal in the adjacent anterior T3 inferior  endplate and anterior T5 superior endplate. This lesion is at risk for  pathologic fracture.  2. Expansile mass in the posterior right second rib and focal 1 cm mass  in the posterior right sixth rib, consistent with metastatic disease.  3. Partially imaged pulmonary malignant/metastatic disease. Recommend  further evaluation with CT with contrast and/or PET/CT.    PET:  TECHNIQUE: Radiation dose reduction techniques were utilized, including  automated exposure control and exposure modulation based on body size.   Blood glucose level at time of injection was 123 mg/dL. 6.8 mCi of F-18  FDG were injected and PET was performed from skull base to mid thigh. CT  was obtained for localization and attenuation correction. Time at  injection 8:43 a.m. PET start time 10:01 a.m. Normalization method:  patient weight. Compared with thoracic MRI 04/25/2024.     FINDINGS: Mediastinal blood pool has a maximal SUV of 2.8.     1. There is a hypermetabolic 3.7 x 2.8 cm irregular mass at the superior  segment of the right lower lobe with a maximal SUV of 9.3. There is no  hypermetabolic hilar or mediastinal lymphadenopathy, but there are  multiple hypermetabolic bone lesions. The largest is at the T4 vertebral  body with a maximal SUV of  9.4. Lytic expansile metastases are also seen  at the posterior and lateral aspects of the right 2nd rib, posterior  right 6th rib, and left aspect of the sacrum anterior to the left S1  neural foramen.   The activity adjacent to the left greater trochanter is likely related  to tendinopathy.     2. There is no hypermetabolic lymphadenopathy or suspicious activity  within the neck. There is no suspicious visceral activity within the  abdomen or pelvis.  The spleen is mildly enlarged measuring 15 cm in diameter. Splenic  activity is less intense than that of the liver.      Assessment & Plan     *Stage IV adenocarcinoma, lung primary  Patient presented with back and chest wall pain, imaging showed and expansile mass T4 vertebral body, posterior right second rib and posterior right sixth rib  PET scan was performed on 5/3/2024 which showed a hypermetabolic mass in the superior segment of the right lower lobe 3.7 x 2.8 cm SUV 9.3, no hypermetabolic mediastinal or hilar lymph nodes but multiple hypermetabolic bone lesions largest being at T4 vertebral body SUV 9.4, posterior lateral right second rib, posterior right sixth rib, left aspect of the sacrum to the left of the S1 neural foramen.  Spleen was mildly enlarged 15 cm but less activity than liver.  CT-guided needle biopsy of a rib mass was performed on 5/10/2024 and showed metastatic adenocarcinoma immunohistochemistry supporting a pulmonary primary  The patient's next generation sequencing returned with a EGFR exon 19 and frame deletion.  Initiated Tagrisso 80 mg daily on 6/5/2024  *Brain metastases  5/31/2024-MRI brain showed multifocal supratentorial and infratentorial enhancing lesions, largest 4-4.5 mm in size-plan to monitor response to Tagrisso  *Pain of malignancy  The patient has available Coleman 5/325 1 p.o. every 6 hours as needed pain  Status post radiation to T4 with improved pain  *Thrombocytopenia secondary to Tagrisso-plt 73k  *Significant anxiety  related to malignancy  The patient is being followed by the psychosocial clinic  *Decreased flow right vertebral artery by MRI  *History of prostate cancer status post prostatectomy 2009    Oncology plan/recommendations:  Continue Tagrisso 80 mg daily  EKG today  CT angiogram head and neck to evaluate decreased flow right vertebral artery  Discussed with the patient initiation of Xgeva risk and side effects for treatment of bone metastatic disease, decrease risk of bone related events  4-week follow-up CBC CMP magnesium EKG restaging CT chest abdomen pelvis MRI brain

## 2024-07-03 ENCOUNTER — SPECIALTY PHARMACY (OUTPATIENT)
Dept: PHARMACY | Facility: HOSPITAL | Age: 79
End: 2024-07-03
Payer: MEDICARE

## 2024-07-03 NOTE — PROGRESS NOTES
Patient's income exceeds the limits of eligibility for the [employer] so I have secured a [employer] johnson.    Carter[employer] ID: [id number]  Amount: $6000  From 05/19/2024 to 05/18/2025  BIN:[id number]  PCN:[id number]  GRP: [id number]  Pharmacy Card ID: [id number]    I will send an email to the [employer] asking that Mr. Fitzpatrick be disenrolled.    Blanca Jacobson - Care Coordinator   7/3/2024  15:51 EDT

## 2024-07-05 ENCOUNTER — TELEPHONE (OUTPATIENT)
Dept: ONCOLOGY | Facility: CLINIC | Age: 79
End: 2024-07-05
Payer: MEDICARE

## 2024-07-05 ENCOUNTER — OFFICE VISIT (OUTPATIENT)
Dept: ONCOLOGY | Facility: CLINIC | Age: 79
End: 2024-07-05
Payer: MEDICARE

## 2024-07-05 ENCOUNTER — APPOINTMENT (OUTPATIENT)
Dept: ONCOLOGY | Facility: HOSPITAL | Age: 79
End: 2024-07-05
Payer: MEDICARE

## 2024-07-05 ENCOUNTER — LAB (OUTPATIENT)
Dept: LAB | Facility: HOSPITAL | Age: 79
End: 2024-07-05
Payer: MEDICARE

## 2024-07-05 ENCOUNTER — TREATMENT (OUTPATIENT)
Dept: PHYSICAL THERAPY | Facility: CLINIC | Age: 79
End: 2024-07-05
Payer: MEDICARE

## 2024-07-05 VITALS
DIASTOLIC BLOOD PRESSURE: 79 MMHG | WEIGHT: 215.4 LBS | HEIGHT: 70 IN | TEMPERATURE: 97.4 F | BODY MASS INDEX: 30.84 KG/M2 | SYSTOLIC BLOOD PRESSURE: 181 MMHG | OXYGEN SATURATION: 98 % | HEART RATE: 80 BPM | RESPIRATION RATE: 18 BRPM

## 2024-07-05 DIAGNOSIS — G90.521 COMPLEX REGIONAL PAIN SYNDROME TYPE 1 OF RIGHT LOWER EXTREMITY: ICD-10-CM

## 2024-07-05 DIAGNOSIS — C61 MALIGNANT NEOPLASM PROSTATE: ICD-10-CM

## 2024-07-05 DIAGNOSIS — C34.90 PRIMARY NONSQUAMOUS NONSMALL CELL NEOPLASM OF LUNG: ICD-10-CM

## 2024-07-05 DIAGNOSIS — D69.6 THROMBOCYTOPENIA: ICD-10-CM

## 2024-07-05 DIAGNOSIS — M79.651 RIGHT THIGH PAIN: Primary | ICD-10-CM

## 2024-07-05 DIAGNOSIS — I65.21 STENOSIS OF RIGHT CAROTID ARTERY: ICD-10-CM

## 2024-07-05 DIAGNOSIS — C34.90 PRIMARY NONSQUAMOUS NONSMALL CELL NEOPLASM OF LUNG: Primary | ICD-10-CM

## 2024-07-05 PROBLEM — C79.51 CANCER, METASTATIC TO BONE: Status: ACTIVE | Noted: 2024-07-05

## 2024-07-05 PROBLEM — I65.29 CAROTID STENOSIS: Status: ACTIVE | Noted: 2024-07-05

## 2024-07-05 LAB
ALBUMIN SERPL-MCNC: 4.4 G/DL (ref 3.5–5.2)
ALBUMIN/GLOB SERPL: 1.6 G/DL
ALP SERPL-CCNC: 104 U/L (ref 39–117)
ALT SERPL W P-5'-P-CCNC: 51 U/L (ref 1–41)
ANION GAP SERPL CALCULATED.3IONS-SCNC: 13.9 MMOL/L (ref 5–15)
AST SERPL-CCNC: 42 U/L (ref 1–40)
BASOPHILS # BLD AUTO: 0.02 10*3/MM3 (ref 0–0.2)
BASOPHILS NFR BLD AUTO: 0.5 % (ref 0–1.5)
BILIRUB SERPL-MCNC: 0.4 MG/DL (ref 0–1.2)
BUN SERPL-MCNC: 21 MG/DL (ref 8–23)
BUN/CREAT SERPL: 21.4 (ref 7–25)
CALCIUM SPEC-SCNC: 9.7 MG/DL (ref 8.6–10.5)
CHLORIDE SERPL-SCNC: 105 MMOL/L (ref 98–107)
CO2 SERPL-SCNC: 24.1 MMOL/L (ref 22–29)
CREAT SERPL-MCNC: 0.98 MG/DL (ref 0.76–1.27)
DEPRECATED RDW RBC AUTO: 41.4 FL (ref 37–54)
EGFRCR SERPLBLD CKD-EPI 2021: 79.9 ML/MIN/1.73
EOSINOPHIL # BLD AUTO: 0.08 10*3/MM3 (ref 0–0.4)
EOSINOPHIL NFR BLD AUTO: 2.2 % (ref 0.3–6.2)
ERYTHROCYTE [DISTWIDTH] IN BLOOD BY AUTOMATED COUNT: 13.2 % (ref 12.3–15.4)
GLOBULIN UR ELPH-MCNC: 2.8 GM/DL
GLUCOSE SERPL-MCNC: 152 MG/DL (ref 65–99)
HCT VFR BLD AUTO: 38.7 % (ref 37.5–51)
HGB BLD-MCNC: 13.2 G/DL (ref 13–17.7)
IMM GRANULOCYTES # BLD AUTO: 0.02 10*3/MM3 (ref 0–0.05)
IMM GRANULOCYTES NFR BLD AUTO: 0.5 % (ref 0–0.5)
LYMPHOCYTES # BLD AUTO: 0.8 10*3/MM3 (ref 0.7–3.1)
LYMPHOCYTES NFR BLD AUTO: 21.7 % (ref 19.6–45.3)
MCH RBC QN AUTO: 29.7 PG (ref 26.6–33)
MCHC RBC AUTO-ENTMCNC: 34.1 G/DL (ref 31.5–35.7)
MCV RBC AUTO: 87.2 FL (ref 79–97)
MONOCYTES # BLD AUTO: 0.27 10*3/MM3 (ref 0.1–0.9)
MONOCYTES NFR BLD AUTO: 7.3 % (ref 5–12)
NEUTROPHILS NFR BLD AUTO: 2.49 10*3/MM3 (ref 1.7–7)
NEUTROPHILS NFR BLD AUTO: 67.8 % (ref 42.7–76)
NRBC BLD AUTO-RTO: 0 /100 WBC (ref 0–0.2)
PLATELET # BLD AUTO: 73 10*3/MM3 (ref 140–450)
PMV BLD AUTO: 11.1 FL (ref 6–12)
POTASSIUM SERPL-SCNC: 3.4 MMOL/L (ref 3.5–5.2)
PROT SERPL-MCNC: 7.2 G/DL (ref 6–8.5)
RBC # BLD AUTO: 4.44 10*6/MM3 (ref 4.14–5.8)
SODIUM SERPL-SCNC: 143 MMOL/L (ref 136–145)
WBC NRBC COR # BLD AUTO: 3.68 10*3/MM3 (ref 3.4–10.8)

## 2024-07-05 PROCEDURE — 85025 COMPLETE CBC W/AUTO DIFF WBC: CPT

## 2024-07-05 PROCEDURE — 80053 COMPREHEN METABOLIC PANEL: CPT

## 2024-07-05 PROCEDURE — 36415 COLL VENOUS BLD VENIPUNCTURE: CPT

## 2024-07-05 NOTE — TELEPHONE ENCOUNTER
----- Message from Bridger ROSE sent at 7/5/2024 10:41 AM EDT -----  Regarding: FW: Xgeva  Switching to Zometa  ----- Message -----  From: Mp Austin MD  Sent: 7/5/2024  10:24 AM EDT  To: Bridger Lindquist RN  Subject: RE: Xgeva                                        Okay please let pt know because I wrote xgeva on his paper  ----- Message -----  From: Bridger Lindquist RN  Sent: 7/5/2024  10:20 AM EDT  To: Mp Austin MD  Subject: FW: Xgeva                                        Okay to switch to Zometa?  ----- Message -----  From: Lesly Lopez  Sent: 7/5/2024  10:18 AM EDT  To: Bridger Lindquist RN  Subject: Xgeva                                            Kulwinder Can prefers Zoledronic acid over Xgeva, Xgeva is non preferred. Can this be changed?      ThanksLesly

## 2024-07-05 NOTE — PROGRESS NOTES
Physical Therapy Initial Evaluation and Plan of Care    Patient: Nemesio Fitzpatrick   : 1945  Diagnosis/ICD-10 Code:  Complex regional pain syndrome type 1 of right lower extremity [G90.521],    Referring practitioner: Sherry Mazariegos MD  Past medical Hx reviewed:24     Subjective Evaluation    History of Present Illness  Mechanism of injury: I have an unusual condition that started before I started having treatment with stage 4 cancer.  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  I did some radiation treatments.  Before I started taking cancer medication I was sleeping in the recliner.  I noticed one time after sitting in recliner, I moved my R leg and I felt an instant intense burning up the leg and up the thigh.  The pain actually felt like the muscle was stuck to the skin and it was ripping off.  My doctor said that I may have some myofacial pain syndrome.    My sympomts have lessened a little.  Its less widespread but now I have more trigger points in the R thigh.    I still walk 2 miles or so a day and doing some light stretching has beneficial.        Patient Occupation: retired Pain  Current pain ratin  At best pain ratin  At worst pain ratin (Briefly with certain movements.  Used to be 10 /10.)  Location: R lateral, anterior thigh.  ( Vastus lateralis)  Quality: tight and burning (Ripping feel)  Alleviating factors: Stretch.  Aggravating factors: sleeping, repetitive movement, squatting and stairs (I can feel it.)    Treatments  Previous treatment: physical therapy  Patient Goals  Patient goals for therapy: increased strength, decreased pain and return to sport/leisure activities  Patient goal: Walking and turning can sometimes hurt.  Driving can bother the leg some.    PLOF: Limited due to spinal fragility and Chemo treatments.      Objective          Palpation     Additional Palpation Details  R lateral quad:  TTP: 1+, soft touch hyperalgesia.        Strength/Myotome Testing      Right Hip   Planes of Motion   Flexion: 4  Abduction: 4+  External rotation: 4+  Internal rotation: 4+   :    Assessment & Plan       Assessment  Impairments: abnormal gait, abnormal or restricted ROM, activity intolerance, impaired physical strength, lacks appropriate home exercise program and pain with function   Functional limitations: lifting, sleeping, uncomfortable because of pain, moving in bed and unable to perform repetitive tasks   Assessment details: Pt presents to PT with symptoms consistent with ITB and vastus lateralis myofascial tension and neuromuscular impairment.  Pt would benefit from skilled PT intervention to address the deficits noted.   Prognosis: good    Goals  Plan Goals:  SHORT TERM GOALS: 4-5 visits   1.  Patient to be compliant with HEP and demo good efficiency with TE  2.  Report < 2 sleep disturbances 2° hip pain.     3.  Increased Lumbar and SIJ mobility to allow for improved pelvic alignment and gait mechanics (equal step length and level pelvis throughout gait).    4. Pt will report minimal-no tenderness to palpation with firm pressure.      LONG TERM GOALS: 8-10 visits   1.  Pt. to score > 80% perceived ability on LEFS  2.  Pain level < 2/10 in hips with all activities including sitting > 60 min continuously and upon first waking.   3.  Hip  A/PROM to WNL to allow for return to ADL's/IADLS and functional activities without increased symptoms.  4. Hip strength to 5/5  to allow for pushing, pulling and more strenuous activities to occur without pain.  Walk > 60 min.  no pain  5. No palpable tenderness to the hip.         Plan  Therapy options: will be seen for skilled therapy services  Planned modality interventions: cryotherapy, electrical stimulation/Russian stimulation, iontophoresis, TENS, thermotherapy (hydrocollator packs), traction and ultrasound  Other planned modality interventions: Dry Needling  Planned therapy interventions: abdominal trunk stabilization, ADL  retraining, body mechanics training, balance/weight-bearing training, flexibility, functional ROM exercises, gait training, home exercise program, joint mobilization, manual therapy, neuromuscular re-education, postural training, soft tissue mobilization, spinal/joint mobilization, strengthening, stretching and therapeutic activities  Frequency: 2x week  Duration in visits: 16  Treatment plan discussed with: patient    Manual Therapy:    8     mins  71520;  Therapeutic Exercise:    -     mins  63993;     Neuromuscular Beto:    -    mins  47035;    Therapeutic Activity:     -     mins  93894;     Gait Training:      -     mins  83276;     Ultrasound:     -     mins  10636;    Electrical Stimulation:    -     mins  41594 ( );  Dry Needling     -     mins self-pay    Timed Treatment:   8   mins   Total Treatment:     40   mins      PT SIGNATURE:  Colby Guillen DPT, PT     SHAILESH Arteaga License #: [id number]    DATE TREATMENT INITIATED: 7/8/2024    Medicare Initial Certification  Certification Period: 10/6/2024  I certify that the therapy services are furnished while this patient is under my care.  The services outlined above are required by this patient, and will be reviewed every 90 days.     PHYSICIAN: Sherry Mazariegos MD      DATE:     Please sign and return via fax to [phone].. Thank you, [facility].

## 2024-07-08 ENCOUNTER — TREATMENT (OUTPATIENT)
Dept: PHYSICAL THERAPY | Facility: CLINIC | Age: 79
End: 2024-07-08
Payer: MEDICARE

## 2024-07-08 DIAGNOSIS — M79.651 RIGHT THIGH PAIN: Primary | ICD-10-CM

## 2024-07-08 DIAGNOSIS — G90.521 COMPLEX REGIONAL PAIN SYNDROME TYPE 1 OF RIGHT LOWER EXTREMITY: ICD-10-CM

## 2024-07-08 NOTE — PROGRESS NOTES
Physical Therapy Daily Treatment Note      Patient: Nemesio Fitzpatrick   : 1945  Referring practitioner: Sherry Mazariegos MD  Date of Initial Visit: Type: THERAPY  Noted: 3/12/2024  Today's Date: 2024  Patient seen for 8 sessions       Visit Diagnoses:      ICD-10-CM ICD-9-CM   1. Right thigh pain  M79.651 729.5   2. Complex regional pain syndrome type 1 of right lower extremity [G90.521]  G90.521 337.22     Subjective:  I did fine after the last visit.  I did purchase a silicone cupping set and should arrive shortly.      Objective:     See Exercise, Manual, and Modality Logs for complete treatment.     Assessment/Plan: Today's visit did continue with myofascial decompression but with some added exercise but in small doses as to not exacerbate symptoms.  He responded well and was provided HEP with strict instructions not to overexert.      Progress per Plan of Care and Progress strengthening /stabilization /functional activity    Timed:         Manual Therapy:    8     mins  93184;     Therapeutic Exercise:    20     mins  55144;     Neuromuscular Beto:    -    mins  93554;    Therapeutic Activity:     -     mins  24521;     Gait Training:      -     mins  22916;     Ultrasound:     -     mins  77525;    Electrical Stimulation:    -     mins  46203 ( );  Dry Needling     -     mins self-pay  Traction     -     mins [id number]      Timed Treatment:   28   mins   Total Treatment:     28   mins    SHAILESH Arteaga License: [id number]

## 2024-07-09 ENCOUNTER — SPECIALTY PHARMACY (OUTPATIENT)
Dept: PHARMACY | Facility: HOSPITAL | Age: 79
End: 2024-07-09
Payer: MEDICARE

## 2024-07-09 NOTE — PROGRESS NOTES
Specialty Pharmacy Note: Tagrisso (osimertinib)    Nemesio Fitzpatrick is a 78 y.o. male with lung cancer was seen 7/5/24 by Dr. Austin. Per provider dictation, no changes to oral oncology regimen Tagrisso (osimertinib).  Labs Review: The CMP and CBC from 7/5/24 have been reviewed. No dose adjustments are needed for the oral specialty medication(s) based on the labs.    Specialty pharmacy will continue to follow patient.    Jerson Seaman, PharmD, [facility]  Clinical Oncology Pharmacist    7/9/2024  15:58 EDT

## 2024-07-10 ENCOUNTER — OFFICE VISIT (OUTPATIENT)
Dept: PSYCHIATRY | Facility: HOSPITAL | Age: 79
End: 2024-07-10
Payer: MEDICARE

## 2024-07-10 DIAGNOSIS — F41.1 GENERALIZED ANXIETY DISORDER: Primary | ICD-10-CM

## 2024-07-10 PROCEDURE — 1160F RVW MEDS BY RX/DR IN RCRD: CPT | Performed by: NURSE PRACTITIONER

## 2024-07-10 PROCEDURE — 99213 OFFICE O/P EST LOW 20 MIN: CPT | Performed by: NURSE PRACTITIONER

## 2024-07-10 PROCEDURE — 90853 GROUP PSYCHOTHERAPY: CPT | Performed by: NURSE PRACTITIONER

## 2024-07-10 PROCEDURE — 1159F MED LIST DOCD IN RCRD: CPT | Performed by: NURSE PRACTITIONER

## 2024-07-10 NOTE — PROGRESS NOTES
Subjective  Patient ID: Nemesio Fitzpatrick is a 78 y.o.. male seen in regularly scheduled group session.  Group participants have consented to group conducted in person    Total Group Time, Face to Face: 75 minutes   Total Group Participants: 5, plus facilitation per Dr. Mary Carmen Rueda and ASHLIEGH Moore    Group Therapy  Group topics explored including development of new normal, interpersonal sensitivity, short and long term effects of diagnosis and treatment, significant other or family conflict, intimacy concerns, anticipitory anxiety, physical deconditioning, and lifestyle choices. Patients share relationships conflict, ongoing grief and loss, challenges with long standing fatigue impacting desire to engage as desired. Considered need to approach each day while allowing uncertainty.    Counseling provided regarding cognitive behavioral therapy (CBT) strategies, behavioral activation/ activity scheduling, reintroduction to activity through graded tasks, balancing avoidance with approach , sleep hygiene, recognition and allowance of need for rest, lifestyle counseling, benefits of exercise and strategies for managing barriers to engagement, allowance of self care, exploration of quality of life goals, survivorship issues, anxiety/ mood management , medication education, and existential distress. Counseling continued regarding opportunities for independent choice, allowance for self care. Considered importance of energy use, scheduled activity, consideration of personal choice. Discussed importance of full force living, identifiying important goals, assessing and reacting to anxiety and worry, considering importance of effective communication with providers. Considred importance of pacing self, energy conservation, recognition of need for rest.    Discussed current programming available in [facility] and community.    Benefits of group discussed while also acknowledging the need for 1:1 consultation at times.  Members invited to discuss any concerns privately with me following group setting or to schedule a 1:1 visit if needs not being met in group.    Next shared medical visit: August 14 at 2:00 PM    Patient response to group: Pt shares openly in first group setting, sharing openly and encouraging other group members.    Medications Management  Dr. Mary Carmen Rueda and ASHLEIGH Moore present for medication discussion and management.  Pt continues in survivorship of prostate cancer, .    CC: Anxiety, depression  HPI: Pt is seen in first group session regarding follow up of sx of depression and anxiety in survivorship of metastatic lung cancer. Continues to endorse anxiety associated with continued uncertainty of diagnosis, choosing to take one step at a time and planning to live 20 more years. Pt reports improved sleep on current dosign of gabapentin, also appreciating intermittent benefit to lorazepam 0.5 mg as needed.  Exam: As Above  Current medication regimen: Gabapentin 300 mg q evening meal, 300 mg q hs and Lorazepam 0.5 mg PRN anxiety  Lab Review:   Lab on 07/05/2024   Component Date Value    Glucose 07/05/2024 152 (H)     BUN 07/05/2024 21     Creatinine 07/05/2024 0.98     Sodium 07/05/2024 143     Potassium 07/05/2024 3.4 (L)     Chloride 07/05/2024 105     CO2 07/05/2024 24.1     Calcium 07/05/2024 9.7     Total Protein 07/05/2024 7.2     Albumin 07/05/2024 4.4     ALT (SGPT) 07/05/2024 51 (H)     AST (SGOT) 07/05/2024 42 (H)     Alkaline Phosphatase 07/05/2024 104     Total Bilirubin 07/05/2024 0.4     Globulin 07/05/2024 2.8     A/G Ratio 07/05/2024 1.6     BUN/Creatinine Ratio 07/05/2024 21.4     Anion Gap 07/05/2024 13.9     eGFR 07/05/2024 79.9     WBC 07/05/2024 3.68     RBC 07/05/2024 4.44     Hemoglobin 07/05/2024 13.2     Hematocrit 07/05/2024 38.7     MCV 07/05/2024 87.2     MCH 07/05/2024 29.7     MCHC 07/05/2024 34.1     RDW 07/05/2024 13.2     RDW-SD 07/05/2024 41.4     MPV 07/05/2024 11.1      Platelets 07/05/2024 73 (L)     Neutrophil % 07/05/2024 67.8     Lymphocyte % 07/05/2024 21.7     Monocyte % 07/05/2024 7.3     Eosinophil % 07/05/2024 2.2     Basophil % 07/05/2024 0.5     Immature Grans % 07/05/2024 0.5     Neutrophils, Absolute 07/05/2024 2.49     Lymphocytes, Absolute 07/05/2024 0.80     Monocytes, Absolute 07/05/2024 0.27     Eosinophils, Absolute 07/05/2024 0.08     Basophils, Absolute 07/05/2024 0.02     Immature Grans, Absolute 07/05/2024 0.02     nRBC 07/05/2024 0.0    Orders Only on 06/26/2024   Component Date Value    Course ID 06/26/2024 C1: T Spine     Course Intent 06/26/2024 Palliative     Course Start Date 06/26/2024 6/11/2024  3:40 PM     Course End Date 06/26/2024 6/26/2024  8:36 AM     Course First Treatment D* 06/26/2024 6/14/2024 11:28 AM     Course Last Treatment Da* 06/26/2024 6/20/2024  2:40 PM     Course Elapsed Days 06/26/2024 6     Reference Point ID 06/26/2024 RX: T4     Reference Point Dosage G* 06/26/2024 35     Plan ID 06/26/2024 T4 SBRT     Plan Name 06/26/2024 T4 SBRT     Plan Fractions Treated t* 06/26/2024 5     Plan Total Fractions Pre* 06/26/2024 5     Plan Prescribed Dose Per* 06/26/2024 7     Plan Total Prescribed Do* 06/26/2024 3,500     Plan Primary Reference P* 06/26/2024 RX: T4    Lab on 06/24/2024   Component Date Value    WBC 06/24/2024 4.56     RBC 06/24/2024 4.54     Hemoglobin 06/24/2024 13.5     Hematocrit 06/24/2024 38.6     MCV 06/24/2024 85.0     MCH 06/24/2024 29.7     MCHC 06/24/2024 35.0     RDW 06/24/2024 12.7     RDW-SD 06/24/2024 39.1     MPV 06/24/2024 11.8     Platelets 06/24/2024 74 (L)     Neutrophil % 06/24/2024 67.3     Lymphocyte % 06/24/2024 18.0 (L)     Monocyte % 06/24/2024 9.0     Eosinophil % 06/24/2024 1.5     Basophil % 06/24/2024 0.7     Immature Grans % 06/24/2024 3.5 (H)     Neutrophils, Absolute 06/24/2024 3.07     Lymphocytes, Absolute 06/24/2024 0.82     Monocytes, Absolute 06/24/2024 0.41     Eosinophils, Absolute  06/24/2024 0.07     Basophils, Absolute 06/24/2024 0.03     Immature Grans, Absolute 06/24/2024 0.16 (H)     nRBC 06/24/2024 0.0    Office Visit on 06/21/2024   Component Date Value    Glucose 06/21/2024 156 (H)     BUN 06/21/2024 22     Creatinine 06/21/2024 1.05     EGFR Result 06/21/2024 73.6     BUN/Creatinine Ratio 06/21/2024 21.0     Sodium 06/21/2024 139     Potassium 06/21/2024 3.9     Chloride 06/21/2024 103     Total CO2 06/21/2024 25.2     Calcium 06/21/2024 9.5     Total Protein 06/21/2024 6.9     Albumin 06/21/2024 4.7     Globulin 06/21/2024 2.2     A/G Ratio 06/21/2024 2.1     Total Bilirubin 06/21/2024 0.3     Alkaline Phosphatase 06/21/2024 109     AST (SGOT) 06/21/2024 28     ALT (SGPT) 06/21/2024 45 (H)     Total Cholesterol 06/21/2024 156     Triglycerides 06/21/2024 581 (H)     HDL Cholesterol 06/21/2024 28 (L)     VLDL Cholesterol Fareed 06/21/2024 83 (H)     LDL Chol Calc (NIH) 06/21/2024 45     Hemoglobin A1C 06/21/2024 5.90 (H)     TSH 06/21/2024 3.520     Vitamin B-12 06/21/2024 263    Hospital Outpatient Visit on 06/20/2024   Component Date Value    Course ID 06/20/2024 C1: T Spine     Course Intent 06/20/2024 Palliative     Course Start Date 06/20/2024 6/11/2024  3:40 PM     Course First Treatment D* 06/20/2024 6/14/2024 11:28 AM     Course Last Treatment Da* 06/20/2024 6/20/2024  2:40 PM     Course Elapsed Days 06/20/2024 6     Reference Point ID 06/20/2024 RX: T4     Reference Point Dosage G* 06/20/2024 35     Reference Point Session * 06/20/2024 7     Plan ID 06/20/2024 T4 SBRT     Plan Fractions Treated t* 06/20/2024 5     Plan Total Fractions Pre* 06/20/2024 5     Plan Prescribed Dose Per* 06/20/2024 7     Plan Total Prescribed Do* 06/20/2024 3,500     Plan Primary Reference P* 06/20/2024 RX: T4    Hospital Outpatient Visit on 06/19/2024   Component Date Value    Course ID 06/19/2024 C1: T Spine     Course Intent 06/19/2024 Palliative     Course Start Date 06/19/2024 6/11/2024  3:40 PM      Course First Treatment D* 06/19/2024 6/14/2024 11:28 AM     Course Last Treatment Da* 06/19/2024 6/19/2024 11:30 AM     Course Elapsed Days 06/19/2024 5     Reference Point ID 06/19/2024 RX: T4     Reference Point Dosage G* 06/19/2024 28     Reference Point Session * 06/19/2024 7     Plan ID 06/19/2024 T4 SBRT     Plan Fractions Treated t* 06/19/2024 4     Plan Total Fractions Pre* 06/19/2024 5     Plan Prescribed Dose Per* 06/19/2024 7     Plan Total Prescribed Do* 06/19/2024 3,500     Plan Primary Reference P* 06/19/2024 RX: T4    Hospital Outpatient Visit on 06/18/2024   Component Date Value    Course ID 06/18/2024 C1: T Spine     Course Intent 06/18/2024 Palliative     Course Start Date 06/18/2024 6/11/2024  3:40 PM     Course First Treatment D* 06/18/2024 6/14/2024 11:28 AM     Course Last Treatment Da* 06/18/2024 6/18/2024  2:42 PM     Course Elapsed Days 06/18/2024 4     Reference Point ID 06/18/2024 RX: T4     Reference Point Dosage G* 06/18/2024 21     Reference Point Session * 06/18/2024 7     Plan ID 06/18/2024 T4 SBRT     Plan Fractions Treated t* 06/18/2024 3     Plan Total Fractions Pre* 06/18/2024 5     Plan Prescribed Dose Per* 06/18/2024 7     Plan Total Prescribed Do* 06/18/2024 3,500     Plan Primary Reference P* 06/18/2024 RX: T4    Hospital Outpatient Visit on 06/17/2024   Component Date Value    Course ID 06/17/2024 C1: T Spine     Course Intent 06/17/2024 Palliative     Course Start Date 06/17/2024 6/11/2024  3:40 PM     Course First Treatment D* 06/17/2024 6/14/2024 11:28 AM     Course Last Treatment Da* 06/17/2024 6/17/2024 11:36 AM     Course Elapsed Days 06/17/2024 3     Reference Point ID 06/17/2024 RX: T4     Reference Point Dosage G* 06/17/2024 14     Reference Point Session * 06/17/2024 7     Plan ID 06/17/2024 T4 SBRT     Plan Fractions Treated t* 06/17/2024 2     Plan Total Fractions Pre* 06/17/2024 5     Plan Prescribed Dose Per* 06/17/2024 7     Plan Total Prescribed Do*  06/17/2024 3,500     Plan Primary Reference P* 06/17/2024 RX: T4    Lab on 06/17/2024   Component Date Value    Glucose 06/17/2024 137 (H)     BUN 06/17/2024 21     Creatinine 06/17/2024 1.09     Sodium 06/17/2024 139     Potassium 06/17/2024 4.6     Chloride 06/17/2024 104     CO2 06/17/2024 24.3     Calcium 06/17/2024 9.4     Total Protein 06/17/2024 7.5     Albumin 06/17/2024 4.6     ALT (SGPT) 06/17/2024 61 (H)     AST (SGOT) 06/17/2024 36     Alkaline Phosphatase 06/17/2024 110     Total Bilirubin 06/17/2024 0.3     Globulin 06/17/2024 2.9     A/G Ratio 06/17/2024 1.6     BUN/Creatinine Ratio 06/17/2024 19.3     Anion Gap 06/17/2024 10.7     eGFR 06/17/2024 70.3     Magnesium 06/17/2024 1.8     WBC 06/17/2024 4.88     RBC 06/17/2024 4.36     Hemoglobin 06/17/2024 13.2     Hematocrit 06/17/2024 38.3     MCV 06/17/2024 87.8     MCH 06/17/2024 30.3     MCHC 06/17/2024 34.5     RDW 06/17/2024 12.6     RDW-SD 06/17/2024 40.9     MPV 06/17/2024 10.5     Platelets 06/17/2024 82 (L)     Neutrophil % 06/17/2024 70.5     Lymphocyte % 06/17/2024 19.9     Monocyte % 06/17/2024 6.6     Eosinophil % 06/17/2024 1.0     Basophil % 06/17/2024 0.6     Immature Grans % 06/17/2024 1.4 (H)     Neutrophils, Absolute 06/17/2024 3.44     Lymphocytes, Absolute 06/17/2024 0.97     Monocytes, Absolute 06/17/2024 0.32     Eosinophils, Absolute 06/17/2024 0.05     Basophils, Absolute 06/17/2024 0.03     Immature Grans, Absolute 06/17/2024 0.07 (H)     nRBC 06/17/2024 0.0    Hospital Outpatient Visit on 06/14/2024   Component Date Value    Course ID 06/14/2024 C1: T Spine     Course Intent 06/14/2024 Palliative     Course Start Date 06/14/2024 6/11/2024  3:40 PM     Course First Treatment D* 06/14/2024 6/14/2024 11:28 AM     Course Last Treatment Da* 06/14/2024 6/14/2024 11:30 AM     Course Elapsed Days 06/14/2024 0     Reference Point ID 06/14/2024 RX: T4     Reference Point Dosage G* 06/14/2024 7     Reference Point Session * 06/14/2024 7      Plan ID 06/14/2024 T4 SBRT     Plan Fractions Treated t* 06/14/2024 1     Plan Total Fractions Pre* 06/14/2024 5     Plan Prescribed Dose Per* 06/14/2024 7     Plan Total Prescribed Do* 06/14/2024 3,500     Plan Primary Reference P* 06/14/2024 RX: T4    There may be more visits with results that are not included.   Mild elevation in LFTs noted.    MDM:  Meds reviewed and renewed as appropriate. No refills needed.  Continue gabapentin and ativan as written.  FU scheduled in group setting.     Diagnoses and all orders for this visit:    1. Generalized anxiety disorder (Primary)

## 2024-07-11 ENCOUNTER — TELEPHONE (OUTPATIENT)
Dept: RADIATION ONCOLOGY | Facility: HOSPITAL | Age: 79
End: 2024-07-11
Payer: MEDICARE

## 2024-07-11 NOTE — TELEPHONE ENCOUNTER
PT called with medical questions regarding pain in his esophagus. Wanted to know if there was any medication he could take or needed to wait it out. Messaged  as well as his nurse and told him one of us would return his call with an answer.

## 2024-07-12 ENCOUNTER — TELEPHONE (OUTPATIENT)
Dept: RADIATION ONCOLOGY | Facility: HOSPITAL | Age: 79
End: 2024-07-12
Payer: MEDICARE

## 2024-07-12 ENCOUNTER — HOSPITAL ENCOUNTER (OUTPATIENT)
Facility: HOSPITAL | Age: 79
Discharge: HOME | End: 2024-07-12
Payer: MEDICARE

## 2024-07-12 DIAGNOSIS — C78.01 METASTATIC LUNG CARCINOMA, RIGHT: Primary | ICD-10-CM

## 2024-07-12 DIAGNOSIS — I65.21 STENOSIS OF RIGHT CAROTID ARTERY: ICD-10-CM

## 2024-07-12 DIAGNOSIS — C34.90 PRIMARY NONSQUAMOUS NONSMALL CELL NEOPLASM OF LUNG: ICD-10-CM

## 2024-07-12 PROCEDURE — 25510000001 IOPAMIDOL PER 1 ML: Performed by: INTERNAL MEDICINE

## 2024-07-12 PROCEDURE — 71260 CT THORAX DX C+: CPT

## 2024-07-12 PROCEDURE — 74177 CT ABD & PELVIS W/CONTRAST: CPT

## 2024-07-12 PROCEDURE — 70496 CT ANGIOGRAPHY HEAD: CPT

## 2024-07-12 PROCEDURE — 70498 CT ANGIOGRAPHY NECK: CPT

## 2024-07-12 RX ORDER — SUCRALFATE 1 G/1
1 TABLET ORAL 4 TIMES DAILY
Qty: 30 TABLET | Refills: 2 | Status: SHIPPED | OUTPATIENT
Start: 2024-07-12

## 2024-07-12 RX ORDER — FAMOTIDINE 20 MG/1
20 TABLET, FILM COATED ORAL 2 TIMES DAILY
Qty: 30 TABLET | Refills: 1 | Status: SHIPPED | OUTPATIENT
Start: 2024-07-12

## 2024-07-12 RX ADMIN — IOPAMIDOL 150 ML: 755 INJECTION, SOLUTION INTRAVENOUS at 10:34

## 2024-07-12 NOTE — TELEPHONE ENCOUNTER
LVM asking PT to call office back to schedule f/u (Per CR) due to PT having esophogeal pain as well as get his pharmacy preference so medication can be sent in.

## 2024-07-15 ENCOUNTER — TELEPHONE (OUTPATIENT)
Dept: ONCOLOGY | Facility: CLINIC | Age: 79
End: 2024-07-15
Payer: MEDICARE

## 2024-07-15 DIAGNOSIS — I65.21 STENOSIS OF RIGHT CAROTID ARTERY: Primary | ICD-10-CM

## 2024-07-15 NOTE — TELEPHONE ENCOUNTER
----- Message from [facility] [place] sent at 7/15/2024 10:27 AM EDT -----  PIP scan impression; need to consult vascular surgery karlene weber for eval

## 2024-07-17 ENCOUNTER — TREATMENT (OUTPATIENT)
Dept: PHYSICAL THERAPY | Facility: CLINIC | Age: 79
End: 2024-07-17
Payer: MEDICARE

## 2024-07-17 DIAGNOSIS — M79.651 RIGHT THIGH PAIN: Primary | ICD-10-CM

## 2024-07-17 DIAGNOSIS — G90.521 COMPLEX REGIONAL PAIN SYNDROME TYPE 1 OF RIGHT LOWER EXTREMITY: ICD-10-CM

## 2024-07-17 NOTE — PROGRESS NOTES
Physical Therapy Daily Treatment Note    [facility] [place]  [address], [address]  [place] [address]    Visit # 9        Patient: Nemesio Fitzpatrick   : 1945  Referring practitioner: Sherry Mazariegos MD  Date of Initial Evaluation:  Type: THERAPY  Noted: 3/12/2024  Today's Date: 2024           ICD-10-CM ICD-9-CM   1. Right thigh pain  M79.651 729.5   2. Complex regional pain syndrome type 1 of right lower extremity [G90.521]  G90.521 337.22       Subjective  Nemesio Fitzpatrick reports:   I had some increaed pain after lying on my side for some of the exercises; I've gotten really good at modifying exercises and movements so I've figured out ways to do the exercises that aren't as uncomfortable.      Objective   See Exercise, Manual, and Modality Logs for complete treatment   Note: any exercises/interventions not performed today have been marked as deferred on flowsheets.     Pt Education:  HEP review - use of cupping set at home, instructed with additional application techniques.   Exercise rationale/ pain free exercise performance  Anatomy and structure of affected musculature  Posture/Postural awareness  Lumbar support  Sleeping positions with pillows  Alternate exercise positions  Verbal/Tactile cues to ensure correct exercise performance/technique        Assessment/Plan  Tolerated continued manual therapy and progression of therapeutic exercise well today, no increased pain reported during or after exercises.     Would continue to benefit from skilled PT progressing with functional ROM and strength as mark/as symptoms dictate.     Progress per Plan of Care         Timed:         Manual Therapy:     12    mins  54246     Therapeutic Exercise:     18    mins  28477     Neuromuscular Beto:        mins  64413    Therapeutic Activity:          mins  83375     Gait Training:           mins  80353     Ultrasound:          mins  05065    Ionto                                    mins  40236  Self Care                            mins  63401    Un-Timed:  Electrical Stimulation:         mins 91437 ( )  Traction          mins 82493    Timed Treatment:   30   mins   Total Treatment:     30   mins    JACKELIN Denise License #[id number]  Physical Therapist Assistant

## 2024-07-18 ENCOUNTER — LAB (OUTPATIENT)
Dept: LAB | Facility: HOSPITAL | Age: 79
End: 2024-07-18
Payer: MEDICARE

## 2024-07-18 ENCOUNTER — CLINICAL SUPPORT (OUTPATIENT)
Dept: ONCOLOGY | Facility: HOSPITAL | Age: 79
End: 2024-07-18
Payer: MEDICARE

## 2024-07-18 DIAGNOSIS — D69.6 THROMBOCYTOPENIA: ICD-10-CM

## 2024-07-18 LAB
ALBUMIN SERPL-MCNC: 4.4 G/DL (ref 3.5–5.2)
ALBUMIN/GLOB SERPL: 1.6 G/DL
ALP SERPL-CCNC: 94 U/L (ref 39–117)
ALT SERPL W P-5'-P-CCNC: 64 U/L (ref 1–41)
ANION GAP SERPL CALCULATED.3IONS-SCNC: 11.8 MMOL/L (ref 5–15)
AST SERPL-CCNC: 48 U/L (ref 1–40)
BASOPHILS # BLD AUTO: 0.03 10*3/MM3 (ref 0–0.2)
BASOPHILS NFR BLD AUTO: 0.8 % (ref 0–1.5)
BILIRUB SERPL-MCNC: 0.5 MG/DL (ref 0–1.2)
BUN SERPL-MCNC: 20 MG/DL (ref 8–23)
BUN/CREAT SERPL: 18.3 (ref 7–25)
CALCIUM SPEC-SCNC: 9.9 MG/DL (ref 8.6–10.5)
CHLORIDE SERPL-SCNC: 101 MMOL/L (ref 98–107)
CO2 SERPL-SCNC: 27.2 MMOL/L (ref 22–29)
CREAT SERPL-MCNC: 1.09 MG/DL (ref 0.76–1.27)
DEPRECATED RDW RBC AUTO: 43.1 FL (ref 37–54)
EGFRCR SERPLBLD CKD-EPI 2021: 70.3 ML/MIN/1.73
EOSINOPHIL # BLD AUTO: 0.07 10*3/MM3 (ref 0–0.4)
EOSINOPHIL NFR BLD AUTO: 1.8 % (ref 0.3–6.2)
ERYTHROCYTE [DISTWIDTH] IN BLOOD BY AUTOMATED COUNT: 13.2 % (ref 12.3–15.4)
GLOBULIN UR ELPH-MCNC: 2.7 GM/DL
GLUCOSE SERPL-MCNC: 153 MG/DL (ref 65–99)
HCT VFR BLD AUTO: 39.4 % (ref 37.5–51)
HGB BLD-MCNC: 13.4 G/DL (ref 13–17.7)
IMM GRANULOCYTES # BLD AUTO: 0.02 10*3/MM3 (ref 0–0.05)
IMM GRANULOCYTES NFR BLD AUTO: 0.5 % (ref 0–0.5)
LYMPHOCYTES # BLD AUTO: 0.93 10*3/MM3 (ref 0.7–3.1)
LYMPHOCYTES NFR BLD AUTO: 24 % (ref 19.6–45.3)
MCH RBC QN AUTO: 30.1 PG (ref 26.6–33)
MCHC RBC AUTO-ENTMCNC: 34 G/DL (ref 31.5–35.7)
MCV RBC AUTO: 88.5 FL (ref 79–97)
MONOCYTES # BLD AUTO: 0.33 10*3/MM3 (ref 0.1–0.9)
MONOCYTES NFR BLD AUTO: 8.5 % (ref 5–12)
NEUTROPHILS NFR BLD AUTO: 2.49 10*3/MM3 (ref 1.7–7)
NEUTROPHILS NFR BLD AUTO: 64.4 % (ref 42.7–76)
NRBC BLD AUTO-RTO: 0 /100 WBC (ref 0–0.2)
PLATELET # BLD AUTO: 81 10*3/MM3 (ref 140–450)
PMV BLD AUTO: 10.5 FL (ref 6–12)
POTASSIUM SERPL-SCNC: 3.8 MMOL/L (ref 3.5–5.2)
PROT SERPL-MCNC: 7.1 G/DL (ref 6–8.5)
RBC # BLD AUTO: 4.45 10*6/MM3 (ref 4.14–5.8)
SODIUM SERPL-SCNC: 140 MMOL/L (ref 136–145)
WBC NRBC COR # BLD AUTO: 3.87 10*3/MM3 (ref 3.4–10.8)

## 2024-07-18 PROCEDURE — 36415 COLL VENOUS BLD VENIPUNCTURE: CPT

## 2024-07-18 PROCEDURE — 80053 COMPREHEN METABOLIC PANEL: CPT

## 2024-07-18 PROCEDURE — 85025 COMPLETE CBC W/AUTO DIFF WBC: CPT

## 2024-07-22 ENCOUNTER — TELEPHONE (OUTPATIENT)
Dept: RADIATION ONCOLOGY | Facility: HOSPITAL | Age: 79
End: 2024-07-22
Payer: MEDICARE

## 2024-07-22 DIAGNOSIS — C78.01 METASTATIC LUNG CARCINOMA, RIGHT: Primary | ICD-10-CM

## 2024-07-22 RX ORDER — FAMOTIDINE 20 MG/1
20 TABLET, FILM COATED ORAL 2 TIMES DAILY
Qty: 60 TABLET | Status: CANCELLED | OUTPATIENT
Start: 2024-07-22

## 2024-07-22 RX ORDER — SUCRALFATE 1 G/1
1 TABLET ORAL 4 TIMES DAILY
Qty: 60 TABLET | Refills: 2 | Status: CANCELLED | OUTPATIENT
Start: 2024-07-22

## 2024-07-22 NOTE — TELEPHONE ENCOUNTER
Called pt to check in with symptoms post radiation, and to check in with medication discrepancy. PT verbalized he is doing well and no longer needs Carafate and Pepcid.

## 2024-07-24 ENCOUNTER — TREATMENT (OUTPATIENT)
Dept: PHYSICAL THERAPY | Facility: CLINIC | Age: 79
End: 2024-07-24
Payer: MEDICARE

## 2024-07-24 DIAGNOSIS — G90.521 COMPLEX REGIONAL PAIN SYNDROME TYPE 1 OF RIGHT LOWER EXTREMITY: ICD-10-CM

## 2024-07-24 DIAGNOSIS — M79.651 RIGHT THIGH PAIN: Primary | ICD-10-CM

## 2024-07-24 NOTE — PROGRESS NOTES
Physical Therapy Daily Treatment Note    [facility] [place]  [address], [address]  [place] [address]    Visit # 10        Patient: Nemesio Fitzpatrick   : 1945  Referring practitioner: Sherry Mazariegos MD  Date of Initial Evaluation:  Type: THERAPY  Noted: 3/12/2024  Today's Date: 2024           ICD-10-CM ICD-9-CM   1. Right thigh pain  M79.651 729.5   2. Complex regional pain syndrome type 1 of right lower extremity [G90.521]  G90.521 337.22       Subjective  Nemesio Fitzpatrick reports:   I think using the cups and doing my exercises at home is helping.  I still have the pain in my thigh, but it's in a smaller area.      Objective   See Exercise, Manual, and Modality Logs for complete treatment   Note: any exercises/interventions not performed today have been marked as deferred on flowsheets.     Pt Education:  HEP review   Exercise rationale/ pain free exercise performance  Alternate exercise positions  Verbal/Tactile cues to ensure correct exercise performance/technique    Assessment/Plan  Tolerated continued manual therapy and progression of therapeutic exercise well today, no increased pain reported during or after exercises.  Subjective reports of decreased affected area with HEP/cupping use at home.   Trial of more rigid cups as pt doesn't have availability at home, pt found uncomfortable but tolerable.       Would continue to benefit from skilled PT progressing with functional ROM and strength as mark/as symptoms dictate.     Progress per Plan of Care         Timed:         Manual Therapy:     5    mins  65604     Therapeutic Exercise:     18    mins  58511     Neuromuscular Beto:    8    mins  76856    Therapeutic Activity:          mins  50873     Gait Training:           mins  80386     Ultrasound:          mins  16362    Ionto                                   mins  86589  Self Care                            mins  68379    Un-Timed:  Electrical  Stimulation:         mins 94048 ( )  Traction          mins 98092    Timed Treatment:   31   mins   Total Treatment:     31   mins    JACKELIN Denise License #[id number]  Physical Therapist Assistant

## 2024-07-31 ENCOUNTER — OFFICE VISIT (OUTPATIENT)
Age: 79
End: 2024-07-31
Payer: MEDICARE

## 2024-07-31 VITALS
HEART RATE: 70 BPM | SYSTOLIC BLOOD PRESSURE: 164 MMHG | BODY MASS INDEX: 30.78 KG/M2 | DIASTOLIC BLOOD PRESSURE: 68 MMHG | WEIGHT: 215 LBS | HEIGHT: 70 IN

## 2024-07-31 DIAGNOSIS — I65.21 STENOSIS OF RIGHT CAROTID ARTERY: ICD-10-CM

## 2024-07-31 DIAGNOSIS — I65.01 VERTEBRAL ARTERY OCCLUSION, RIGHT: ICD-10-CM

## 2024-07-31 NOTE — PROGRESS NOTES
Chief Complaint  Carotid Artery Disease    Subjective        Nemesio Fitzpatrick presents to [facility] VASCULAR SURGERY  History of Present Illness  78 y.o. male with a history of lung cancer who is on targeted drug therapy and is scheduled to start radiation soon for metastatic cerebral disease who was incidentally found to have carotid stenosis during his work up for metastatic lung cancer.   He is asymptomatic.  Pacifically denies any symptoms of stroke or mini stroke, including aphasia, amaurosis fugax, focal motor deficits, and facial droop.  He does not take an antiplatelet.  He is former cigarette smoker, but he quit 30-40 years ago.  He smoked 1 PPD for 10-15 years.  He served in [employer] and was exposed to Agent Orange.  He has not had neck irradiation but he has had an anterior approach cervical spinal fusion.   He is accompanied by his wife.    Past Medical History:   Diagnosis Date    Acute bronchitis     Allergic Iodine    Allergic rhinitis     Anxiety 2024    Due to sudden cancer diagnosis    Arthritis 2000    BPH (benign prostatic hypertrophy)     Cervical disc disorder 1987    Dislocation, shoulder 1973    Elevated prostate specific antigen (PSA)     Headache 1990    History of bone scan 10/21/2010    normal    History of colonoscopy     never    History of EKG 03/12/2012    also 11-; T wave abnormality    History of medical problems 1978    HL (hearing loss) 2000    Hyperlipidemia     Hypertension 2024    Upon learning of Stage 4 cancer    Hypothyroidism     IFG (impaired fasting glucose)     Kidney calculus     left ureteral stone    Knee swelling 2015    Low back pain     Lung cancer 2024    Right    Malignant neoplasm prostate 11/09/2009    Dr. Mcclelland; lymph nodes negative margins clear    Prostate nodule     right lobe    Rotator cuff syndrome 2009    Scoliosis 2009    Screening for prostate cancer     prostatectomy    Sleep apnea     CPAP machine    URI (upper respiratory  infection)        Past Surgical History:   Procedure Laterality Date    CERVICAL DISC SURGERY  1990    also 2006; C5-6, C6-7    HAND SURGERY Left     trigger finger release; left middle finger    NECK SURGERY  ,     Cervical disk    PROSTATECTOMY  2009    RHINOPLASTY  1985    ROTATOR CUFF REPAIR      SHOULDER SURGERY Left 2009    Dr. KRISTEL Jimenez; rotator cuff repair; bone spurs     SPINE SURGERY      TRIGGER POINT INJECTION  ,,    URETERAL STENT INSERTION Left 2013    Dr. Martinez       Family History   Problem Relation Age of Onset    Diabetes Mother     Kidney disease Mother         calculus    Hearing loss Mother     Prostate cancer Father     Stomach cancer Father     Cancer Sister         breast    Hypothyroidism Sister     Kidney disease Sister         calculus    Arthritis Sister     Transient ischemic attack Brother     Alzheimer's disease Other         uncle    Diabetes Other         uncle    Arthritis Brother          Social History     Tobacco Use    Smoking status: Former     Current packs/day: 0.00     Average packs/day: 1 pack/day for 22.0 years (22.0 ttl pk-yrs)     Types: Cigarettes, Pipe     Start date: 1967     Quit date: 1988     Years since quittin.6     Passive exposure: Past    Smokeless tobacco: Never    Tobacco comments:     Quit smoking pipe around    Vaping Use    Vaping status: Never Used   Substance Use Topics    Alcohol use: Yes     Comment: Rarely will drink one glass of wine    Drug use: Never       Allergies: Iodinated contrast media, Iodine, Molds & smuts, Shellfish allergy, and Amoxicillin    Prior to Admission medications    Medication Sig Start Date End Date Taking? Authorizing Provider   cyclobenzaprine (FLEXERIL) 5 MG tablet Take 1 tablet by mouth 2 (Two) Times a Day As Needed for Muscle Spasms. 24  Yes Samantha Estevez APRN   Diclofenac Sodium (VOLTAREN) 1 % gel gel Apply 4 g topically to the appropriate  "area as directed 3 (Three) Times a Day. 4/4/24  Yes Samantha Estevez APRN   gabapentin (Neurontin) 300 MG capsule Take 1 capsule by mouth 3 (Three) Times a Day. 5/24/24 5/24/25 Yes Marla Givens APRN   HYDROcodone-acetaminophen (NORCO) 5-325 MG per tablet Take 1 tablet by mouth Every 6 (Six) Hours As Needed for Moderate Pain. 6/21/24  Yes Sherry Mazariegos MD   ibuprofen (ADVIL,MOTRIN) 200 MG tablet Take 1 tablet by mouth Every 6 (Six) Hours As Needed for Mild Pain.   Yes ProviderHailey MD   levothyroxine (SYNTHROID, LEVOTHROID) 75 MCG tablet  1/27/17  Yes ProviderHailey MD   loratadine (CLARITIN) 10 MG tablet Take 1 tablet by mouth Daily. 8/29/12  Yes ProviderHailey MD   LORazepam (ATIVAN) 0.5 MG tablet TAKE ONE TABLET BY MOUTH EVERY 8 HOURS AS NEEDED FOR ANXIETY 6/27/24  Yes Mp Austin MD   naproxen sodium (ALEVE) 220 MG tablet Take 1 tablet by mouth 2 (Two) Times a Day As Needed.   Yes ProviderHailey MD   Norvasc 10 MG tablet Take 1 tablet by mouth Daily. 5/14/24  Yes Hailey Romero MD   ondansetron (ZOFRAN) 8 MG tablet Take 1 tablet by mouth 3 (Three) Times a Day As Needed for Nausea or Vomiting. 6/3/24  Yes Mp Austin MD   Osimertinib Mesylate 80 MG tablet Take 1 tablet by mouth Daily. 6/5/24  Yes Mp Austin MD   predniSONE (DELTASONE) 50 MG tablet Take one tablet 50 mg prednisone 13 hours prior to scan. Take one 50 mg prednisone 7 hours prior to scan. Take one 50 mg prednisone one hour prior to scan. 7/9/24  Yes Mp Austin MD   diphenhydrAMINE (BENADRYL) 50 MG tablet Take one 50 mg tablet 1 hour prior to scan. 7/9/24   Mp Austin MD         Objective   Vital Signs:  /68 (BP Location: Right arm)   Pulse 70   Ht 177.8 cm (70\")   Wt 97.5 kg (215 lb)   BMI 30.85 kg/m²   Estimated body mass index is 30.85 kg/m² as calculated from the following:    Height as of this encounter: 177.8 cm (70\").    Weight as of this encounter: 97.5 " kg (215 lb).               Physical Exam  Vitals reviewed.   Constitutional:       General: He is not in acute distress.     Appearance: Normal appearance.   HENT:      Head: Normocephalic and atraumatic.      Right Ear: External ear normal.      Left Ear: External ear normal.      Nose: Nose normal.      Mouth/Throat:      Mouth: Mucous membranes are moist.      Pharynx: Oropharynx is clear.   Eyes:      Extraocular Movements: Extraocular movements intact.      Conjunctiva/sclera: Conjunctivae normal.      Pupils: Pupils are equal, round, and reactive to light.   Cardiovascular:      Rate and Rhythm: Normal rate and regular rhythm.      Pulses:           Carotid pulses are 2+ on the right side and 2+ on the left side.       Radial pulses are 2+ on the right side and 2+ on the left side.        Dorsalis pedis pulses are 2+ on the right side and 2+ on the left side.        Posterior tibial pulses are 2+ on the right side and 2+ on the left side.   Pulmonary:      Comments: Non labored respirations on room air, equal chest rise  Abdominal:      General: Abdomen is flat. There is no distension.      Palpations: Abdomen is soft.   Musculoskeletal:      Cervical back: Normal range of motion. No rigidity.      Right lower leg: No edema.      Left lower leg: No edema.   Skin:     General: Skin is warm and dry.      Capillary Refill: Capillary refill takes less than 2 seconds.   Neurological:      General: No focal deficit present.      Mental Status: He is alert and oriented to person, place, and time.   Psychiatric:         Mood and Affect: Mood normal.         Behavior: Behavior normal.        Result Review :    The following data was reviewed by: Lyubov Pascual MD on 07/31/2024:  CMP          6/21/2024    09:37 7/5/2024    09:07 7/18/2024    10:05   CMP   Glucose 156  152  153    BUN 22  21  20    Creatinine 1.05  0.98  1.09    EGFR  79.9  70.3    Sodium 139  143  140    Potassium 3.9  3.4  3.8    Chloride 103  105   101    Calcium 9.5  9.7  9.9    Total Protein 6.9      Total Protein  7.2  7.1    Albumin 4.7  4.4  4.4    Globulin 2.2      Globulin  2.8  2.7    Total Bilirubin 0.3  0.4  0.5    Alkaline Phosphatase 109  104  94    AST (SGOT) 28  42  48    ALT (SGPT) 45  51  64    Albumin/Globulin Ratio  1.6  1.6    BUN/Creatinine Ratio 21.0  21.4  18.3    Anion Gap  13.9  11.8      CBC          6/24/2024    09:51 7/5/2024    09:07 7/18/2024    10:05   CBC   WBC 4.56  3.68  3.87    RBC 4.54  4.44  4.45    Hemoglobin 13.5  13.2  13.4    Hematocrit 38.6  38.7  39.4    MCV 85.0  87.2  88.5    MCH 29.7  29.7  30.1    MCHC 35.0  34.1  34.0    RDW 12.7  13.2  13.2    Platelets 74  73  81      Data reviewed : Radiologic studies CT angiogram of the head and neck from 7/12/2024, MRI of the brain with and without contrast from 5/31/2024             Assessment and Plan     Diagnoses and all orders for this visit:    1. Stenosis of right carotid artery  -     Duplex Carotid Ultrasound CAR; Future    2. Vertebral artery occlusion, right  -     Duplex Carotid Ultrasound CAR; Future    Nemesio Fitzpatrick is a 78-year-old male who was referred to me for evaluation of asymptomatic right carotid stenosis.  This was found incidentally during workup for his metastatic lung cancer.  He has been on targeted drug therapy but is scheduled to start radiation for metastatic disease to his brain.  There was an incidental finding of a moderate right internal carotid artery stenosis on a CT angiogram of the head and neck, as well as a right vertebral artery occlusion.  The radiology report reported a 65% stenosis of the right internal carotid artery.  I personally and independently reviewed these films and measured the stenosis at 50%.  MRI of the brain from 5/31/2024 showed multiple enhancing lesions consistent with metastatic disease with no evidence of acute or subacute infarctions.  I discussed these results with the patient and his wife.  He is asymptomatic  with regards to stroke or TIA symptoms.  Since he has less than 70% stenosis and is asymptomatic, I would recommend medical management only with aspirin and a statin.  He is okay to begin his radiation treatments from my standpoint.  I have ordered a carotid duplex for baseline study and I will follow-up with him once it has been completed to review those results.         Follow Up     Return in about 4 weeks (around 8/28/2024).  Patient was given instructions and counseling regarding his condition or for health maintenance advice. Please see specific information pulled into the AVS if appropriate.

## 2024-08-02 ENCOUNTER — SPECIALTY PHARMACY (OUTPATIENT)
Dept: PHARMACY | Facility: HOSPITAL | Age: 79
End: 2024-08-02
Payer: MEDICARE

## 2024-08-02 DIAGNOSIS — F41.1 GENERALIZED ANXIETY DISORDER: ICD-10-CM

## 2024-08-02 RX ORDER — GABAPENTIN 300 MG/1
300 CAPSULE ORAL 3 TIMES DAILY
Qty: 90 CAPSULE | Refills: 2 | Status: SHIPPED | OUTPATIENT
Start: 2024-08-02

## 2024-08-02 NOTE — PROGRESS NOTES
Specialty Pharmacy Refill Coordination Note     Nemesio is a 78 y.o. male contacted today regarding refills of  [place] specialty medication(s).    Reviewed and verified with patient:       Specialty medication(s) and dose(s) confirmed: yes    Refill Questions      Flowsheet Row Most Recent Value   Changes to allergies? No   Changes to medications? No   New conditions or infections since last clinic visit No   Unplanned office visit, urgent care, ED, or hospital admission in the last 4 weeks  No   How does patient/caregiver feel medication is working? Good   Financial problems or insurance changes  No   Since the previous refill, were any specialty medication doses or scheduled injections missed or delayed?  No   Does this patient require a clinical escalation to a pharmacist? No            Delivery Questions      Flowsheet Row Most Recent Value   Delivery method [facility]   Delivery address verified with patient/caregiver? Yes   Delivery address Home  [[facility] to the home on 8/8 to arrive 8/9. Address confirmed. $0 co-pay.]   Number of medications in delivery 1   Medication(s) being filled and delivered Osimertinib Mesylate   Doses left of specialty medications 29 - Pt has a donation from  Pharmacy   Copay verified? Yes   Copay amount $0   Copay form of payment No copayment ($0)   Ship Date 8/8   Delivery Date 8/9   Signature Required No                   Follow-up: 3 week(s)     Blanca Jacobson, Pharmacy Technician  Specialty Pharmacy Technician

## 2024-08-03 ENCOUNTER — HOSPITAL ENCOUNTER (OUTPATIENT)
Dept: MRI IMAGING | Facility: HOSPITAL | Age: 79
Discharge: HOME | End: 2024-08-03
Payer: MEDICARE

## 2024-08-03 DIAGNOSIS — D69.6 THROMBOCYTOPENIA: ICD-10-CM

## 2024-08-03 DIAGNOSIS — I65.21 STENOSIS OF RIGHT CAROTID ARTERY: ICD-10-CM

## 2024-08-03 DIAGNOSIS — C34.90 PRIMARY NONSQUAMOUS NONSMALL CELL NEOPLASM OF LUNG: ICD-10-CM

## 2024-08-03 PROCEDURE — 70553 MRI BRAIN STEM W/O & W/DYE: CPT

## 2024-08-03 PROCEDURE — A9577 INJ MULTIHANCE: HCPCS | Performed by: INTERNAL MEDICINE

## 2024-08-03 PROCEDURE — 0 GADOBENATE DIMEGLUMINE 529 MG/ML SOLUTION: Performed by: INTERNAL MEDICINE

## 2024-08-03 RX ADMIN — GADOBENATE DIMEGLUMINE 20 ML: 529 INJECTION, SOLUTION INTRAVENOUS at 11:59

## 2024-08-05 NOTE — PROGRESS NOTES
Subjective     REASON FOR CONSULTATION:  spine mass  Provide an opinion on any further workup or treatment                             REQUESTING PRACTITIONER:  Chandrakant    RECORDS OBTAINED:  Records of the patients history including those obtained from the referring provider were reviewed and summarized in detail.      History of Present Illness:  The patient returns today for follow-up.  He is doing well.  He reports no major side effects from Tagrisso.  He denies uncontrolled nausea vomiting diarrhea.  His pain is dramatically improved since radiation.  He denies headache or neurological changes.  No new complaints today.      ONCOLOGY HISTORY:  This is a pleasant 78-year-old man with impaired fasting glucose, hyperlipidemia, hypothyroidism and history of prostate cancer.  The patient has been experiencing about a 1 year history of progressive back pain in the upper mid thoracic spine stabbing in nature and radiating bilaterally anteriorly.  Symptoms are worse when the patient lays down to rest at night.  He has also had some pain around the right shoulder blade.  He was treated with PT with no improvement.  The patient has also been having left hip pain.  The patient was evaluated by orthopedic surgery and an MRI of the thoracic spine without contrast was performed on 4/25/2024 and showed a large expansile marrow replacing mass along the anterior T4 vertebral body 3.2 x 2.1 cm extending into the anterior paraspinal soft tissue along the T3-T4 space with reactive bone marrow edema.  Another partially visualized expansile mass was seen in the posterior right second rib 2.7 x 1.5 cm and another in the posterior sixth rib 1 cm with surrounding bone marrow edema.  At T7-T8 there is a posterior disc protrusion with mild to moderate canal stenosis.  In the posterior right lung a 3.5 cm mass was noted with additional small nodules also seen but poorly characterized.    The patient denies weight loss, night sweats,  shortness of breath, cough, hemoptysis, headaches, confusion, changes in swallowing bowel habits urinary habits.  He has history of prostate cancer treated with prostatectomy in 2009 and reports negligible PSA values.    The patient has history of light smoking during his 20s and 30s.  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    A full body PET scan was performed on 5/3/2024 which showed a hypermetabolic mass in the superior segment of the right lower lobe 3.7 x 2.8 cm SUV 9.3, no hypermetabolic mediastinal or hilar lymph nodes but multiple hypermetabolic bone lesions largest being at T4 vertebral body SUV 9.4, posterior lateral right second rib, posterior right sixth rib, left aspect of the sacrum to the left of the S1 neural foramen.  Spleen was mildly enlarged 15 cm but less activity than liver.    A CT-guided needle biopsy of a rib mass was performed on 5/10/2024 and showed metastatic adenocarcinoma immunohistochemistry supporting a pulmonary primary.    The patient was seen by radiation oncology with plans to treat at least palliatively to T4 and rib lesions possible additional sites pending peer review of case.    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    MRI brain 5/31/2024 showed multiple supratentorial and infratentorial lesions consistent with metastatic disease largest 4-4.5 mm in size.    The patient initiated Tagrisso 6/5/2024.  He was treated with radiation therapy to T4.          Past Medical History:   Diagnosis Date    Acute bronchitis     Allergic Iodine    Allergic rhinitis     Anxiety 2024    Due to sudden cancer diagnosis    Arthritis 2000    BPH (benign prostatic hypertrophy)     Cervical disc disorder 1987    Dislocation, shoulder 1973    Elevated prostate specific antigen (PSA)     Headache 1990    History of bone scan 10/21/2010    normal    History of colonoscopy     never    History of EKG 03/12/2012    also 11-; T  wave abnormality    History of medical problems 1978    HL (hearing loss) 2000    Hyperlipidemia     Hypertension 2024    Upon learning of Stage 4 cancer    Hypothyroidism     IFG (impaired fasting glucose)     Kidney calculus     left ureteral stone    Knee swelling 2015    Low back pain     Lung cancer 2024    Right    Malignant neoplasm prostate 11/09/2009    Dr. Mcclelland; lymph nodes negative margins clear    Prostate nodule     right lobe    Rotator cuff syndrome 2009    Scoliosis 2009    Screening for prostate cancer     prostatectomy    Sleep apnea     CPAP machine    URI (upper respiratory infection)         Past Surgical History:   Procedure Laterality Date    CERVICAL DISC SURGERY  1990    also 2006; C5-6, C6-7    HAND SURGERY Left 2001    trigger finger release; left middle finger    NECK SURGERY  1988, 2005    Cervical disk    PROSTATECTOMY  11/09/2009    RHINOPLASTY  1985    ROTATOR CUFF REPAIR      SHOULDER SURGERY Left 09/29/2009    Dr. KRISTEL Jimenez; rotator cuff repair; bone spurs     SPINE SURGERY  2005    TRIGGER POINT INJECTION  2008,2021,2022    URETERAL STENT INSERTION Left 12/04/2013    Dr. Martinez        Current Outpatient Medications on File Prior to Visit   Medication Sig Dispense Refill    cyclobenzaprine (FLEXERIL) 5 MG tablet Take 1 tablet by mouth 2 (Two) Times a Day As Needed for Muscle Spasms. 20 tablet 0    Diclofenac Sodium (VOLTAREN) 1 % gel gel Apply 4 g topically to the appropriate area as directed 3 (Three) Times a Day. 100 g 2    gabapentin (NEURONTIN) 300 MG capsule TAKE ONE CAPSULE BY MOUTH THREE TIMES DAILY 90 capsule 2    HYDROcodone-acetaminophen (NORCO) 5-325 MG per tablet Take 1 tablet by mouth Every 6 (Six) Hours As Needed for Moderate Pain. 30 tablet 0    ibuprofen (ADVIL,MOTRIN) 200 MG tablet Take 1 tablet by mouth Every 6 (Six) Hours As Needed for Mild Pain.      levothyroxine (SYNTHROID, LEVOTHROID) 75 MCG tablet       loratadine (CLARITIN) 10 MG tablet Take 1  tablet by mouth Daily.      LORazepam (ATIVAN) 0.5 MG tablet TAKE ONE TABLET BY MOUTH EVERY 8 HOURS AS NEEDED FOR ANXIETY 30 tablet 1    naproxen sodium (ALEVE) 220 MG tablet Take 1 tablet by mouth 2 (Two) Times a Day As Needed.      Norvasc 10 MG tablet Take 1 tablet by mouth Daily.      ondansetron (ZOFRAN) 8 MG tablet Take 1 tablet by mouth 3 (Three) Times a Day As Needed for Nausea or Vomiting. 30 tablet 5    Osimertinib Mesylate 80 MG tablet Take 1 tablet by mouth Daily. 30 tablet 5    predniSONE (DELTASONE) 50 MG tablet Take one tablet 50 mg prednisone 13 hours prior to scan. Take one 50 mg prednisone 7 hours prior to scan. Take one 50 mg prednisone one hour prior to scan. 3 tablet 0    [DISCONTINUED] diphenhydrAMINE (BENADRYL) 50 MG tablet Take one 50 mg tablet 1 hour prior to scan. 1 tablet 0     Current Facility-Administered Medications on File Prior to Visit   Medication Dose Route Frequency Provider Last Rate Last Admin    cyanocobalamin injection 1,000 mcg  1,000 mcg Intramuscular Q28 Days Sherry Mazariegos MD   1,000 mcg at 24 0946        ALLERGIES:    Allergies   Allergen Reactions    Iodinated Contrast Media Anaphylaxis     Patient had a severe reaction in the past / difficulty breathing    Iodine Unknown - High Severity     Difficulty breathing    Molds & Smuts Unknown - High Severity    Shellfish Allergy Unknown - High Severity     Burning inside    Amoxicillin Rash        Social History     Socioeconomic History    Marital status:      Spouse name: Faith   Tobacco Use    Smoking status: Former     Current packs/day: 0.00     Average packs/day: 1 pack/day for 22.0 years (22.0 ttl pk-yrs)     Types: Cigarettes, Pipe     Start date: 1967     Quit date: 1988     Years since quittin.6     Passive exposure: Past    Smokeless tobacco: Never    Tobacco comments:     Quit smoking pipe around    Vaping Use    Vaping status: Never Used   Substance and Sexual Activity     "Alcohol use: Yes     Comment: Rarely will drink one glass of wine    Drug use: Never    Sexual activity: Not Currently     Partners: Female        Family History   Problem Relation Age of Onset    Diabetes Mother     Kidney disease Mother         calculus    Hearing loss Mother     Prostate cancer Father     Stomach cancer Father     Cancer Sister         breast    Hypothyroidism Sister     Kidney disease Sister         calculus    Arthritis Sister     Transient ischemic attack Brother     Alzheimer's disease Other         uncle    Diabetes Other         uncle    Arthritis Brother         Review of Systems   Constitutional: Negative.    HENT: Negative.     Respiratory: Negative.     Cardiovascular: Negative.    Gastrointestinal: Negative.    Genitourinary: Negative.    Musculoskeletal:  Negative for back pain.   Neurological: Negative.    Hematological: Negative.    Psychiatric/Behavioral:  Negative for dysphoric mood. The patient is not nervous/anxious.     Unchanged 8/8/2024      Objective     Vitals:    08/08/24 0815   BP: 167/80   Pulse: 84   Resp: 18   Temp: 97.9 °F (36.6 °C)   TempSrc: Oral   SpO2: 98%   Weight: 96.9 kg (213 lb 11.2 oz)   Height: 177.8 cm (70\")   PainSc:   2                 8/8/2024     8:16 AM   Current Status   ECOG score 0       Physical Exam    CONSTITUTIONAL: pleasant well-developed adult man  HEENT: no icterus, no thrush, moist membranes  LYMPH: no cervical or supraclavicular lad  CV: RRR, S1S2, no murmur  RESP: cta bilat, no wheezing, no rales  GI: soft, non-tender, no splenomegaly, +bs  MUSC: no edema, normal gait, improved point tenderness over the T4 vertebral body  NEURO: alert and oriented x3, normal strength  PSYCH: Normal mood and affect  Exam otherwise unchanged 8/8/2024    RECENT LABS:  Hematology WBC   Date Value Ref Range Status   08/08/2024 3.83 3.40 - 10.80 10*3/mm3 Final     RBC   Date Value Ref Range Status   08/08/2024 4.57 4.14 - 5.80 10*6/mm3 Final     Hemoglobin "   Date Value Ref Range Status   08/08/2024 13.3 13.0 - 17.7 g/dL Final     Hematocrit   Date Value Ref Range Status   08/08/2024 39.8 37.5 - 51.0 % Final     Platelets   Date Value Ref Range Status   08/08/2024 81 (L) 140 - 450 10*3/mm3 Final        Lab Results   Component Value Date    GLUCOSE 153 (H) 07/18/2024    BUN 20 07/18/2024    CREATININE 1.09 07/18/2024    EGFRRESULT 73.6 06/21/2024    EGFR 70.3 07/18/2024    BCR 18.3 07/18/2024    K 3.8 07/18/2024    CO2 27.2 07/18/2024    CALCIUM 9.9 07/18/2024    PROTENTOTREF 6.9 06/21/2024    ALBUMIN 4.4 07/18/2024    BILITOT 0.5 07/18/2024    AST 48 (H) 07/18/2024    ALT 64 (H) 07/18/2024     MRI Thoracic Spine:  MPRESSION:     1. Large expansile mass in the anterior T4 vertebral body extending into  the adjacent soft tissues and involving greater than 50% of the  vertebral body, consistent with metastatic disease. Surrounding bone  marrow edema like signal throughout the T4 vertebral body and patchy  bone marrow edema like signal in the adjacent anterior T3 inferior  endplate and anterior T5 superior endplate. This lesion is at risk for  pathologic fracture.  2. Expansile mass in the posterior right second rib and focal 1 cm mass  in the posterior right sixth rib, consistent with metastatic disease.  3. Partially imaged pulmonary malignant/metastatic disease. Recommend  further evaluation with CT with contrast and/or PET/CT.    PET:  TECHNIQUE: Radiation dose reduction techniques were utilized, including  automated exposure control and exposure modulation based on body size.   Blood glucose level at time of injection was 123 mg/dL. 6.8 mCi of F-18  FDG were injected and PET was performed from skull base to mid thigh. CT  was obtained for localization and attenuation correction. Time at  injection 8:43 a.m. PET start time 10:01 a.m. Normalization method:  patient weight. Compared with thoracic MRI 04/25/2024.     FINDINGS: Mediastinal blood pool has a maximal SUV of  2.8.     1. There is a hypermetabolic 3.7 x 2.8 cm irregular mass at the superior  segment of the right lower lobe with a maximal SUV of 9.3. There is no  hypermetabolic hilar or mediastinal lymphadenopathy, but there are  multiple hypermetabolic bone lesions. The largest is at the T4 vertebral  body with a maximal SUV of 9.4. Lytic expansile metastases are also seen  at the posterior and lateral aspects of the right 2nd rib, posterior  right 6th rib, and left aspect of the sacrum anterior to the left S1  neural foramen.   The activity adjacent to the left greater trochanter is likely related  to tendinopathy.     2. There is no hypermetabolic lymphadenopathy or suspicious activity  within the neck. There is no suspicious visceral activity within the  abdomen or pelvis.  The spleen is mildly enlarged measuring 15 cm in diameter. Splenic  activity is less intense than that of the liver.    CT Angiogram Neck (07/12/2024 10:35)  CT Angiogram Head (07/12/2024 10:35)  IMPRESSION:  1.  A 65% stenosis involving the right internal carotid artery  proximally is appreciated using NASCET criteria due to eccentric  noncalcified plaque.  2.  The right vertebral artery is occluded just proximal to the dura.  The posterior inferior cerebellar artery on the right is opacified via  retrograde flow.  3.  A moderate stenosis involving the left P1 segment is appreciated.  4.  A lytic lesion is noted at T4, only partially visualized. No obvious  enhancing lesion involving the brain is identified. A MRI examination of  the brain with and without contrast would be more sensitive for  evaluation of metastatic disease.    CT Abdomen Pelvis With Contrast (07/12/2024 10:35)  CT Chest With Contrast Diagnostic (07/12/2024 10:35)  IMPRESSION:  1.  A 65% stenosis involving the right internal carotid artery  proximally is appreciated using NASCET criteria due to eccentric  noncalcified plaque.  2.  The right vertebral artery is occluded just  proximal to the dura.  The posterior inferior cerebellar artery on the right is opacified via  retrograde flow.  3.  A moderate stenosis involving the left P1 segment is appreciated.  4.  A lytic lesion is noted at T4, only partially visualized. No obvious  enhancing lesion involving the brain is identified. A MRI examination of  the brain with and without contrast would be more sensitive for  evaluation of metastatic disease.    MRI Brain With & Without Contrast (08/03/2024 12:31)  IMPRESSION:  There is very minimal small vessel disease in cerebral white  matter. The remainder of the MRI of the brain is within normal limits.  Specifically, the previously identified 5 tiny 2 to 4 mm enhancing  lesions in the brain on the MRI of the brain on 05/31/2024 including 2  in the left frontal lobe, 1 in the left parietal lobe another medial  right occipital lobe and one in the medial left cerebellum have resolved  in the interval with no discernible residual enhancing metastatic  lesions identified in the brain. Continued followup contrast-enhanced  MRI of the brain is recommended.    Assessment & Plan     *Stage IV adenocarcinoma, lung primary  Patient presented with back and chest wall pain, imaging showed and expansile mass T4 vertebral body, posterior right second rib and posterior right sixth rib  PET scan was performed on 5/3/2024 which showed a hypermetabolic mass in the superior segment of the right lower lobe 3.7 x 2.8 cm SUV 9.3, no hypermetabolic mediastinal or hilar lymph nodes but multiple hypermetabolic bone lesions largest being at T4 vertebral body SUV 9.4, posterior lateral right second rib, posterior right sixth rib, left aspect of the sacrum to the left of the S1 neural foramen.  Spleen was mildly enlarged 15 cm but less activity than liver.  CT-guided needle biopsy of a rib mass was performed on 5/10/2024 and showed metastatic adenocarcinoma immunohistochemistry supporting a pulmonary primary  The  patient's next generation sequencing returned with a EGFR exon 19 and frame deletion.  Initiated Tagrisso 80 mg daily on 6/5/2024 7/12/2024-CT chest abdomen pelvis-decreased size of the right upper lobe mass, stable osseous metastatic disease, stable solid and mixed groundglass right lung apex  *Brain metastases  5/31/2024-MRI brain showed multifocal supratentorial and infratentorial enhancing lesions, largest 4-4.5 mm in size-plan to monitor response to Tagrisso  8/3/2024 MRI brain-minimal small vessel disease, resolution of previous enhancing lesions  *Pain of malignancy  The patient has available Elmo 5/325 1 p.o. every 6 hours as needed pain  Status post radiation to T4 with improved pain  *Thrombocytopenia secondary to Tagrisso-plt 81  *Significant anxiety related to malignancy  The patient is being followed by the psychosocial clinic  *Decreased flow right vertebral artery by MRI  7/12/2024 CT angiogram head and neck-65% stenosis of the right internal carotid artery, right vertebral artery occluded proximal to the dura with retrograde flow through the PICA, moderate stenosis P1, lytic lesion T4  Vascular surgery recommended to begin aspirin 81 mg daily  *History of prostate cancer status post prostatectomy 2009    Oncology plan/recommendations:  Continue Tagrisso 80 mg daily  Begin Zometa for metastatic bone disease, reduction of skeletal related events  Okay to begin aspirin 81 mg daily  CBC 2 weeks for platelet count check  MD visit 4 weeks for CBC CMP Zometa labs and Zometa  Plan to recheck CT chest abdomen pelvis October/November

## 2024-08-08 ENCOUNTER — OFFICE VISIT (OUTPATIENT)
Dept: ONCOLOGY | Facility: CLINIC | Age: 79
End: 2024-08-08
Payer: MEDICARE

## 2024-08-08 ENCOUNTER — INFUSION (OUTPATIENT)
Dept: ONCOLOGY | Facility: HOSPITAL | Age: 79
End: 2024-08-08
Payer: OTHER GOVERNMENT

## 2024-08-08 ENCOUNTER — APPOINTMENT (OUTPATIENT)
Dept: ONCOLOGY | Facility: HOSPITAL | Age: 79
End: 2024-08-08
Payer: OTHER GOVERNMENT

## 2024-08-08 ENCOUNTER — CLINICAL SUPPORT (OUTPATIENT)
Dept: ONCOLOGY | Facility: HOSPITAL | Age: 79
End: 2024-08-08
Payer: OTHER GOVERNMENT

## 2024-08-08 VITALS
TEMPERATURE: 97.9 F | SYSTOLIC BLOOD PRESSURE: 167 MMHG | OXYGEN SATURATION: 98 % | RESPIRATION RATE: 18 BRPM | WEIGHT: 213.7 LBS | BODY MASS INDEX: 30.59 KG/M2 | DIASTOLIC BLOOD PRESSURE: 80 MMHG | HEART RATE: 84 BPM | HEIGHT: 70 IN

## 2024-08-08 VITALS — WEIGHT: 213.7 LBS | BODY MASS INDEX: 30.66 KG/M2

## 2024-08-08 DIAGNOSIS — C79.51 CANCER, METASTATIC TO BONE: ICD-10-CM

## 2024-08-08 DIAGNOSIS — C61 MALIGNANT NEOPLASM PROSTATE: ICD-10-CM

## 2024-08-08 DIAGNOSIS — Z79.899 HIGH RISK MEDICATION USE: Primary | ICD-10-CM

## 2024-08-08 DIAGNOSIS — C79.51 CANCER, METASTATIC TO BONE: Primary | ICD-10-CM

## 2024-08-08 LAB
ALBUMIN SERPL-MCNC: 4.4 G/DL (ref 3.5–5.2)
ALBUMIN/GLOB SERPL: 1.6 G/DL
ALP SERPL-CCNC: 91 U/L (ref 39–117)
ALT SERPL W P-5'-P-CCNC: 63 U/L (ref 1–41)
ANION GAP SERPL CALCULATED.3IONS-SCNC: 12.6 MMOL/L (ref 5–15)
AST SERPL-CCNC: 45 U/L (ref 1–40)
BASOPHILS # BLD AUTO: 0.02 10*3/MM3 (ref 0–0.2)
BASOPHILS NFR BLD AUTO: 0.5 % (ref 0–1.5)
BILIRUB SERPL-MCNC: 0.4 MG/DL (ref 0–1.2)
BUN SERPL-MCNC: 16 MG/DL (ref 8–23)
BUN/CREAT SERPL: 16.3 (ref 7–25)
CALCIUM SPEC-SCNC: 9.3 MG/DL (ref 8.6–10.5)
CHLORIDE SERPL-SCNC: 103 MMOL/L (ref 98–107)
CO2 SERPL-SCNC: 24.4 MMOL/L (ref 22–29)
CREAT SERPL-MCNC: 0.98 MG/DL (ref 0.76–1.27)
DEPRECATED RDW RBC AUTO: 41.1 FL (ref 37–54)
EGFRCR SERPLBLD CKD-EPI 2021: 79.9 ML/MIN/1.73
EOSINOPHIL # BLD AUTO: 0.07 10*3/MM3 (ref 0–0.4)
EOSINOPHIL NFR BLD AUTO: 1.8 % (ref 0.3–6.2)
ERYTHROCYTE [DISTWIDTH] IN BLOOD BY AUTOMATED COUNT: 13.1 % (ref 12.3–15.4)
GLOBULIN UR ELPH-MCNC: 2.7 GM/DL
GLUCOSE SERPL-MCNC: 165 MG/DL (ref 65–99)
HCT VFR BLD AUTO: 39.8 % (ref 37.5–51)
HGB BLD-MCNC: 13.3 G/DL (ref 13–17.7)
IMM GRANULOCYTES # BLD AUTO: 0.01 10*3/MM3 (ref 0–0.05)
IMM GRANULOCYTES NFR BLD AUTO: 0.3 % (ref 0–0.5)
LYMPHOCYTES # BLD AUTO: 0.98 10*3/MM3 (ref 0.7–3.1)
LYMPHOCYTES NFR BLD AUTO: 25.6 % (ref 19.6–45.3)
MAGNESIUM SERPL-MCNC: 1.8 MG/DL (ref 1.6–2.4)
MCH RBC QN AUTO: 29.1 PG (ref 26.6–33)
MCHC RBC AUTO-ENTMCNC: 33.4 G/DL (ref 31.5–35.7)
MCV RBC AUTO: 87.1 FL (ref 79–97)
MONOCYTES # BLD AUTO: 0.32 10*3/MM3 (ref 0.1–0.9)
MONOCYTES NFR BLD AUTO: 8.4 % (ref 5–12)
NEUTROPHILS NFR BLD AUTO: 2.43 10*3/MM3 (ref 1.7–7)
NEUTROPHILS NFR BLD AUTO: 63.4 % (ref 42.7–76)
NRBC BLD AUTO-RTO: 0 /100 WBC (ref 0–0.2)
PHOSPHATE SERPL-MCNC: 2.5 MG/DL (ref 2.5–4.5)
PLATELET # BLD AUTO: 81 10*3/MM3 (ref 140–450)
PMV BLD AUTO: 10.5 FL (ref 6–12)
POTASSIUM SERPL-SCNC: 3.5 MMOL/L (ref 3.5–5.2)
PROT SERPL-MCNC: 7.1 G/DL (ref 6–8.5)
RBC # BLD AUTO: 4.57 10*6/MM3 (ref 4.14–5.8)
SODIUM SERPL-SCNC: 140 MMOL/L (ref 136–145)
WBC NRBC COR # BLD AUTO: 3.83 10*3/MM3 (ref 3.4–10.8)

## 2024-08-08 PROCEDURE — 96374 THER/PROPH/DIAG INJ IV PUSH: CPT

## 2024-08-08 PROCEDURE — 85025 COMPLETE CBC W/AUTO DIFF WBC: CPT

## 2024-08-08 PROCEDURE — 84100 ASSAY OF PHOSPHORUS: CPT

## 2024-08-08 PROCEDURE — 80053 COMPREHEN METABOLIC PANEL: CPT

## 2024-08-08 PROCEDURE — 25810000003 SODIUM CHLORIDE 0.9 % SOLUTION: Performed by: INTERNAL MEDICINE

## 2024-08-08 PROCEDURE — 83735 ASSAY OF MAGNESIUM: CPT

## 2024-08-08 PROCEDURE — 25010000002 ZOLEDRONIC ACID 4 MG/100ML SOLUTION: Performed by: INTERNAL MEDICINE

## 2024-08-08 RX ORDER — ZOLEDRONIC ACID 0.04 MG/ML
4 INJECTION, SOLUTION INTRAVENOUS ONCE
Status: COMPLETED | OUTPATIENT
Start: 2024-08-08 | End: 2024-08-08

## 2024-08-08 RX ORDER — ZOLEDRONIC ACID 0.04 MG/ML
4 INJECTION, SOLUTION INTRAVENOUS ONCE
Status: CANCELLED | OUTPATIENT
Start: 2024-08-08

## 2024-08-08 RX ORDER — SODIUM CHLORIDE 9 MG/ML
20 INJECTION, SOLUTION INTRAVENOUS ONCE
Status: COMPLETED | OUTPATIENT
Start: 2024-08-08 | End: 2024-08-08

## 2024-08-08 RX ORDER — SODIUM CHLORIDE 9 MG/ML
20 INJECTION, SOLUTION INTRAVENOUS ONCE
Status: CANCELLED | OUTPATIENT
Start: 2024-08-08

## 2024-08-08 RX ADMIN — SODIUM CHLORIDE 20 ML/HR: 9 INJECTION, SOLUTION INTRAVENOUS at 09:14

## 2024-08-08 RX ADMIN — ZOLEDRONIC ACID 4 MG: 0.04 INJECTION, SOLUTION INTRAVENOUS at 09:14

## 2024-08-09 ENCOUNTER — SPECIALTY PHARMACY (OUTPATIENT)
Dept: PHARMACY | Facility: HOSPITAL | Age: 79
End: 2024-08-09
Payer: MEDICARE

## 2024-08-09 NOTE — PROGRESS NOTES
Specialty Pharmacy Note: Tagrisso (osimertinib)    Nemesio Fitzpatrick is a 78 y.o. male with metastatic lung adenocarcinoma was seen 8/8/24 by Dr. Austin. Per provider dictation, no changes to oral oncology regimen Tagrisso (osimertinib) 80 mg po daily.  Labs Review: The CMP and CBC from 8/8/24 have been reviewed. No dose adjustments are needed for the oral specialty medication(s) based on the labs.    Specialty pharmacy will continue to follow patient.    Vesna Moreira Rph, BCOP  8/9/2024  08:29 EDT

## 2024-08-13 ENCOUNTER — PATIENT MESSAGE (OUTPATIENT)
Dept: FAMILY MEDICINE CLINIC | Facility: CLINIC | Age: 79
End: 2024-08-13
Payer: MEDICARE

## 2024-08-13 ENCOUNTER — SPECIALTY PHARMACY (OUTPATIENT)
Dept: PHARMACY | Facility: HOSPITAL | Age: 79
End: 2024-08-13
Payer: MEDICARE

## 2024-08-14 ENCOUNTER — HOSPITAL ENCOUNTER (EMERGENCY)
Facility: HOSPITAL | Age: 79
Discharge: HOME | End: 2024-08-14
Attending: EMERGENCY MEDICINE
Payer: MEDICARE

## 2024-08-14 ENCOUNTER — APPOINTMENT (OUTPATIENT)
Dept: GENERAL RADIOLOGY | Facility: HOSPITAL | Age: 79
End: 2024-08-14
Payer: MEDICARE

## 2024-08-14 ENCOUNTER — APPOINTMENT (OUTPATIENT)
Dept: CT IMAGING | Facility: HOSPITAL | Age: 79
End: 2024-08-14
Payer: MEDICARE

## 2024-08-14 ENCOUNTER — OFFICE VISIT (OUTPATIENT)
Dept: PSYCHIATRY | Facility: HOSPITAL | Age: 79
End: 2024-08-14
Payer: MEDICARE

## 2024-08-14 VITALS
HEIGHT: 70 IN | SYSTOLIC BLOOD PRESSURE: 182 MMHG | DIASTOLIC BLOOD PRESSURE: 83 MMHG | WEIGHT: 208 LBS | HEART RATE: 78 BPM | BODY MASS INDEX: 29.78 KG/M2 | RESPIRATION RATE: 16 BRPM | OXYGEN SATURATION: 97 % | TEMPERATURE: 98.2 F

## 2024-08-14 DIAGNOSIS — M25.512 ACUTE PAIN OF LEFT SHOULDER: ICD-10-CM

## 2024-08-14 DIAGNOSIS — F41.1 GENERALIZED ANXIETY DISORDER: Primary | ICD-10-CM

## 2024-08-14 DIAGNOSIS — M47.812 CERVICAL SPONDYLOSIS: ICD-10-CM

## 2024-08-14 DIAGNOSIS — M54.12 CERVICAL RADICULOPATHY: Primary | ICD-10-CM

## 2024-08-14 DIAGNOSIS — C79.51 MALIGNANT NEOPLASM METASTATIC TO BONE: ICD-10-CM

## 2024-08-14 PROCEDURE — 1159F MED LIST DOCD IN RCRD: CPT | Performed by: NURSE PRACTITIONER

## 2024-08-14 PROCEDURE — 99284 EMERGENCY DEPT VISIT MOD MDM: CPT | Performed by: EMERGENCY MEDICINE

## 2024-08-14 PROCEDURE — 1160F RVW MEDS BY RX/DR IN RCRD: CPT | Performed by: NURSE PRACTITIONER

## 2024-08-14 PROCEDURE — 90853 GROUP PSYCHOTHERAPY: CPT | Performed by: NURSE PRACTITIONER

## 2024-08-14 PROCEDURE — 73030 X-RAY EXAM OF SHOULDER: CPT

## 2024-08-14 PROCEDURE — 72125 CT NECK SPINE W/O DYE: CPT

## 2024-08-14 PROCEDURE — 99284 EMERGENCY DEPT VISIT MOD MDM: CPT

## 2024-08-14 PROCEDURE — 99213 OFFICE O/P EST LOW 20 MIN: CPT | Performed by: NURSE PRACTITIONER

## 2024-08-14 RX ORDER — HYDROCODONE BITARTRATE AND ACETAMINOPHEN 5; 325 MG/1; MG/1
1 TABLET ORAL EVERY 6 HOURS PRN
Qty: 8 TABLET | Refills: 0 | Status: SHIPPED | OUTPATIENT
Start: 2024-08-14

## 2024-08-14 RX ORDER — METHYLPREDNISOLONE 4 MG/1
TABLET ORAL
Qty: 21 TABLET | Refills: 0 | Status: SHIPPED | OUTPATIENT
Start: 2024-08-14

## 2024-08-14 NOTE — PROGRESS NOTES
Subjective  Patient ID: Nemesio Fitzpatrick is a 78 y.o.. male seen in regularly scheduled group session.  Group participants have consented to group conducted in person    Total Group Time, Face to Face: 75 minutes  Total Group Participants: 5, plus facilitation per Dr. Mary Carmen Rueda and ASHLEIGH Moore     Group Therapy  Group topics explored including development of new normal, interpersonal sensitivity, short and long term effects of diagnosis and treatment, anticipitory anxiety, anxiety surrounding adjusted treatment plan or adjusted follow up, physical deconditioning, weight management, and lifestyle choices. Patients share achievements contributing to sense of self, purpose. Identified ongoing challenges in survivorship, adjusted pleasures, and grief associated with aging, failed expectations, and loss. Members share helpful life mottos, goals of not creating more regret. Members supported each other openly, honestly, and without judgement.     Counseling provided regarding cognitive behavioral therapy (CBT) strategies, behavioral activation/ activity scheduling, reintroduction to activity through graded tasks, balancing avoidance with approach , recognition and allowance of need for rest, lifestyle counseling, allowance of self care, exploration of quality of life goals, survivorship issues, anxiety/ mood management , medication education, and existential distress. Continued to foster developing group dynamics, namely appreciation for shared experiences, validation, and encouragement. Considered strategies for allowance of self care, in and out of group.      Discussed [facility] and community.     Benefits of group discussed while also acknowledging the need for 1:1 consultation at times. Members invited to discuss any concerns privately with me following group setting or to schedule a 1:1 visit if needs not being met in group.     Next shared medical visit: September  18 at 2:00 in person    Patient response to group: Pt contributes openly to conversation, supporting other members openly.     Medications Management  Dr. Mary Carmen Rueda and ASHLEIGH Moore present for medication discussion and management.  Pt continues in survivorship of metastatic prostate cancer.    CC: Pain, anxiety  HPI: Pt is seen in follow up regarding ongoing experience of anxiety in survivorship of metastatic prostate cancer. Reports ongoing demoralization associated with variable physical symptoms, most recently reporting diffuse pain associated with recent initiation of zometa. Has considered going to urgent care for x ray to assess acute on chronic pain. Is generally taking gabapentin 300 mg q evening meal and 300 mg q hs, occassionally taking additional dose PRN pain. Reports intrusion of pain on sleep at times, sleeping better on previous dose of 300 mg q evening meal and 600 mg q hs. Does note taking 300 mg q AM at times for pain, although finds this to be sedating.  Exam: As Above  Current medication regimen: gabapentin 300 mg tid  Lab Review:   Infusion on 08/08/2024   Component Date Value    Glucose 08/08/2024 165 (H)     BUN 08/08/2024 16     Creatinine 08/08/2024 0.98     Sodium 08/08/2024 140     Potassium 08/08/2024 3.5     Chloride 08/08/2024 103     CO2 08/08/2024 24.4     Calcium 08/08/2024 9.3     Total Protein 08/08/2024 7.1     Albumin 08/08/2024 4.4     ALT (SGPT) 08/08/2024 63 (H)     AST (SGOT) 08/08/2024 45 (H)     Alkaline Phosphatase 08/08/2024 91     Total Bilirubin 08/08/2024 0.4     Globulin 08/08/2024 2.7     A/G Ratio 08/08/2024 1.6     BUN/Creatinine Ratio 08/08/2024 16.3     Anion Gap 08/08/2024 12.6     eGFR 08/08/2024 79.9     Magnesium 08/08/2024 1.8     Phosphorus 08/08/2024 2.5     WBC 08/08/2024 3.83     RBC 08/08/2024 4.57     Hemoglobin 08/08/2024 13.3     Hematocrit 08/08/2024 39.8     MCV 08/08/2024 87.1     MCH 08/08/2024 29.1     MCHC 08/08/2024 33.4      RDW 08/08/2024 13.1     RDW-SD 08/08/2024 41.1     MPV 08/08/2024 10.5     Platelets 08/08/2024 81 (L)     Neutrophil % 08/08/2024 63.4     Lymphocyte % 08/08/2024 25.6     Monocyte % 08/08/2024 8.4     Eosinophil % 08/08/2024 1.8     Basophil % 08/08/2024 0.5     Immature Grans % 08/08/2024 0.3     Neutrophils, Absolute 08/08/2024 2.43     Lymphocytes, Absolute 08/08/2024 0.98     Monocytes, Absolute 08/08/2024 0.32     Eosinophils, Absolute 08/08/2024 0.07     Basophils, Absolute 08/08/2024 0.02     Immature Grans, Absolute 08/08/2024 0.01     nRBC 08/08/2024 0.0    Lab on 07/18/2024   Component Date Value    Glucose 07/18/2024 153 (H)     BUN 07/18/2024 20     Creatinine 07/18/2024 1.09     Sodium 07/18/2024 140     Potassium 07/18/2024 3.8     Chloride 07/18/2024 101     CO2 07/18/2024 27.2     Calcium 07/18/2024 9.9     Total Protein 07/18/2024 7.1     Albumin 07/18/2024 4.4     ALT (SGPT) 07/18/2024 64 (H)     AST (SGOT) 07/18/2024 48 (H)     Alkaline Phosphatase 07/18/2024 94     Total Bilirubin 07/18/2024 0.5     Globulin 07/18/2024 2.7     A/G Ratio 07/18/2024 1.6     BUN/Creatinine Ratio 07/18/2024 18.3     Anion Gap 07/18/2024 11.8     eGFR 07/18/2024 70.3     WBC 07/18/2024 3.87     RBC 07/18/2024 4.45     Hemoglobin 07/18/2024 13.4     Hematocrit 07/18/2024 39.4     MCV 07/18/2024 88.5     MCH 07/18/2024 30.1     MCHC 07/18/2024 34.0     RDW 07/18/2024 13.2     RDW-SD 07/18/2024 43.1     MPV 07/18/2024 10.5     Platelets 07/18/2024 81 (L)     Neutrophil % 07/18/2024 64.4     Lymphocyte % 07/18/2024 24.0     Monocyte % 07/18/2024 8.5     Eosinophil % 07/18/2024 1.8     Basophil % 07/18/2024 0.8     Immature Grans % 07/18/2024 0.5     Neutrophils, Absolute 07/18/2024 2.49     Lymphocytes, Absolute 07/18/2024 0.93     Monocytes, Absolute 07/18/2024 0.33     Eosinophils, Absolute 07/18/2024 0.07     Basophils, Absolute 07/18/2024 0.03     Immature Grans, Absolute 07/18/2024 0.02     nRBC 07/18/2024 0.0     Lab on 07/05/2024   Component Date Value    Glucose 07/05/2024 152 (H)     BUN 07/05/2024 21     Creatinine 07/05/2024 0.98     Sodium 07/05/2024 143     Potassium 07/05/2024 3.4 (L)     Chloride 07/05/2024 105     CO2 07/05/2024 24.1     Calcium 07/05/2024 9.7     Total Protein 07/05/2024 7.2     Albumin 07/05/2024 4.4     ALT (SGPT) 07/05/2024 51 (H)     AST (SGOT) 07/05/2024 42 (H)     Alkaline Phosphatase 07/05/2024 104     Total Bilirubin 07/05/2024 0.4     Globulin 07/05/2024 2.8     A/G Ratio 07/05/2024 1.6     BUN/Creatinine Ratio 07/05/2024 21.4     Anion Gap 07/05/2024 13.9     eGFR 07/05/2024 79.9     WBC 07/05/2024 3.68     RBC 07/05/2024 4.44     Hemoglobin 07/05/2024 13.2     Hematocrit 07/05/2024 38.7     MCV 07/05/2024 87.2     MCH 07/05/2024 29.7     MCHC 07/05/2024 34.1     RDW 07/05/2024 13.2     RDW-SD 07/05/2024 41.4     MPV 07/05/2024 11.1     Platelets 07/05/2024 73 (L)     Neutrophil % 07/05/2024 67.8     Lymphocyte % 07/05/2024 21.7     Monocyte % 07/05/2024 7.3     Eosinophil % 07/05/2024 2.2     Basophil % 07/05/2024 0.5     Immature Grans % 07/05/2024 0.5     Neutrophils, Absolute 07/05/2024 2.49     Lymphocytes, Absolute 07/05/2024 0.80     Monocytes, Absolute 07/05/2024 0.27     Eosinophils, Absolute 07/05/2024 0.08     Basophils, Absolute 07/05/2024 0.02     Immature Grans, Absolute 07/05/2024 0.02     nRBC 07/05/2024 0.0    Orders Only on 06/26/2024   Component Date Value    Course ID 06/26/2024 C1: T Spine     Course Intent 06/26/2024 Palliative     Course Start Date 06/26/2024 6/11/2024  3:40 PM     Course End Date 06/26/2024 6/26/2024  8:36 AM     Course First Treatment D* 06/26/2024 6/14/2024 11:28 AM     Course Last Treatment Da* 06/26/2024 6/20/2024  2:40 PM     Course Elapsed Days 06/26/2024 6     Reference Point ID 06/26/2024 RX: T4     Reference Point Dosage G* 06/26/2024 35     Plan ID 06/26/2024 T4 SBRT     Plan Name 06/26/2024 T4 SBRT     Plan Fractions Treated t*  06/26/2024 5     Plan Total Fractions Pre* 06/26/2024 5     Plan Prescribed Dose Per* 06/26/2024 7     Plan Total Prescribed Do* 06/26/2024 3,500     Plan Primary Reference P* 06/26/2024 RX: T4    Lab on 06/24/2024   Component Date Value    WBC 06/24/2024 4.56     RBC 06/24/2024 4.54     Hemoglobin 06/24/2024 13.5     Hematocrit 06/24/2024 38.6     MCV 06/24/2024 85.0     MCH 06/24/2024 29.7     MCHC 06/24/2024 35.0     RDW 06/24/2024 12.7     RDW-SD 06/24/2024 39.1     MPV 06/24/2024 11.8     Platelets 06/24/2024 74 (L)     Neutrophil % 06/24/2024 67.3     Lymphocyte % 06/24/2024 18.0 (L)     Monocyte % 06/24/2024 9.0     Eosinophil % 06/24/2024 1.5     Basophil % 06/24/2024 0.7     Immature Grans % 06/24/2024 3.5 (H)     Neutrophils, Absolute 06/24/2024 3.07     Lymphocytes, Absolute 06/24/2024 0.82     Monocytes, Absolute 06/24/2024 0.41     Eosinophils, Absolute 06/24/2024 0.07     Basophils, Absolute 06/24/2024 0.03     Immature Grans, Absolute 06/24/2024 0.16 (H)     nRBC 06/24/2024 0.0    Office Visit on 06/21/2024   Component Date Value    Glucose 06/21/2024 156 (H)     BUN 06/21/2024 22     Creatinine 06/21/2024 1.05     EGFR Result 06/21/2024 73.6     BUN/Creatinine Ratio 06/21/2024 21.0     Sodium 06/21/2024 139     Potassium 06/21/2024 3.9     Chloride 06/21/2024 103     Total CO2 06/21/2024 25.2     Calcium 06/21/2024 9.5     Total Protein 06/21/2024 6.9     Albumin 06/21/2024 4.7     Globulin 06/21/2024 2.2     A/G Ratio 06/21/2024 2.1     Total Bilirubin 06/21/2024 0.3     Alkaline Phosphatase 06/21/2024 109     AST (SGOT) 06/21/2024 28     ALT (SGPT) 06/21/2024 45 (H)     Total Cholesterol 06/21/2024 156     Triglycerides 06/21/2024 581 (H)     HDL Cholesterol 06/21/2024 28 (L)     VLDL Cholesterol Fareed 06/21/2024 83 (H)     LDL Chol Calc (NIH) 06/21/2024 45     Hemoglobin A1C 06/21/2024 5.90 (H)     TSH 06/21/2024 3.520     Vitamin B-12 06/21/2024 263    Hospital Outpatient Visit on 06/20/2024    Component Date Value    Course ID 06/20/2024 C1: T Spine     Course Intent 06/20/2024 Palliative     Course Start Date 06/20/2024 6/11/2024  3:40 PM     Course First Treatment D* 06/20/2024 6/14/2024 11:28 AM     Course Last Treatment Da* 06/20/2024 6/20/2024  2:40 PM     Course Elapsed Days 06/20/2024 6     Reference Point ID 06/20/2024 RX: T4     Reference Point Dosage G* 06/20/2024 35     Reference Point Session * 06/20/2024 7     Plan ID 06/20/2024 T4 SBRT     Plan Fractions Treated t* 06/20/2024 5     Plan Total Fractions Pre* 06/20/2024 5     Plan Prescribed Dose Per* 06/20/2024 7     Plan Total Prescribed Do* 06/20/2024 3,500     Plan Primary Reference P* 06/20/2024 RX: T4    Hospital Outpatient Visit on 06/19/2024   Component Date Value    Course ID 06/19/2024 C1: T Spine     Course Intent 06/19/2024 Palliative     Course Start Date 06/19/2024 6/11/2024  3:40 PM     Course First Treatment D* 06/19/2024 6/14/2024 11:28 AM     Course Last Treatment Da* 06/19/2024 6/19/2024 11:30 AM     Course Elapsed Days 06/19/2024 5     Reference Point ID 06/19/2024 RX: T4     Reference Point Dosage G* 06/19/2024 28     Reference Point Session * 06/19/2024 7     Plan ID 06/19/2024 T4 SBRT     Plan Fractions Treated t* 06/19/2024 4     Plan Total Fractions Pre* 06/19/2024 5     Plan Prescribed Dose Per* 06/19/2024 7     Plan Total Prescribed Do* 06/19/2024 3,500     Plan Primary Reference P* 06/19/2024 RX: T4    Hospital Outpatient Visit on 06/18/2024   Component Date Value    Course ID 06/18/2024 C1: T Spine     Course Intent 06/18/2024 Palliative     Course Start Date 06/18/2024 6/11/2024  3:40 PM     Course First Treatment D* 06/18/2024 6/14/2024 11:28 AM     Course Last Treatment Da* 06/18/2024 6/18/2024  2:42 PM     Course Elapsed Days 06/18/2024 4     Reference Point ID 06/18/2024 RX: T4     Reference Point Dosage G* 06/18/2024 21     Reference Point Session * 06/18/2024 7     Plan ID 06/18/2024 T4 SBRT     Plan  Fractions Treated t* 06/18/2024 3     Plan Total Fractions Pre* 06/18/2024 5     Plan Prescribed Dose Per* 06/18/2024 7     Plan Total Prescribed Do* 06/18/2024 3,500     Plan Primary Reference P* 06/18/2024 RX: T4    Hospital Outpatient Visit on 06/17/2024   Component Date Value    Course ID 06/17/2024 C1: T Spine     Course Intent 06/17/2024 Palliative     Course Start Date 06/17/2024 6/11/2024  3:40 PM     Course First Treatment D* 06/17/2024 6/14/2024 11:28 AM     Course Last Treatment Da* 06/17/2024 6/17/2024 11:36 AM     Course Elapsed Days 06/17/2024 3     Reference Point ID 06/17/2024 RX: T4     Reference Point Dosage G* 06/17/2024 14     Reference Point Session * 06/17/2024 7     Plan ID 06/17/2024 T4 SBRT     Plan Fractions Treated t* 06/17/2024 2     Plan Total Fractions Pre* 06/17/2024 5     Plan Prescribed Dose Per* 06/17/2024 7     Plan Total Prescribed Do* 06/17/2024 3,500     Plan Primary Reference P* 06/17/2024 RX: T4    There may be more visits with results that are not included.   Stable elevation in LFTs noted.    MDM:  Meds reviewed and renewed as appropriate.  Continue gabapentin 300 mg q dinner and q hs as currently benefiting anxiety and sleep. Encouraged more regular use of second dose q hs to assist with sleep, urging patient to assess impact on daytime pain mgmt and fatigue.  FU scheduled in group setting.    Diagnoses and all orders for this visit:    1. Generalized anxiety disorder (Primary)

## 2024-08-14 NOTE — FSED PROVIDER NOTE
Subjective   History of Present Illness  79yo male pmh significant htn/hypothyroid/prostate ca/lung adenocarcinoma stage IV/brain mets/COLLEEN presents ED c/o 3d hx nontraumatic left shoulder pain with radiation paresthesias left hand digits 2-3-4.  ROS neg fever/chest pain/soa/motor weakness.    History provided by:  Patient  Shoulder Injury      Review of Systems   Constitutional: Negative.    HENT: Negative.     Eyes: Negative.    Respiratory: Negative.     Cardiovascular: Negative.    Gastrointestinal: Negative.    Musculoskeletal:  Positive for arthralgias and back pain.   Allergic/Immunologic: Positive for immunocompromised state.   All other systems reviewed and are negative.      Past Medical History:   Diagnosis Date    Acute bronchitis     Allergic Iodine    Allergic rhinitis     Anxiety 2024    Due to sudden cancer diagnosis    Arthritis 2000    BPH (benign prostatic hypertrophy)     Cervical disc disorder 1987    Dislocation, shoulder 1973    Elevated prostate specific antigen (PSA)     Headache 1990    History of bone scan 10/21/2010    normal    History of colonoscopy     never    History of EKG 03/12/2012    also 11-; T wave abnormality    History of medical problems 1978    HL (hearing loss) 2000    Hyperlipidemia     Hypertension 2024    Upon learning of Stage 4 cancer    Hypothyroidism     IFG (impaired fasting glucose)     Kidney calculus     left ureteral stone    Knee swelling 2015    Low back pain     Lung cancer 2024    Right    Malignant neoplasm prostate 11/09/2009    Dr. Mcclelland; lymph nodes negative margins clear    Prostate nodule     right lobe    Rotator cuff syndrome 2009    Scoliosis 2009    Screening for prostate cancer     prostatectomy    Sleep apnea     CPAP machine    URI (upper respiratory infection)        Allergies   Allergen Reactions    Iodinated Contrast Media Anaphylaxis     Patient had a severe reaction in the past / difficulty breathing    Iodine Unknown - High  Severity     Difficulty breathing    Molds & Smuts Unknown - High Severity    Shellfish Allergy Unknown - High Severity     Burning inside    Amoxicillin Rash       Past Surgical History:   Procedure Laterality Date    CERVICAL DISC SURGERY  1990    also 2006; C5-6, C6-7    HAND SURGERY Left     trigger finger release; left middle finger    NECK SURGERY  ,     Cervical disk    PROSTATECTOMY  2009    RHINOPLASTY  1985    ROTATOR CUFF REPAIR      SHOULDER SURGERY Left 2009    Dr. KRISTEL Jimenez; rotator cuff repair; bone spurs     SPINE SURGERY      TRIGGER POINT INJECTION  ,,    URETERAL STENT INSERTION Left 2013    Dr. Martinez       Family History   Problem Relation Age of Onset    Diabetes Mother     Kidney disease Mother         calculus    Hearing loss Mother     Prostate cancer Father     Stomach cancer Father     Cancer Sister         breast    Hypothyroidism Sister     Kidney disease Sister         calculus    Arthritis Sister     Transient ischemic attack Brother     Alzheimer's disease Other         uncle    Diabetes Other         uncle    Arthritis Brother        Social History     Socioeconomic History    Marital status:      Spouse name: Faith   Tobacco Use    Smoking status: Former     Current packs/day: 0.00     Average packs/day: 1 pack/day for 22.0 years (22.0 ttl pk-yrs)     Types: Cigarettes, Pipe     Start date: 1967     Quit date: 1988     Years since quittin.6     Passive exposure: Past    Smokeless tobacco: Never    Tobacco comments:     Quit smoking pipe around    Vaping Use    Vaping status: Never Used   Substance and Sexual Activity    Alcohol use: Yes     Comment: Rarely will drink one glass of wine    Drug use: Never    Sexual activity: Not Currently     Partners: Female           Objective   Physical Exam  Vitals and nursing note reviewed.   Constitutional:       Appearance: Normal appearance.   HENT:      Head:  Normocephalic and atraumatic.      Right Ear: External ear normal.      Left Ear: External ear normal.      Nose: Nose normal.      Mouth/Throat:      Mouth: Mucous membranes are moist.      Pharynx: Oropharynx is clear.   Eyes:      Pupils: Pupils are equal, round, and reactive to light.   Cardiovascular:      Rate and Rhythm: Normal rate and regular rhythm.      Pulses: Normal pulses.      Heart sounds: Normal heart sounds. No murmur heard.     No friction rub. No gallop.   Pulmonary:      Effort: Pulmonary effort is normal. No respiratory distress.      Breath sounds: Normal breath sounds. No wheezing, rhonchi or rales.   Abdominal:      General: Abdomen is flat. Bowel sounds are normal. There is no distension.      Palpations: Abdomen is soft.      Tenderness: There is no abdominal tenderness.   Musculoskeletal:         General: No swelling or deformity. Normal range of motion.      Left shoulder: Tenderness present. No swelling, deformity or bony tenderness. Normal range of motion. Normal strength. Normal pulse.        Arms:       Cervical back: Normal range of motion and neck supple. No rigidity or tenderness.   Lymphadenopathy:      Cervical: No cervical adenopathy.   Skin:     General: Skin is warm and dry.   Neurological:      General: No focal deficit present.      Mental Status: He is alert and oriented to person, place, and time.      GCS: GCS eye subscore is 4. GCS verbal subscore is 5. GCS motor subscore is 6.      Sensory: Sensation is intact.      Motor: Motor function is intact.         Procedures           ED Course      CT Cervical Spine Without Contrast    Result Date: 8/14/2024  Narrative: CT CERVICAL SPINE WO CONTRAST-  DATE OF EXAM: 8/14/2024 6:06 PM  INDICATION: Cervical radiculopathy. Left shoulder pain with numbness in the left shoulder to the middle finger after pulled by dog.  COMPARISON: Left shoulder radiographs 8/14/2024. Chest and CTA neck 7/12/2024. MRI brain 5/31/2024. PET/CT  5/3/2024. MRI thoracic spine 4/25/2024.  TECHNIQUE: Multiple contiguous axial images were acquired through the cervical spine without intravenous administration of contrast. Reformatted coronal and sagittal sequences were also reviewed. Radiation dose reduction techniques were utilized, including automated exposure control and exposure modulation based on body size.  FINDINGS: Stable postoperative changes from ACDF at C5-C6 and C6-C7 with solid bony bridging across the disc spaces and facets. No evidence of hardware complications. Straightening of the normal cervical lordosis, which could be postoperative and/or degenerative. The cervical vertebral bodies otherwise demonstrate well preserved height and alignment. No evidence of acute fracture of the cervical spine. Partially imaged metastatic bone lesion in the right second rib. Severe hypertrophic degenerative changes at the anterior median axial joint with chronic corticated ossification at the tip of the dens.  C2-C3: Right greater than left facet hypertrophy, resulting in mild to moderate right neural foraminal narrowing. No significant spinal canal stenosis or left neural foraminal narrowing. C3-C4: Left greater than right facet hypertrophy and left uncinate process hypertrophy. Findings result in partial effacement of the left lateral recess, severe left neural foraminal narrowing, and mild right neural femoral narrowing. No significant spinal canal stenosis. C4-C5: Right greater than left facet hypertrophy, resulting in severe right neural foraminal narrowing and mild to moderate left neural foraminal narrowing. No significant spinal canal stenosis. C5-C6: Postoperative changes with right facet hypertrophy and endplate osteophyte resulting in mild right neural foraminal narrowing but no significant spinal canal stenosis or left neural foraminal narrowing. C6-C7: Postoperative changes, without significant spinal canal stenosis or neural foraminal narrowing.  C7-T1: Bilateral facet hypertrophy, resulting in mild to moderate left neural foraminal narrowing and mild right neural foraminal narrowing.  The paraspinal soft tissues are unremarkable. Similar-appearing mild opacity in the right lung apex with associated bronchiectasis or cavitation.      Impression:  1. No acute fracture or subluxation of the cervical spine. 2. Stable postoperative changes from ACDF from C5-C7 with solid bony bridging and no evidence of hardware complications. 3. Multilevel cervical spondylosis, as above. 4. Partially imaged metastatic bone lesion in the right second rib and similar-appearing ill-defined right apical lung opacity.  This report was finalized on 8/14/2024 6:41 PM by Jan Vaz MD on Workstation: [id number]      XR Shoulder 2+ View Left    Result Date: 8/14/2024  Narrative: XR SHOULDER 2+ VW LEFT-  DATE OF EXAM: 8/14/2024 4:12 PM  INDICATION: Left shoulder pain. Pain worse after dog pulled patient a couple of days ago.  COMPARISON: CT chest 7/12/2024. PET/CT 5/3/2024.  TECHNIQUE: 3 views of the left shoulder were obtained.  FINDINGS: No evidence of acute fracture or dislocation. Postoperative changes in the greater tuberosity with surgical tunnels from rotator cuff repair. Mild glenohumeral joint DJD. Chronic appearing widening of the AC joint space, likely sequela of distal clavicle resection. Known T4 metastatic lesion is not well visualized. Partially imaged lower cervical spinal fusion hardware the included left lung is clear.      Impression: Mild DJD and postoperative changes, without radiographic evidence of acute fracture or dislocation of the left shoulder.  This report was finalized on 8/14/2024 4:34 PM by Jan Vaz MD on Workstation: [id number]      MRI Brain With & Without Contrast    Result Date: 8/6/2024  Narrative: MRI OF THE BRAIN WITH AND WITHOUT CONTRAST ON 08/03/2024  CLINICAL HISTORY: This is a 78-year-old male patient with a history of nonsquamous,  non-small cell neoplasm of lung with brain metastases.  TECHNIQUE: Axial T1, FLAIR, fat-suppressed T2, axial diffusion and gradient echo T2, sagittal T1 and postcontrast axial fat-suppressed T1 sagittal and coronal T1 and thin cut 1.5 mm thick postcontrast gradient echo MP rage T1-weighted images were obtained and reconstructed in sagittal axial and coronal imaging planes.  This is correlated to a prior MRI of the brain from [facility] on 05/31/2024.  FINDINGS: On the prior MRI of the brain on 05/31/2024 there were 5 enhancing lesions seen in the brain including a 4 mm enhancing metastasis to the superior medial right frontal cortex and the 3 mm enhancing mid to the posterior inferior left parietal cortex and a 3 mm enhancing metastasis to the anterior inferior left frontal cortex and a 4 mm enhancing metastasis to the medial right occipital cortex and a tiny 2 mm enhancing that in the medial left cerebellar white matter. On the current MRI these tiny enhancing brain lesions have essentially resolved with no discernible residual enhancing lesions identified in the brain parenchyma. There is very minimal T2 high signal in the periventricular white matter consistent with very minimal small vessel disease. The remainder of the brain parenchyma is normal in signal intensity. The ventricles are normal in size. I see no focal mass effect. There is no midline shift. No extra-axial fluid collections are identified. No abnormal areas of enhancement are seen in the brain. The paranasal sinuses and the mastoid air cells and the middle ear cavities are clear. The calvarium and the skull base demonstrate normal marrow signal intensity. The orbits are normal in appearance.      Impression:  1. There is very minimal small vessel disease in cerebral white matter. The remainder of the MRI of the brain is within normal limits. Specifically, the previously identified 5 tiny 2 to 4 mm enhancing lesions in the brain on  the MRI of the brain on 05/31/2024 including 2 in the left frontal lobe, 1 in the left parietal lobe another medial right occipital lobe and one in the medial left cerebellum have resolved in the interval with no discernible residual enhancing metastatic lesions identified in the brain. Continued followup contrast-enhanced MRI of the brain is recommended.  This report was finalized on 8/6/2024 7:19 AM by Dr. Cooper Walsh M.D on Workstation: [id number]                                  [employer] reviewed by Marco A Randolph MD       Medical Decision Making  Problems Addressed:  Acute pain of left shoulder: complicated acute illness or injury  Cervical radiculopathy: complicated acute illness or injury  Cervical spondylosis: complicated acute illness or injury  Malignant neoplasm metastatic to bone: complicated acute illness or injury    Amount and/or Complexity of Data Reviewed  Radiology: ordered.    Risk  Prescription drug management.        Final diagnoses:   Cervical radiculopathy   Acute pain of left shoulder   Cervical spondylosis   Malignant neoplasm metastatic to bone       ED Disposition  ED Disposition       ED Disposition   Discharge    Condition   Good    Comment   --               Sherry Mazariegos MD  [address]  [facility] [address]  [phone]    In 1 day      Louis Vizcaino MD  [address] [facility] [address]  [phone]    In 1 day           Medication List        New Prescriptions      methylPREDNISolone 4 MG dose pack  Commonly known as: MEDROL  Take as directed on package instructions.     naloxone 4 MG/0.1ML nasal spray  Commonly known as: NARCAN  Call 911. Don't prime. Biddle in 1 nostril for overdose. Repeat in 2-3 minutes in other nostril if no or minimal breathing/responsiveness.            Changed      * HYDROcodone-acetaminophen 5-325 MG per tablet  Commonly known as: NORCO  Take 1 tablet by mouth Every 6 (Six) Hours As Needed for Moderate Pain.  What  changed: Another medication with the same name was added. Make sure you understand how and when to take each.     * HYDROcodone-acetaminophen 5-325 MG per tablet  Commonly known as: NORCO  Take 1 tablet by mouth Every 6 (Six) Hours As Needed for Moderate Pain.  What changed: You were already taking a medication with the same name, and this prescription was added. Make sure you understand how and when to take each.           * This list has 2 medication(s) that are the same as other medications prescribed for you. Read the directions carefully, and ask your doctor or other care provider to review them with you.                   Where to Get Your Medications        These medications were sent to [facility] - [place], [place] - [address] [facility] - [phone]  - [phone]   [address] [facility], [facility] [address]      Phone: [phone]   HYDROcodone-acetaminophen 5-325 MG per tablet  methylPREDNISolone 4 MG dose pack  naloxone 4 MG/0.1ML nasal spray

## 2024-08-15 ENCOUNTER — CLINICAL SUPPORT (OUTPATIENT)
Dept: FAMILY MEDICINE CLINIC | Facility: CLINIC | Age: 79
End: 2024-08-15
Payer: MEDICARE

## 2024-08-15 PROCEDURE — 96372 THER/PROPH/DIAG INJ SC/IM: CPT | Performed by: FAMILY MEDICINE

## 2024-08-15 RX ADMIN — CYANOCOBALAMIN 1000 MCG: 1000 INJECTION, SOLUTION INTRAMUSCULAR; SUBCUTANEOUS at 10:42

## 2024-08-19 ENCOUNTER — PATIENT OUTREACH (OUTPATIENT)
Dept: OTHER | Facility: HOSPITAL | Age: 79
End: 2024-08-19
Payer: MEDICARE

## 2024-08-19 NOTE — PROGRESS NOTES
Reviewed chart. Patient with Stage IV lung cancer. Completed palliative radiation to T4, Continues Tagrisso 80 mg daily. Continues visits with [place]; seen in urgent care last week for shoulder pain. Sees orthopedist 8/29. Oncology APRN 9/5 with [facility]    Called patient.  We discussed his recent bone pain related to Zometa causing him, to go urgent care.  The patient states his pain improved several days after the infusion.  We discussed his next oncology APRN appt and Zometa.  Encouraged him to discuss the bone pain he experienced after his infusion with the oncology APRN. He plans to do this.    The patient is eating well. He denies any other questions, concerns or resource needs.    I will continue to follow; encouraged him to call as needed.

## 2024-08-22 ENCOUNTER — CLINICAL SUPPORT (OUTPATIENT)
Dept: ONCOLOGY | Facility: HOSPITAL | Age: 79
End: 2024-08-22
Payer: MEDICARE

## 2024-08-22 ENCOUNTER — LAB (OUTPATIENT)
Dept: LAB | Facility: HOSPITAL | Age: 79
End: 2024-08-22
Payer: MEDICARE

## 2024-08-22 DIAGNOSIS — C61 MALIGNANT NEOPLASM PROSTATE: ICD-10-CM

## 2024-08-22 DIAGNOSIS — C79.51 CANCER, METASTATIC TO BONE: ICD-10-CM

## 2024-08-22 LAB
BASOPHILS # BLD AUTO: 0.03 10*3/MM3 (ref 0–0.2)
BASOPHILS NFR BLD AUTO: 0.7 % (ref 0–1.5)
DEPRECATED RDW RBC AUTO: 40 FL (ref 37–54)
EOSINOPHIL # BLD AUTO: 0.06 10*3/MM3 (ref 0–0.4)
EOSINOPHIL NFR BLD AUTO: 1.5 % (ref 0.3–6.2)
ERYTHROCYTE [DISTWIDTH] IN BLOOD BY AUTOMATED COUNT: 12.9 % (ref 12.3–15.4)
HCT VFR BLD AUTO: 40.2 % (ref 37.5–51)
HGB BLD-MCNC: 13.7 G/DL (ref 13–17.7)
IMM GRANULOCYTES # BLD AUTO: 0.05 10*3/MM3 (ref 0–0.05)
IMM GRANULOCYTES NFR BLD AUTO: 1.2 % (ref 0–0.5)
LYMPHOCYTES # BLD AUTO: 1.01 10*3/MM3 (ref 0.7–3.1)
LYMPHOCYTES NFR BLD AUTO: 24.7 % (ref 19.6–45.3)
MCH RBC QN AUTO: 29.1 PG (ref 26.6–33)
MCHC RBC AUTO-ENTMCNC: 34.1 G/DL (ref 31.5–35.7)
MCV RBC AUTO: 85.5 FL (ref 79–97)
MONOCYTES # BLD AUTO: 0.34 10*3/MM3 (ref 0.1–0.9)
MONOCYTES NFR BLD AUTO: 8.3 % (ref 5–12)
NEUTROPHILS NFR BLD AUTO: 2.6 10*3/MM3 (ref 1.7–7)
NEUTROPHILS NFR BLD AUTO: 63.6 % (ref 42.7–76)
NRBC BLD AUTO-RTO: 0 /100 WBC (ref 0–0.2)
PLATELET # BLD AUTO: 83 10*3/MM3 (ref 140–450)
PMV BLD AUTO: 10.7 FL (ref 6–12)
RBC # BLD AUTO: 4.7 10*6/MM3 (ref 4.14–5.8)
WBC NRBC COR # BLD AUTO: 4.09 10*3/MM3 (ref 3.4–10.8)

## 2024-08-22 PROCEDURE — 36415 COLL VENOUS BLD VENIPUNCTURE: CPT

## 2024-08-22 PROCEDURE — 85025 COMPLETE CBC W/AUTO DIFF WBC: CPT

## 2024-08-22 NOTE — PROGRESS NOTES
Patient is here for lab review with RN.  CBC reviewed, counts are stable for this patient at this time. Patient has no complaints. Copy of labs given to patient and follow up appointment reviewed. Patient is instructed to call the office with any concerns or new symptoms prior to next visit. Patient verbalized understanding and discharged in stable condition. Katelyn Agrawal RN

## 2024-08-23 ENCOUNTER — SPECIALTY PHARMACY (OUTPATIENT)
Dept: PHARMACY | Facility: HOSPITAL | Age: 79
End: 2024-08-23
Payer: MEDICARE

## 2024-08-29 ENCOUNTER — OFFICE VISIT (OUTPATIENT)
Dept: ORTHOPEDIC SURGERY | Facility: CLINIC | Age: 79
End: 2024-08-29
Payer: MEDICARE

## 2024-08-29 VITALS — HEIGHT: 70 IN | TEMPERATURE: 97.7 F | BODY MASS INDEX: 30.31 KG/M2 | WEIGHT: 211.7 LBS

## 2024-08-29 DIAGNOSIS — M12.812 ROTATOR CUFF ARTHROPATHY OF LEFT SHOULDER: ICD-10-CM

## 2024-08-29 DIAGNOSIS — M19.012 OSTEOARTHRITIS OF LEFT SHOULDER, UNSPECIFIED OSTEOARTHRITIS TYPE: Primary | ICD-10-CM

## 2024-08-29 PROCEDURE — 1160F RVW MEDS BY RX/DR IN RCRD: CPT | Performed by: ORTHOPAEDIC SURGERY

## 2024-08-29 PROCEDURE — 99214 OFFICE O/P EST MOD 30 MIN: CPT | Performed by: ORTHOPAEDIC SURGERY

## 2024-08-29 PROCEDURE — 1159F MED LIST DOCD IN RCRD: CPT | Performed by: ORTHOPAEDIC SURGERY

## 2024-08-29 RX ORDER — NYSTATIN 100000/ML
SUSPENSION, ORAL (FINAL DOSE FORM) ORAL
COMMUNITY
Start: 2024-08-26

## 2024-08-29 NOTE — PROGRESS NOTES
General Exam    Patient: Nemesio Fitzpatrick    YOB: 1945    Medical Record Number: [id number]    Chief Complaints: Left shoulder pain   History of Present Illness:     78 y.o. male patient who presents for patient states he has had some discomfort for some time now and noticed some popping and clicking.  Does have history of a rotator cuff surgery.  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  He is been treated and states brain masses have resolved lung mass is decreased and will be following up with the other imaging study for his back to check tumor status.  States that shoulder does not bother him all the time when it is bothersome its more posterior and lateral.  Worse with certain activities particularly at the end of the day he notices it.  Denies it waking him up at night or significant increase in discomfort.    Denies any numbness or tingling.  Denies any fevers, cough or shortness of breath.    Allergies:   Allergies   Allergen Reactions    Iodinated Contrast Media Anaphylaxis     Patient had a severe reaction in the past / difficulty breathing    Iodine Unknown - High Severity     Difficulty breathing    Molds & Smuts Unknown - High Severity    Shellfish Allergy Unknown - High Severity     Burning inside    Amoxicillin Rash       Home Medications:      Current Outpatient Medications:     cyclobenzaprine (FLEXERIL) 5 MG tablet, Take 1 tablet by mouth 2 (Two) Times a Day As Needed for Muscle Spasms., Disp: 20 tablet, Rfl: 0    Diclofenac Sodium (VOLTAREN) 1 % gel gel, Apply 4 g topically to the appropriate area as directed 3 (Three) Times a Day., Disp: 100 g, Rfl: 2    gabapentin (NEURONTIN) 300 MG capsule, TAKE ONE CAPSULE BY MOUTH THREE TIMES DAILY, Disp: 90 capsule, Rfl: 2    HYDROcodone-acetaminophen (NORCO) 5-325 MG per tablet, Take 1 tablet by mouth Every 6 (Six) Hours As Needed for Moderate Pain., Disp: 30 tablet,  Rfl: 0    ibuprofen (ADVIL,MOTRIN) 200 MG tablet, Take 1 tablet by mouth Every 6 (Six) Hours As Needed for Mild Pain., Disp: , Rfl:     levothyroxine (SYNTHROID, LEVOTHROID) 75 MCG tablet, , Disp: , Rfl:     loratadine (CLARITIN) 10 MG tablet, Take 1 tablet by mouth Daily., Disp: , Rfl:     LORazepam (ATIVAN) 0.5 MG tablet, TAKE ONE TABLET BY MOUTH EVERY 8 HOURS AS NEEDED FOR ANXIETY, Disp: 30 tablet, Rfl: 1    naloxone (NARCAN) 4 MG/0.1ML nasal spray, Call 911. Don't prime. [place] in 1 nostril for overdose. Repeat in 2-3 minutes in other nostril if no or minimal breathing/responsiveness., Disp: 2 each, Rfl: 0    naproxen sodium (ALEVE) 220 MG tablet, Take 1 tablet by mouth 2 (Two) Times a Day As Needed., Disp: , Rfl:     Norvasc 10 MG tablet, Take 1 tablet by mouth Daily., Disp: , Rfl:     nystatin (MYCOSTATIN) 100,000 unit/mL suspension, , Disp: , Rfl:     ondansetron (ZOFRAN) 8 MG tablet, Take 1 tablet by mouth 3 (Three) Times a Day As Needed for Nausea or Vomiting., Disp: 30 tablet, Rfl: 5    Osimertinib Mesylate 80 MG tablet, Take 1 tablet by mouth Daily., Disp: 30 tablet, Rfl: 5    Current Facility-Administered Medications:     cyanocobalamin injection 1,000 mcg, 1,000 mcg, Intramuscular, Q28 Days, Sherry Mazariegos MD, 1,000 mcg at 08/15/24 1042    Past Medical History:   Diagnosis Date    Acute bronchitis     Allergic Iodine    Allergic rhinitis     Anxiety 2024    Due to sudden cancer diagnosis    Arthritis 2000    BPH (benign prostatic hypertrophy)     Cervical disc disorder 1987    Dislocation, shoulder 1973    Elevated prostate specific antigen (PSA)     Headache 1990    History of bone scan 10/21/2010    normal    History of colonoscopy     never    History of EKG 03/12/2012    also 11-; T wave abnormality    History of medical problems 1978    HL (hearing loss) 2000    Hyperlipidemia     Hypertension 2024    Upon learning of Stage 4 cancer    Hypothyroidism     IFG (impaired fasting glucose)      Kidney calculus     left ureteral stone    Knee swelling 2015    Low back pain     Lung cancer     Right    Malignant neoplasm prostate 2009    Dr. Mcclelland; lymph nodes negative margins clear    Prostate nodule     right lobe    Rotator cuff syndrome 2009    Scoliosis 2009    Screening for prostate cancer     prostatectomy    Sleep apnea     CPAP machine    URI (upper respiratory infection)        Past Surgical History:   Procedure Laterality Date    CERVICAL DISC SURGERY  1990    also ; C5-6, C6-7    HAND SURGERY Left     trigger finger release; left middle finger    NECK SURGERY  ,     Cervical disk    PROSTATECTOMY  2009    RHINOPLASTY  1985    ROTATOR CUFF REPAIR      SHOULDER SURGERY Left 2009    Dr. KRISTEL Jimenez; rotator cuff repair; bone spurs     SPINE SURGERY  2005    TRIGGER POINT INJECTION  ,,    URETERAL STENT INSERTION Left 2013    Dr. Martinez       Social History     Occupational History     Employer: RETIRED   Tobacco Use    Smoking status: Former     Current packs/day: 0.00     Average packs/day: 1 pack/day for 22.0 years (22.0 ttl pk-yrs)     Types: Cigarettes, Pipe     Start date: 1967     Quit date: 1988     Years since quittin.6     Passive exposure: Past    Smokeless tobacco: Never    Tobacco comments:     Quit smoking pipe around    Vaping Use    Vaping status: Never Used   Substance and Sexual Activity    Alcohol use: Yes     Comment: Rarely will drink one glass of wine    Drug use: Never    Sexual activity: Not Currently     Partners: Female      Social History     Social History Narrative    Not on file       Family History   Problem Relation Age of Onset    Diabetes Mother     Kidney disease Mother         calculus    Hearing loss Mother     Prostate cancer Father     Stomach cancer Father     Cancer Sister         breast    Hypothyroidism Sister     Kidney disease Sister         calculus    Arthritis Sister      "Transient ischemic attack Brother     Alzheimer's disease Other         uncle    Diabetes Other         uncle    Arthritis Brother        Review of Systems:      Constitutional: Denies fever, shaking or chills         All other pertinent positives and negatives as noted above in HPI.    Physical Exam: 78 y.o. male    Vitals:    08/29/24 0923   Temp: 97.7 °F (36.5 °C)   TempSrc: Temporal   Weight: 96 kg (211 lb 11.2 oz)   Height: 177.8 cm (70\")       General:  Patient is awake and alert.  Appears in no acute distress or discomfort.      Musculoskeletal/Extremities:    Left lower extremity examined no overt tenderness palpation overall full painless range of motion.  Motor and sensory intact distally.         Radiology:       Previous films reviewed to evaluate the patient's complaint/s.     No imaging for comparison.    Assessment: Left shoulder osteoarthritis, history of rotator cuff tear with possible subsequent rotator cuff arthropathy      Plan:      I discussed the findings with the patient and things more arthritic in nature as well as some rotator cuff involvement as noted above.  Given the fact that he did have metastatic disease I told him to fully rule out any soft tissue involvement MRI could be warranted he would like to hold on that.  He did have a CT of the chest which did incorporate both shoulders there was no mention of soft tissue abnormality on the scans.  X-ray findings were also devoid of any bony or os abnormality that can be seen.  The imaging of the shoulder was more consistent with degenerative changes and history of rotator cuff repair.  Told the patient that if symptoms change or worsen or he does have more concern on MRI could provide more information.  At this time we will plan conservative treatment and will be following up with his other specialist.           We will plan for follow up as above.    All questions were answered.  Patient understands and agrees with the plan.    Lev " MD Sherry    08/29/2024    CC to Sherry Mazariegos MD       Answers submitted by the patient for this visit:  Primary Reason for Visit (Submitted on 8/28/2024)  What is the primary reason for your visit?: Extremity Pain  Lower Extremity Injury Questionnaire (Submitted on 8/28/2024)  Chief Complaint: Extremity pain  Injury: No  Pain location: left shoulder  Pain quality: other  Pain - numeric: 5/10  Progression since onset: unchanged  numbness: Yes  difficulty holding things: Yes  upper extremity swelling: No  redness: No

## 2024-09-03 ENCOUNTER — HOSPITAL ENCOUNTER (OUTPATIENT)
Dept: CARDIOLOGY | Facility: HOSPITAL | Age: 79
Discharge: HOME | End: 2024-09-03
Admitting: STUDENT IN AN ORGANIZED HEALTH CARE EDUCATION/TRAINING PROGRAM
Payer: MEDICARE

## 2024-09-03 DIAGNOSIS — I65.01 VERTEBRAL ARTERY OCCLUSION, RIGHT: ICD-10-CM

## 2024-09-03 DIAGNOSIS — I65.21 STENOSIS OF RIGHT CAROTID ARTERY: ICD-10-CM

## 2024-09-03 LAB
BH CV XLRA MEAS LEFT DIST CCA EDV: 19.6 CM/SEC
BH CV XLRA MEAS LEFT DIST CCA PSV: 100.3 CM/SEC
BH CV XLRA MEAS LEFT DIST ICA EDV: -30.7 CM/SEC
BH CV XLRA MEAS LEFT DIST ICA PSV: -90.5 CM/SEC
BH CV XLRA MEAS LEFT ICA/CCA RATIO: 0.9
BH CV XLRA MEAS LEFT MID ICA EDV: -28.5 CM/SEC
BH CV XLRA MEAS LEFT MID ICA PSV: -90.5 CM/SEC
BH CV XLRA MEAS LEFT PROX CCA EDV: 21.2 CM/SEC
BH CV XLRA MEAS LEFT PROX CCA PSV: 101 CM/SEC
BH CV XLRA MEAS LEFT PROX ICA EDV: 17.4 CM/SEC
BH CV XLRA MEAS LEFT PROX ICA PSV: 65.2 CM/SEC
BH CV XLRA MEAS LEFT PROX SCLA PSV: 93.3 CM/SEC
BH CV XLRA MEAS LEFT VERTEBRAL A EDV: 18.6 CM/SEC
BH CV XLRA MEAS LEFT VERTEBRAL A PSV: 51.6 CM/SEC
BH CV XLRA MEAS RIGHT DIST CCA EDV: 19.9 CM/SEC
BH CV XLRA MEAS RIGHT DIST CCA PSV: 110 CM/SEC
BH CV XLRA MEAS RIGHT DIST ICA EDV: 35.8 CM/SEC
BH CV XLRA MEAS RIGHT DIST ICA PSV: 119.1 CM/SEC
BH CV XLRA MEAS RIGHT ICA/CCA RATIO: 3.44
BH CV XLRA MEAS RIGHT MID ICA EDV: 44.5 CM/SEC
BH CV XLRA MEAS RIGHT MID ICA PSV: 195.6 CM/SEC
BH CV XLRA MEAS RIGHT PROX CCA EDV: 16.5 CM/SEC
BH CV XLRA MEAS RIGHT PROX CCA PSV: 165.5 CM/SEC
BH CV XLRA MEAS RIGHT PROX ECA EDV: 15.5 CM/SEC
BH CV XLRA MEAS RIGHT PROX ECA PSV: 169.5 CM/SEC
BH CV XLRA MEAS RIGHT PROX ICA EDV: 93 CM/SEC
BH CV XLRA MEAS RIGHT PROX ICA PSV: 378 CM/SEC
BH CV XLRA MEAS RIGHT VERTEBRAL A PSV: 56.4 CM/SEC

## 2024-09-03 PROCEDURE — 93880 EXTRACRANIAL BILAT STUDY: CPT | Performed by: SURGERY

## 2024-09-03 PROCEDURE — 93880 EXTRACRANIAL BILAT STUDY: CPT

## 2024-09-04 NOTE — PROGRESS NOTES
Subjective     REASON FOR CONSULTATION:  spine mass  Provide an opinion on any further workup or treatment                             REQUESTING PRACTITIONER: Chandrakant    RECORDS OBTAINED:  Records of the patients history including those obtained from the referring provider were reviewed and summarized in detail.      History of Present Illness:  He is doing so well. He reports feeling the best he has in a long time and is extremely grateful. He denies shortness of breath, cough, nausea/vomiting/diarrhea, skin rash. Appetite remains good.       ONCOLOGY HISTORY:  This is a pleasant 78-year-old man with impaired fasting glucose, hyperlipidemia, hypothyroidism and history of prostate cancer.  The patient has been experiencing about a 1 year history of progressive back pain in the upper mid thoracic spine stabbing in nature and radiating bilaterally anteriorly.  Symptoms are worse when the patient lays down to rest at night.  He has also had some pain around the right shoulder blade.  He was treated with PT with no improvement.  The patient has also been having left hip pain.  The patient was evaluated by orthopedic surgery and an MRI of the thoracic spine without contrast was performed on 4/25/2024 and showed a large expansile marrow replacing mass along the anterior T4 vertebral body 3.2 x 2.1 cm extending into the anterior paraspinal soft tissue along the T3-T4 space with reactive bone marrow edema.  Another partially visualized expansile mass was seen in the posterior right second rib 2.7 x 1.5 cm and another in the posterior sixth rib 1 cm with surrounding bone marrow edema.  At T7-T8 there is a posterior disc protrusion with mild to moderate canal stenosis.  In the posterior right lung a 3.5 cm mass was noted with additional small nodules also seen but poorly characterized.    The patient denies weight loss, night sweats, shortness of breath, cough, hemoptysis, headaches, confusion, changes in swallowing bowel  habits urinary habits.  He has history of prostate cancer treated with prostatectomy in 2009 and reports negligible PSA values.    The patient has history of light smoking during his 20s and 30s.  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    A full body PET scan was performed on 5/3/2024 which showed a hypermetabolic mass in the superior segment of the right lower lobe 3.7 x 2.8 cm SUV 9.3, no hypermetabolic mediastinal or hilar lymph nodes but multiple hypermetabolic bone lesions largest being at T4 vertebral body SUV 9.4, posterior lateral right second rib, posterior right sixth rib, left aspect of the sacrum to the left of the S1 neural foramen.  Spleen was mildly enlarged 15 cm but less activity than liver.    A CT-guided needle biopsy of a rib mass was performed on 5/10/2024 and showed metastatic adenocarcinoma immunohistochemistry supporting a pulmonary primary.    The patient was seen by radiation oncology with plans to treat at least palliatively to T4 and rib lesions possible additional sites pending peer review of case.    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    MRI brain 5/31/2024 showed multiple supratentorial and infratentorial lesions consistent with metastatic disease largest 4-4.5 mm in size.    The patient initiated Tagrisso 6/5/2024.  He was treated with radiation therapy to T4.      Past Medical History:   Diagnosis Date    Acute bronchitis     Allergic Iodine    Allergic rhinitis     Anxiety 2024    Due to sudden cancer diagnosis    Arthritis 2000    BPH (benign prostatic hypertrophy)     Cervical disc disorder 1987    Dislocation, shoulder 1973    Elevated prostate specific antigen (PSA)     Headache 1990    History of bone scan 10/21/2010    normal    History of colonoscopy     never    History of EKG 03/12/2012    also 11-; T wave abnormality    History of medical problems 1978    HL (hearing loss) 2000    Hyperlipidemia      Hypertension 2024    Upon learning of Stage 4 cancer    Hypothyroidism     IFG (impaired fasting glucose)     Kidney calculus     left ureteral stone    Knee swelling 2015    Low back pain     Lung cancer 2024    Right    Malignant neoplasm prostate 11/09/2009    Dr. Mcclelland; lymph nodes negative margins clear    Prostate nodule     right lobe    Rotator cuff syndrome 2009    Scoliosis 2009    Screening for prostate cancer     prostatectomy    Sleep apnea     CPAP machine    URI (upper respiratory infection)         Past Surgical History:   Procedure Laterality Date    CERVICAL DISC SURGERY  1990    also 2006; C5-6, C6-7    HAND SURGERY Left 2001    trigger finger release; left middle finger    NECK SURGERY  1988, 2005    Cervical disk    PROSTATECTOMY  11/09/2009    RHINOPLASTY  1985    ROTATOR CUFF REPAIR      SHOULDER SURGERY Left 09/29/2009    Dr. RKISTEL Jimenez; rotator cuff repair; bone spurs     SPINE SURGERY  2005    TRIGGER POINT INJECTION  2008,2021,2022    URETERAL STENT INSERTION Left 12/04/2013    Dr. Martinez        Current Outpatient Medications on File Prior to Visit   Medication Sig Dispense Refill    cyclobenzaprine (FLEXERIL) 5 MG tablet Take 1 tablet by mouth 2 (Two) Times a Day As Needed for Muscle Spasms. 20 tablet 0    Diclofenac Sodium (VOLTAREN) 1 % gel gel Apply 4 g topically to the appropriate area as directed 3 (Three) Times a Day. 100 g 2    gabapentin (NEURONTIN) 300 MG capsule TAKE ONE CAPSULE BY MOUTH THREE TIMES DAILY 90 capsule 2    HYDROcodone-acetaminophen (NORCO) 5-325 MG per tablet Take 1 tablet by mouth Every 6 (Six) Hours As Needed for Moderate Pain. 30 tablet 0    ibuprofen (ADVIL,MOTRIN) 200 MG tablet Take 1 tablet by mouth Every 6 (Six) Hours As Needed for Mild Pain.      levothyroxine (SYNTHROID, LEVOTHROID) 75 MCG tablet       loratadine (CLARITIN) 10 MG tablet Take 1 tablet by mouth Daily.      LORazepam (ATIVAN) 0.5 MG tablet TAKE ONE TABLET BY MOUTH EVERY 8 HOURS  AS NEEDED FOR ANXIETY 30 tablet 1    naloxone (NARCAN) 4 MG/0.1ML nasal spray Call 911. Don't prime. Metcalf in 1 nostril for overdose. Repeat in 2-3 minutes in other nostril if no or minimal breathing/responsiveness. 2 each 0    naproxen sodium (ALEVE) 220 MG tablet Take 1 tablet by mouth 2 (Two) Times a Day As Needed.      Norvasc 10 MG tablet Take 1 tablet by mouth Daily.      nystatin (MYCOSTATIN) 100,000 unit/mL suspension       ondansetron (ZOFRAN) 8 MG tablet Take 1 tablet by mouth 3 (Three) Times a Day As Needed for Nausea or Vomiting. 30 tablet 5    Osimertinib Mesylate 80 MG tablet Take 1 tablet by mouth Daily. 30 tablet 5     Current Facility-Administered Medications on File Prior to Visit   Medication Dose Route Frequency Provider Last Rate Last Admin    cyanocobalamin injection 1,000 mcg  1,000 mcg Intramuscular Q28 Days Sherry Mazariegos MD   1,000 mcg at 08/15/24 1042        ALLERGIES:    Allergies   Allergen Reactions    Iodinated Contrast Media Anaphylaxis     Patient had a severe reaction in the past / difficulty breathing    Iodine Unknown - High Severity     Difficulty breathing    Molds & Smuts Unknown - High Severity    Shellfish Allergy Unknown - High Severity     Burning inside    Amoxicillin Rash        Social History     Socioeconomic History    Marital status:      Spouse name: Faith   Tobacco Use    Smoking status: Former     Current packs/day: 0.00     Average packs/day: 1 pack/day for 22.0 years (22.0 ttl pk-yrs)     Types: Cigarettes, Pipe     Start date: 1967     Quit date: 1988     Years since quittin.7     Passive exposure: Past    Smokeless tobacco: Never    Tobacco comments:     Quit smoking pipe around    Vaping Use    Vaping status: Never Used   Substance and Sexual Activity    Alcohol use: Yes     Comment: Rarely will drink one glass of wine    Drug use: Never    Sexual activity: Not Currently     Partners: Female        Family History   Problem  Relation Age of Onset    Diabetes Mother     Kidney disease Mother         calculus    Hearing loss Mother     Prostate cancer Father     Stomach cancer Father     Cancer Sister         breast    Hypothyroidism Sister     Kidney disease Sister         calculus    Arthritis Sister     Transient ischemic attack Brother     Alzheimer's disease Other         uncle    Diabetes Other         uncle    Arthritis Brother         Objective     Vitals:    09/05/24 1049   BP: 158/71   Pulse: 68   Temp: 98.1 °F (36.7 °C)   TempSrc: Oral   SpO2: 98%   Weight: 97.6 kg (215 lb 1.6 oz)   PainSc: 0-No pain               8/8/2024     8:16 AM   Current Status   ECOG score 0       Physical Exam    CONSTITUTIONAL: Pleasant well- developed man   HEENT: no icterus, no thrush, moist membranes  LYMPH: no cervical or supraclavicular lad  CV: RRR, S1S2, no murmur  RESP: cta bilat, no wheezing, no rales  GI: soft, non-tender, no splenomegaly, +bs  MUSC: no edema, normal gait, improved point tenderness over the T4 vertebral body  NEURO: Alert and oritented x3, normal strength.   PSYCH: Normal mood and affect.       RECENT LABS:  Hematology Results from last 7 days   Lab Units 09/05/24  1039   WBC 10*3/mm3 3.31*   NEUTROS ABS 10*3/mm3 2.13   HEMOGLOBIN g/dL 12.9*   HEMATOCRIT % 38.2   PLATELETS 10*3/mm3 81*                     MRI Thoracic Spine:  MPRESSION:     1. Large expansile mass in the anterior T4 vertebral body extending into  the adjacent soft tissues and involving greater than 50% of the  vertebral body, consistent with metastatic disease. Surrounding bone  marrow edema like signal throughout the T4 vertebral body and patchy  bone marrow edema like signal in the adjacent anterior T3 inferior  endplate and anterior T5 superior endplate. This lesion is at risk for  pathologic fracture.  2. Expansile mass in the posterior right second rib and focal 1 cm mass  in the posterior right sixth rib, consistent with metastatic disease.  3.  Partially imaged pulmonary malignant/metastatic disease. Recommend  further evaluation with CT with contrast and/or PET/CT.    PET:  TECHNIQUE: Radiation dose reduction techniques were utilized, including  automated exposure control and exposure modulation based on body size.   Blood glucose level at time of injection was 123 mg/dL. 6.8 mCi of F-18  FDG were injected and PET was performed from skull base to mid thigh. CT  was obtained for localization and attenuation correction. Time at  injection 8:43 a.m. PET start time 10:01 a.m. Normalization method:  patient weight. Compared with thoracic MRI 04/25/2024.     FINDINGS: Mediastinal blood pool has a maximal SUV of 2.8.     1. There is a hypermetabolic 3.7 x 2.8 cm irregular mass at the superior  segment of the right lower lobe with a maximal SUV of 9.3. There is no  hypermetabolic hilar or mediastinal lymphadenopathy, but there are  multiple hypermetabolic bone lesions. The largest is at the T4 vertebral  body with a maximal SUV of 9.4. Lytic expansile metastases are also seen  at the posterior and lateral aspects of the right 2nd rib, posterior  right 6th rib, and left aspect of the sacrum anterior to the left S1  neural foramen.   The activity adjacent to the left greater trochanter is likely related  to tendinopathy.     2. There is no hypermetabolic lymphadenopathy or suspicious activity  within the neck. There is no suspicious visceral activity within the  abdomen or pelvis.  The spleen is mildly enlarged measuring 15 cm in diameter. Splenic  activity is less intense than that of the liver.    CT Angiogram Neck (07/12/2024 10:35)  CT Angiogram Head (07/12/2024 10:35)  IMPRESSION:  1.  A 65% stenosis involving the right internal carotid artery  proximally is appreciated using NASCET criteria due to eccentric  noncalcified plaque.  2.  The right vertebral artery is occluded just proximal to the dura.  The posterior inferior cerebellar artery on the right is  opacified via  retrograde flow.  3.  A moderate stenosis involving the left P1 segment is appreciated.  4.  A lytic lesion is noted at T4, only partially visualized. No obvious  enhancing lesion involving the brain is identified. A MRI examination of  the brain with and without contrast would be more sensitive for  evaluation of metastatic disease.    CT Abdomen Pelvis With Contrast (07/12/2024 10:35)  CT Chest With Contrast Diagnostic (07/12/2024 10:35)  IMPRESSION:  1.  A 65% stenosis involving the right internal carotid artery  proximally is appreciated using NASCET criteria due to eccentric  noncalcified plaque.  2.  The right vertebral artery is occluded just proximal to the dura.  The posterior inferior cerebellar artery on the right is opacified via  retrograde flow.  3.  A moderate stenosis involving the left P1 segment is appreciated.  4.  A lytic lesion is noted at T4, only partially visualized. No obvious  enhancing lesion involving the brain is identified. A MRI examination of  the brain with and without contrast would be more sensitive for  evaluation of metastatic disease.    MRI Brain With & Without Contrast (08/03/2024 12:31)  IMPRESSION:  There is very minimal small vessel disease in cerebral white  matter. The remainder of the MRI of the brain is within normal limits.  Specifically, the previously identified 5 tiny 2 to 4 mm enhancing  lesions in the brain on the MRI of the brain on 05/31/2024 including 2  in the left frontal lobe, 1 in the left parietal lobe another medial  right occipital lobe and one in the medial left cerebellum have resolved  in the interval with no discernible residual enhancing metastatic  lesions identified in the brain. Continued followup contrast-enhanced  MRI of the brain is recommended.    Assessment & Plan     *Stage IV adenocarcinoma, lung primary  Patient presented with back and chest wall pain, imaging showed and expansile mass T4 vertebral body, posterior right  second rib and posterior right sixth rib  PET scan was performed on 5/3/2024 which showed a hypermetabolic mass in the superior segment of the right lower lobe 3.7 x 2.8 cm SUV 9.3, no hypermetabolic mediastinal or hilar lymph nodes but multiple hypermetabolic bone lesions largest being at T4 vertebral body SUV 9.4, posterior lateral right second rib, posterior right sixth rib, left aspect of the sacrum to the left of the S1 neural foramen.  Spleen was mildly enlarged 15 cm but less activity than liver.  CT-guided needle biopsy of a rib mass was performed on 5/10/2024 and showed metastatic adenocarcinoma immunohistochemistry supporting a pulmonary primary  The patient's next generation sequencing returned with a EGFR exon 19 and frame deletion.  Initiated Tagrisso 80 mg daily on 6/5/2024 7/12/2024-CT chest abdomen pelvis-decreased size of the right upper lobe mass, stable osseous metastatic disease, stable solid and mixed groundglass right lung apex  9/5/2024: Tolerating Tagrisso well. Continue 80mg daily.     *Brain metastases  5/31/2024-MRI brain showed multifocal supratentorial and infratentorial enhancing lesions, largest 4-4.5 mm in size-plan to monitor response to Tagrisso  8/3/2024 MRI brain-minimal small vessel disease, resolution of previous enhancing lesions    *Pain of malignancy  The patient has available Hammond 5/325 1 p.o. every 6 hours as needed pain  Status post radiation to T4 with improved pain    *Thrombocytopenia secondary to Tagrisso-plt 81  9/5/2024: Platelets stable at 81. Instructed to call if develops symptoms such as nosebleed or petechiae.     *Significant anxiety related to malignancy  The patient is being followed by the psychosocial clinic    *Bone metastases   9/5/2024: Experienced aches and pains with last zometa dose. Instructed to start Claritin. Zometa held today due to phosphorus of 1.9. Encouraged increase in dietary phosphorus such as dairy products.     *Decreased flow right  vertebral artery by MRI  7/12/2024 CT angiogram head and neck-65% stenosis of the right internal carotid artery, right vertebral artery occluded proximal to the dura with retrograde flow through the PICA, moderate stenosis P1, lytic lesion T4  Vascular surgery recommended to begin aspirin 81 mg daily      *History of prostate cancer status post prostatectomy 2009    Plan:   Continue Tagrisso 80 mg daily.   Zometa held today for phosphorus of 1.9  Start claritin for joint aches following previous zometa infusion.   Continue aspirin 81mg daily.   Return to clinic in 4 weeks for NP or MD visit, cbc/cmp/mag/phos, and Zometa   Plan for CT chest abdomen pelvis October/November  Instructed to call/routine sooner with new concerns.       Sanjana Talbert, APRN

## 2024-09-05 ENCOUNTER — INFUSION (OUTPATIENT)
Dept: ONCOLOGY | Facility: HOSPITAL | Age: 79
End: 2024-09-05
Payer: MEDICARE

## 2024-09-05 ENCOUNTER — OFFICE VISIT (OUTPATIENT)
Dept: ONCOLOGY | Facility: CLINIC | Age: 79
End: 2024-09-05
Payer: MEDICARE

## 2024-09-05 VITALS
OXYGEN SATURATION: 98 % | DIASTOLIC BLOOD PRESSURE: 71 MMHG | WEIGHT: 215.1 LBS | HEART RATE: 68 BPM | TEMPERATURE: 98.1 F | BODY MASS INDEX: 30.86 KG/M2 | SYSTOLIC BLOOD PRESSURE: 158 MMHG

## 2024-09-05 DIAGNOSIS — C34.90 PRIMARY NONSQUAMOUS NONSMALL CELL NEOPLASM OF LUNG: Primary | ICD-10-CM

## 2024-09-05 DIAGNOSIS — C61 MALIGNANT NEOPLASM PROSTATE: ICD-10-CM

## 2024-09-05 DIAGNOSIS — C79.51 CANCER, METASTATIC TO BONE: Primary | ICD-10-CM

## 2024-09-05 DIAGNOSIS — Z79.899 HIGH RISK MEDICATION USE: ICD-10-CM

## 2024-09-05 DIAGNOSIS — C79.51 CANCER, METASTATIC TO BONE: ICD-10-CM

## 2024-09-05 LAB
ALBUMIN SERPL-MCNC: 4.5 G/DL (ref 3.5–5.2)
ALBUMIN/GLOB SERPL: 1.7 G/DL
ALP SERPL-CCNC: 76 U/L (ref 39–117)
ALT SERPL W P-5'-P-CCNC: 38 U/L (ref 1–41)
ANION GAP SERPL CALCULATED.3IONS-SCNC: 10.4 MMOL/L (ref 5–15)
AST SERPL-CCNC: 37 U/L (ref 1–40)
BASOPHILS # BLD AUTO: 0.03 10*3/MM3 (ref 0–0.2)
BASOPHILS NFR BLD AUTO: 0.9 % (ref 0–1.5)
BILIRUB SERPL-MCNC: 0.6 MG/DL (ref 0–1.2)
BUN SERPL-MCNC: 18 MG/DL (ref 8–23)
BUN/CREAT SERPL: 17.3 (ref 7–25)
CALCIUM SPEC-SCNC: 8.8 MG/DL (ref 8.6–10.5)
CHLORIDE SERPL-SCNC: 106 MMOL/L (ref 98–107)
CO2 SERPL-SCNC: 25.6 MMOL/L (ref 22–29)
CREAT SERPL-MCNC: 1.04 MG/DL (ref 0.76–1.27)
DEPRECATED RDW RBC AUTO: 42.6 FL (ref 37–54)
EGFRCR SERPLBLD CKD-EPI 2021: 74.4 ML/MIN/1.73
EOSINOPHIL # BLD AUTO: 0.06 10*3/MM3 (ref 0–0.4)
EOSINOPHIL NFR BLD AUTO: 1.8 % (ref 0.3–6.2)
ERYTHROCYTE [DISTWIDTH] IN BLOOD BY AUTOMATED COUNT: 13.2 % (ref 12.3–15.4)
GLOBULIN UR ELPH-MCNC: 2.7 GM/DL
GLUCOSE SERPL-MCNC: 130 MG/DL (ref 65–99)
HCT VFR BLD AUTO: 38.2 % (ref 37.5–51)
HGB BLD-MCNC: 12.9 G/DL (ref 13–17.7)
IMM GRANULOCYTES # BLD AUTO: 0.02 10*3/MM3 (ref 0–0.05)
IMM GRANULOCYTES NFR BLD AUTO: 0.6 % (ref 0–0.5)
LYMPHOCYTES # BLD AUTO: 0.76 10*3/MM3 (ref 0.7–3.1)
LYMPHOCYTES NFR BLD AUTO: 23 % (ref 19.6–45.3)
MAGNESIUM SERPL-MCNC: 2 MG/DL (ref 1.6–2.4)
MCH RBC QN AUTO: 29.9 PG (ref 26.6–33)
MCHC RBC AUTO-ENTMCNC: 33.8 G/DL (ref 31.5–35.7)
MCV RBC AUTO: 88.4 FL (ref 79–97)
MONOCYTES # BLD AUTO: 0.31 10*3/MM3 (ref 0.1–0.9)
MONOCYTES NFR BLD AUTO: 9.4 % (ref 5–12)
NEUTROPHILS NFR BLD AUTO: 2.13 10*3/MM3 (ref 1.7–7)
NEUTROPHILS NFR BLD AUTO: 64.3 % (ref 42.7–76)
NRBC BLD AUTO-RTO: 0 /100 WBC (ref 0–0.2)
PHOSPHATE SERPL-MCNC: 1.9 MG/DL (ref 2.5–4.5)
PLATELET # BLD AUTO: 81 10*3/MM3 (ref 140–450)
PMV BLD AUTO: 10.5 FL (ref 6–12)
POTASSIUM SERPL-SCNC: 3.5 MMOL/L (ref 3.5–5.2)
PROT SERPL-MCNC: 7.2 G/DL (ref 6–8.5)
RBC # BLD AUTO: 4.32 10*6/MM3 (ref 4.14–5.8)
SODIUM SERPL-SCNC: 142 MMOL/L (ref 136–145)
WBC NRBC COR # BLD AUTO: 3.31 10*3/MM3 (ref 3.4–10.8)

## 2024-09-05 PROCEDURE — 83735 ASSAY OF MAGNESIUM: CPT

## 2024-09-05 PROCEDURE — 85025 COMPLETE CBC W/AUTO DIFF WBC: CPT

## 2024-09-05 PROCEDURE — G0463 HOSPITAL OUTPT CLINIC VISIT: HCPCS

## 2024-09-05 PROCEDURE — 84100 ASSAY OF PHOSPHORUS: CPT

## 2024-09-05 PROCEDURE — 80053 COMPREHEN METABOLIC PANEL: CPT

## 2024-09-06 ENCOUNTER — SPECIALTY PHARMACY (OUTPATIENT)
Dept: PHARMACY | Facility: HOSPITAL | Age: 79
End: 2024-09-06
Payer: MEDICARE

## 2024-09-06 NOTE — PROGRESS NOTES
Specialty Pharmacy Note: Tagrisso (osimertinib)    Nemesio Fitzpatrick is a 78 y.o. male with stage IV adenocarcinoma of lung was seen 9/5/24 by APRN. Per provider dictation, no changes to oral oncology regimen Tagrisso (osimertinib).  Labs Review: The CMP and CBC from 9/5/24 have been reviewed. No dose adjustments are needed for the oral specialty medication(s) based on the labs.    Specialty pharmacy will continue to follow patient.    Vesna Moreira Rph, BCOP  9/6/2024  08:29 EDT

## 2024-09-09 ENCOUNTER — SPECIALTY PHARMACY (OUTPATIENT)
Dept: PHARMACY | Facility: HOSPITAL | Age: 79
End: 2024-09-09
Payer: MEDICARE

## 2024-09-09 NOTE — PROGRESS NOTES
Specialty Pharmacy Patient Management Program  Oncology / Hematology Refill Outreach      Nemesio was contacted today regarding refills of his medication(s).    Specialty medication(s) and dose(s) confirmed: Tagrisso    Refill Questions      Flowsheet Row Most Recent Value   Changes to allergies? No   Changes to medications? No   New conditions or infections since last clinic visit No   Unplanned office visit, urgent care, ED, or hospital admission in the last 4 weeks  No   How does patient/caregiver feel medication is working? Good   Financial problems or insurance changes  No   Since the previous refill, were any specialty medication doses or scheduled injections missed or delayed?  No   Does this patient require a clinical escalation to a pharmacist? No          Delivery Questions      Flowsheet Row Most Recent Value   Delivery method [employer]   Delivery address verified with patient/caregiver? Yes   Delivery address Home   Number of medications in delivery 1   Medication(s) being filled and delivered Osimertinib Mesylate   Doses left of specialty medications 16   Copay verified? Yes   Copay amount $0   Copay form of payment No copayment ($0)   Ship Date 9/12   Delivery Date 9/13   Signature Required No            Follow-Up: 25 d [USUALLY TIME FRAME UNTIL NEXT REFILL OUTREACH FOLLOW-UP (APPROX) Ex. 25d, 85d, etc. ]    Blanca Jacobson, Pharmacy Technician  9/9/2024  16:43 EDT

## 2024-09-10 ENCOUNTER — SPECIALTY PHARMACY (OUTPATIENT)
Dept: PHARMACY | Facility: HOSPITAL | Age: 79
End: 2024-09-10
Payer: MEDICARE

## 2024-09-10 NOTE — PROGRESS NOTES
Specialty Pharmacy Patient Management Program  Oncology Reassessment     Nemesio Fitzpatrick was referred by an their provider to the Oncology Patient Management program offered by [facility] for adenocarcinoma of the lung. A follow-up outreach was conducted, including assessment of continued therapy appropriateness, medication adherence, and side effect incidence and management for Tagrisso 80 mg po daily.    Changes to Insurance Coverage or Financial Support  None at this time    Relevant Past Medical History and Comorbidities  Relevant medical history and concomitant health conditions were discussed with the patient. The patient's chart has been reviewed for relevant past medical history and comorbid health conditions and updated as necessary.   Past Medical History:   Diagnosis Date    Acute bronchitis     Allergic Iodine    Allergic rhinitis     Anxiety 2024    Due to sudden cancer diagnosis    Arthritis 2000    BPH (benign prostatic hypertrophy)     Cervical disc disorder 1987    Dislocation, shoulder 1973    Elevated prostate specific antigen (PSA)     Headache 1990    History of bone scan 10/21/2010    normal    History of colonoscopy     never    History of EKG 03/12/2012    also 11-; T wave abnormality    History of medical problems 1978    HL (hearing loss) 2000    Hyperlipidemia     Hypertension 2024    Upon learning of Stage 4 cancer    Hypothyroidism     IFG (impaired fasting glucose)     Kidney calculus     left ureteral stone    Knee swelling 2015    Low back pain     Lung cancer 2024    Right    Malignant neoplasm prostate 11/09/2009    Dr. Mcclelland; lymph nodes negative margins clear    Prostate nodule     right lobe    Rotator cuff syndrome 2009    Scoliosis 2009    Screening for prostate cancer     prostatectomy    Sleep apnea     CPAP machine    URI (upper respiratory infection)      Social History     Socioeconomic History    Marital status:      Spouse name:  Faith   Tobacco Use    Smoking status: Former     Current packs/day: 0.00     Average packs/day: 1 pack/day for 22.0 years (22.0 ttl pk-yrs)     Types: Cigarettes, Pipe     Start date: 1967     Quit date: 1988     Years since quittin.7     Passive exposure: Past    Smokeless tobacco: Never    Tobacco comments:     Quit smoking pipe around    Vaping Use    Vaping status: Never Used   Substance and Sexual Activity    Alcohol use: Yes     Comment: Rarely will drink one glass of wine    Drug use: Never    Sexual activity: Not Currently     Partners: Female     Problem list reviewed by Kath Moreira RPH on 9/10/2024 at  9:11 AM    Hospitalizations and Urgent Care Since Last Assessment  ED Visits, Admissions, or Hospitalizations: no  Urgent Office Visits: no    Allergies  Known allergies and reactions were discussed with the patient. The patient's chart has been reviewed for allergy information and updated as necessary.   Allergies   Allergen Reactions    Iodinated Contrast Media Anaphylaxis     Patient had a severe reaction in the past / difficulty breathing    Iodine Unknown - High Severity     Difficulty breathing    Molds & Smuts Unknown - High Severity    Shellfish Allergy Unknown - High Severity     Burning inside    Amoxicillin Rash     Allergies reviewed by Kath Moreira RPH on 9/10/2024 at  9:11 AM    Relevant Laboratory Values  Relevant laboratory values were discussed with the patient. The following specialty medication dose adjustment(s) are recommended: No dose adjustments are needed for the oral specialty medication(s) based on the labs.    Lab Results   Component Value Date    GLUCOSE 130 (H) 2024    CALCIUM 8.8 2024     2024    K 3.5 2024    CO2 25.6 2024     2024    BUN 18 2024    CREATININE 1.04 2024    EGFRIFAFRI 86 2022    EGFRIFNONA 75 2022    BCR 17.3 2024    ANIONGAP 10.4 2024     Lab Results    Component Value Date    WBC 3.31 (L) 09/05/2024    RBC 4.32 09/05/2024    HGB 12.9 (L) 09/05/2024    HCT 38.2 09/05/2024    MCV 88.4 09/05/2024    MCH 29.9 09/05/2024    MCHC 33.8 09/05/2024    RDW 13.2 09/05/2024    RDWSD 42.6 09/05/2024    MPV 10.5 09/05/2024    PLT 81 (L) 09/05/2024    NEUTRORELPCT 64.3 09/05/2024    LYMPHORELPCT 23.0 09/05/2024    MONORELPCT 9.4 09/05/2024    EOSRELPCT 1.8 09/05/2024    BASORELPCT 0.9 09/05/2024    AUTOIGPER 0.6 (H) 09/05/2024    NEUTROABS 2.13 09/05/2024    LYMPHSABS 0.76 09/05/2024    MONOSABS 0.31 09/05/2024    EOSABS 0.06 09/05/2024    BASOSABS 0.03 09/05/2024    AUTOIGNUM 0.02 09/05/2024    NRBC 0.0 09/05/2024       Current Medication List  This medication list has been reviewed with the patient and evaluated for any interactions or necessary modifications/recommendations, and updated to include all prescription medications, OTC medications, and supplements the patient is currently taking.  This list reflects what is contained in the patient's profile, which has also been marked as reviewed to communicate to other providers it is the most up to date version of the patient's current medication therapy.     Current Outpatient Medications:     cyclobenzaprine (FLEXERIL) 5 MG tablet, Take 1 tablet by mouth 2 (Two) Times a Day As Needed for Muscle Spasms. (Patient not taking: Reported on 9/5/2024), Disp: 20 tablet, Rfl: 0    Diclofenac Sodium (VOLTAREN) 1 % gel gel, Apply 4 g topically to the appropriate area as directed 3 (Three) Times a Day., Disp: 100 g, Rfl: 2    gabapentin (NEURONTIN) 300 MG capsule, TAKE ONE CAPSULE BY MOUTH THREE TIMES DAILY, Disp: 90 capsule, Rfl: 2    HYDROcodone-acetaminophen (NORCO) 5-325 MG per tablet, Take 1 tablet by mouth Every 6 (Six) Hours As Needed for Moderate Pain., Disp: 30 tablet, Rfl: 0    ibuprofen (ADVIL,MOTRIN) 200 MG tablet, Take 1 tablet by mouth Every 6 (Six) Hours As Needed for Mild Pain., Disp: , Rfl:     levothyroxine  (SYNTHROID, LEVOTHROID) 75 MCG tablet, , Disp: , Rfl:     loratadine (CLARITIN) 10 MG tablet, Take 1 tablet by mouth Daily., Disp: , Rfl:     LORazepam (ATIVAN) 0.5 MG tablet, TAKE ONE TABLET BY MOUTH EVERY 8 HOURS AS NEEDED FOR ANXIETY (Patient not taking: Reported on 9/5/2024), Disp: 30 tablet, Rfl: 1    naloxone (NARCAN) 4 MG/0.1ML nasal spray, Call 911. Don't prime. [place] in 1 nostril for overdose. Repeat in 2-3 minutes in other nostril if no or minimal breathing/responsiveness. (Patient not taking: Reported on 9/5/2024), Disp: 2 each, Rfl: 0    naproxen sodium (ALEVE) 220 MG tablet, Take 1 tablet by mouth 2 (Two) Times a Day As Needed., Disp: , Rfl:     Norvasc 10 MG tablet, Take 1 tablet by mouth Daily. (Patient not taking: Reported on 9/5/2024), Disp: , Rfl:     nystatin (MYCOSTATIN) 100,000 unit/mL suspension, , Disp: , Rfl:     ondansetron (ZOFRAN) 8 MG tablet, Take 1 tablet by mouth 3 (Three) Times a Day As Needed for Nausea or Vomiting. (Patient not taking: Reported on 9/5/2024), Disp: 30 tablet, Rfl: 5    Osimertinib Mesylate 80 MG tablet, Take 1 tablet by mouth Daily., Disp: 30 tablet, Rfl: 5    Current Facility-Administered Medications:     cyanocobalamin injection 1,000 mcg, 1,000 mcg, Intramuscular, Q28 Days, Sherry Mazariegos MD, 1,000 mcg at 08/15/24 1042    Medicines reviewed by Kath Moreira [facility] on 9/10/2024 at  9:11 AM    Drug Interactions  Assessed medication list for interactions, no significant drug interactions noted.   Advised patient to call the clinic if any new medications are started so we can assess for drug-drug interactions.  Drug-food interactions discussed:  none of concern    Adverse Drug Reactions  Medication tolerability: Patient reported common adverse drug reaction  Medication plan: Continue therapy with normal follow-up  Plan for ADR Management:  prn Imodium for occasional diarrhea and SPF 30  sunscreen while in the sun to prevent red splotches.      Adherence,  Self-Administration, and Current Therapy Problems  Adherence related to the patient's specialty therapy was discussed with the patient. The Adherence segment of this outreach has been reviewed and updated.     Adherence Questions  Linked Medication(s) Assessed: Osimertinib Mesylate  On average, how many doses/injections does the patient miss per month?: 0  What are the identified reasons for non-adherence or missed doses? : no problems identified  What is the estimated medication adherence level?: %  Based on the patient/caregiver response and refill history, does this patient require an MTP to track adherence improvements?: no    Additional Barriers to Patient Self-Administration: none  Methods for Supporting Patient Self-Administration: he put his medication out the night prior so that he will not forget  Patient has had no issues obtaining medication from pharmacy.    Open Medication Therapy Problems  No medication therapy recommendations to display    Goals of Therapy  Goals related to the patient's specialty therapy were discussed with the patient. The Patient Goals segment of this outreach has been reviewed and updated.   Goals Addressed Today        Specialty Pharmacy General Goal      Progression free survival-following CT results  5/31/2024-MRI brain showed multifocal supratentorial and infratentorial enhancing lesions, largest 4-4.5 mm in size-plan to monitor response to Tagrisso  8/3/2024 MRI brain-minimal small vessel disease, resolution of previous enhancing lesions- continue Tagrisso 80 mg po daily              Quality of Life Assessment   Quality of Life related to the patient's enrollment in the patient management program and services provided was discussed with the patient. The QOL segment of this outreach has been reviewed and updated.  Quality of Life Improvement Scale: 8-Moderately better    Discussed aforementioned material with patient over the phone.     Reassessment Plan & Follow-Up  1.  Medication Therapy Changes: none at this time  2. Related Plans, Therapy Recommendations, or Issues to Be Addressed: none  3. Pharmacist to perform regular assessments no more than (6) months from the previous assessment.   4. Care Coordinator to set up future refill outreaches, coordinate prescription delivery, and escalate clinical questions to pharmacist.    Attestation  Therapeutic appropriateness: Appropriate   I attest the patient was actively involved in and has agreed to the above plan of care.  If the prescribed therapy is at any point deemed not appropriate based on the current or future assessments, a consultation will be initiated with the patient's specialty care provider to determine the best course of action. The revised plan of therapy will be documented along with any required assessments and/or additional patient education provided.     Vesna Moreira Rph, BCOP  Clinical Specialty Pharmacist, Oncology  9/10/2024  09:13 EDT

## 2024-09-11 ENCOUNTER — OFFICE VISIT (OUTPATIENT)
Age: 79
End: 2024-09-11
Payer: MEDICARE

## 2024-09-11 VITALS
WEIGHT: 215 LBS | BODY MASS INDEX: 30.78 KG/M2 | HEIGHT: 70 IN | SYSTOLIC BLOOD PRESSURE: 150 MMHG | DIASTOLIC BLOOD PRESSURE: 71 MMHG

## 2024-09-11 DIAGNOSIS — I65.23 BILATERAL CAROTID ARTERY STENOSIS: Primary | ICD-10-CM

## 2024-09-11 NOTE — PROGRESS NOTES
Chief Complaint  Carotid Artery Disease    Subjective        Nemesio Fitzpatrick presents to [facility]  History of Present Illness  78 y.o. male for follow-up of asymptomatic carotid stenosis.  Since his last visit, he denies any symptoms of stroke or TIA, specifically no amaurosis fugax, no aphasia, no focal motor deficits, and no facial droop.  He clarified that he does not need radiation of his brain metastases, these were treated with medication with an excellent response.  He is taking aspirin but no statin.      Past Medical History:   Diagnosis Date    Acute bronchitis     Allergic Iodine    Allergic rhinitis     Anxiety 2024    Due to sudden cancer diagnosis    Arthritis 2000    BPH (benign prostatic hypertrophy)     Cervical disc disorder 1987    Dislocation, shoulder 1973    Elevated prostate specific antigen (PSA)     Headache 1990    History of bone scan 10/21/2010    normal    History of colonoscopy     never    History of EKG 03/12/2012    also 11-; T wave abnormality    History of medical problems 1978    HL (hearing loss) 2000    Hyperlipidemia     Hypertension 2024    Upon learning of Stage 4 cancer    Hypothyroidism     IFG (impaired fasting glucose)     Kidney calculus     left ureteral stone    Knee swelling 2015    Low back pain     Lung cancer 2024    Right    Malignant neoplasm prostate 11/09/2009    Dr. Mcclelland; lymph nodes negative margins clear    Prostate nodule     right lobe    Rotator cuff syndrome 2009    Scoliosis 2009    Screening for prostate cancer     prostatectomy    Sleep apnea     CPAP machine    URI (upper respiratory infection)        Past Surgical History:   Procedure Laterality Date    CERVICAL DISC SURGERY  1990    also 2006; C5-6, C6-7    HAND SURGERY Left 2001    trigger finger release; left middle finger    NECK SURGERY  1988, 2005    Cervical disk    PROSTATECTOMY  11/09/2009    RHINOPLASTY  1985    ROTATOR CUFF REPAIR      SHOULDER  SURGERY Left 2009    Dr. KRISTEL Jimenez; rotator cuff repair; bone spurs     SPINE SURGERY  2005    TRIGGER POINT INJECTION  ,,    URETERAL STENT INSERTION Left 2013    Dr. Martinez       Family History   Problem Relation Age of Onset    Diabetes Mother     Kidney disease Mother         calculus    Hearing loss Mother     Prostate cancer Father     Stomach cancer Father     Cancer Sister         breast    Hypothyroidism Sister     Kidney disease Sister         calculus    Arthritis Sister     Transient ischemic attack Brother     Alzheimer's disease Other         uncle    Diabetes Other         uncle    Arthritis Brother          Social History     Tobacco Use    Smoking status: Former     Current packs/day: 0.00     Average packs/day: 1 pack/day for 22.0 years (22.0 ttl pk-yrs)     Types: Cigarettes, Pipe     Start date: 1967     Quit date: 1988     Years since quittin.7     Passive exposure: Past    Smokeless tobacco: Never    Tobacco comments:     Quit smoking pipe around    Vaping Use    Vaping status: Never Used   Substance Use Topics    Alcohol use: Yes     Comment: Rarely will drink one glass of wine    Drug use: Never       Allergies: Iodinated contrast media, Iodine, Molds & smuts, Shellfish allergy, and Amoxicillin    Prior to Admission medications    Medication Sig Start Date End Date Taking? Authorizing Provider   Diclofenac Sodium (VOLTAREN) 1 % gel gel Apply 4 g topically to the appropriate area as directed 3 (Three) Times a Day. 24  Yes Samantha Estevez APRN   gabapentin (NEURONTIN) 300 MG capsule TAKE ONE CAPSULE BY MOUTH THREE TIMES DAILY 24  Yes Marla Givens APRN   HYDROcodone-acetaminophen (NORCO) 5-325 MG per tablet Take 1 tablet by mouth Every 6 (Six) Hours As Needed for Moderate Pain. 24  Yes Sherry Mazariegos MD   ibuprofen (ADVIL,MOTRIN) 200 MG tablet Take 1 tablet by mouth Every 6 (Six) Hours As Needed for Mild Pain.   Yes  "Hailey Romero MD   levothyroxine (SYNTHROID, LEVOTHROID) 75 MCG tablet  1/27/17  Yes Hailey Romero MD   loratadine (CLARITIN) 10 MG tablet Take 1 tablet by mouth Daily. 8/29/12  Yes Hailey Romero MD   naproxen sodium (ALEVE) 220 MG tablet Take 1 tablet by mouth 2 (Two) Times a Day As Needed.   Yes Hailey Romero MD   nystatin (MYCOSTATIN) 100,000 unit/mL suspension  8/26/24  Yes Hailey Romero MD   ondansetron (ZOFRAN) 8 MG tablet Take 1 tablet by mouth 3 (Three) Times a Day As Needed for Nausea or Vomiting. 6/3/24  Yes Mp Austin MD   Osimertinib Mesylate 80 MG tablet Take 1 tablet by mouth Daily. 6/5/24  Yes Mp Austin MD   cyclobenzaprine (FLEXERIL) 5 MG tablet Take 1 tablet by mouth 2 (Two) Times a Day As Needed for Muscle Spasms.  Patient not taking: Reported on 9/5/2024 4/4/24   Samantha Estevez APRN   LORazepam (ATIVAN) 0.5 MG tablet TAKE ONE TABLET BY MOUTH EVERY 8 HOURS AS NEEDED FOR ANXIETY  Patient not taking: Reported on 9/5/2024 6/27/24   Mp Austin MD   naloxone (NARCAN) 4 MG/0.1ML nasal spray Call 911. Don't prime. [place] in 1 nostril for overdose. Repeat in 2-3 minutes in other nostril if no or minimal breathing/responsiveness.  Patient not taking: Reported on 9/5/2024 8/14/24   Marco A Randolph MD   Norvasc 10 MG tablet Take 1 tablet by mouth Daily.  Patient not taking: Reported on 9/5/2024 5/14/24   Hailey Romero MD         Objective   Vital Signs:  /71   Ht 177.8 cm (70\")   Wt 97.5 kg (215 lb)   BMI 30.85 kg/m²   Estimated body mass index is 30.85 kg/m² as calculated from the following:    Height as of this encounter: 177.8 cm (70\").    Weight as of this encounter: 97.5 kg (215 lb).               Physical Exam  Vitals reviewed.   Constitutional:       General: He is not in acute distress.     Appearance: Normal appearance.   HENT:      Head: Normocephalic and atraumatic.      Right Ear: External ear normal.      Left Ear: " External ear normal.      Nose: Nose normal.      Mouth/Throat:      Mouth: Mucous membranes are moist.      Pharynx: Oropharynx is clear.   Eyes:      Extraocular Movements: Extraocular movements intact.      Conjunctiva/sclera: Conjunctivae normal.      Pupils: Pupils are equal, round, and reactive to light.   Cardiovascular:      Rate and Rhythm: Normal rate and regular rhythm.      Pulses:           Carotid pulses are 2+ on the right side and 2+ on the left side.       Radial pulses are 2+ on the right side and 2+ on the left side.   Pulmonary:      Comments: Non labored respirations on room air, equal chest rise  Abdominal:      General: Abdomen is flat. There is no distension.      Palpations: Abdomen is soft.   Musculoskeletal:      Cervical back: Normal range of motion. No rigidity.      Right lower leg: No edema.      Left lower leg: No edema.   Skin:     General: Skin is warm and dry.      Capillary Refill: Capillary refill takes less than 2 seconds.   Neurological:      General: No focal deficit present.      Mental Status: He is alert and oriented to person, place, and time.   Psychiatric:         Mood and Affect: Mood normal.         Behavior: Behavior normal.        Result Review :                          Assessment and Plan     Diagnoses and all orders for this visit:    1. Bilateral carotid artery stenosis (Primary)  -     Duplex Carotid Ultrasound CAR; Future    Nemesio Fitzpatrick is a 78 year old male who turns for follow-up asymptomatic bilateral carotid stenosis.  He remains asymptomatic with no symptoms of stroke or TIA.  He is taking low-dose aspirin daily but does not take a statin.  He had a carotid duplex on September 3, 2024.  This showed moderate, 50 to 69% stenosis of the right internal carotid artery and mild, less than 50% stenosis of the left internal carotid artery.  There was antegrade flow of bilateral vertebral arteries.  We discussed these results.  There is a distal right vertebral  artery occlusion on CT angiogram from 7/5/2024, the duplex results are consistent with this since it is a distal occlusion.  I have recommended we continue medical management with aspirin and he will discuss statin with his primary care provider next week follow-up visit.  He will follow-up in 1 year with a carotid duplex for surveillance.         Follow Up     Return in about 1 year (around 9/11/2025).  Patient was given instructions and counseling regarding his condition or for health maintenance advice. Please see specific information pulled into the AVS if appropriate.

## 2024-09-12 ENCOUNTER — CLINICAL SUPPORT (OUTPATIENT)
Dept: FAMILY MEDICINE CLINIC | Facility: CLINIC | Age: 79
End: 2024-09-12
Payer: MEDICARE

## 2024-09-12 PROCEDURE — 96372 THER/PROPH/DIAG INJ SC/IM: CPT | Performed by: FAMILY MEDICINE

## 2024-09-12 RX ADMIN — CYANOCOBALAMIN 1000 MCG: 1000 INJECTION, SOLUTION INTRAMUSCULAR; SUBCUTANEOUS at 10:19

## 2024-09-17 PROBLEM — I65.01 OCCLUSION OF RIGHT VERTEBRAL ARTERY: Status: ACTIVE | Noted: 2024-09-17

## 2024-09-18 ENCOUNTER — OFFICE VISIT (OUTPATIENT)
Dept: PSYCHIATRY | Facility: HOSPITAL | Age: 79
End: 2024-09-18
Payer: MEDICARE

## 2024-09-18 DIAGNOSIS — F41.1 GENERALIZED ANXIETY DISORDER: ICD-10-CM

## 2024-09-18 PROCEDURE — 1160F RVW MEDS BY RX/DR IN RCRD: CPT | Performed by: NURSE PRACTITIONER

## 2024-09-18 PROCEDURE — 90853 GROUP PSYCHOTHERAPY: CPT | Performed by: NURSE PRACTITIONER

## 2024-09-18 PROCEDURE — 99213 OFFICE O/P EST LOW 20 MIN: CPT | Performed by: NURSE PRACTITIONER

## 2024-09-18 PROCEDURE — 1159F MED LIST DOCD IN RCRD: CPT | Performed by: NURSE PRACTITIONER

## 2024-09-18 RX ORDER — GABAPENTIN 300 MG/1
300 CAPSULE ORAL 3 TIMES DAILY
Qty: 90 CAPSULE | Refills: 2 | Status: SHIPPED | OUTPATIENT
Start: 2024-09-18

## 2024-09-23 ENCOUNTER — OFFICE VISIT (OUTPATIENT)
Dept: FAMILY MEDICINE CLINIC | Facility: CLINIC | Age: 79
End: 2024-09-23
Payer: MEDICARE

## 2024-09-23 VITALS
SYSTOLIC BLOOD PRESSURE: 142 MMHG | DIASTOLIC BLOOD PRESSURE: 70 MMHG | HEART RATE: 81 BPM | WEIGHT: 211 LBS | BODY MASS INDEX: 30.21 KG/M2 | HEIGHT: 70 IN | OXYGEN SATURATION: 100 % | TEMPERATURE: 98.4 F

## 2024-09-23 DIAGNOSIS — Z79.899 LONG TERM USE OF DRUG: ICD-10-CM

## 2024-09-23 DIAGNOSIS — Z00.00 MEDICARE ANNUAL WELLNESS VISIT, SUBSEQUENT: Primary | ICD-10-CM

## 2024-09-23 DIAGNOSIS — C79.51 CANCER, METASTATIC TO BONE: ICD-10-CM

## 2024-09-23 DIAGNOSIS — M54.32 SCIATICA OF LEFT SIDE: ICD-10-CM

## 2024-09-23 PROCEDURE — 99213 OFFICE O/P EST LOW 20 MIN: CPT | Performed by: FAMILY MEDICINE

## 2024-09-23 PROCEDURE — 1160F RVW MEDS BY RX/DR IN RCRD: CPT | Performed by: FAMILY MEDICINE

## 2024-09-23 PROCEDURE — G0439 PPPS, SUBSEQ VISIT: HCPCS | Performed by: FAMILY MEDICINE

## 2024-09-23 PROCEDURE — 1126F AMNT PAIN NOTED NONE PRSNT: CPT | Performed by: FAMILY MEDICINE

## 2024-09-23 PROCEDURE — 1159F MED LIST DOCD IN RCRD: CPT | Performed by: FAMILY MEDICINE

## 2024-09-23 PROCEDURE — 1170F FXNL STATUS ASSESSED: CPT | Performed by: FAMILY MEDICINE

## 2024-09-23 RX ORDER — HYDROCODONE BITARTRATE AND ACETAMINOPHEN 5; 325 MG/1; MG/1
1 TABLET ORAL DAILY PRN
Qty: 30 TABLET | Refills: 0 | Status: SHIPPED | OUTPATIENT
Start: 2024-09-23

## 2024-09-27 LAB — DRUGS UR: NORMAL

## 2024-09-30 ENCOUNTER — SPECIALTY PHARMACY (OUTPATIENT)
Dept: PHARMACY | Facility: HOSPITAL | Age: 79
End: 2024-09-30
Payer: MEDICARE

## 2024-09-30 DIAGNOSIS — C61 MALIGNANT NEOPLASM PROSTATE: ICD-10-CM

## 2024-09-30 DIAGNOSIS — C79.51 CANCER, METASTATIC TO BONE: Primary | ICD-10-CM

## 2024-10-03 ENCOUNTER — SPECIALTY PHARMACY (OUTPATIENT)
Dept: ONCOLOGY | Facility: HOSPITAL | Age: 79
End: 2024-10-03
Payer: MEDICARE

## 2024-10-03 ENCOUNTER — INFUSION (OUTPATIENT)
Dept: ONCOLOGY | Facility: HOSPITAL | Age: 79
End: 2024-10-03
Payer: MEDICARE

## 2024-10-03 ENCOUNTER — OFFICE VISIT (OUTPATIENT)
Dept: ONCOLOGY | Facility: CLINIC | Age: 79
End: 2024-10-03
Payer: MEDICARE

## 2024-10-03 VITALS
SYSTOLIC BLOOD PRESSURE: 166 MMHG | HEIGHT: 70 IN | DIASTOLIC BLOOD PRESSURE: 76 MMHG | BODY MASS INDEX: 30.61 KG/M2 | WEIGHT: 213.8 LBS | HEART RATE: 79 BPM | RESPIRATION RATE: 16 BRPM | OXYGEN SATURATION: 97 % | TEMPERATURE: 97.5 F

## 2024-10-03 DIAGNOSIS — C61 MALIGNANT NEOPLASM PROSTATE: ICD-10-CM

## 2024-10-03 DIAGNOSIS — C34.90 PRIMARY NONSQUAMOUS NONSMALL CELL NEOPLASM OF LUNG: Primary | ICD-10-CM

## 2024-10-03 DIAGNOSIS — C79.51 CANCER, METASTATIC TO BONE: ICD-10-CM

## 2024-10-03 DIAGNOSIS — C79.51 CANCER, METASTATIC TO BONE: Primary | ICD-10-CM

## 2024-10-03 LAB
ALBUMIN SERPL-MCNC: 4.5 G/DL (ref 3.5–5.2)
ALBUMIN/GLOB SERPL: 1.8 G/DL
ALP SERPL-CCNC: 71 U/L (ref 39–117)
ALT SERPL W P-5'-P-CCNC: 75 U/L (ref 1–41)
ANION GAP SERPL CALCULATED.3IONS-SCNC: 11.7 MMOL/L (ref 5–15)
AST SERPL-CCNC: 58 U/L (ref 1–40)
BASOPHILS # BLD AUTO: 0.04 10*3/MM3 (ref 0–0.2)
BASOPHILS NFR BLD AUTO: 1.1 % (ref 0–1.5)
BILIRUB SERPL-MCNC: 0.3 MG/DL (ref 0–1.2)
BUN SERPL-MCNC: 13 MG/DL (ref 8–23)
BUN/CREAT SERPL: 13.7 (ref 7–25)
CALCIUM SPEC-SCNC: 9.3 MG/DL (ref 8.6–10.5)
CHLORIDE SERPL-SCNC: 103 MMOL/L (ref 98–107)
CO2 SERPL-SCNC: 24.3 MMOL/L (ref 22–29)
CREAT SERPL-MCNC: 0.95 MG/DL (ref 0.76–1.27)
DEPRECATED RDW RBC AUTO: 40.7 FL (ref 37–54)
EGFRCR SERPLBLD CKD-EPI 2021: 83 ML/MIN/1.73
EOSINOPHIL # BLD AUTO: 0.09 10*3/MM3 (ref 0–0.4)
EOSINOPHIL NFR BLD AUTO: 2.4 % (ref 0.3–6.2)
ERYTHROCYTE [DISTWIDTH] IN BLOOD BY AUTOMATED COUNT: 13.1 % (ref 12.3–15.4)
GLOBULIN UR ELPH-MCNC: 2.5 GM/DL
GLUCOSE SERPL-MCNC: 147 MG/DL (ref 65–99)
HCT VFR BLD AUTO: 39.9 % (ref 37.5–51)
HGB BLD-MCNC: 13.4 G/DL (ref 13–17.7)
IMM GRANULOCYTES # BLD AUTO: 0.01 10*3/MM3 (ref 0–0.05)
IMM GRANULOCYTES NFR BLD AUTO: 0.3 % (ref 0–0.5)
LYMPHOCYTES # BLD AUTO: 0.98 10*3/MM3 (ref 0.7–3.1)
LYMPHOCYTES NFR BLD AUTO: 26.1 % (ref 19.6–45.3)
MAGNESIUM SERPL-MCNC: 1.9 MG/DL (ref 1.6–2.4)
MCH RBC QN AUTO: 29.1 PG (ref 26.6–33)
MCHC RBC AUTO-ENTMCNC: 33.6 G/DL (ref 31.5–35.7)
MCV RBC AUTO: 86.6 FL (ref 79–97)
MONOCYTES # BLD AUTO: 0.29 10*3/MM3 (ref 0.1–0.9)
MONOCYTES NFR BLD AUTO: 7.7 % (ref 5–12)
NEUTROPHILS NFR BLD AUTO: 2.35 10*3/MM3 (ref 1.7–7)
NEUTROPHILS NFR BLD AUTO: 62.4 % (ref 42.7–76)
NRBC BLD AUTO-RTO: 0 /100 WBC (ref 0–0.2)
PHOSPHATE SERPL-MCNC: 2.3 MG/DL (ref 2.5–4.5)
PLATELET # BLD AUTO: 83 10*3/MM3 (ref 140–450)
PMV BLD AUTO: 10.5 FL (ref 6–12)
POTASSIUM SERPL-SCNC: 4 MMOL/L (ref 3.5–5.2)
PROT SERPL-MCNC: 7 G/DL (ref 6–8.5)
RBC # BLD AUTO: 4.61 10*6/MM3 (ref 4.14–5.8)
SODIUM SERPL-SCNC: 139 MMOL/L (ref 136–145)
WBC NRBC COR # BLD AUTO: 3.76 10*3/MM3 (ref 3.4–10.8)

## 2024-10-03 PROCEDURE — 85025 COMPLETE CBC W/AUTO DIFF WBC: CPT

## 2024-10-03 PROCEDURE — 83735 ASSAY OF MAGNESIUM: CPT

## 2024-10-03 PROCEDURE — 84100 ASSAY OF PHOSPHORUS: CPT

## 2024-10-03 PROCEDURE — 96374 THER/PROPH/DIAG INJ IV PUSH: CPT

## 2024-10-03 PROCEDURE — 80053 COMPREHEN METABOLIC PANEL: CPT

## 2024-10-03 PROCEDURE — 25010000002 ZOLEDRONIC ACID 4 MG/100ML SOLUTION

## 2024-10-03 PROCEDURE — 25810000003 SODIUM CHLORIDE 0.9 % SOLUTION

## 2024-10-03 RX ORDER — DIPHENHYDRAMINE HYDROCHLORIDE 50 MG/ML
50 INJECTION INTRAMUSCULAR; INTRAVENOUS
Status: CANCELLED | OUTPATIENT
Start: 2024-10-03 | End: 2024-10-03

## 2024-10-03 RX ORDER — DIPHENHYDRAMINE HCL 25 MG
50 CAPSULE ORAL
Status: CANCELLED | OUTPATIENT
Start: 2024-10-03 | End: 2024-10-03

## 2024-10-03 RX ORDER — SODIUM CHLORIDE 9 MG/ML
20 INJECTION, SOLUTION INTRAVENOUS ONCE
Status: COMPLETED | OUTPATIENT
Start: 2024-10-03 | End: 2024-10-03

## 2024-10-03 RX ORDER — PREDNISONE 50 MG/1
50 TABLET ORAL TAKE AS DIRECTED
Qty: 3 TABLET | Refills: 0 | Status: SHIPPED | OUTPATIENT
Start: 2024-10-03

## 2024-10-03 RX ORDER — DIPHENHYDRAMINE HCL 50 MG
50 CAPSULE ORAL ONCE
Qty: 1 CAPSULE | Refills: 0 | Status: SHIPPED | OUTPATIENT
Start: 2024-10-03 | End: 2024-10-03

## 2024-10-03 RX ORDER — ZOLEDRONIC ACID 0.04 MG/ML
4 INJECTION, SOLUTION INTRAVENOUS ONCE
Status: CANCELLED | OUTPATIENT
Start: 2024-10-03

## 2024-10-03 RX ORDER — ZOLEDRONIC ACID 0.04 MG/ML
4 INJECTION, SOLUTION INTRAVENOUS ONCE
Status: COMPLETED | OUTPATIENT
Start: 2024-10-03 | End: 2024-10-03

## 2024-10-03 RX ADMIN — SODIUM CHLORIDE 20 ML/HR: 9 INJECTION, SOLUTION INTRAVENOUS at 12:38

## 2024-10-03 RX ADMIN — ZOLEDRONIC ACID 4 MG: 0.04 INJECTION, SOLUTION INTRAVENOUS at 12:39

## 2024-10-03 NOTE — PROGRESS NOTES
Subjective     REASON FOR CONSULTATION:  spine mass  Provide an opinion on any further workup or treatment                             REQUESTING PRACTITIONER: Chandrakant    RECORDS OBTAINED:  Records of the patients history including those obtained from the referring provider were reviewed and summarized in detail.      History of Present Illness:   He continues to do really well while on Tagrisso.  Again is expressing his extreme gratitude for how well he is doing.  He does occasionally get dry skin on his hands, however, uses an emollient which helps.  He denies nausea/vomiting/diarrhea, shortness of breath, or chest pain.      ONCOLOGY HISTORY:  This is a pleasant 78-year-old man with impaired fasting glucose, hyperlipidemia, hypothyroidism and history of prostate cancer.  The patient has been experiencing about a 1 year history of progressive back pain in the upper mid thoracic spine stabbing in nature and radiating bilaterally anteriorly.  Symptoms are worse when the patient lays down to rest at night.  He has also had some pain around the right shoulder blade.  He was treated with PT with no improvement.  The patient has also been having left hip pain.  The patient was evaluated by orthopedic surgery and an MRI of the thoracic spine without contrast was performed on 4/25/2024 and showed a large expansile marrow replacing mass along the anterior T4 vertebral body 3.2 x 2.1 cm extending into the anterior paraspinal soft tissue along the T3-T4 space with reactive bone marrow edema.  Another partially visualized expansile mass was seen in the posterior right second rib 2.7 x 1.5 cm and another in the posterior sixth rib 1 cm with surrounding bone marrow edema.  At T7-T8 there is a posterior disc protrusion with mild to moderate canal stenosis.  In the posterior right lung a 3.5 cm mass was noted with additional small nodules also seen but poorly characterized.    The patient denies weight loss, night sweats,  shortness of breath, cough, hemoptysis, headaches, confusion, changes in swallowing bowel habits urinary habits.  He has history of prostate cancer treated with prostatectomy in 2009 and reports negligible PSA values.    The patient has history of light smoking during his 20s and 30s.  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    A full body PET scan was performed on 5/3/2024 which showed a hypermetabolic mass in the superior segment of the right lower lobe 3.7 x 2.8 cm SUV 9.3, no hypermetabolic mediastinal or hilar lymph nodes but multiple hypermetabolic bone lesions largest being at T4 vertebral body SUV 9.4, posterior lateral right second rib, posterior right sixth rib, left aspect of the sacrum to the left of the S1 neural foramen.  Spleen was mildly enlarged 15 cm but less activity than liver.    A CT-guided needle biopsy of a rib mass was performed on 5/10/2024 and showed metastatic adenocarcinoma immunohistochemistry supporting a pulmonary primary.    The patient was seen by radiation oncology with plans to treat at least palliatively to T4 and rib lesions possible additional sites pending peer review of case.    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    MRI brain 5/31/2024 showed multiple supratentorial and infratentorial lesions consistent with metastatic disease largest 4-4.5 mm in size.    The patient initiated Tagrisso 6/5/2024.  He was treated with radiation therapy to T4.      Past Medical History:   Diagnosis Date    Acute bronchitis     Allergic Iodine    Allergic rhinitis     Anxiety 2024    Due to sudden cancer diagnosis    Arthritis 2000    BPH (benign prostatic hypertrophy)     Cervical disc disorder 1987    Dislocation, shoulder 1973    Elevated prostate specific antigen (PSA)     Headache 1990    History of bone scan 10/21/2010    normal    History of colonoscopy     never    History of EKG 03/12/2012    also 11-; T wave  abnormality    History of medical problems 1978    HL (hearing loss) 2000    Hyperlipidemia     Hypertension 2024    Upon learning of Stage 4 cancer    Hypothyroidism     IFG (impaired fasting glucose)     Kidney calculus     left ureteral stone    Knee swelling 2015    Low back pain     Lung cancer 2024    Right    Malignant neoplasm prostate 11/09/2009    Dr. Mcclelland; lymph nodes negative margins clear    Prostate nodule     right lobe    Rotator cuff syndrome 2009    Scoliosis 2009    Screening for prostate cancer     prostatectomy    Sleep apnea     CPAP machine    URI (upper respiratory infection)         Past Surgical History:   Procedure Laterality Date    CERVICAL DISC SURGERY  1990    also 2006; C5-6, C6-7    HAND SURGERY Left 2001    trigger finger release; left middle finger    NECK SURGERY  1988, 2005    Cervical disk    PROSTATECTOMY  11/09/2009    RHINOPLASTY  1985    ROTATOR CUFF REPAIR      SHOULDER SURGERY Left 09/29/2009    Dr. KRISTEL Jimenez; rotator cuff repair; bone spurs     SPINE SURGERY  2005    TRIGGER POINT INJECTION  2008,2021,2022    URETERAL STENT INSERTION Left 12/04/2013    Dr. Martinez        Current Outpatient Medications on File Prior to Visit   Medication Sig Dispense Refill    Diclofenac Sodium (VOLTAREN) 1 % gel gel Apply 4 g topically to the appropriate area as directed 3 (Three) Times a Day. 100 g 2    gabapentin (NEURONTIN) 300 MG capsule Take 1 capsule by mouth 3 (Three) Times a Day. 90 capsule 2    HYDROcodone-acetaminophen (NORCO) 5-325 MG per tablet Take 1 tablet by mouth Daily As Needed for Moderate Pain. 30 tablet 0    levothyroxine (SYNTHROID, LEVOTHROID) 75 MCG tablet       loratadine (CLARITIN) 10 MG tablet Take 1 tablet by mouth Daily.      Norvasc 10 MG tablet Take 1 tablet by mouth Daily.      ondansetron (ZOFRAN) 8 MG tablet Take 1 tablet by mouth 3 (Three) Times a Day As Needed for Nausea or Vomiting. 30 tablet 5    Osimertinib Mesylate 80 MG tablet Take 1  "tablet by mouth Daily. 30 tablet 5     Current Facility-Administered Medications on File Prior to Visit   Medication Dose Route Frequency Provider Last Rate Last Admin    cyanocobalamin injection 1,000 mcg  1,000 mcg Intramuscular Q28 Days Sherry Mazariegos MD   1,000 mcg at 24 1019        ALLERGIES:    Allergies   Allergen Reactions    Iodinated Contrast Media Anaphylaxis     Patient had a severe reaction in the past / difficulty breathing    Iodine Unknown - High Severity     Difficulty breathing    Molds & Smuts Unknown - High Severity    Shellfish Allergy Unknown - High Severity     Burning inside    Amoxicillin Rash        Social History     Socioeconomic History    Marital status:      Spouse name: Faith   Tobacco Use    Smoking status: Former     Current packs/day: 0.00     Average packs/day: 1 pack/day for 22.0 years (22.0 ttl pk-yrs)     Types: Cigarettes, Pipe     Start date: 1967     Quit date: 1988     Years since quittin.7     Passive exposure: Past    Smokeless tobacco: Never    Tobacco comments:     Quit smoking pipe around    Vaping Use    Vaping status: Never Used   Substance and Sexual Activity    Alcohol use: Yes     Comment: Rarely will drink one glass of wine    Drug use: Never    Sexual activity: Not Currently     Partners: Female        Family History   Problem Relation Age of Onset    Diabetes Mother     Kidney disease Mother         calculus    Hearing loss Mother     Prostate cancer Father     Stomach cancer Father     Cancer Sister         breast    Hypothyroidism Sister     Kidney disease Sister         calculus    Arthritis Sister     Transient ischemic attack Brother     Alzheimer's disease Other         uncle    Diabetes Other         uncle    Arthritis Brother         Objective     Vitals:    10/03/24 1119   BP: 166/76   Pulse: 79   Resp: 16   Temp: 97.5 °F (36.4 °C)   TempSrc: Oral   SpO2: 97%   Weight: 97 kg (213 lb 12.8 oz)   Height: 177.8 cm (70\") "   PainSc: 0-No pain             9/5/2024    10:49 AM   Current Status   ECOG score 0       Physical Exam    CONSTITUTIONAL: Pleasant well- developed man   HEENT: no icterus, no thrush, moist membranes  LYMPH: no cervical or supraclavicular lad  CV: RRR, S1S2, no murmur  RESP: cta bilat, no wheezing, no rales  GI: soft, non-tender, no splenomegaly, +bs  MUSC: No edema, normal gait.  NEURO: Alert and oritented x3, normal strength.   PSYCH: Normal mood and affect.       RECENT LABS:  Results from last 7 days   Lab Units 10/03/24  1109   WBC 10*3/mm3 3.76   NEUTROS ABS 10*3/mm3 2.35   HEMOGLOBIN g/dL 13.4   HEMATOCRIT % 39.9   PLATELETS 10*3/mm3 83*     Results from last 7 days   Lab Units 10/03/24  1109   SODIUM mmol/L 139   POTASSIUM mmol/L 4.0   CHLORIDE mmol/L 103   CO2 mmol/L 24.3   BUN mg/dL 13   CREATININE mg/dL 0.95   CALCIUM mg/dL 9.3   ALBUMIN g/dL 4.5   BILIRUBIN mg/dL 0.3   ALK PHOS U/L 71   ALT (SGPT) U/L 75*   AST (SGOT) U/L 58*   GLUCOSE mg/dL 147*   MAGNESIUM mg/dL 1.9             MRI Thoracic Spine:  MPRESSION:     1. Large expansile mass in the anterior T4 vertebral body extending into  the adjacent soft tissues and involving greater than 50% of the  vertebral body, consistent with metastatic disease. Surrounding bone  marrow edema like signal throughout the T4 vertebral body and patchy  bone marrow edema like signal in the adjacent anterior T3 inferior  endplate and anterior T5 superior endplate. This lesion is at risk for  pathologic fracture.  2. Expansile mass in the posterior right second rib and focal 1 cm mass  in the posterior right sixth rib, consistent with metastatic disease.  3. Partially imaged pulmonary malignant/metastatic disease. Recommend  further evaluation with CT with contrast and/or PET/CT.    PET:  TECHNIQUE: Radiation dose reduction techniques were utilized, including  automated exposure control and exposure modulation based on body size.   Blood glucose level at time of  injection was 123 mg/dL. 6.8 mCi of F-18  FDG were injected and PET was performed from skull base to mid thigh. CT  was obtained for localization and attenuation correction. Time at  injection 8:43 a.m. PET start time 10:01 a.m. Normalization method:  patient weight. Compared with thoracic MRI 04/25/2024.     FINDINGS: Mediastinal blood pool has a maximal SUV of 2.8.     1. There is a hypermetabolic 3.7 x 2.8 cm irregular mass at the superior  segment of the right lower lobe with a maximal SUV of 9.3. There is no  hypermetabolic hilar or mediastinal lymphadenopathy, but there are  multiple hypermetabolic bone lesions. The largest is at the T4 vertebral  body with a maximal SUV of 9.4. Lytic expansile metastases are also seen  at the posterior and lateral aspects of the right 2nd rib, posterior  right 6th rib, and left aspect of the sacrum anterior to the left S1  neural foramen.   The activity adjacent to the left greater trochanter is likely related  to tendinopathy.     2. There is no hypermetabolic lymphadenopathy or suspicious activity  within the neck. There is no suspicious visceral activity within the  abdomen or pelvis.  The spleen is mildly enlarged measuring 15 cm in diameter. Splenic  activity is less intense than that of the liver.    CT Angiogram Neck (07/12/2024 10:35)  CT Angiogram Head (07/12/2024 10:35)  IMPRESSION:  1.  A 65% stenosis involving the right internal carotid artery  proximally is appreciated using NASCET criteria due to eccentric  noncalcified plaque.  2.  The right vertebral artery is occluded just proximal to the dura.  The posterior inferior cerebellar artery on the right is opacified via  retrograde flow.  3.  A moderate stenosis involving the left P1 segment is appreciated.  4.  A lytic lesion is noted at T4, only partially visualized. No obvious  enhancing lesion involving the brain is identified. A MRI examination of  the brain with and without contrast would be more sensitive  for  evaluation of metastatic disease.    CT Abdomen Pelvis With Contrast (07/12/2024 10:35)  CT Chest With Contrast Diagnostic (07/12/2024 10:35)  IMPRESSION:  1.  A 65% stenosis involving the right internal carotid artery  proximally is appreciated using NASCET criteria due to eccentric  noncalcified plaque.  2.  The right vertebral artery is occluded just proximal to the dura.  The posterior inferior cerebellar artery on the right is opacified via  retrograde flow.  3.  A moderate stenosis involving the left P1 segment is appreciated.  4.  A lytic lesion is noted at T4, only partially visualized. No obvious  enhancing lesion involving the brain is identified. A MRI examination of  the brain with and without contrast would be more sensitive for  evaluation of metastatic disease.    MRI Brain With & Without Contrast (08/03/2024 12:31)  IMPRESSION:  There is very minimal small vessel disease in cerebral white  matter. The remainder of the MRI of the brain is within normal limits.  Specifically, the previously identified 5 tiny 2 to 4 mm enhancing  lesions in the brain on the MRI of the brain on 05/31/2024 including 2  in the left frontal lobe, 1 in the left parietal lobe another medial  right occipital lobe and one in the medial left cerebellum have resolved  in the interval with no discernible residual enhancing metastatic  lesions identified in the brain. Continued followup contrast-enhanced  MRI of the brain is recommended.    Assessment & Plan     *Stage IV adenocarcinoma, lung primary  Patient presented with back and chest wall pain, imaging showed and expansile mass T4 vertebral body, posterior right second rib and posterior right sixth rib  PET scan was performed on 5/3/2024 which showed a hypermetabolic mass in the superior segment of the right lower lobe 3.7 x 2.8 cm SUV 9.3, no hypermetabolic mediastinal or hilar lymph nodes but multiple hypermetabolic bone lesions largest being at T4 vertebral body SUV  9.4, posterior lateral right second rib, posterior right sixth rib, left aspect of the sacrum to the left of the S1 neural foramen.  Spleen was mildly enlarged 15 cm but less activity than liver.  CT-guided needle biopsy of a rib mass was performed on 5/10/2024 and showed metastatic adenocarcinoma immunohistochemistry supporting a pulmonary primary  The patient's next generation sequencing returned with a EGFR exon 19 and frame deletion.  Initiated Tagrisso 80 mg daily on 6/5/2024 7/12/2024-CT chest abdomen pelvis-decreased size of the right upper lobe mass, stable osseous metastatic disease, stable solid and mixed groundglass right lung apex  9/5/2024: Tolerating Tagrisso well. Continue 80mg daily.   10/3/2024: Tolerating Tagrisso well.  Continue 80 mg daily.    *Brain metastases  5/31/2024-MRI brain showed multifocal supratentorial and infratentorial enhancing lesions, largest 4-4.5 mm in size-plan to monitor response to Tagrisso  8/3/2024 MRI brain-minimal small vessel disease, resolution of previous enhancing lesions    *Pain of malignancy  The patient has available [place] 5/325 1 p.o. every 6 hours as needed pain  Status post radiation to T4 with improved pain    *Thrombocytopenia secondary to Tagrisso-plt 81  9/5/2024: Platelets stable at 81. Instructed to call if develops symptoms such as nosebleed or petechiae.     *Significant anxiety related to malignancy  The patient is being followed by the psychosocial clinic    *Bone metastases   9/5/2024: Experienced aches and pains with last zometa dose. Instructed to start Claritin. Zometa held today due to phosphorus of 1.9. Encouraged increase in dietary phosphorus such as dairy products.   10/3/2024: Proceed with Zometa today.  Next dose due 10/31/2024.    *Decreased flow right vertebral artery by MRI  7/12/2024 CT angiogram head and neck-65% stenosis of the right internal carotid artery, right vertebral artery occluded proximal to the dura with retrograde flow  through the PICA, moderate stenosis P1, lytic lesion T4  Vascular surgery recommended to begin aspirin 81 mg daily      *History of prostate cancer status post prostatectomy 2009    Plan:   Continue Tagrisso 80 mg daily.  Proceed with Zometa today.  To be continued monthly.  Continue Claritin.  Continue aspirin 81 mg daily.  Return to clinic in 4 weeks for NP visit, Zometa, CBC/CMP/magnesium/phosphorus.  CT chest abdomen pelvis with contrast scheduled on 11/21/2024.  Contrast allergy protocol prescribed to be taken prior to scan.  Follow-up 1 week after CT for MD visit, scan review, Zometa, CBC/CMP/magnesium/phosphorus.  Instructed to reach out sooner with any new concerns or problems.    Patient is on a high risk medication requiring close monitoring for toxicity.      Sanjana Talbert, APRN

## 2024-10-03 NOTE — PROGRESS NOTES
MTM Encounter-Re: Adherence and side effects (Tagrisso)    Today's encounter was conducted in person, face-to-face.     Medication:  Tagrisso 80 mg po daily  - Reason for outreach: Routine medication check-in .  - Administration: Patient is taking every day at the same time .  - Missed doses: Patient reports missing 0 doses in the last 30 days.  - Self-administration: Patient demonstrates ability to self-administer medication. No barriers to adherence identified.   - Diagnosis/Indication: stage IV adenocarcinoma of the lung. Progress toward achieving therapeutic goals reviewed.   - Patient denies side effects, aside from nail thinning, which is manageable and not bothersome to patient. Advised patient to alert office if this changes.    - Medication availability/affordability: Patient has had no issues obtaining medication from pharmacy.   - Questions/concerns about medications: none at this time       Completed medication reconciliation today to assess for drug interactions.   Reviewed allergies, medical history, labs, quality of life( he reports being grateful for the medication working and to be alive, he reports the staff being extremely supportive), and medication history with patient.   Patient denies starting or stopping any medications.  Assessed medication list for interactions, no significant drug interactions noted.   Advised pt to call the clinic if any new medications are started so we can assess for drug-drug interactions.     All questions addressed. Patient had no additional concerns for MTM office.     Kath Moreira RPH  10/3/2024  12:22 EDT

## 2024-10-04 ENCOUNTER — SPECIALTY PHARMACY (OUTPATIENT)
Dept: PHARMACY | Facility: HOSPITAL | Age: 79
End: 2024-10-04
Payer: MEDICARE

## 2024-10-04 NOTE — PROGRESS NOTES
Specialty Pharmacy Note: Tagrisso (osimertinib)    Nemesio Fitzpatrick is a 78 y.o. male with stage IV adenocarcinoma of the lung was seen 10/3/24 by APRN. Per provider dictation, no changes to oral oncology regimen Tagrisso (osimertinib) 80 mg po daily.  Labs Review: The CMP and CBC from 10/3/24 have been reviewed. No dose adjustments are needed for the oral specialty medication(s) based on the labs.    Specialty pharmacy will continue to follow patient.    Vesna Moreira Rph, BCOP  10/4/2024  08:38 EDT

## 2024-10-07 ENCOUNTER — SPECIALTY PHARMACY (OUTPATIENT)
Dept: PHARMACY | Facility: HOSPITAL | Age: 79
End: 2024-10-07
Payer: MEDICARE

## 2024-10-07 NOTE — PROGRESS NOTES
Specialty Pharmacy Patient Management Program  Oncology / Hematology Refill Outreach      Nemesio was contacted today regarding refills of his medication(s).    Specialty medication(s) and dose(s) confirmed: Tagrisso    Refill Questions      Flowsheet Row Most Recent Value   Changes to allergies? No   Changes to medications? No   New conditions or infections since last clinic visit No   Unplanned office visit, urgent care, ED, or hospital admission in the last 4 weeks  No   How does patient/caregiver feel medication is working? Very good   Financial problems or insurance changes  No   Since the previous refill, were any specialty medication doses or scheduled injections missed or delayed?  No   Does this patient require a clinical escalation to a pharmacist? No          Delivery Questions      Flowsheet Row Most Recent Value   Delivery method [employer]   Delivery address verified with patient/caregiver? Yes   Delivery address Home   Number of medications in delivery 1   Medication(s) being filled and delivered Osimertinib Mesylate   Doses left of specialty medications About 7 to 8   Copay verified? Yes   Copay amount $0   Copay form of payment No copayment ($0)   Ship Date 10/10   Delivery Date 10/11   Signature Required No            Follow-Up: 21 d [USUALLY TIME FRAME UNTIL NEXT REFILL OUTREACH FOLLOW-UP (APPROX) Ex. 25d, 85d, etc. ]    Blanca Jacobson, Pharmacy Technician  10/7/2024  17:08 EDT

## 2024-10-10 ENCOUNTER — CLINICAL SUPPORT (OUTPATIENT)
Dept: FAMILY MEDICINE CLINIC | Facility: CLINIC | Age: 79
End: 2024-10-10
Payer: MEDICARE

## 2024-10-10 DIAGNOSIS — R79.89 LOW VITAMIN B12 LEVEL: Primary | ICD-10-CM

## 2024-10-10 PROCEDURE — 96372 THER/PROPH/DIAG INJ SC/IM: CPT | Performed by: FAMILY MEDICINE

## 2024-10-10 RX ADMIN — CYANOCOBALAMIN 1000 MCG: 1000 INJECTION, SOLUTION INTRAMUSCULAR; SUBCUTANEOUS at 10:39

## 2024-10-30 ENCOUNTER — OFFICE VISIT (OUTPATIENT)
Dept: PSYCHIATRY | Facility: HOSPITAL | Age: 79
End: 2024-10-30
Payer: MEDICARE

## 2024-10-30 DIAGNOSIS — F41.1 GENERALIZED ANXIETY DISORDER: Primary | ICD-10-CM

## 2024-10-30 DIAGNOSIS — C34.90 PRIMARY NONSQUAMOUS NONSMALL CELL NEOPLASM OF LUNG: ICD-10-CM

## 2024-10-30 NOTE — PROGRESS NOTES
Subjective  Patient ID: Nemesio Fitzpatrick is a 79 y.o.. male seen in regularly scheduled group session.  Group participants have consented to group conducted in person    Total Group Time, Face to Face: 70 minutes  Total Group Participants: 5, plus facilitation per ASHLEIGH Moore    Group Therapy  Group topics explored including development of new normal, interpersonal sensitivity, short and long term effects of diagnosis and treatment, significant other or family conflict, anticipitory anxiety, physical deconditioning, social determinants of health (finances, living arrangements, etc), and lifestyle choices. Shares benefits and impact of perspective, ongoing processing of historical medical events, and continued issues in survivorship. Considered genuine and artificial limitations, barriers to personal goals. Identified benefit of continuing to set goals, while giving self permission to make these smaller. Appreciated setting of realistic goals, maintaining health, and allowing permission to life actively vs prepare to die.    Counseling provided regarding behavioral activation/ activity scheduling, reintroduction to activity through graded tasks, balancing avoidance with approach , sleep hygiene, recognition and allowance of need for rest, pharmacological and non pharmacological management of sleep disturbance, lifestyle counseling, benefits of exercise and strategies for managing barriers to engagement, allowance of self care, exploration of quality of life goals, survivorship issues, anxiety/ mood management , medication education, and existential distress. Supported ongoing development of growing group. Considered doable goals, importance of regular engagement with others, acknowledging importance of energy conservation, prioritizing things needed and desired to do. Supported discussions of existential distress, life review, and consideration of purpose in this phase of life.     Discussed current programming  available in [facility] and community.    Benefits of group discussed while also acknowledging the need for 1:1 consultation at times. Members invited to discuss any concerns privately with me following group setting or to schedule a 1:1 visit if needs not being met in group.    Next shared medical visit: December 4 at 2:00 in person    Patient response to group: Pt shares openly in group setting, collaborating openly with other members openly..    Medications Management  ASHLEIGH Moore present for medication discussion and management.  Pt continues in survivorship of metastatic lung cancer.    CC: Anxiety, fatigue  HPI: Pt continues to be seen regarding sx of anxiety in survivorship of metastatic lung cancer. Continues to feel well enough while wondering if this is as good as it gets. Appreciates ability to continue to connect with others in the group setting, appreciating perception of wellness, while noting dissonance from prior normal, specifically in realms of energy and endurance. Continues to appreciate benefits to gabapentin, regularly taking 600 mg q hs with appropriate sleep. Is not taking ativan.   Exam: As Above  Current medication regimen: gabapentin 600 mg q hs, ativan PRN - very limited use  Lab Review:   Infusion on 10/03/2024   Component Date Value    Glucose 10/03/2024 147 (H)     BUN 10/03/2024 13     Creatinine 10/03/2024 0.95     Sodium 10/03/2024 139     Potassium 10/03/2024 4.0     Chloride 10/03/2024 103     CO2 10/03/2024 24.3     Calcium 10/03/2024 9.3     Total Protein 10/03/2024 7.0     Albumin 10/03/2024 4.5     ALT (SGPT) 10/03/2024 75 (H)     AST (SGOT) 10/03/2024 58 (H)     Alkaline Phosphatase 10/03/2024 71     Total Bilirubin 10/03/2024 0.3     Globulin 10/03/2024 2.5     A/G Ratio 10/03/2024 1.8     BUN/Creatinine Ratio 10/03/2024 13.7     Anion Gap 10/03/2024 11.7     eGFR 10/03/2024 83.0     Magnesium 10/03/2024 1.9     Phosphorus 10/03/2024 2.3 (L)     WBC 10/03/2024  3.76     RBC 10/03/2024 4.61     Hemoglobin 10/03/2024 13.4     Hematocrit 10/03/2024 39.9     MCV 10/03/2024 86.6     MCH 10/03/2024 29.1     MCHC 10/03/2024 33.6     RDW 10/03/2024 13.1     RDW-SD 10/03/2024 40.7     MPV 10/03/2024 10.5     Platelets 10/03/2024 83 (L)     Neutrophil % 10/03/2024 62.4     Lymphocyte % 10/03/2024 26.1     Monocyte % 10/03/2024 7.7     Eosinophil % 10/03/2024 2.4     Basophil % 10/03/2024 1.1     Immature Grans % 10/03/2024 0.3     Neutrophils, Absolute 10/03/2024 2.35     Lymphocytes, Absolute 10/03/2024 0.98     Monocytes, Absolute 10/03/2024 0.29     Eosinophils, Absolute 10/03/2024 0.09     Basophils, Absolute 10/03/2024 0.04     Immature Grans, Absolute 10/03/2024 0.01     nRBC 10/03/2024 0.0    Office Visit on 09/23/2024   Component Date Value    Report Summary 09/23/2024 FINAL    Infusion on 09/05/2024   Component Date Value    Glucose 09/05/2024 130 (H)     BUN 09/05/2024 18     Creatinine 09/05/2024 1.04     Sodium 09/05/2024 142     Potassium 09/05/2024 3.5     Chloride 09/05/2024 106     CO2 09/05/2024 25.6     Calcium 09/05/2024 8.8     Total Protein 09/05/2024 7.2     Albumin 09/05/2024 4.5     ALT (SGPT) 09/05/2024 38     AST (SGOT) 09/05/2024 37     Alkaline Phosphatase 09/05/2024 76     Total Bilirubin 09/05/2024 0.6     Globulin 09/05/2024 2.7     A/G Ratio 09/05/2024 1.7     BUN/Creatinine Ratio 09/05/2024 17.3     Anion Gap 09/05/2024 10.4     eGFR 09/05/2024 74.4     Magnesium 09/05/2024 2.0     Phosphorus 09/05/2024 1.9 (L)     WBC 09/05/2024 3.31 (L)     RBC 09/05/2024 4.32     Hemoglobin 09/05/2024 12.9 (L)     Hematocrit 09/05/2024 38.2     MCV 09/05/2024 88.4     MCH 09/05/2024 29.9     MCHC 09/05/2024 33.8     RDW 09/05/2024 13.2     RDW-SD 09/05/2024 42.6     MPV 09/05/2024 10.5     Platelets 09/05/2024 81 (L)     Neutrophil % 09/05/2024 64.3     Lymphocyte % 09/05/2024 23.0     Monocyte % 09/05/2024 9.4     Eosinophil % 09/05/2024 1.8     Basophil %  09/05/2024 0.9     Immature Grans % 09/05/2024 0.6 (H)     Neutrophils, Absolute 09/05/2024 2.13     Lymphocytes, Absolute 09/05/2024 0.76     Monocytes, Absolute 09/05/2024 0.31     Eosinophils, Absolute 09/05/2024 0.06     Basophils, Absolute 09/05/2024 0.03     Immature Grans, Absolute 09/05/2024 0.02     nRBC 09/05/2024 0.0    Hospital Outpatient Visit on 09/03/2024   Component Date Value    Prox CCA PSV 09/03/2024 165.5     Prox CCA EDV 09/03/2024 16.5     Dist CCA PSV 09/03/2024 110.0     Dist CCA EDV 09/03/2024 19.9     Prox ICA PSV 09/03/2024 378.0     Prox ICA EDV 09/03/2024 93.0     Mid ICA PSV 09/03/2024 195.6     Mid ICA EDV 09/03/2024 44.5     Dist ICA PSV 09/03/2024 119.1     Dist ICA EDV 09/03/2024 35.8     Prox ECA PSV 09/03/2024 169.5     Prox ECA EDV 09/03/2024 15.5     Vertebral A PSV 09/03/2024 56.4     Prox CCA PSV 09/03/2024 101.0     Prox CCA EDV 09/03/2024 21.2     Dist CCA PSV 09/03/2024 100.3     Dist CCA EDV 09/03/2024 19.6     Prox ICA PSV 09/03/2024 65.2     Prox ICA EDV 09/03/2024 17.4     Mid ICA PSV 09/03/2024 -90.5     Mid ICA EDV 09/03/2024 -28.5     Dist ICA PSV 09/03/2024 -90.5     Dist ICA EDV 09/03/2024 -30.7     Vertebral A PSV 09/03/2024 51.6     Vertebral A EDV 09/03/2024 18.6     Prox SCLA PSV 09/03/2024 93.3     ICA/CCA ratio 09/03/2024 3.44     ICA/CCA ratio 09/03/2024 0.90    Mild elevation in LFTs noted    MDM:  Meds reviewed and renewed as appropriate.  Continue gabapentin as written. No refills needed.  FU scheduled in group setting.    Diagnoses and all orders for this visit:    1. Generalized anxiety disorder (Primary)    2. Primary nonsquamous nonsmall cell neoplasm of lung

## 2024-10-31 ENCOUNTER — INFUSION (OUTPATIENT)
Dept: ONCOLOGY | Facility: HOSPITAL | Age: 79
End: 2024-10-31
Payer: MEDICARE

## 2024-10-31 ENCOUNTER — OFFICE VISIT (OUTPATIENT)
Dept: ONCOLOGY | Facility: CLINIC | Age: 79
End: 2024-10-31
Payer: MEDICARE

## 2024-10-31 ENCOUNTER — PATIENT OUTREACH (OUTPATIENT)
Dept: OTHER | Facility: HOSPITAL | Age: 79
End: 2024-10-31
Payer: MEDICARE

## 2024-10-31 VITALS
BODY MASS INDEX: 30.54 KG/M2 | WEIGHT: 213.3 LBS | OXYGEN SATURATION: 96 % | HEART RATE: 87 BPM | HEIGHT: 70 IN | TEMPERATURE: 97.5 F | SYSTOLIC BLOOD PRESSURE: 136 MMHG | RESPIRATION RATE: 18 BRPM | DIASTOLIC BLOOD PRESSURE: 65 MMHG

## 2024-10-31 DIAGNOSIS — C79.51 CANCER, METASTATIC TO BONE: Primary | ICD-10-CM

## 2024-10-31 DIAGNOSIS — C34.90 PRIMARY NONSQUAMOUS NONSMALL CELL NEOPLASM OF LUNG: ICD-10-CM

## 2024-10-31 DIAGNOSIS — C61 MALIGNANT NEOPLASM PROSTATE: Primary | ICD-10-CM

## 2024-10-31 DIAGNOSIS — C79.51 CANCER, METASTATIC TO BONE: ICD-10-CM

## 2024-10-31 DIAGNOSIS — C61 MALIGNANT NEOPLASM PROSTATE: ICD-10-CM

## 2024-10-31 DIAGNOSIS — Z79.899 HIGH RISK MEDICATION USE: ICD-10-CM

## 2024-10-31 LAB
ALBUMIN SERPL-MCNC: 4.3 G/DL (ref 3.5–5.2)
ALBUMIN/GLOB SERPL: 1.8 G/DL
ALP SERPL-CCNC: 64 U/L (ref 39–117)
ALT SERPL W P-5'-P-CCNC: 66 U/L (ref 1–41)
ANION GAP SERPL CALCULATED.3IONS-SCNC: 16.1 MMOL/L (ref 5–15)
AST SERPL-CCNC: 50 U/L (ref 1–40)
BASOPHILS # BLD AUTO: 0.03 10*3/MM3 (ref 0–0.2)
BASOPHILS NFR BLD AUTO: 0.6 % (ref 0–1.5)
BILIRUB SERPL-MCNC: 0.2 MG/DL (ref 0–1.2)
BUN SERPL-MCNC: 17 MG/DL (ref 8–23)
BUN/CREAT SERPL: 17 (ref 7–25)
CALCIUM SPEC-SCNC: 9 MG/DL (ref 8.6–10.5)
CHLORIDE SERPL-SCNC: 105 MMOL/L (ref 98–107)
CO2 SERPL-SCNC: 21.9 MMOL/L (ref 22–29)
CREAT SERPL-MCNC: 1 MG/DL (ref 0.76–1.27)
DEPRECATED RDW RBC AUTO: 41.1 FL (ref 37–54)
EGFRCR SERPLBLD CKD-EPI 2021: 77.5 ML/MIN/1.73
EOSINOPHIL # BLD AUTO: 0.09 10*3/MM3 (ref 0–0.4)
EOSINOPHIL NFR BLD AUTO: 1.9 % (ref 0.3–6.2)
ERYTHROCYTE [DISTWIDTH] IN BLOOD BY AUTOMATED COUNT: 13.2 % (ref 12.3–15.4)
GLOBULIN UR ELPH-MCNC: 2.4 GM/DL
GLUCOSE SERPL-MCNC: 120 MG/DL (ref 65–99)
HCT VFR BLD AUTO: 39.7 % (ref 37.5–51)
HGB BLD-MCNC: 13.7 G/DL (ref 13–17.7)
IMM GRANULOCYTES # BLD AUTO: 0.02 10*3/MM3 (ref 0–0.05)
IMM GRANULOCYTES NFR BLD AUTO: 0.4 % (ref 0–0.5)
LYMPHOCYTES # BLD AUTO: 1.11 10*3/MM3 (ref 0.7–3.1)
LYMPHOCYTES NFR BLD AUTO: 24 % (ref 19.6–45.3)
MAGNESIUM SERPL-MCNC: 1.9 MG/DL (ref 1.6–2.4)
MCH RBC QN AUTO: 29.9 PG (ref 26.6–33)
MCHC RBC AUTO-ENTMCNC: 34.5 G/DL (ref 31.5–35.7)
MCV RBC AUTO: 86.7 FL (ref 79–97)
MONOCYTES # BLD AUTO: 0.41 10*3/MM3 (ref 0.1–0.9)
MONOCYTES NFR BLD AUTO: 8.9 % (ref 5–12)
NEUTROPHILS NFR BLD AUTO: 2.97 10*3/MM3 (ref 1.7–7)
NEUTROPHILS NFR BLD AUTO: 64.2 % (ref 42.7–76)
NRBC BLD AUTO-RTO: 0 /100 WBC (ref 0–0.2)
PHOSPHATE SERPL-MCNC: 2.3 MG/DL (ref 2.5–4.5)
PLATELET # BLD AUTO: 95 10*3/MM3 (ref 140–450)
PMV BLD AUTO: 10.8 FL (ref 6–12)
POTASSIUM SERPL-SCNC: 4.1 MMOL/L (ref 3.5–5.2)
PROT SERPL-MCNC: 6.7 G/DL (ref 6–8.5)
RBC # BLD AUTO: 4.58 10*6/MM3 (ref 4.14–5.8)
SODIUM SERPL-SCNC: 143 MMOL/L (ref 136–145)
WBC NRBC COR # BLD AUTO: 4.63 10*3/MM3 (ref 3.4–10.8)

## 2024-10-31 PROCEDURE — 80053 COMPREHEN METABOLIC PANEL: CPT

## 2024-10-31 PROCEDURE — 85025 COMPLETE CBC W/AUTO DIFF WBC: CPT

## 2024-10-31 PROCEDURE — 96374 THER/PROPH/DIAG INJ IV PUSH: CPT

## 2024-10-31 PROCEDURE — 84100 ASSAY OF PHOSPHORUS: CPT

## 2024-10-31 PROCEDURE — 25010000002 ZOLEDRONIC ACID 4 MG/100ML SOLUTION: Performed by: NURSE PRACTITIONER

## 2024-10-31 PROCEDURE — 83735 ASSAY OF MAGNESIUM: CPT

## 2024-10-31 RX ORDER — SODIUM CHLORIDE 9 MG/ML
20 INJECTION, SOLUTION INTRAVENOUS ONCE
Status: CANCELLED | OUTPATIENT
Start: 2024-10-31

## 2024-10-31 RX ORDER — ZOLEDRONIC ACID 0.04 MG/ML
4 INJECTION, SOLUTION INTRAVENOUS ONCE
Status: CANCELLED | OUTPATIENT
Start: 2024-10-31

## 2024-10-31 RX ORDER — SODIUM CHLORIDE 9 MG/ML
20 INJECTION, SOLUTION INTRAVENOUS ONCE
Status: DISCONTINUED | OUTPATIENT
Start: 2024-10-31 | End: 2024-10-31 | Stop reason: HOSPADM

## 2024-10-31 RX ORDER — ZOLEDRONIC ACID 0.04 MG/ML
4 INJECTION, SOLUTION INTRAVENOUS ONCE
Status: COMPLETED | OUTPATIENT
Start: 2024-10-31 | End: 2024-10-31

## 2024-10-31 RX ADMIN — ZOLEDRONIC ACID 4 MG: 0.04 INJECTION, SOLUTION INTRAVENOUS at 14:25

## 2024-10-31 NOTE — PROGRESS NOTES
Reviewed chart. Patient with Stage IV lung cancer. Completed palliative radiation to T4, Continues Tagrisso 80 mg daily, IV Zometa. Continues visits with Cristy and .     Met patient and wife in infusion today. He is receiving Zometa and denies any ongoing issues other than intermittent pain, which is managed with 1/2 pain pill at night.    The patient is eating well and denies weight loss.     We again discussed integrative therapies and other [facility]. Patient expressed gratitude for my support and denied any additional needs at this time.     Since he is stable on Tagrisso with no ongoing resource needs, I will close his case to navigation although reminded him that he can access the services in the [facility] even with his navigation case being closed. Encouraged patient to call in the future if the need arises. Patient verbalized understanding.

## 2024-10-31 NOTE — PROGRESS NOTES
Subjective     REASON FOR CONSULTATION:  spine mass  Provide an opinion on any further workup or treatment                             REQUESTING PRACTITIONER: Chandrakant    RECORDS OBTAINED:  Records of the patients history including those obtained from the referring provider were reviewed and summarized in detail.      History of Present Illness:    The patient is a 79 y.o. male with the above-mentioned history, who returns to the office today for monthly follow-up and review.  He continues on Tagrisso 80 mg daily.  He reports overall excellent tolerance.  He has very occasional diarrhea which is unchanged from his baseline.  He reports intermittent back pain though this is manageable and actually improved.  He remains exceptionally grateful for his overall quality of life and improved performance status.    ONCOLOGY HISTORY:  This is a pleasant 78-year-old man with impaired fasting glucose, hyperlipidemia, hypothyroidism and history of prostate cancer.  The patient has been experiencing about a 1 year history of progressive back pain in the upper mid thoracic spine stabbing in nature and radiating bilaterally anteriorly.  Symptoms are worse when the patient lays down to rest at night.  He has also had some pain around the right shoulder blade.  He was treated with PT with no improvement.  The patient has also been having left hip pain.  The patient was evaluated by orthopedic surgery and an MRI of the thoracic spine without contrast was performed on 4/25/2024 and showed a large expansile marrow replacing mass along the anterior T4 vertebral body 3.2 x 2.1 cm extending into the anterior paraspinal soft tissue along the T3-T4 space with reactive bone marrow edema.  Another partially visualized expansile mass was seen in the posterior right second rib 2.7 x 1.5 cm and another in the posterior sixth rib 1 cm with surrounding bone marrow edema.  At T7-T8 there is a posterior disc protrusion with mild to moderate canal  stenosis.  In the posterior right lung a 3.5 cm mass was noted with additional small nodules also seen but poorly characterized.    The patient denies weight loss, night sweats, shortness of breath, cough, hemoptysis, headaches, confusion, changes in swallowing bowel habits urinary habits.  He has history of prostate cancer treated with prostatectomy in 2009 and reports negligible PSA values.    The patient has history of light smoking during his 20s and 30s.  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    A full body PET scan was performed on 5/3/2024 which showed a hypermetabolic mass in the superior segment of the right lower lobe 3.7 x 2.8 cm SUV 9.3, no hypermetabolic mediastinal or hilar lymph nodes but multiple hypermetabolic bone lesions largest being at T4 vertebral body SUV 9.4, posterior lateral right second rib, posterior right sixth rib, left aspect of the sacrum to the left of the S1 neural foramen.  Spleen was mildly enlarged 15 cm but less activity than liver.    A CT-guided needle biopsy of a rib mass was performed on 5/10/2024 and showed metastatic adenocarcinoma immunohistochemistry supporting a pulmonary primary.    The patient was seen by radiation oncology with plans to treat at least palliatively to T4 and rib lesions possible additional sites pending peer review of case.    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    MRI brain 5/31/2024 showed multiple supratentorial and infratentorial lesions consistent with metastatic disease largest 4-4.5 mm in size.    The patient initiated Tagrisso 6/5/2024.  He was treated with radiation therapy to T4.      Past Medical History:   Diagnosis Date    Acute bronchitis     Allergic Iodine    Allergic rhinitis     Anxiety 2024    Due to sudden cancer diagnosis    Arthritis 2000    BPH (benign prostatic hypertrophy)     Cervical disc disorder 1987    Dislocation, shoulder 1973    Elevated prostate  specific antigen (PSA)     Headache 1990    History of bone scan 10/21/2010    normal    History of colonoscopy     never    History of EKG 03/12/2012    also 11-; T wave abnormality    History of medical problems 1978    HL (hearing loss) 2000    Hyperlipidemia     Hypertension 2024    Upon learning of Stage 4 cancer    Hypothyroidism     IFG (impaired fasting glucose)     Kidney calculus     left ureteral stone    Knee swelling 2015    Low back pain     Lung cancer 2024    Right    Malignant neoplasm prostate 11/09/2009    Dr. Mcclelland; lymph nodes negative margins clear    Prostate nodule     right lobe    Rotator cuff syndrome 2009    Scoliosis 2009    Screening for prostate cancer     prostatectomy    Sleep apnea     CPAP machine    URI (upper respiratory infection)         Past Surgical History:   Procedure Laterality Date    CERVICAL DISC SURGERY  1990    also 2006; C5-6, C6-7    HAND SURGERY Left 2001    trigger finger release; left middle finger    NECK SURGERY  1988, 2005    Cervical disk    PROSTATECTOMY  11/09/2009    RHINOPLASTY  1985    ROTATOR CUFF REPAIR      SHOULDER SURGERY Left 09/29/2009    Dr. KRISTEL Jimenez; rotator cuff repair; bone spurs     SPINE SURGERY  2005    TRIGGER POINT INJECTION  2008,2021,2022    URETERAL STENT INSERTION Left 12/04/2013    Dr. Martinez        Current Outpatient Medications on File Prior to Visit   Medication Sig Dispense Refill    Diclofenac Sodium (VOLTAREN) 1 % gel gel Apply 4 g topically to the appropriate area as directed 3 (Three) Times a Day. 100 g 2    gabapentin (NEURONTIN) 300 MG capsule Take 1 capsule by mouth 3 (Three) Times a Day. 90 capsule 2    HYDROcodone-acetaminophen (NORCO) 5-325 MG per tablet Take 1 tablet by mouth Daily As Needed for Moderate Pain. 30 tablet 0    levothyroxine (SYNTHROID, LEVOTHROID) 75 MCG tablet       loratadine (CLARITIN) 10 MG tablet Take 1 tablet by mouth Daily.      Norvasc 10 MG tablet Take 1 tablet by mouth Daily.       ondansetron (ZOFRAN) 8 MG tablet Take 1 tablet by mouth 3 (Three) Times a Day As Needed for Nausea or Vomiting. 30 tablet 5    Osimertinib Mesylate 80 MG tablet Take 1 tablet by mouth Daily. 30 tablet 5    predniSONE (DELTASONE) 50 MG tablet Take 1 tablet by mouth Take As Directed for 3 doses. Take 1 tablet (50mg) 13 Hours 7 Hours & 1 Hour Prior to Exam 3 tablet 0     Current Facility-Administered Medications on File Prior to Visit   Medication Dose Route Frequency Provider Last Rate Last Admin    cyanocobalamin injection 1,000 mcg  1,000 mcg Intramuscular Q28 Days Sherry Mazariegos MD   1,000 mcg at 10/10/24 1039        ALLERGIES:    Allergies   Allergen Reactions    Iodinated Contrast Media Anaphylaxis     Patient had a severe reaction in the past / difficulty breathing    Iodine Unknown - High Severity     Difficulty breathing    Molds & Smuts Unknown - High Severity    Shellfish Allergy Unknown - High Severity     Burning inside    Amoxicillin Rash        Social History     Socioeconomic History    Marital status:      Spouse name: Faith   Tobacco Use    Smoking status: Former     Current packs/day: 0.00     Average packs/day: 1 pack/day for 22.0 years (22.0 ttl pk-yrs)     Types: Cigarettes, Pipe     Start date: 1967     Quit date: 1988     Years since quittin.8     Passive exposure: Past    Smokeless tobacco: Never    Tobacco comments:     Quit smoking pipe around    Vaping Use    Vaping status: Never Used   Substance and Sexual Activity    Alcohol use: Yes     Comment: Rarely will drink one glass of wine    Drug use: Never    Sexual activity: Not Currently     Partners: Female        Family History   Problem Relation Age of Onset    Diabetes Mother     Kidney disease Mother         calculus    Hearing loss Mother     Prostate cancer Father     Stomach cancer Father     Cancer Sister         breast    Hypothyroidism Sister     Kidney disease Sister         calculus    Arthritis  "Sister     Transient ischemic attack Brother     Alzheimer's disease Other         uncle    Diabetes Other         uncle    Arthritis Brother         Objective     Vitals:    10/31/24 1335   BP: 136/65   Pulse: 87   Resp: 18   Temp: 97.5 °F (36.4 °C)   TempSrc: Oral   SpO2: 96%   Weight: 96.8 kg (213 lb 4.8 oz)   Height: 177.8 cm (70\")   PainSc: 0-No pain             10/31/2024     1:36 PM   Current Status   ECOG score 0       Physical Exam    CONSTITUTIONAL: Pleasant well- developed man   HEENT: no icterus, no thrush, moist membranes  LYMPH: no cervical or supraclavicular lad  CV: RRR, S1S2, no murmur  RESP: cta bilat, no wheezing, no rales  GI: soft, non-tender, no splenomegaly, +bs  MUSC: No edema, normal gait.  NEURO: Alert and oritented x3, normal strength.   PSYCH: Normal mood and affect.       RECENT LABS:  Results from last 7 days   Lab Units 10/31/24  1322   WBC 10*3/mm3 4.63   NEUTROS ABS 10*3/mm3 2.97   HEMOGLOBIN g/dL 13.7   HEMATOCRIT % 39.7   PLATELETS 10*3/mm3 95*     Results from last 7 days   Lab Units 10/31/24  1322   SODIUM mmol/L 143   POTASSIUM mmol/L 4.1   CHLORIDE mmol/L 105   CO2 mmol/L 21.9*   BUN mg/dL 17   CREATININE mg/dL 1.00   CALCIUM mg/dL 9.0   ALBUMIN g/dL 4.3   BILIRUBIN mg/dL 0.2   ALK PHOS U/L 64   ALT (SGPT) U/L 66*   AST (SGOT) U/L 50*   GLUCOSE mg/dL 120*   MAGNESIUM mg/dL 1.9             MRI Thoracic Spine:  MPRESSION:     1. Large expansile mass in the anterior T4 vertebral body extending into  the adjacent soft tissues and involving greater than 50% of the  vertebral body, consistent with metastatic disease. Surrounding bone  marrow edema like signal throughout the T4 vertebral body and patchy  bone marrow edema like signal in the adjacent anterior T3 inferior  endplate and anterior T5 superior endplate. This lesion is at risk for  pathologic fracture.  2. Expansile mass in the posterior right second rib and focal 1 cm mass  in the posterior right sixth rib, consistent with " metastatic disease.  3. Partially imaged pulmonary malignant/metastatic disease. Recommend  further evaluation with CT with contrast and/or PET/CT.    PET:  TECHNIQUE: Radiation dose reduction techniques were utilized, including  automated exposure control and exposure modulation based on body size.   Blood glucose level at time of injection was 123 mg/dL. 6.8 mCi of F-18  FDG were injected and PET was performed from skull base to mid thigh. CT  was obtained for localization and attenuation correction. Time at  injection 8:43 a.m. PET start time 10:01 a.m. Normalization method:  patient weight. Compared with thoracic MRI 04/25/2024.     FINDINGS: Mediastinal blood pool has a maximal SUV of 2.8.     1. There is a hypermetabolic 3.7 x 2.8 cm irregular mass at the superior  segment of the right lower lobe with a maximal SUV of 9.3. There is no  hypermetabolic hilar or mediastinal lymphadenopathy, but there are  multiple hypermetabolic bone lesions. The largest is at the T4 vertebral  body with a maximal SUV of 9.4. Lytic expansile metastases are also seen  at the posterior and lateral aspects of the right 2nd rib, posterior  right 6th rib, and left aspect of the sacrum anterior to the left S1  neural foramen.   The activity adjacent to the left greater trochanter is likely related  to tendinopathy.     2. There is no hypermetabolic lymphadenopathy or suspicious activity  within the neck. There is no suspicious visceral activity within the  abdomen or pelvis.  The spleen is mildly enlarged measuring 15 cm in diameter. Splenic  activity is less intense than that of the liver.    CT Angiogram Neck (07/12/2024 10:35)  CT Angiogram Head (07/12/2024 10:35)  IMPRESSION:  1.  A 65% stenosis involving the right internal carotid artery  proximally is appreciated using NASCET criteria due to eccentric  noncalcified plaque.  2.  The right vertebral artery is occluded just proximal to the dura.  The posterior inferior cerebellar  artery on the right is opacified via  retrograde flow.  3.  A moderate stenosis involving the left P1 segment is appreciated.  4.  A lytic lesion is noted at T4, only partially visualized. No obvious  enhancing lesion involving the brain is identified. A MRI examination of  the brain with and without contrast would be more sensitive for  evaluation of metastatic disease.    CT Abdomen Pelvis With Contrast (07/12/2024 10:35)  CT Chest With Contrast Diagnostic (07/12/2024 10:35)  IMPRESSION:  1.  A 65% stenosis involving the right internal carotid artery  proximally is appreciated using NASCET criteria due to eccentric  noncalcified plaque.  2.  The right vertebral artery is occluded just proximal to the dura.  The posterior inferior cerebellar artery on the right is opacified via  retrograde flow.  3.  A moderate stenosis involving the left P1 segment is appreciated.  4.  A lytic lesion is noted at T4, only partially visualized. No obvious  enhancing lesion involving the brain is identified. A MRI examination of  the brain with and without contrast would be more sensitive for  evaluation of metastatic disease.    MRI Brain With & Without Contrast (08/03/2024 12:31)  IMPRESSION:  There is very minimal small vessel disease in cerebral white  matter. The remainder of the MRI of the brain is within normal limits.  Specifically, the previously identified 5 tiny 2 to 4 mm enhancing  lesions in the brain on the MRI of the brain on 05/31/2024 including 2  in the left frontal lobe, 1 in the left parietal lobe another medial  right occipital lobe and one in the medial left cerebellum have resolved  in the interval with no discernible residual enhancing metastatic  lesions identified in the brain. Continued followup contrast-enhanced  MRI of the brain is recommended.    Assessment & Plan     *Stage IV adenocarcinoma, lung primary  Patient presented with back and chest wall pain, imaging showed and expansile mass T4 vertebral  body, posterior right second rib and posterior right sixth rib  PET scan was performed on 5/3/2024 which showed a hypermetabolic mass in the superior segment of the right lower lobe 3.7 x 2.8 cm SUV 9.3, no hypermetabolic mediastinal or hilar lymph nodes but multiple hypermetabolic bone lesions largest being at T4 vertebral body SUV 9.4, posterior lateral right second rib, posterior right sixth rib, left aspect of the sacrum to the left of the S1 neural foramen.  Spleen was mildly enlarged 15 cm but less activity than liver.  CT-guided needle biopsy of a rib mass was performed on 5/10/2024 and showed metastatic adenocarcinoma immunohistochemistry supporting a pulmonary primary  The patient's next generation sequencing returned with a EGFR exon 19 and frame deletion.  Initiated Tagrisso 80 mg daily on 6/5/2024 7/12/2024-CT chest abdomen pelvis-decreased size of the right upper lobe mass, stable osseous metastatic disease, stable solid and mixed groundglass right lung apex  10/31/2024 ongoing excellent tolerance to Tagrisso 80 mg daily which will be continued.  Follow-up imaging scheduled    *Brain metastases  5/31/2024-MRI brain showed multifocal supratentorial and infratentorial enhancing lesions, largest 4-4.5 mm in size-plan to monitor response to Tagrisso  8/3/2024 MRI brain-minimal small vessel disease, resolution of previous enhancing lesions    *Pain of malignancy  The patient has available [place] 5/325 1 p.o. every 6 hours as needed pain  Status post radiation to T4 with improved pain    *Thrombocytopenia secondary to Tagrisso  9/5/2024: Platelets stable at 81. Instructed to call if develops symptoms such as nosebleed or petechiae.   Further improvement in platelets noted today at 95,000 without signs or symptoms of bleeding    *Significant anxiety related to malignancy  The patient is being followed by the psychosocial clinic  Reports today he has noted great benefit from small group meetings    *Bone  metastases   9/5/2024: Experienced aches and pains with last zometa dose. Instructed to start Claritin. Zometa held today due to phosphorus of 1.9. Encouraged increase in dietary phosphorus such as dairy products.   10/31/2024: Proceed with Zometa today which is continued every 4 weeks    *Decreased flow right vertebral artery by MRI  7/12/2024 CT angiogram head and neck-65% stenosis of the right internal carotid artery, right vertebral artery occluded proximal to the dura with retrograde flow through the PICA, moderate stenosis P1, lytic lesion T4  Vascular surgery recommended to begin aspirin 81 mg daily      *History of prostate cancer status post prostatectomy 2009    Plan:   Continue Tagrisso 80 mg daily  Proceed today with Zometa which is continued every 4 weeks  Continue to follow with behavioral oncology including small group  Continue aspirin 81 mg daily.  CT chest abdomen pelvis with contrast scheduled on 11/21/2024.  Contrast allergy protocol prescribed to be taken prior to scan.  Follow-up 1 week after CT for MD visit, scan review, Zometa, CBC/CMP/magnesium/phosphorus.  Instructed to reach out sooner with any new concerns or problems.    Patient is on a high risk medication requiring close monitoring for toxicity.    Emma Vasquez, APRN  10/31/2024

## 2024-11-01 ENCOUNTER — SPECIALTY PHARMACY (OUTPATIENT)
Dept: PHARMACY | Facility: HOSPITAL | Age: 79
End: 2024-11-01
Payer: MEDICARE

## 2024-11-01 NOTE — PROGRESS NOTES
Specialty Pharmacy Note: Tagrisso (osimertinib)    Nemesio Fitzpatrick is a 79 y.o. male with stage Iv adenocarcinoma of the lung was seen 10/31/24 by APRN. Per provider dictation, no changes to oral oncology regimen Tagrisso (osimertinib) 80 mg po daily.  Labs Review: The CMP and CBC from 10/31/24 have been reviewed. No dose adjustments are needed for the oral specialty medication(s) based on the labs.    Specialty pharmacy will continue to follow patient.    Vesna Moreira Rph, BCOP  11/1/2024  08:33 EDT

## 2024-11-04 ENCOUNTER — SPECIALTY PHARMACY (OUTPATIENT)
Dept: PHARMACY | Facility: HOSPITAL | Age: 79
End: 2024-11-04
Payer: MEDICARE

## 2024-11-04 NOTE — PROGRESS NOTES
I contacted Mr. Fitzpatrick to coordinate a refill shipment of Tagrisso. He asked that I call back in two weeks to schedule his next delivery.    Blanca Jacobson - Care Coordinator   11/4/2024  12:09 EST

## 2024-11-09 DIAGNOSIS — M54.6 ACUTE BILATERAL THORACIC BACK PAIN: ICD-10-CM

## 2024-11-14 ENCOUNTER — CLINICAL SUPPORT (OUTPATIENT)
Dept: FAMILY MEDICINE CLINIC | Facility: CLINIC | Age: 79
End: 2024-11-14
Payer: MEDICARE

## 2024-11-14 DIAGNOSIS — E53.8 B12 DEFICIENCY: Primary | ICD-10-CM

## 2024-11-14 PROCEDURE — 96372 THER/PROPH/DIAG INJ SC/IM: CPT | Performed by: FAMILY MEDICINE

## 2024-11-14 RX ADMIN — CYANOCOBALAMIN 1000 MCG: 1000 INJECTION, SOLUTION INTRAMUSCULAR; SUBCUTANEOUS at 10:19

## 2024-11-18 ENCOUNTER — SPECIALTY PHARMACY (OUTPATIENT)
Dept: PHARMACY | Facility: HOSPITAL | Age: 79
End: 2024-11-18
Payer: MEDICARE

## 2024-11-18 NOTE — PROGRESS NOTES
Specialty Pharmacy Patient Management Program  Oncology / Hematology Refill Outreach      Nemesio was contacted today regarding refills of his medication(s).    Specialty medication(s) and dose(s) confirmed: Tagrisso 80mg    Refill Questions      Flowsheet Row Most Recent Value   Changes to allergies? No   Changes to medications? No   New conditions or infections since last clinic visit No   Unplanned office visit, urgent care, ED, or hospital admission in the last 4 weeks  No   How does patient/caregiver feel medication is working? Good   Financial problems or insurance changes  No   Since the previous refill, were any specialty medication doses or scheduled injections missed or delayed?  No   Does this patient require a clinical escalation to a pharmacist? No          Delivery Questions      Flowsheet Row Most Recent Value   Delivery method [employer]   Delivery address verified with patient/caregiver? Yes   Delivery address Home  [shp to the home on 11/19 to arrive 11/20]   Number of medications in delivery 1   Medication(s) being filled and delivered Osimertinib Mesylate   Doses left of specialty medications between 7 and 10   Copay verified? Yes   Copay amount $0   Copay form of payment No copayment ($0)   Ship Date 11/19   Delivery Date 11/20   Signature Required No            Follow-Up: 21d  [USUALLY TIME FRAME UNTIL NEXT REFILL OUTREACH FOLLOW-UP (APPROX) Ex. 25d, 85d, etc. ]    Blanca Jacobson, Pharmacy Technician  11/18/2024  14:27 EST

## 2024-11-21 ENCOUNTER — HOSPITAL ENCOUNTER (OUTPATIENT)
Facility: HOSPITAL | Age: 79
Discharge: HOME | End: 2024-11-21
Payer: MEDICARE

## 2024-11-21 DIAGNOSIS — C34.90 PRIMARY NONSQUAMOUS NONSMALL CELL NEOPLASM OF LUNG: ICD-10-CM

## 2024-11-21 PROCEDURE — 74177 CT ABD & PELVIS W/CONTRAST: CPT

## 2024-11-21 PROCEDURE — 71260 CT THORAX DX C+: CPT

## 2024-11-21 PROCEDURE — 25510000001 IOPAMIDOL 61 % SOLUTION

## 2024-11-21 RX ORDER — IOPAMIDOL 612 MG/ML
100 INJECTION, SOLUTION INTRAVASCULAR
Status: COMPLETED | OUTPATIENT
Start: 2024-11-21 | End: 2024-11-21

## 2024-11-21 RX ORDER — DIPHENHYDRAMINE HCL 50 MG/1
50 CAPSULE ORAL ONCE
COMMUNITY
Start: 2024-10-03

## 2024-11-21 RX ORDER — ASPIRIN 81 MG/1
81 TABLET, CHEWABLE ORAL DAILY
COMMUNITY

## 2024-11-21 RX ADMIN — IOPAMIDOL 85 ML: 612 INJECTION, SOLUTION INTRAVENOUS at 09:49

## 2024-11-26 NOTE — PROGRESS NOTES
Subjective     REASON FOR CONSULTATION:  spine mass  Provide an opinion on any further workup or treatment                             REQUESTING PRACTITIONER:  Chnadrakant    RECORDS OBTAINED:  Records of the patients history including those obtained from the referring provider were reviewed and summarized in detail.      History of Present Illness:  The patient returns today for follow-up.  He is doing well.  He reports no major side effects from Tagrisso.  He denies uncontrolled nausea vomiting diarrhea.  He is having no uncontrolled pain.  He denies headache or neurological changes.  No new complaints today.      ONCOLOGY HISTORY:  This is a pleasant 78-year-old man with impaired fasting glucose, hyperlipidemia, hypothyroidism and history of prostate cancer.  The patient has been experiencing about a 1 year history of progressive back pain in the upper mid thoracic spine stabbing in nature and radiating bilaterally anteriorly.  Symptoms are worse when the patient lays down to rest at night.  He has also had some pain around the right shoulder blade.  He was treated with PT with no improvement.  The patient has also been having left hip pain.  The patient was evaluated by orthopedic surgery and an MRI of the thoracic spine without contrast was performed on 4/25/2024 and showed a large expansile marrow replacing mass along the anterior T4 vertebral body 3.2 x 2.1 cm extending into the anterior paraspinal soft tissue along the T3-T4 space with reactive bone marrow edema.  Another partially visualized expansile mass was seen in the posterior right second rib 2.7 x 1.5 cm and another in the posterior sixth rib 1 cm with surrounding bone marrow edema.  At T7-T8 there is a posterior disc protrusion with mild to moderate canal stenosis.  In the posterior right lung a 3.5 cm mass was noted with additional small nodules also seen but poorly characterized.    The patient denies weight loss, night sweats, shortness of breath,  cough, hemoptysis, headaches, confusion, changes in swallowing bowel habits urinary habits.  He has history of prostate cancer treated with prostatectomy in 2009 and reports negligible PSA values.    The patient has history of light smoking during his 20s and 30s.  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    A full body PET scan was performed on 5/3/2024 which showed a hypermetabolic mass in the superior segment of the right lower lobe 3.7 x 2.8 cm SUV 9.3, no hypermetabolic mediastinal or hilar lymph nodes but multiple hypermetabolic bone lesions largest being at T4 vertebral body SUV 9.4, posterior lateral right second rib, posterior right sixth rib, left aspect of the sacrum to the left of the S1 neural foramen.  Spleen was mildly enlarged 15 cm but less activity than liver.    A CT-guided needle biopsy of a rib mass was performed on 5/10/2024 and showed metastatic adenocarcinoma immunohistochemistry supporting a pulmonary primary.    The patient was seen by radiation oncology with plans to treat at least palliatively to T4 and rib lesions possible additional sites pending peer review of case.    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    MRI brain 5/31/2024 showed multiple supratentorial and infratentorial lesions consistent with metastatic disease largest 4-4.5 mm in size.    The patient initiated Tagrisso 6/5/2024.  He was treated with radiation therapy to T4.          Past Medical History:   Diagnosis Date    Acute bronchitis     Allergic Iodine    Allergic rhinitis     Anxiety 2024    Due to sudden cancer diagnosis    Arthritis 2000    BPH (benign prostatic hypertrophy)     Cervical disc disorder 1987    Dislocation, shoulder 1973    Elevated prostate specific antigen (PSA)     Headache 1990    History of bone scan 10/21/2010    normal    History of colonoscopy     never    History of EKG 03/12/2012    also 11-; T wave abnormality     History of medical problems 1978    HL (hearing loss) 2000    Hyperlipidemia     Hypertension 2024    Upon learning of Stage 4 cancer    Hypothyroidism     IFG (impaired fasting glucose)     Kidney calculus     left ureteral stone    Knee swelling 2015    Low back pain     Lung cancer 2024    Right    Malignant neoplasm prostate 11/09/2009    Dr. Mcclelladn; lymph nodes negative margins clear    Prostate nodule     right lobe    Rotator cuff syndrome 2009    Scoliosis 2009    Screening for prostate cancer     prostatectomy    Sleep apnea     CPAP machine    URI (upper respiratory infection)         Past Surgical History:   Procedure Laterality Date    CERVICAL DISC SURGERY  1990    also 2006; C5-6, C6-7    HAND SURGERY Left 2001    trigger finger release; left middle finger    NECK SURGERY  1988, 2005    Cervical disk    PROSTATECTOMY  11/09/2009    RHINOPLASTY  1985    ROTATOR CUFF REPAIR      SHOULDER SURGERY Left 09/29/2009    Dr. KRISTEL Jimenez; rotator cuff repair; bone spurs     SPINE SURGERY  2005    TRIGGER POINT INJECTION  2008,2021,2022    URETERAL STENT INSERTION Left 12/04/2013    Dr. Martinez        Current Outpatient Medications on File Prior to Visit   Medication Sig Dispense Refill    aspirin 81 MG chewable tablet Chew 1 tablet Daily.      Banophen 50 MG capsule Take 1 capsule by mouth 1 time.      Diclofenac Sodium (VOLTAREN) 1 % gel gel Apply 4 g topically to the appropriate area as directed 3 (Three) Times a Day. 100 g 2    gabapentin (NEURONTIN) 300 MG capsule Take 1 capsule by mouth 3 (Three) Times a Day. 90 capsule 2    HYDROcodone-acetaminophen (NORCO) 5-325 MG per tablet Take 1 tablet by mouth Daily As Needed for Moderate Pain. 30 tablet 0    levothyroxine (SYNTHROID, LEVOTHROID) 75 MCG tablet       loratadine (CLARITIN) 10 MG tablet Take 1 tablet by mouth Daily.      Norvasc 10 MG tablet Take 1 tablet by mouth Daily.      ondansetron (ZOFRAN) 8 MG tablet Take 1 tablet by mouth 3 (Three) Times  a Day As Needed for Nausea or Vomiting. 30 tablet 5    Osimertinib Mesylate 80 MG tablet Take 1 tablet by mouth Daily. 30 tablet 5    predniSONE (DELTASONE) 50 MG tablet Take 1 tablet by mouth Take As Directed for 3 doses. Take 1 tablet (50mg) 13 Hours 7 Hours & 1 Hour Prior to Exam 3 tablet 0     Current Facility-Administered Medications on File Prior to Visit   Medication Dose Route Frequency Provider Last Rate Last Admin    cyanocobalamin injection 1,000 mcg  1,000 mcg Intramuscular Q28 Days Sherry Mazariegos MD   1,000 mcg at 24 1019        ALLERGIES:    Allergies   Allergen Reactions    Iodinated Contrast Media Anaphylaxis     Patient had a severe reaction in the past / difficulty breathing    Iodine Unknown - High Severity     Difficulty breathing    Molds & Smuts Unknown - High Severity    Shellfish Allergy Unknown - High Severity     Burning inside    Amoxicillin Rash        Social History     Socioeconomic History    Marital status:      Spouse name: Faith   Tobacco Use    Smoking status: Former     Current packs/day: 0.00     Average packs/day: 1 pack/day for 22.0 years (22.0 ttl pk-yrs)     Types: Cigarettes, Pipe     Start date: 1967     Quit date: 1988     Years since quittin.9     Passive exposure: Past    Smokeless tobacco: Never    Tobacco comments:     Quit smoking pipe around    Vaping Use    Vaping status: Never Used   Substance and Sexual Activity    Alcohol use: Yes     Comment: Rarely will drink one glass of wine    Drug use: Never    Sexual activity: Not Currently     Partners: Female        Family History   Problem Relation Age of Onset    Diabetes Mother     Kidney disease Mother         calculus    Hearing loss Mother     Prostate cancer Father     Stomach cancer Father     Cancer Sister         breast    Hypothyroidism Sister     Kidney disease Sister         calculus    Arthritis Sister     Transient ischemic attack Brother     Alzheimer's disease Other   "       uncle    Diabetes Other         uncle    Arthritis Brother         Review of Systems   Constitutional: Negative.    HENT: Negative.     Respiratory: Negative.     Cardiovascular: Negative.    Gastrointestinal: Negative.    Genitourinary: Negative.    Musculoskeletal:  Negative for back pain.   Neurological: Negative.    Hematological: Negative.    Psychiatric/Behavioral:  Negative for dysphoric mood. The patient is not nervous/anxious.     Unchanged 12/2/24      Objective     Vitals:    12/02/24 0756   BP: 151/69   Pulse: 68   Resp: 16   Temp: 96.8 °F (36 °C)   TempSrc: Oral   SpO2: 98%   Weight: 99.2 kg (218 lb 9.6 oz)   Height: 177.8 cm (70\")   PainSc: 0-No pain                   12/2/2024     7:57 AM   Current Status   ECOG score 0       Physical Exam    CONSTITUTIONAL: pleasant well-developed adult man  HEENT: no icterus, no thrush, moist membranes  LYMPH: no cervical or supraclavicular lad  CV: RRR, S1S2, no murmur  RESP: cta bilat, no wheezing, no rales  GI: soft, non-tender, no splenomegaly, +bs  MUSC: no edema, normal gait, improved point tenderness over the T4 vertebral body  NEURO: alert and oriented x3, normal strength  PSYCH: Normal mood and mild anxious affect      RECENT LABS:  Hematology WBC   Date Value Ref Range Status   12/02/2024 4.63 3.40 - 10.80 10*3/mm3 Final     RBC   Date Value Ref Range Status   12/02/2024 4.67 4.14 - 5.80 10*6/mm3 Final     Hemoglobin   Date Value Ref Range Status   12/02/2024 13.8 13.0 - 17.7 g/dL Final     Hematocrit   Date Value Ref Range Status   12/02/2024 41.1 37.5 - 51.0 % Final     Platelets   Date Value Ref Range Status   12/02/2024 85 (L) 140 - 450 10*3/mm3 Final        Lab Results   Component Value Date    GLUCOSE 120 (H) 10/31/2024    BUN 17 10/31/2024    CREATININE 1.00 10/31/2024    EGFRRESULT 73.6 06/21/2024    EGFR 77.5 10/31/2024    BCR 17.0 10/31/2024    K 4.1 10/31/2024    CO2 21.9 (L) 10/31/2024    CALCIUM 9.0 10/31/2024    PROTENTOTREF 6.9 " 06/21/2024    ALBUMIN 4.3 10/31/2024    BILITOT 0.2 10/31/2024    AST 50 (H) 10/31/2024    ALT 66 (H) 10/31/2024       CT Chest With Contrast Diagnostic (11/21/2024 9:48 AM)  CT Abdomen Pelvis With Contrast (11/21/2024 9:48 AM)  IMPRESSION:  1. Stable right lower lobe mass  2. Stable mixed solid and groundglass nodular density right lung apex  and small groundglass opacity posterior left upper lobe  3. Bony metastatic disease again noted. The lytic lesion in the T4  vertebral body has become more sclerotic likely treatment related      Assessment & Plan     *Stage IV adenocarcinoma, lung primary  Patient presented with back and chest wall pain, imaging showed and expansile mass T4 vertebral body, posterior right second rib and posterior right sixth rib  PET scan was performed on 5/3/2024 which showed a hypermetabolic mass in the superior segment of the right lower lobe 3.7 x 2.8 cm SUV 9.3, no hypermetabolic mediastinal or hilar lymph nodes but multiple hypermetabolic bone lesions largest being at T4 vertebral body SUV 9.4, posterior lateral right second rib, posterior right sixth rib, left aspect of the sacrum to the left of the S1 neural foramen.  Spleen was mildly enlarged 15 cm but less activity than liver.  CT-guided needle biopsy of a rib mass was performed on 5/10/2024 and showed metastatic adenocarcinoma immunohistochemistry supporting a pulmonary primary  The patient's next generation sequencing returned with a EGFR exon 19 and frame deletion.  Initiated Tagrisso 80 mg daily on 6/5/2024 7/12/2024-CT chest abdomen pelvis-decreased size of the right upper lobe mass, stable osseous metastatic disease, stable solid and mixed groundglass right lung apex  11/21/2024--stable mixed solid groundglass density in the right lung apex 12 mm, stable right lower lobe mass 2.7 x 2 cm, stable groundglass left upper lobe, calcified mediastinal and right hilar lymph nodes, stable sclerotic lesion right second rib, more  sclerotic lesion T4  *Brain metastases  5/31/2024-MRI brain showed multifocal supratentorial and infratentorial enhancing lesions, largest 4-4.5 mm in size-plan to monitor response to Tagrisso  8/3/2024 MRI brain-minimal small vessel disease, resolution of previous enhancing lesions  *Pain of malignancy  The patient has available Dalton 5/325 1 p.o. every 6 hours as needed pain  Status post radiation to T4 with improved pain  *Thrombocytopenia secondary to Tagrisso-plt 85  *Significant anxiety related to malignancy  The patient is being followed by the psychosocial clinic  *Decreased flow right vertebral artery by MRI  7/12/2024 CT angiogram head and neck-65% stenosis of the right internal carotid artery, right vertebral artery occluded proximal to the dura with retrograde flow through the PICA, moderate stenosis P1, lytic lesion T4  Vascular surgery recommended to begin aspirin 81 mg daily  *History of prostate cancer status post prostatectomy 2009  *Mild LFT elevation-likely side effect of Tagrisso, stable  Oncology plan/recommendations:  Continue Tagrisso 80 mg daily  Continue monthly Zometa for metastatic bone disease, reduction of skeletal related events  Continue aspirin 81 mg daily  Continue to monitor CBC and CMP monthly  Plan to repeat CT chest abdomen pelvis MRI brain March 2025

## 2024-12-02 ENCOUNTER — INFUSION (OUTPATIENT)
Dept: ONCOLOGY | Facility: HOSPITAL | Age: 79
End: 2024-12-02
Payer: MEDICARE

## 2024-12-02 ENCOUNTER — CLINICAL SUPPORT (OUTPATIENT)
Dept: OTHER | Facility: HOSPITAL | Age: 79
End: 2024-12-02
Payer: MEDICARE

## 2024-12-02 ENCOUNTER — OFFICE VISIT (OUTPATIENT)
Dept: ONCOLOGY | Facility: CLINIC | Age: 79
End: 2024-12-02
Payer: MEDICARE

## 2024-12-02 VITALS
OXYGEN SATURATION: 98 % | RESPIRATION RATE: 16 BRPM | HEIGHT: 70 IN | TEMPERATURE: 96.8 F | SYSTOLIC BLOOD PRESSURE: 151 MMHG | BODY MASS INDEX: 31.3 KG/M2 | HEART RATE: 68 BPM | DIASTOLIC BLOOD PRESSURE: 69 MMHG | WEIGHT: 218.6 LBS

## 2024-12-02 DIAGNOSIS — C34.90 PRIMARY NONSQUAMOUS NONSMALL CELL NEOPLASM OF LUNG: ICD-10-CM

## 2024-12-02 DIAGNOSIS — C61 MALIGNANT NEOPLASM PROSTATE: ICD-10-CM

## 2024-12-02 DIAGNOSIS — D69.6 THROMBOCYTOPENIA: ICD-10-CM

## 2024-12-02 DIAGNOSIS — Z91.041 ALLERGY TO IODINATED CONTRAST: ICD-10-CM

## 2024-12-02 DIAGNOSIS — C79.51 CANCER, METASTATIC TO BONE: Primary | ICD-10-CM

## 2024-12-02 DIAGNOSIS — C79.51 CANCER, METASTATIC TO BONE: ICD-10-CM

## 2024-12-02 DIAGNOSIS — I65.21 STENOSIS OF RIGHT CAROTID ARTERY: ICD-10-CM

## 2024-12-02 LAB
ALBUMIN SERPL-MCNC: 4.3 G/DL (ref 3.5–5.2)
ALBUMIN/GLOB SERPL: 1.7 G/DL
ALP SERPL-CCNC: 65 U/L (ref 39–117)
ALT SERPL W P-5'-P-CCNC: 56 U/L (ref 1–41)
ANION GAP SERPL CALCULATED.3IONS-SCNC: 11.4 MMOL/L (ref 5–15)
AST SERPL-CCNC: 60 U/L (ref 1–40)
BASOPHILS # BLD AUTO: 0.04 10*3/MM3 (ref 0–0.2)
BASOPHILS NFR BLD AUTO: 0.9 % (ref 0–1.5)
BILIRUB SERPL-MCNC: 0.4 MG/DL (ref 0–1.2)
BUN SERPL-MCNC: 14 MG/DL (ref 8–23)
BUN/CREAT SERPL: 13.6 (ref 7–25)
CALCIUM SPEC-SCNC: 8.8 MG/DL (ref 8.6–10.5)
CHLORIDE SERPL-SCNC: 101 MMOL/L (ref 98–107)
CO2 SERPL-SCNC: 25.6 MMOL/L (ref 22–29)
CREAT SERPL-MCNC: 1.03 MG/DL (ref 0.76–1.27)
DEPRECATED RDW RBC AUTO: 44.3 FL (ref 37–54)
EGFRCR SERPLBLD CKD-EPI 2021: 74.8 ML/MIN/1.73
EOSINOPHIL # BLD AUTO: 0.14 10*3/MM3 (ref 0–0.4)
EOSINOPHIL NFR BLD AUTO: 3 % (ref 0.3–6.2)
ERYTHROCYTE [DISTWIDTH] IN BLOOD BY AUTOMATED COUNT: 13.8 % (ref 12.3–15.4)
GLOBULIN UR ELPH-MCNC: 2.6 GM/DL
GLUCOSE SERPL-MCNC: 160 MG/DL (ref 65–99)
HCT VFR BLD AUTO: 41.1 % (ref 37.5–51)
HGB BLD-MCNC: 13.8 G/DL (ref 13–17.7)
IMM GRANULOCYTES # BLD AUTO: 0.03 10*3/MM3 (ref 0–0.05)
IMM GRANULOCYTES NFR BLD AUTO: 0.6 % (ref 0–0.5)
LYMPHOCYTES # BLD AUTO: 1.01 10*3/MM3 (ref 0.7–3.1)
LYMPHOCYTES NFR BLD AUTO: 21.8 % (ref 19.6–45.3)
MAGNESIUM SERPL-MCNC: 1.8 MG/DL (ref 1.6–2.4)
MCH RBC QN AUTO: 29.6 PG (ref 26.6–33)
MCHC RBC AUTO-ENTMCNC: 33.6 G/DL (ref 31.5–35.7)
MCV RBC AUTO: 88 FL (ref 79–97)
MONOCYTES # BLD AUTO: 0.42 10*3/MM3 (ref 0.1–0.9)
MONOCYTES NFR BLD AUTO: 9.1 % (ref 5–12)
NEUTROPHILS NFR BLD AUTO: 2.99 10*3/MM3 (ref 1.7–7)
NEUTROPHILS NFR BLD AUTO: 64.6 % (ref 42.7–76)
NRBC BLD AUTO-RTO: 0 /100 WBC (ref 0–0.2)
PHOSPHATE SERPL-MCNC: 2.9 MG/DL (ref 2.5–4.5)
PLATELET # BLD AUTO: 85 10*3/MM3 (ref 140–450)
PMV BLD AUTO: 11.6 FL (ref 6–12)
POTASSIUM SERPL-SCNC: 4.8 MMOL/L (ref 3.5–5.2)
PROT SERPL-MCNC: 6.9 G/DL (ref 6–8.5)
RBC # BLD AUTO: 4.67 10*6/MM3 (ref 4.14–5.8)
SODIUM SERPL-SCNC: 138 MMOL/L (ref 136–145)
WBC NRBC COR # BLD AUTO: 4.63 10*3/MM3 (ref 3.4–10.8)

## 2024-12-02 PROCEDURE — 84100 ASSAY OF PHOSPHORUS: CPT

## 2024-12-02 PROCEDURE — 99214 OFFICE O/P EST MOD 30 MIN: CPT | Performed by: INTERNAL MEDICINE

## 2024-12-02 PROCEDURE — 25010000002 ZOLEDRONIC ACID 4 MG/100ML SOLUTION: Performed by: INTERNAL MEDICINE

## 2024-12-02 PROCEDURE — 85025 COMPLETE CBC W/AUTO DIFF WBC: CPT

## 2024-12-02 PROCEDURE — 80053 COMPREHEN METABOLIC PANEL: CPT

## 2024-12-02 PROCEDURE — 96374 THER/PROPH/DIAG INJ IV PUSH: CPT

## 2024-12-02 PROCEDURE — 1126F AMNT PAIN NOTED NONE PRSNT: CPT | Performed by: INTERNAL MEDICINE

## 2024-12-02 PROCEDURE — G2211 COMPLEX E/M VISIT ADD ON: HCPCS | Performed by: INTERNAL MEDICINE

## 2024-12-02 PROCEDURE — 83735 ASSAY OF MAGNESIUM: CPT

## 2024-12-02 RX ORDER — ZOLEDRONIC ACID 0.04 MG/ML
4 INJECTION, SOLUTION INTRAVENOUS ONCE
Status: COMPLETED | OUTPATIENT
Start: 2024-12-02 | End: 2024-12-02

## 2024-12-02 RX ORDER — ZOLEDRONIC ACID 0.04 MG/ML
4 INJECTION, SOLUTION INTRAVENOUS ONCE
OUTPATIENT
Start: 2024-12-30

## 2024-12-02 RX ORDER — ZOLEDRONIC ACID 0.04 MG/ML
4 INJECTION, SOLUTION INTRAVENOUS ONCE
Status: CANCELLED | OUTPATIENT
Start: 2024-12-02

## 2024-12-02 RX ORDER — PREDNISONE 50 MG/1
TABLET ORAL
Qty: 3 TABLET | Refills: 0 | Status: SHIPPED | OUTPATIENT
Start: 2024-12-02

## 2024-12-02 RX ORDER — SODIUM CHLORIDE 9 MG/ML
20 INJECTION, SOLUTION INTRAVENOUS ONCE
OUTPATIENT
Start: 2024-12-30

## 2024-12-02 RX ORDER — SODIUM CHLORIDE 9 MG/ML
20 INJECTION, SOLUTION INTRAVENOUS ONCE
Status: CANCELLED | OUTPATIENT
Start: 2024-12-02

## 2024-12-02 RX ADMIN — ZOLEDRONIC ACID 4 MG: 0.04 INJECTION, SOLUTION INTRAVENOUS at 09:14

## 2024-12-02 NOTE — PROGRESS NOTES
OUTPATIENT ONCOLOGY NUTRITION ASSESSMENT    Patient Name: Nemesio Fitzpatrick  YOB: 1945  MRN: [id number]  Assessment Date: 12/2/2024    COMMENTS:  Initial nutrition assessment for pt with Stage IV adenocarcinoma, lung primary, brain mets. Other pertinent medical history reviewed.  Pt is receiving Zometa today and is also taking Tagrisso.  Labs/Meds from today reviewed. Glu 160    Met with pt and his wife today during his infusion. They report a good appetite.  He eats a varied diet, wife thinks he eats too much junk. Weight is stable.    Explained RD role and the importance of adequate nutrition and hydration during treatment.  Encouraged patient to focus on getting adequate calories, protein and nutrients in order to support immune function and nutrition needs. Reviewed examples of high protein foods to include at meals and snacks. Suggested small more frequent meals if appetite decreases. He is not having any significant side effects from his tx,  Discussed sugar as it relates to cancer and empty calories. Discouraged too much especially with his elevated glucose and mildly elevated Hgba1c.     Provided written information on healthy eating during cancer and consistent carb diet. Will follow throughout treatment course and be available as needed. Pt very appreciative of visit.        Reason for Assessment Physician referral     Diagnosis/Problem   Stage IV adenocarcinoma, lung primary, brain mets   Treatment Plan Chemotherapy   Frequency    Goal of cancer treatment Disease control   Comments:          Encounter Information        Nutrition/Diet History:     Oral Nutrition Supplements:    Factors/Symptoms Affecting Intake: No factors at this time   Comments:      Medical/Surgical History Past Medical History:   Diagnosis Date    Acute bronchitis     Allergic Iodine    Allergic rhinitis     Anxiety 2024    Due to sudden cancer diagnosis    Arthritis 2000    BPH (benign prostatic hypertrophy)     Cervical  "disc disorder 1987    Dislocation, shoulder 1973    Elevated prostate specific antigen (PSA)     Headache 1990    History of bone scan 10/21/2010    normal    History of colonoscopy     never    History of EKG 03/12/2012    also 11-; T wave abnormality    History of medical problems 1978    HL (hearing loss) 2000    Hyperlipidemia     Hypertension 2024    Upon learning of Stage 4 cancer    Hypothyroidism     IFG (impaired fasting glucose)     Kidney calculus     left ureteral stone    Knee swelling 2015    Low back pain     Lung cancer 2024    Right    Malignant neoplasm prostate 11/09/2009    Dr. Mcclelland; lymph nodes negative margins clear    Prostate nodule     right lobe    Rotator cuff syndrome 2009    Scoliosis 2009    Screening for prostate cancer     prostatectomy    Sleep apnea     CPAP machine    URI (upper respiratory infection)        Past Surgical History:   Procedure Laterality Date    CERVICAL DISC SURGERY  1990    also 2006; C5-6, C6-7    HAND SURGERY Left 2001    trigger finger release; left middle finger    NECK SURGERY  1988, 2005    Cervical disk    PROSTATECTOMY  11/09/2009    RHINOPLASTY  1985    ROTATOR CUFF REPAIR      SHOULDER SURGERY Left 09/29/2009    Dr. KRISTEL Jimenez; rotator cuff repair; bone spurs     SPINE SURGERY  2005    TRIGGER POINT INJECTION  2008,2021,2022    URETERAL STENT INSERTION Left 12/04/2013    Dr. Martinez               Anthropometrics        Current Height Ht Readings from Last 1 Encounters:   12/02/24 177.8 cm (70\")      Current Weight Wt Readings from Last 1 Encounters:   12/02/24 99.2 kg (218 lb 9.6 oz)      BMI  31.3   Ideal Body Weight (IBW) 166lb   Usual Body Weight (UBW) 215lb   Weight Change/Trend Stable   Weight History Wt Readings from Last 30 Encounters:   12/02/24 0756 99.2 kg (218 lb 9.6 oz)   10/31/24 1335 96.8 kg (213 lb 4.8 oz)   10/03/24 1119 97 kg (213 lb 12.8 oz)   09/23/24 1131 95.7 kg (211 lb)   09/11/24 1141 97.5 kg (215 lb)   09/05/24 " "1049 97.6 kg (215 lb 1.6 oz)   08/29/24 0923 96 kg (211 lb 11.2 oz)   08/14/24 1555 94.3 kg (208 lb)   08/08/24 0815 96.9 kg (213 lb 11.2 oz)   08/08/24 1139 96.9 kg (213 lb 11.2 oz)   07/31/24 1122 97.5 kg (215 lb)   07/05/24 0923 97.7 kg (215 lb 6.4 oz)   06/21/24 0846 98.2 kg (216 lb 6.4 oz)   06/20/24 1509 98 kg (216 lb)   06/17/24 1349 98.4 kg (217 lb)   06/03/24 1524 97.5 kg (215 lb)   05/24/24 1230 96.6 kg (212 lb 14.4 oz)   05/23/24 1418 96.1 kg (211 lb 12.8 oz)   05/31/24 1458 96.6 kg (212 lb 15.4 oz)   05/17/24 0941 95.5 kg (210 lb 8 oz)   05/10/24 0814 97.5 kg (215 lb)   05/02/24 0901 96.8 kg (213 lb 8 oz)   04/17/24 1037 98.7 kg (217 lb 9.6 oz)   04/04/24 1347 97.7 kg (215 lb 6.4 oz)   03/11/24 1530 97.7 kg (215 lb 6.4 oz)   03/05/24 1041 97.7 kg (215 lb 4.8 oz)   09/21/23 1324 97 kg (213 lb 14.4 oz)   09/20/23 0853 96.3 kg (212 lb 4.8 oz)   05/24/23 0920 100 kg (220 lb 6.4 oz)   12/27/22 1504 101 kg (222 lb)          Medications           Current medications: Diclofenac Sodium, HYDROcodone-acetaminophen, Osimertinib Mesylate, amLODIPine, aspirin, diphenhydrAMINE, gabapentin, levothyroxine, loratadine, ondansetron, and predniSONE                 Tests/Procedures        Tests/Procedures Chemotherapy     Labs       Pertinent Labs    Results from last 7 days   Lab Units 12/02/24  0746   SODIUM mmol/L 138   POTASSIUM mmol/L 4.8   CHLORIDE mmol/L 101   CO2 mmol/L 25.6   BUN mg/dL 14   CREATININE mg/dL 1.03   CALCIUM mg/dL 8.8   BILIRUBIN mg/dL 0.4   ALK PHOS U/L 65   ALT (SGPT) U/L 56*   AST (SGOT) U/L 60*   GLUCOSE mg/dL 160*     Results from last 7 days   Lab Units 12/02/24  0746   MAGNESIUM mg/dL 1.8   PHOSPHORUS mg/dL 2.9   HEMOGLOBIN g/dL 13.8   HEMATOCRIT % 41.1   WBC 10*3/mm3 4.63   ALBUMIN g/dL 4.3     Results from last 7 days   Lab Units 12/02/24  0746   PLATELETS 10*3/mm3 85*     No results found for: \"COVID19\"  Lab Results   Component Value Date    HGBA1C 5.90 (H) 06/21/2024          Physical " Findings        Physical Appearance alert, oriented, overweight     Edema  not assessed   Gastrointestinal None   Tubes/Lines/Drains none   Oral/Mouth Cavity WNL   Skin Intact       NUTRITION INTERVENTION / PLAN OF CARE      Intervention Goal(s) Maintain nutrition status, Provide information, Meet estimated needs, Disease management/therapy, Tolerate PO , and No significant weight loss         RD Intervention/Action Encouraged intake, Follow Tx progress         Recommendations:       PO Diet Consume calorie and protein dense healthy consistent carb meals      Supplements       Snacks       Other    New Prescription Ordered? No, recommend   --      Monitor/Evaluation Follow up as needed   Education Education provided   --    Electronically signed by:  Lydia Rueda RD  12/02/24 12:51 EST

## 2024-12-03 ENCOUNTER — TELEPHONE (OUTPATIENT)
Dept: ONCOLOGY | Facility: CLINIC | Age: 79
End: 2024-12-03
Payer: MEDICARE

## 2024-12-03 ENCOUNTER — SPECIALTY PHARMACY (OUTPATIENT)
Dept: PHARMACY | Facility: HOSPITAL | Age: 79
End: 2024-12-03
Payer: MEDICARE

## 2024-12-03 NOTE — PROGRESS NOTES
Specialty Pharmacy Note: Tagrisso (osimertinib)    Nemesio Fitzpatrick is a 79 y.o. male with Stage IV adenocarcinoma of the lung was seen 12/2/24 by Dr. Austin. Per provider dictation, no changes to oral oncology regimen Tagrisso (osimertinib) 80 mg po daily.  Labs Review: The CMP and CBC from 12/2/24 have been reviewed. No dose adjustments are needed for the oral specialty medication(s) based on the labs.    Specialty pharmacy will continue to follow patient.    Vesna Moreira Rph, BCOP  12/3/2024  08:58 EST

## 2024-12-03 NOTE — TELEPHONE ENCOUNTER
Caller: Nemesio Fitzpatrick    Relationship: Self    Best call back number: [phone]     What is the best time to reach you: ANYTIME    Who are you requesting to speak with (clinical staff, provider,  specific staff member):     What was the call regarding:PATIENT WOULD LIKE TO R/S HIS MONDAY APPTS TO THURSDAY OR FRIDAY - MONDAYS DON'T WORK FOR HIM.    PLEASE CALL TO ADVISE.

## 2024-12-04 ENCOUNTER — OFFICE VISIT (OUTPATIENT)
Dept: PSYCHIATRY | Facility: HOSPITAL | Age: 79
End: 2024-12-04
Payer: MEDICARE

## 2024-12-04 DIAGNOSIS — F41.1 GENERALIZED ANXIETY DISORDER: ICD-10-CM

## 2024-12-04 PROCEDURE — 1160F RVW MEDS BY RX/DR IN RCRD: CPT | Performed by: NURSE PRACTITIONER

## 2024-12-04 PROCEDURE — 90853 GROUP PSYCHOTHERAPY: CPT | Performed by: NURSE PRACTITIONER

## 2024-12-04 PROCEDURE — 99213 OFFICE O/P EST LOW 20 MIN: CPT | Performed by: NURSE PRACTITIONER

## 2024-12-04 PROCEDURE — 1159F MED LIST DOCD IN RCRD: CPT | Performed by: NURSE PRACTITIONER

## 2024-12-04 RX ORDER — GABAPENTIN 300 MG/1
300 CAPSULE ORAL TAKE AS DIRECTED
Qty: 60 CAPSULE | Refills: 2 | Status: SHIPPED | OUTPATIENT
Start: 2024-12-04

## 2024-12-04 NOTE — PROGRESS NOTES
Subjective  Patient ID: Nemesio Fitzpatrick is a 79 y.o.. male seen in regularly scheduled group session.  Group participants have consented to group conducted in person    Total Group Time, Face to Face: 90 minutes  Total Group Participants: 8, plus facilitation per ASHLEIGH Moore    Group Therapy  Group topics explored including development of new normal, short and long term effects of diagnosis and treatment, anticipitory anxiety, anxiety surrounding adjusted treatment plan or adjusted follow up, pain management, physical deconditioning, social determinants of health (finances, living arrangements, etc), and lifestyle choices. Members share expectations of recovery at completion of treatment, struggling to manage new normal. Identified ongoing burden of illness, able participation, managing reactions from others, and allowance of self to have boundaries and limits. Considered impact of knowing others with cancer or complicated illness, recognizing this can be emotional. Shared appreciation for group support, interacting extensively and providing open feedback to other members.    Counseling provided regarding cognitive behavioral therapy (CBT) strategies, behavioral activation/ activity scheduling, reintroduction to activity through graded tasks, balancing avoidance with approach , sleep hygiene, recognition and allowance of need for rest, pharmacological and non pharmacological management of sleep disturbance, lifestyle counseling, risks associated with substance use, benefits of exercise and strategies for managing barriers to engagement, allowance of self care, exploration of quality of life goals, and survivorship issues. Embraced group dynamics, recognizing benefits of universality, instillation of hope, and altruism. Supported group discussion. Identified and explored themes of uncertainty, grief, and processing of loss.     Discussed current programming available in [facility] and community.    Benefits  "of group discussed while also acknowledging the need for 1:1 consultation at times. Members invited to discuss any concerns privately with me following group setting or to schedule a 1:1 visit if needs not being met in group.    Next shared medical visit: January 8 at 2:00 PM    Patient response to group: Pt shares in group current challenges, impact of treatment, and ongoing approach to survivorship.    Medications Management  Pt continues in survivorship of metastatic lung cancer.    CC: Fatigue, demoralization  HPI: Pt is seen in follow up regarding continued challenges managing adjusted abilities in survivorship of metastatic lung cancer. Reports to be doing \"OK,\" managing various challenges in survivorship and able to support others. Appreciates ongoing independence. Continues to endorse anxiety, balancing with humor. Continues on gabapentin 300 mg q evening meal and 300 mg q hs. Is sleeping well, also appreciating pain mgmt with 1/2 tab hydrocodone. Has to conserve energy, while finding he is able to do this effectively.   Exam: As Above  Current medication regimen: gabapentin 300 mg q evening meal and 300 mg q hs  Lab Review:   Infusion on 12/02/2024   Component Date Value    Glucose 12/02/2024 160 (H)     BUN 12/02/2024 14     Creatinine 12/02/2024 1.03     Sodium 12/02/2024 138     Potassium 12/02/2024 4.8     Chloride 12/02/2024 101     CO2 12/02/2024 25.6     Calcium 12/02/2024 8.8     Total Protein 12/02/2024 6.9     Albumin 12/02/2024 4.3     ALT (SGPT) 12/02/2024 56 (H)     AST (SGOT) 12/02/2024 60 (H)     Alkaline Phosphatase 12/02/2024 65     Total Bilirubin 12/02/2024 0.4     Globulin 12/02/2024 2.6     A/G Ratio 12/02/2024 1.7     BUN/Creatinine Ratio 12/02/2024 13.6     Anion Gap 12/02/2024 11.4     eGFR 12/02/2024 74.8     Magnesium 12/02/2024 1.8     Phosphorus 12/02/2024 2.9     WBC 12/02/2024 4.63     RBC 12/02/2024 4.67     Hemoglobin 12/02/2024 13.8     Hematocrit 12/02/2024 41.1     MCV " 12/02/2024 88.0     MCH 12/02/2024 29.6     MCHC 12/02/2024 33.6     RDW 12/02/2024 13.8     RDW-SD 12/02/2024 44.3     MPV 12/02/2024 11.6     Platelets 12/02/2024 85 (L)     Neutrophil % 12/02/2024 64.6     Lymphocyte % 12/02/2024 21.8     Monocyte % 12/02/2024 9.1     Eosinophil % 12/02/2024 3.0     Basophil % 12/02/2024 0.9     Immature Grans % 12/02/2024 0.6 (H)     Neutrophils, Absolute 12/02/2024 2.99     Lymphocytes, Absolute 12/02/2024 1.01     Monocytes, Absolute 12/02/2024 0.42     Eosinophils, Absolute 12/02/2024 0.14     Basophils, Absolute 12/02/2024 0.04     Immature Grans, Absolute 12/02/2024 0.03     nRBC 12/02/2024 0.0    Infusion on 10/31/2024   Component Date Value    Glucose 10/31/2024 120 (H)     BUN 10/31/2024 17     Creatinine 10/31/2024 1.00     Sodium 10/31/2024 143     Potassium 10/31/2024 4.1     Chloride 10/31/2024 105     CO2 10/31/2024 21.9 (L)     Calcium 10/31/2024 9.0     Total Protein 10/31/2024 6.7     Albumin 10/31/2024 4.3     ALT (SGPT) 10/31/2024 66 (H)     AST (SGOT) 10/31/2024 50 (H)     Alkaline Phosphatase 10/31/2024 64     Total Bilirubin 10/31/2024 0.2     Globulin 10/31/2024 2.4     A/G Ratio 10/31/2024 1.8     BUN/Creatinine Ratio 10/31/2024 17.0     Anion Gap 10/31/2024 16.1 (H)     eGFR 10/31/2024 77.5     Magnesium 10/31/2024 1.9     Phosphorus 10/31/2024 2.3 (L)     WBC 10/31/2024 4.63     RBC 10/31/2024 4.58     Hemoglobin 10/31/2024 13.7     Hematocrit 10/31/2024 39.7     MCV 10/31/2024 86.7     MCH 10/31/2024 29.9     MCHC 10/31/2024 34.5     RDW 10/31/2024 13.2     RDW-SD 10/31/2024 41.1     MPV 10/31/2024 10.8     Platelets 10/31/2024 95 (L)     Neutrophil % 10/31/2024 64.2     Lymphocyte % 10/31/2024 24.0     Monocyte % 10/31/2024 8.9     Eosinophil % 10/31/2024 1.9     Basophil % 10/31/2024 0.6     Immature Grans % 10/31/2024 0.4     Neutrophils, Absolute 10/31/2024 2.97     Lymphocytes, Absolute 10/31/2024 1.11     Monocytes, Absolute 10/31/2024 0.41      Eosinophils, Absolute 10/31/2024 0.09     Basophils, Absolute 10/31/2024 0.03     Immature Grans, Absolute 10/31/2024 0.02     nRBC 10/31/2024 0.0      MDM:  Meds reviewed and renewed as appropriate.  Continue gabapentin as written.  FU scheduled in group setting.    Diagnoses and all orders for this visit:    1. Generalized anxiety disorder  -     gabapentin (NEURONTIN) 300 MG capsule; Take 1 capsule by mouth Take As Directed. 1 capsule PO q evening meal, 1 capsule PO q hs  Dispense: 60 capsule; Refill: 2

## 2024-12-13 ENCOUNTER — OFFICE VISIT (OUTPATIENT)
Dept: FAMILY MEDICINE CLINIC | Facility: CLINIC | Age: 79
End: 2024-12-13
Payer: MEDICARE

## 2024-12-13 VITALS
DIASTOLIC BLOOD PRESSURE: 76 MMHG | OXYGEN SATURATION: 98 % | RESPIRATION RATE: 14 BRPM | HEIGHT: 70 IN | SYSTOLIC BLOOD PRESSURE: 138 MMHG | HEART RATE: 63 BPM | WEIGHT: 213.8 LBS | BODY MASS INDEX: 30.61 KG/M2 | TEMPERATURE: 97.9 F

## 2024-12-13 DIAGNOSIS — R73.01 IFG (IMPAIRED FASTING GLUCOSE): ICD-10-CM

## 2024-12-13 DIAGNOSIS — C79.51 CANCER, METASTATIC TO BONE: ICD-10-CM

## 2024-12-13 DIAGNOSIS — I10 PRIMARY HYPERTENSION: Primary | ICD-10-CM

## 2024-12-13 DIAGNOSIS — C61 MALIGNANT NEOPLASM PROSTATE: ICD-10-CM

## 2024-12-13 DIAGNOSIS — E03.9 ACQUIRED HYPOTHYROIDISM: ICD-10-CM

## 2024-12-13 DIAGNOSIS — E78.2 MIXED HYPERLIPIDEMIA: ICD-10-CM

## 2024-12-13 DIAGNOSIS — J30.1 SEASONAL ALLERGIC RHINITIS DUE TO POLLEN: ICD-10-CM

## 2024-12-13 DIAGNOSIS — C34.90 PRIMARY NONSQUAMOUS NONSMALL CELL NEOPLASM OF LUNG: ICD-10-CM

## 2024-12-13 DIAGNOSIS — F41.1 GENERALIZED ANXIETY DISORDER: ICD-10-CM

## 2024-12-13 DIAGNOSIS — Z12.5 ENCOUNTER FOR SCREENING FOR MALIGNANT NEOPLASM OF PROSTATE: ICD-10-CM

## 2024-12-13 DIAGNOSIS — M54.32 SCIATICA OF LEFT SIDE: ICD-10-CM

## 2024-12-13 PROCEDURE — 1126F AMNT PAIN NOTED NONE PRSNT: CPT | Performed by: FAMILY MEDICINE

## 2024-12-13 PROCEDURE — 1159F MED LIST DOCD IN RCRD: CPT | Performed by: FAMILY MEDICINE

## 2024-12-13 PROCEDURE — 96372 THER/PROPH/DIAG INJ SC/IM: CPT | Performed by: FAMILY MEDICINE

## 2024-12-13 PROCEDURE — 1160F RVW MEDS BY RX/DR IN RCRD: CPT | Performed by: FAMILY MEDICINE

## 2024-12-13 PROCEDURE — 3078F DIAST BP <80 MM HG: CPT | Performed by: FAMILY MEDICINE

## 2024-12-13 PROCEDURE — 3075F SYST BP GE 130 - 139MM HG: CPT | Performed by: FAMILY MEDICINE

## 2024-12-13 PROCEDURE — 99214 OFFICE O/P EST MOD 30 MIN: CPT | Performed by: FAMILY MEDICINE

## 2024-12-13 RX ORDER — HYDROCODONE BITARTRATE AND ACETAMINOPHEN 5; 325 MG/1; MG/1
1 TABLET ORAL DAILY PRN
Qty: 30 TABLET | Refills: 0 | Status: SHIPPED | OUTPATIENT
Start: 2024-12-13

## 2024-12-13 RX ADMIN — CYANOCOBALAMIN 1000 MCG: 1000 INJECTION, SOLUTION INTRAMUSCULAR; SUBCUTANEOUS at 10:37

## 2024-12-14 LAB
ALBUMIN SERPL-MCNC: 4.7 G/DL (ref 3.5–5.2)
ALBUMIN/GLOB SERPL: 2.1 G/DL
ALP SERPL-CCNC: 65 U/L (ref 39–117)
ALT SERPL-CCNC: 64 U/L (ref 1–41)
AST SERPL-CCNC: 43 U/L (ref 1–40)
BILIRUB SERPL-MCNC: 0.4 MG/DL (ref 0–1.2)
BUN SERPL-MCNC: 17 MG/DL (ref 8–23)
BUN/CREAT SERPL: 16.3 (ref 7–25)
CALCIUM SERPL-MCNC: 9.5 MG/DL (ref 8.6–10.5)
CHLORIDE SERPL-SCNC: 103 MMOL/L (ref 98–107)
CHOLEST SERPL-MCNC: 157 MG/DL (ref 0–200)
CO2 SERPL-SCNC: 26 MMOL/L (ref 22–29)
CREAT SERPL-MCNC: 1.04 MG/DL (ref 0.76–1.27)
EGFRCR SERPLBLD CKD-EPI 2021: 74 ML/MIN/1.73
GLOBULIN SER CALC-MCNC: 2.2 GM/DL
GLUCOSE SERPL-MCNC: 116 MG/DL (ref 65–99)
HBA1C MFR BLD: 5.4 % (ref 4.8–5.6)
HDLC SERPL-MCNC: 31 MG/DL (ref 40–60)
LDLC SERPL CALC-MCNC: 61 MG/DL (ref 0–100)
POTASSIUM SERPL-SCNC: 3.9 MMOL/L (ref 3.5–5.2)
PROT SERPL-MCNC: 6.9 G/DL (ref 6–8.5)
PSA SERPL-MCNC: <0.014 NG/ML (ref 0–4)
SODIUM SERPL-SCNC: 138 MMOL/L (ref 136–145)
T4 FREE SERPL-MCNC: 1.23 NG/DL (ref 0.93–1.7)
TRIGL SERPL-MCNC: 421 MG/DL (ref 0–150)
TSH SERPL DL<=0.005 MIU/L-ACNC: 4.63 UIU/ML (ref 0.27–4.2)
VLDLC SERPL CALC-MCNC: 65 MG/DL (ref 5–40)

## 2024-12-16 ENCOUNTER — SPECIALTY PHARMACY (OUTPATIENT)
Dept: PHARMACY | Facility: HOSPITAL | Age: 79
End: 2024-12-16
Payer: MEDICARE

## 2024-12-16 DIAGNOSIS — C34.90 PRIMARY NONSQUAMOUS NONSMALL CELL NEOPLASM OF LUNG: ICD-10-CM

## 2024-12-16 RX ORDER — OSIMERTINIB 80 1/1
80 TABLET, FILM COATED ORAL DAILY
Qty: 30 TABLET | Refills: 5 | Status: SHIPPED | OUTPATIENT
Start: 2024-12-16

## 2024-12-16 NOTE — TELEPHONE ENCOUNTER
Specialty Pharmacy Patient Management Program  Per Protocol Prescription Order or Refill     Patient will be filling or currently fills medications at [facility] and is enrolled in the Patient Management Program.    Requested Prescriptions     Pending Prescriptions Disp Refills    Osimertinib Mesylate (Tagrisso) 80 MG tablet 30 tablet 5     Sig: Take 1 tablet by mouth Daily.     Prescription orders above were sent to the pharmacy per Collaborative Care Agreement Protocol.     Last Office Visit: 12/2/24  Next Office Visit: 1/31/25    Joann Haddad PharmD, [facility]  Clinical Specialty Pharmacist, Oncology  12/16/2024  15:43 EST

## 2024-12-17 ENCOUNTER — OFFICE VISIT (OUTPATIENT)
Dept: CARDIOLOGY | Facility: CLINIC | Age: 79
End: 2024-12-17
Payer: MEDICARE

## 2024-12-17 ENCOUNTER — SPECIALTY PHARMACY (OUTPATIENT)
Dept: PHARMACY | Facility: HOSPITAL | Age: 79
End: 2024-12-17
Payer: MEDICARE

## 2024-12-17 VITALS
HEIGHT: 70 IN | BODY MASS INDEX: 30.64 KG/M2 | OXYGEN SATURATION: 98 % | WEIGHT: 214 LBS | SYSTOLIC BLOOD PRESSURE: 154 MMHG | DIASTOLIC BLOOD PRESSURE: 80 MMHG | HEART RATE: 68 BPM

## 2024-12-17 DIAGNOSIS — R00.2 PALPITATIONS: Primary | ICD-10-CM

## 2024-12-17 NOTE — PROGRESS NOTES
Specialty Pharmacy Patient Management Program  Oncology / Hematology Refill Outreach      Nemesio was contacted today regarding refills of his medication(s).    Specialty medication(s) and dose(s) confirmed: Tagrisso 80 mg    Refill Questions      Flowsheet Row Most Recent Value   Changes to allergies? No   Changes to medications? No   New conditions or infections since last clinic visit No   Unplanned office visit, urgent care, ED, or hospital admission in the last 4 weeks  No   How does patient/caregiver feel medication is working? Good   Financial problems or insurance changes  No   Since the previous refill, were any specialty medication doses or scheduled injections missed or delayed?  No   Does this patient require a clinical escalation to a pharmacist? No          Delivery Questions      Flowsheet Row Most Recent Value   Delivery method UPS   Delivery address verified with patient/caregiver? Yes   Delivery address Home   Number of medications in delivery 1   Medication(s) being filled and delivered Osimertinib Mesylate (Tagrisso)   Doses left of specialty medications 12   Copay verified? Yes   Copay amount $0   Copay form of payment No copayment ($0)   Ship Date 12/19   Delivery Date 12/20   Signature Required No            Follow-Up: 21d  [USUALLY TIME FRAME UNTIL NEXT REFILL OUTREACH FOLLOW-UP (APPROX) Ex. 25d, 85d, etc. ]    Balnca Jacobson, Pharmacy Technician  12/17/2024  12:18 EST

## 2024-12-17 NOTE — PROGRESS NOTES
"      CARDIOLOGY    Wilfrido Linder MD    ENCOUNTER DATE:  12/17/2024    Nemesio Fitzpatrick / 79 y.o. / male        CHIEF COMPLAINT / REASON FOR OFFICE VISIT     Irregular Heart Beat      HISTORY OF PRESENT ILLNESS       HPI  Nemesio Fitzpatrick is a 79 y.o. male who presents today for consultation.  I saw the patient about 10 years ago.  He had stage IV lung cancer and was placed on immunotherapy.  He has responded very nicely to this.  He was reading some of the side effects which include palpitations as well as prolonged QT intervals.  He has been having serial ECGs the last ECG from the summer showed a normal QTc interval.  He has been having some irregular heartbeats additionally.  He had body Galaxy watch and was able to show me rhythm strips.  Rhythm strip showed some PACs as well as some PVCs.      The following portions of the patient's history were reviewed and updated as appropriate: allergies, current medications, past family history, past medical history, past social history, past surgical history and problem list.      VITAL SIGNS     Visit Vitals  /80 (BP Location: Left arm)   Pulse 68   Ht 177.8 cm (70\")   Wt 97.1 kg (214 lb)   SpO2 98%   BMI 30.71 kg/m²         Wt Readings from Last 3 Encounters:   12/17/24 97.1 kg (214 lb)   12/13/24 97 kg (213 lb 12.8 oz)   12/02/24 99.2 kg (218 lb 9.6 oz)     Body mass index is 30.71 kg/m².      REVIEW OF SYSTEMS   ROS        PHYSICAL EXAMINATION     Vitals reviewed.   Constitutional:       Appearance: Healthy appearance.   Pulmonary:      Effort: Pulmonary effort is normal.   Cardiovascular:      Normal rate. Regular rhythm. Normal S1. Normal S2.       Murmurs: There is no murmur.      No gallop.  No click. No rub.   Pulses:     Intact distal pulses.   Edema:     Peripheral edema absent.   Neurological:      Mental Status: Alert.           REVIEWED DATA     Procedures    Cardiac Procedures:      Lipid Panel          6/21/2024    09:37 12/13/2024    10:37   Lipid " Panel   Total Cholesterol 156  157    Triglycerides 581  421    HDL Cholesterol 28  31    VLDL Cholesterol 83  65    LDL Cholesterol  45  61          ASSESSMENT & PLAN      Diagnosis Plan   1. Palpitations  Adult Transthoracic Echo Complete W/ Cont if Necessary Per Protocol            SUMMARY/DISCUSSION  Palpitations.  Patient is having PVCs and PACs.  I am going to set him up for an echocardiogram to rule out any structural abnormalities.  Stage IV lung cancer.  Patient is on chemotherapy.  His echo is unremarkable I told him to continue to monitor his watch if any rhythms come up that is concerning or he does not understand or the computer reads atrial fibrillation or indeterminate but he is going to send them to me through [employer].  Follow-up in 1 year or reassess at that point.        MEDICATIONS         Discharge Medications            Accurate as of December 17, 2024  3:11 PM. If you have any questions, ask your nurse or doctor.                Continue These Medications        Instructions Start Date   aspirin 81 MG chewable tablet   81 mg, Daily      Diclofenac Sodium 1 % gel gel  Commonly known as: VOLTAREN   4 g, Topical, 3 Times Daily      diphenhydrAMINE 50 MG tablet  Commonly known as: BENADRYL   Take one 50 mg tablet 1 hour prior to scan.      gabapentin 300 MG capsule  Commonly known as: NEURONTIN   300 mg, Oral, Take As Directed, 1 capsule PO q evening meal, 1 capsule PO q hs      HYDROcodone-acetaminophen 5-325 MG per tablet  Commonly known as: NORCO   1 tablet, Oral, Daily PRN      levothyroxine 75 MCG tablet  Commonly known as: SYNTHROID, LEVOTHROID   No dose, route, or frequency recorded.      loratadine 10 MG tablet  Commonly known as: CLARITIN   10 mg, Daily      Norvasc 10 MG tablet  Generic drug: amLODIPine   10 mg, Daily      ondansetron 8 MG tablet  Commonly known as: ZOFRAN   8 mg, Oral, 3 Times Daily PRN      Tagrisso 80 MG tablet  Generic drug: Osimertinib Mesylate   80 mg, Oral, Daily                    **Dragon Disclaimer:   Much of this encounter note is an electronic transcription/translation of spoken language to printed text. The electronic translation of spoken language may permit erroneous, or at times, nonsensical words or phrases to be inadvertently transcribed. Although I have reviewed the note for such errors, some may still exist.

## 2025-01-03 ENCOUNTER — APPOINTMENT (OUTPATIENT)
Dept: ONCOLOGY | Facility: HOSPITAL | Age: 80
End: 2025-01-03
Payer: MEDICARE

## 2025-01-03 ENCOUNTER — INFUSION (OUTPATIENT)
Dept: ONCOLOGY | Facility: HOSPITAL | Age: 80
End: 2025-01-03
Payer: MEDICARE

## 2025-01-03 VITALS
OXYGEN SATURATION: 97 % | WEIGHT: 214.8 LBS | DIASTOLIC BLOOD PRESSURE: 79 MMHG | HEART RATE: 84 BPM | BODY MASS INDEX: 30.82 KG/M2 | TEMPERATURE: 97.8 F | SYSTOLIC BLOOD PRESSURE: 157 MMHG | RESPIRATION RATE: 16 BRPM

## 2025-01-03 DIAGNOSIS — C61 MALIGNANT NEOPLASM PROSTATE: Primary | ICD-10-CM

## 2025-01-03 DIAGNOSIS — C79.51 CANCER, METASTATIC TO BONE: ICD-10-CM

## 2025-01-03 LAB
ALBUMIN SERPL-MCNC: 4.5 G/DL (ref 3.5–5.2)
ALBUMIN/GLOB SERPL: 2 G/DL
ALP SERPL-CCNC: 60 U/L (ref 39–117)
ALT SERPL W P-5'-P-CCNC: 72 U/L (ref 1–41)
ANION GAP SERPL CALCULATED.3IONS-SCNC: 12.4 MMOL/L (ref 5–15)
AST SERPL-CCNC: 47 U/L (ref 1–40)
BASOPHILS # BLD AUTO: 0.02 10*3/MM3 (ref 0–0.2)
BASOPHILS NFR BLD AUTO: 0.4 % (ref 0–1.5)
BILIRUB SERPL-MCNC: 0.4 MG/DL (ref 0–1.2)
BUN SERPL-MCNC: 17 MG/DL (ref 8–23)
BUN/CREAT SERPL: 17.5 (ref 7–25)
CALCIUM SPEC-SCNC: 9.1 MG/DL (ref 8.6–10.5)
CHLORIDE SERPL-SCNC: 106 MMOL/L (ref 98–107)
CO2 SERPL-SCNC: 22.6 MMOL/L (ref 22–29)
CREAT SERPL-MCNC: 0.97 MG/DL (ref 0.76–1.27)
DEPRECATED RDW RBC AUTO: 42 FL (ref 37–54)
EGFRCR SERPLBLD CKD-EPI 2021: 80.4 ML/MIN/1.73
EOSINOPHIL # BLD AUTO: 0.06 10*3/MM3 (ref 0–0.4)
EOSINOPHIL NFR BLD AUTO: 1.3 % (ref 0.3–6.2)
ERYTHROCYTE [DISTWIDTH] IN BLOOD BY AUTOMATED COUNT: 13.6 % (ref 12.3–15.4)
GLOBULIN UR ELPH-MCNC: 2.2 GM/DL
GLUCOSE SERPL-MCNC: 132 MG/DL (ref 65–99)
HCT VFR BLD AUTO: 38.8 % (ref 37.5–51)
HGB BLD-MCNC: 12.9 G/DL (ref 13–17.7)
IMM GRANULOCYTES # BLD AUTO: 0.02 10*3/MM3 (ref 0–0.05)
IMM GRANULOCYTES NFR BLD AUTO: 0.4 % (ref 0–0.5)
LYMPHOCYTES # BLD AUTO: 0.89 10*3/MM3 (ref 0.7–3.1)
LYMPHOCYTES NFR BLD AUTO: 18.5 % (ref 19.6–45.3)
MAGNESIUM SERPL-MCNC: 2 MG/DL (ref 1.6–2.4)
MCH RBC QN AUTO: 28.7 PG (ref 26.6–33)
MCHC RBC AUTO-ENTMCNC: 33.2 G/DL (ref 31.5–35.7)
MCV RBC AUTO: 86.2 FL (ref 79–97)
MONOCYTES # BLD AUTO: 0.38 10*3/MM3 (ref 0.1–0.9)
MONOCYTES NFR BLD AUTO: 7.9 % (ref 5–12)
NEUTROPHILS NFR BLD AUTO: 3.43 10*3/MM3 (ref 1.7–7)
NEUTROPHILS NFR BLD AUTO: 71.5 % (ref 42.7–76)
NRBC BLD AUTO-RTO: 0 /100 WBC (ref 0–0.2)
PHOSPHATE SERPL-MCNC: 2.3 MG/DL (ref 2.5–4.5)
PLATELET # BLD AUTO: 84 10*3/MM3 (ref 140–450)
PMV BLD AUTO: 10.7 FL (ref 6–12)
POTASSIUM SERPL-SCNC: 3.7 MMOL/L (ref 3.5–5.2)
PROT SERPL-MCNC: 6.7 G/DL (ref 6–8.5)
RBC # BLD AUTO: 4.5 10*6/MM3 (ref 4.14–5.8)
SODIUM SERPL-SCNC: 141 MMOL/L (ref 136–145)
WBC NRBC COR # BLD AUTO: 4.8 10*3/MM3 (ref 3.4–10.8)

## 2025-01-03 PROCEDURE — 96374 THER/PROPH/DIAG INJ IV PUSH: CPT

## 2025-01-03 PROCEDURE — 83735 ASSAY OF MAGNESIUM: CPT

## 2025-01-03 PROCEDURE — 84100 ASSAY OF PHOSPHORUS: CPT

## 2025-01-03 PROCEDURE — 80053 COMPREHEN METABOLIC PANEL: CPT

## 2025-01-03 PROCEDURE — 85025 COMPLETE CBC W/AUTO DIFF WBC: CPT

## 2025-01-03 PROCEDURE — 25010000002 ZOLEDRONIC ACID 4 MG/100ML SOLUTION: Performed by: INTERNAL MEDICINE

## 2025-01-03 RX ORDER — ZOLEDRONIC ACID 0.04 MG/ML
4 INJECTION, SOLUTION INTRAVENOUS ONCE
Status: COMPLETED | OUTPATIENT
Start: 2025-01-03 | End: 2025-01-03

## 2025-01-03 RX ADMIN — ZOLEDRONIC ACID 4 MG: 0.04 INJECTION, SOLUTION INTRAVENOUS at 14:58

## 2025-01-08 ENCOUNTER — OFFICE VISIT (OUTPATIENT)
Dept: PSYCHIATRY | Facility: HOSPITAL | Age: 80
End: 2025-01-08
Payer: MEDICARE

## 2025-01-08 DIAGNOSIS — C34.90 PRIMARY NONSQUAMOUS NONSMALL CELL NEOPLASM OF LUNG: ICD-10-CM

## 2025-01-08 DIAGNOSIS — F41.1 GENERALIZED ANXIETY DISORDER: Primary | ICD-10-CM

## 2025-01-08 NOTE — PROGRESS NOTES
Subjective  Patient ID: Nemesio Fitzpatrick is a 79 y.o.. male seen in regularly scheduled group session.  Group participants have consented to group conducted in person    Total Group Time, Face to Face: 60 minutes   Total Group Participants: 3, plus facilitation per ASHLEIGH Moore    Group Therapy  Group topics explored including development of new normal, interpersonal sensitivity, short and long term effects of diagnosis and treatment, anticipitory anxiety, anxiety surrounding adjusted treatment plan or adjusted follow up, physical deconditioning, weight management, social determinants of health (finances, living arrangements, etc), and lifestyle choices. Members shared experience alongside chronic illness, persistent thoughts of death and dying, coping strategies to managing this. Shared life experiences, challenges with grief and loss, personal impact of perspective. Shared details of diagnosis, prognostic understanding. Identified lasting impact of demoralization on mood and anxiety. Some conflict regarding differing coping strategies.    Counseling provided regarding cognitive behavioral therapy (CBT) strategies, behavioral activation/ activity scheduling, reintroduction to activity through graded tasks, balancing avoidance with approach , sleep hygiene, recognition and allowance of need for rest, pharmacological and non pharmacological management of sleep disturbance, lifestyle counseling, benefits of exercise and strategies for managing barriers to engagement, allowance of self care, exploration of quality of life goals, survivorship issues, anxiety/ mood management , medication education, and existential distress. Counseling provided recognizing different strategies of managing concerns, grief. Supported calm discussion, allowance of varying opinions. Actively assisted each member individually regarding impact of interactions, support of developing group dynamics, recognizing of need for individualized  support and counseling.    Discussed current programming available in [facility] and community.    Benefits of group discussed while also acknowledging the need for 1:1 consultation at times. Members invited to discuss any concerns privately with me following group setting or to schedule a 1:1 visit if needs not being met in group.    Next shared medical visit: February 12 at 2:00 PM    Patient response to group: Pt shares openly with other group members, sharing personal coping strategies.    Medications Management  Pt continues in survivorship of non small cell lung cancer.    CC: Anxiety, fatigue  HPI: Pt is seen in follow up regarding ongoing anxiety and fatigue in survivorship of lung cancer. Reports increasing anxiety with reading of information regarding expectations related to current treatment contributing to disruption in sleep. Allowed self time to consider this, acknowledge impact of mood, and intentionally taking time to appreciate current health and wellness while acknowledging anticipation of eventual decline. Shares brief interaction with another patient, appreciating sense of hope in this encounter. Currently feeling well, while noting intermittent burden of pain. Denies functional impairment regarding disease on most days. Continues gabapentin 300 mg q evening and q hs. Reports appropriate sleep, 5-6 hours of sleep nightly. Energy is radically acceptable.   Exam: As Above  Current medication regimen: gabapentin 300 mg q evening meal and 300 mg q hs; very infrequent use of ativan  Lab Review:   Infusion on 01/03/2025   Component Date Value    Glucose 01/03/2025 132 (H)     BUN 01/03/2025 17     Creatinine 01/03/2025 0.97     Sodium 01/03/2025 141     Potassium 01/03/2025 3.7     Chloride 01/03/2025 106     CO2 01/03/2025 22.6     Calcium 01/03/2025 9.1     Total Protein 01/03/2025 6.7     Albumin 01/03/2025 4.5     ALT (SGPT) 01/03/2025 72 (H)     AST (SGOT) 01/03/2025 47 (H)     Alkaline  Phosphatase 01/03/2025 60     Total Bilirubin 01/03/2025 0.4     Globulin 01/03/2025 2.2     A/G Ratio 01/03/2025 2.0     BUN/Creatinine Ratio 01/03/2025 17.5     Anion Gap 01/03/2025 12.4     eGFR 01/03/2025 80.4     Magnesium 01/03/2025 2.0     Phosphorus 01/03/2025 2.3 (L)     WBC 01/03/2025 4.80     RBC 01/03/2025 4.50     Hemoglobin 01/03/2025 12.9 (L)     Hematocrit 01/03/2025 38.8     MCV 01/03/2025 86.2     MCH 01/03/2025 28.7     MCHC 01/03/2025 33.2     RDW 01/03/2025 13.6     RDW-SD 01/03/2025 42.0     MPV 01/03/2025 10.7     Platelets 01/03/2025 84 (L)     Neutrophil % 01/03/2025 71.5     Lymphocyte % 01/03/2025 18.5 (L)     Monocyte % 01/03/2025 7.9     Eosinophil % 01/03/2025 1.3     Basophil % 01/03/2025 0.4     Immature Grans % 01/03/2025 0.4     Neutrophils, Absolute 01/03/2025 3.43     Lymphocytes, Absolute 01/03/2025 0.89     Monocytes, Absolute 01/03/2025 0.38     Eosinophils, Absolute 01/03/2025 0.06     Basophils, Absolute 01/03/2025 0.02     Immature Grans, Absolute 01/03/2025 0.02     nRBC 01/03/2025 0.0    Office Visit on 12/13/2024   Component Date Value    PSA 12/13/2024 <0.014     Glucose 12/13/2024 116 (H)     BUN 12/13/2024 17     Creatinine 12/13/2024 1.04     EGFR Result 12/13/2024 74.0     BUN/Creatinine Ratio 12/13/2024 16.3     Sodium 12/13/2024 138     Potassium 12/13/2024 3.9     Chloride 12/13/2024 103     Total CO2 12/13/2024 26.0     Calcium 12/13/2024 9.5     Total Protein 12/13/2024 6.9     Albumin 12/13/2024 4.7     Globulin 12/13/2024 2.2     A/G Ratio 12/13/2024 2.1     Total Bilirubin 12/13/2024 0.4     Alkaline Phosphatase 12/13/2024 65     AST (SGOT) 12/13/2024 43 (H)     ALT (SGPT) 12/13/2024 64 (H)     Total Cholesterol 12/13/2024 157     Triglycerides 12/13/2024 421 (H)     HDL Cholesterol 12/13/2024 31 (L)     VLDL Cholesterol Fareed 12/13/2024 65 (H)     LDL Chol Calc (NIH) 12/13/2024 61     TSH 12/13/2024 4.630 (H)     Hemoglobin A1C 12/13/2024 5.40     Free T4  12/13/2024 1.23    Infusion on 12/02/2024   Component Date Value    Glucose 12/02/2024 160 (H)     BUN 12/02/2024 14     Creatinine 12/02/2024 1.03     Sodium 12/02/2024 138     Potassium 12/02/2024 4.8     Chloride 12/02/2024 101     CO2 12/02/2024 25.6     Calcium 12/02/2024 8.8     Total Protein 12/02/2024 6.9     Albumin 12/02/2024 4.3     ALT (SGPT) 12/02/2024 56 (H)     AST (SGOT) 12/02/2024 60 (H)     Alkaline Phosphatase 12/02/2024 65     Total Bilirubin 12/02/2024 0.4     Globulin 12/02/2024 2.6     A/G Ratio 12/02/2024 1.7     BUN/Creatinine Ratio 12/02/2024 13.6     Anion Gap 12/02/2024 11.4     eGFR 12/02/2024 74.8     Magnesium 12/02/2024 1.8     Phosphorus 12/02/2024 2.9     WBC 12/02/2024 4.63     RBC 12/02/2024 4.67     Hemoglobin 12/02/2024 13.8     Hematocrit 12/02/2024 41.1     MCV 12/02/2024 88.0     MCH 12/02/2024 29.6     MCHC 12/02/2024 33.6     RDW 12/02/2024 13.8     RDW-SD 12/02/2024 44.3     MPV 12/02/2024 11.6     Platelets 12/02/2024 85 (L)     Neutrophil % 12/02/2024 64.6     Lymphocyte % 12/02/2024 21.8     Monocyte % 12/02/2024 9.1     Eosinophil % 12/02/2024 3.0     Basophil % 12/02/2024 0.9     Immature Grans % 12/02/2024 0.6 (H)     Neutrophils, Absolute 12/02/2024 2.99     Lymphocytes, Absolute 12/02/2024 1.01     Monocytes, Absolute 12/02/2024 0.42     Eosinophils, Absolute 12/02/2024 0.14     Basophils, Absolute 12/02/2024 0.04     Immature Grans, Absolute 12/02/2024 0.03     nRBC 12/02/2024 0.0      MDM:  Meds reviewed and renewed as appropriate.  Support continuation of gabapentin as written. No refills needed.  FU scheduled in group setting.    Diagnoses and all orders for this visit:    1. Generalized anxiety disorder (Primary)    2. Primary nonsquamous nonsmall cell neoplasm of lung

## 2025-01-13 ENCOUNTER — CLINICAL SUPPORT (OUTPATIENT)
Dept: FAMILY MEDICINE CLINIC | Facility: CLINIC | Age: 80
End: 2025-01-13
Payer: MEDICARE

## 2025-01-13 DIAGNOSIS — R79.89 LOW VITAMIN B12 LEVEL: Primary | ICD-10-CM

## 2025-01-13 PROCEDURE — 96372 THER/PROPH/DIAG INJ SC/IM: CPT | Performed by: FAMILY MEDICINE

## 2025-01-13 RX ADMIN — CYANOCOBALAMIN 1000 MCG: 1000 INJECTION, SOLUTION INTRAMUSCULAR; SUBCUTANEOUS at 15:01

## 2025-01-14 ENCOUNTER — HOSPITAL ENCOUNTER (OUTPATIENT)
Dept: CARDIOLOGY | Facility: HOSPITAL | Age: 80
Discharge: HOME | End: 2025-01-14
Admitting: INTERNAL MEDICINE
Payer: MEDICARE

## 2025-01-14 VITALS
HEART RATE: 82 BPM | WEIGHT: 214 LBS | HEIGHT: 70 IN | DIASTOLIC BLOOD PRESSURE: 78 MMHG | SYSTOLIC BLOOD PRESSURE: 170 MMHG | BODY MASS INDEX: 30.64 KG/M2

## 2025-01-14 DIAGNOSIS — R00.2 PALPITATIONS: ICD-10-CM

## 2025-01-14 LAB
AORTIC ARCH: 2.2 CM
ASCENDING AORTA: 3.2 CM
AV MEAN PRESS GRAD SYS DOP V1V2: 4 MMHG
AV VMAX SYS DOP: 134 CM/SEC
BH CV ECHO MEAS - ACS: 1.84 CM
BH CV ECHO MEAS - AO MAX PG: 7.2 MMHG
BH CV ECHO MEAS - AO ROOT DIAM: 3.7 CM
BH CV ECHO MEAS - AO V2 VTI: 26.6 CM
BH CV ECHO MEAS - AVA(I,D): 2.9 CM2
BH CV ECHO MEAS - EDV(CUBED): 117.6 ML
BH CV ECHO MEAS - EDV(MOD-SP2): 90 ML
BH CV ECHO MEAS - EDV(MOD-SP4): 76 ML
BH CV ECHO MEAS - EF(MOD-SP2): 61.1 %
BH CV ECHO MEAS - EF(MOD-SP4): 63.2 %
BH CV ECHO MEAS - ESV(CUBED): 38.2 ML
BH CV ECHO MEAS - ESV(MOD-SP2): 35 ML
BH CV ECHO MEAS - ESV(MOD-SP4): 28 ML
BH CV ECHO MEAS - FS: 31.3 %
BH CV ECHO MEAS - IVS/LVPW: 1 CM
BH CV ECHO MEAS - IVSD: 1.2 CM
BH CV ECHO MEAS - LAT PEAK E' VEL: 6.3 CM/SEC
BH CV ECHO MEAS - LV DIASTOLIC VOL/BSA (35-75): 35.4 CM2
BH CV ECHO MEAS - LV MASS(C)D: 226.4 GRAMS
BH CV ECHO MEAS - LV MAX PG: 4.2 MMHG
BH CV ECHO MEAS - LV MEAN PG: 2 MMHG
BH CV ECHO MEAS - LV SYSTOLIC VOL/BSA (12-30): 13 CM2
BH CV ECHO MEAS - LV V1 MAX: 103 CM/SEC
BH CV ECHO MEAS - LV V1 VTI: 22.1 CM
BH CV ECHO MEAS - LVIDD: 4.9 CM
BH CV ECHO MEAS - LVIDS: 3.4 CM
BH CV ECHO MEAS - LVOT AREA: 3.5 CM2
BH CV ECHO MEAS - LVOT DIAM: 2.11 CM
BH CV ECHO MEAS - LVPWD: 1.2 CM
BH CV ECHO MEAS - MED PEAK E' VEL: 8.1 CM/SEC
BH CV ECHO MEAS - MR MAX PG: 92.6 MMHG
BH CV ECHO MEAS - MR MAX VEL: 481.1 CM/SEC
BH CV ECHO MEAS - MV A DUR: 0.1 SEC
BH CV ECHO MEAS - MV A MAX VEL: 93.8 CM/SEC
BH CV ECHO MEAS - MV DEC SLOPE: 389.3 CM/SEC2
BH CV ECHO MEAS - MV DEC TIME: 0.18 SEC
BH CV ECHO MEAS - MV E MAX VEL: 55.3 CM/SEC
BH CV ECHO MEAS - MV E/A: 0.59
BH CV ECHO MEAS - MV MAX PG: 5.3 MMHG
BH CV ECHO MEAS - MV MEAN PG: 2.21 MMHG
BH CV ECHO MEAS - MV P1/2T: 68.3 MSEC
BH CV ECHO MEAS - MV V2 VTI: 28.1 CM
BH CV ECHO MEAS - MVA(P1/2T): 3.2 CM2
BH CV ECHO MEAS - MVA(VTI): 2.8 CM2
BH CV ECHO MEAS - PA ACC TIME: 0.14 SEC
BH CV ECHO MEAS - PA V2 MAX: 179.2 CM/SEC
BH CV ECHO MEAS - PULM A REVS DUR: 0.12 SEC
BH CV ECHO MEAS - PULM A REVS VEL: 39 CM/SEC
BH CV ECHO MEAS - PULM DIAS VEL: 38.6 CM/SEC
BH CV ECHO MEAS - PULM S/D: 1.16
BH CV ECHO MEAS - PULM SYS VEL: 44.6 CM/SEC
BH CV ECHO MEAS - QP/QS: 0.81
BH CV ECHO MEAS - RV MAX PG: 3 MMHG
BH CV ECHO MEAS - RV V1 MAX: 87 CM/SEC
BH CV ECHO MEAS - RV V1 VTI: 18.5 CM
BH CV ECHO MEAS - RVOT DIAM: 2.07 CM
BH CV ECHO MEAS - SUP REN AO DIAM: 2.3 CM
BH CV ECHO MEAS - SV(LVOT): 77.4 ML
BH CV ECHO MEAS - SV(MOD-SP2): 55 ML
BH CV ECHO MEAS - SV(MOD-SP4): 48 ML
BH CV ECHO MEAS - SV(RVOT): 62.7 ML
BH CV ECHO MEAS - SVI(LVOT): 36 ML/M2
BH CV ECHO MEAS - SVI(MOD-SP2): 25.6 ML/M2
BH CV ECHO MEAS - SVI(MOD-SP4): 22.3 ML/M2
BH CV ECHO MEAS - TAPSE (>1.6): 2.4 CM
BH CV ECHO MEASUREMENTS AVERAGE E/E' RATIO: 7.68
BH CV XLRA - RV BASE: 3.3 CM
BH CV XLRA - RV LENGTH: 6.5 CM
BH CV XLRA - RV MID: 2.8 CM
BH CV XLRA - TDI S': 14.6 CM/SEC
LEFT ATRIUM VOLUME INDEX: 28.5 ML/M2
LV EF BIPLANE MOD: 61.6 %
SINUS: 3.6 CM
STJ: 2.7 CM

## 2025-01-14 PROCEDURE — 93306 TTE W/DOPPLER COMPLETE: CPT

## 2025-01-15 ENCOUNTER — TELEPHONE (OUTPATIENT)
Dept: CARDIOLOGY | Facility: CLINIC | Age: 80
End: 2025-01-15
Payer: MEDICARE

## 2025-01-15 NOTE — TELEPHONE ENCOUNTER
Pt returned missed call.  I spoke with Nemesio Fitzpatrick and updated pt on results from provider.  Pt verbalized understanding and has no further questions at this time.    Thank you,    Rajani TAM, RN  [place] [facility] [place]  01/15/25 15:40 EST

## 2025-01-15 NOTE — PROGRESS NOTES
Left message on patient's voice identified voicemail informing him of stable echocardiogram results.  Encouraged him to call for questions.

## 2025-01-20 ENCOUNTER — SPECIALTY PHARMACY (OUTPATIENT)
Dept: PHARMACY | Facility: HOSPITAL | Age: 80
End: 2025-01-20
Payer: MEDICARE

## 2025-01-20 NOTE — PROGRESS NOTES
Specialty Pharmacy Patient Management Program  Oncology / Hematology Refill Outreach      Nemesio was contacted today regarding refills of his medication(s).    Specialty medication(s) and dose(s) confirmed: Tagrisso    Refill Questions      Flowsheet Row Most Recent Value   Changes to allergies? No   Changes to medications? No   New conditions or infections since last clinic visit No   Unplanned office visit, urgent care, ED, or hospital admission in the last 4 weeks  No   How does patient/caregiver feel medication is working? Good   Financial problems or insurance changes  No   Since the previous refill, were any specialty medication doses or scheduled injections missed or delayed?  No   Does this patient require a clinical escalation to a pharmacist? No          Delivery Questions      Flowsheet Row Most Recent Value   Delivery method UPS   Delivery address verified with patient/caregiver? Yes   Delivery address Home   Number of medications in delivery 1   Medication(s) being filled and delivered Osimertinib Mesylate (Tagrisso)   Doses left of specialty medications 10   Copay verified? Yes   Copay amount $0   Copay form of payment No copayment ($0)   Ship Date 1/21   Delivery Date 1/22   Signature Required No            Follow-Up: 21 d [USUALLY TIME FRAME UNTIL NEXT REFILL OUTREACH FOLLOW-UP (APPROX) Ex. 25d, 85d, etc. ]    Blanca Jacobson, Pharmacy Technician  1/20/2025  15:10 EST

## 2025-01-27 ENCOUNTER — APPOINTMENT (OUTPATIENT)
Dept: ONCOLOGY | Facility: HOSPITAL | Age: 80
End: 2025-01-27
Payer: MEDICARE

## 2025-01-31 ENCOUNTER — APPOINTMENT (OUTPATIENT)
Dept: ONCOLOGY | Facility: HOSPITAL | Age: 80
End: 2025-01-31
Payer: MEDICARE

## 2025-01-31 ENCOUNTER — SPECIALTY PHARMACY (OUTPATIENT)
Dept: ONCOLOGY | Facility: HOSPITAL | Age: 80
End: 2025-01-31
Payer: MEDICARE

## 2025-01-31 ENCOUNTER — OFFICE VISIT (OUTPATIENT)
Dept: ONCOLOGY | Facility: CLINIC | Age: 80
End: 2025-01-31
Payer: MEDICARE

## 2025-01-31 ENCOUNTER — INFUSION (OUTPATIENT)
Dept: ONCOLOGY | Facility: HOSPITAL | Age: 80
End: 2025-01-31
Payer: MEDICARE

## 2025-01-31 VITALS
HEIGHT: 70 IN | BODY MASS INDEX: 30.78 KG/M2 | TEMPERATURE: 97.5 F | DIASTOLIC BLOOD PRESSURE: 74 MMHG | OXYGEN SATURATION: 95 % | WEIGHT: 215 LBS | HEART RATE: 71 BPM | SYSTOLIC BLOOD PRESSURE: 149 MMHG

## 2025-01-31 DIAGNOSIS — C34.90 PRIMARY NONSQUAMOUS NONSMALL CELL NEOPLASM OF LUNG: Primary | ICD-10-CM

## 2025-01-31 DIAGNOSIS — C61 MALIGNANT NEOPLASM PROSTATE: ICD-10-CM

## 2025-01-31 DIAGNOSIS — C79.51 CANCER, METASTATIC TO BONE: Primary | ICD-10-CM

## 2025-01-31 DIAGNOSIS — C79.51 CANCER, METASTATIC TO BONE: ICD-10-CM

## 2025-01-31 LAB
ALBUMIN SERPL-MCNC: 4.5 G/DL (ref 3.5–5.2)
ALBUMIN/GLOB SERPL: 1.8 G/DL
ALP SERPL-CCNC: 57 U/L (ref 39–117)
ALT SERPL W P-5'-P-CCNC: 68 U/L (ref 1–41)
ANION GAP SERPL CALCULATED.3IONS-SCNC: 12.9 MMOL/L (ref 5–15)
AST SERPL-CCNC: 52 U/L (ref 1–40)
BASOPHILS # BLD AUTO: 0.03 10*3/MM3 (ref 0–0.2)
BASOPHILS NFR BLD AUTO: 0.7 % (ref 0–1.5)
BILIRUB SERPL-MCNC: 0.4 MG/DL (ref 0–1.2)
BUN SERPL-MCNC: 18 MG/DL (ref 8–23)
BUN/CREAT SERPL: 18.4 (ref 7–25)
CALCIUM SPEC-SCNC: 9 MG/DL (ref 8.6–10.5)
CHLORIDE SERPL-SCNC: 104 MMOL/L (ref 98–107)
CO2 SERPL-SCNC: 24.1 MMOL/L (ref 22–29)
CREAT SERPL-MCNC: 0.98 MG/DL (ref 0.76–1.27)
DEPRECATED RDW RBC AUTO: 43.3 FL (ref 37–54)
EGFRCR SERPLBLD CKD-EPI 2021: 79.4 ML/MIN/1.73
EOSINOPHIL # BLD AUTO: 0.1 10*3/MM3 (ref 0–0.4)
EOSINOPHIL NFR BLD AUTO: 2.2 % (ref 0.3–6.2)
ERYTHROCYTE [DISTWIDTH] IN BLOOD BY AUTOMATED COUNT: 13.2 % (ref 12.3–15.4)
GLOBULIN UR ELPH-MCNC: 2.5 GM/DL
GLUCOSE SERPL-MCNC: 138 MG/DL (ref 65–99)
HCT VFR BLD AUTO: 41.8 % (ref 37.5–51)
HGB BLD-MCNC: 13.8 G/DL (ref 13–17.7)
IMM GRANULOCYTES # BLD AUTO: 0.02 10*3/MM3 (ref 0–0.05)
IMM GRANULOCYTES NFR BLD AUTO: 0.4 % (ref 0–0.5)
LYMPHOCYTES # BLD AUTO: 1.03 10*3/MM3 (ref 0.7–3.1)
LYMPHOCYTES NFR BLD AUTO: 22.8 % (ref 19.6–45.3)
MAGNESIUM SERPL-MCNC: 2 MG/DL (ref 1.6–2.4)
MCH RBC QN AUTO: 29.1 PG (ref 26.6–33)
MCHC RBC AUTO-ENTMCNC: 33 G/DL (ref 31.5–35.7)
MCV RBC AUTO: 88.2 FL (ref 79–97)
MONOCYTES # BLD AUTO: 0.4 10*3/MM3 (ref 0.1–0.9)
MONOCYTES NFR BLD AUTO: 8.8 % (ref 5–12)
NEUTROPHILS NFR BLD AUTO: 2.94 10*3/MM3 (ref 1.7–7)
NEUTROPHILS NFR BLD AUTO: 65.1 % (ref 42.7–76)
NRBC BLD AUTO-RTO: 0 /100 WBC (ref 0–0.2)
PHOSPHATE SERPL-MCNC: 1.9 MG/DL (ref 2.5–4.5)
PLATELET # BLD AUTO: 86 10*3/MM3 (ref 140–450)
PMV BLD AUTO: 10.5 FL (ref 6–12)
POTASSIUM SERPL-SCNC: 3.8 MMOL/L (ref 3.5–5.2)
PROT SERPL-MCNC: 7 G/DL (ref 6–8.5)
RBC # BLD AUTO: 4.74 10*6/MM3 (ref 4.14–5.8)
SODIUM SERPL-SCNC: 141 MMOL/L (ref 136–145)
WBC NRBC COR # BLD AUTO: 4.52 10*3/MM3 (ref 3.4–10.8)

## 2025-01-31 PROCEDURE — 80053 COMPREHEN METABOLIC PANEL: CPT

## 2025-01-31 PROCEDURE — 85025 COMPLETE CBC W/AUTO DIFF WBC: CPT

## 2025-01-31 PROCEDURE — 83735 ASSAY OF MAGNESIUM: CPT

## 2025-01-31 PROCEDURE — 84100 ASSAY OF PHOSPHORUS: CPT

## 2025-01-31 NOTE — PROGRESS NOTES
Subjective     REASON FOR CONSULTATION:  spine mass  Provide an opinion on any further workup or treatment                     RECORDS OBTAINED:  Records of the patients history including those obtained from the referring provider were reviewed and summarized in detail.      History of Present Illness:  He returns today for 1 month follow up while continuing on Tagrisso. He continues to tolerate this well aside from mild skin irritation on his hands. He denies nausea, vomiting, diarrhea, shortness of breath, chest pain, headache, or worsening fatigue.       ONCOLOGY HISTORY:  This is a pleasant 78-year-old man with impaired fasting glucose, hyperlipidemia, hypothyroidism and history of prostate cancer.  The patient has been experiencing about a 1 year history of progressive back pain in the upper mid thoracic spine stabbing in nature and radiating bilaterally anteriorly.  Symptoms are worse when the patient lays down to rest at night.  He has also had some pain around the right shoulder blade.  He was treated with PT with no improvement.  The patient has also been having left hip pain.  The patient was evaluated by orthopedic surgery and an MRI of the thoracic spine without contrast was performed on 4/25/2024 and showed a large expansile marrow replacing mass along the anterior T4 vertebral body 3.2 x 2.1 cm extending into the anterior paraspinal soft tissue along the T3-T4 space with reactive bone marrow edema.  Another partially visualized expansile mass was seen in the posterior right second rib 2.7 x 1.5 cm and another in the posterior sixth rib 1 cm with surrounding bone marrow edema.  At T7-T8 there is a posterior disc protrusion with mild to moderate canal stenosis.  In the posterior right lung a 3.5 cm mass was noted with additional small nodules also seen but poorly characterized.    The patient denies weight loss, night sweats, shortness of breath, cough, hemoptysis, headaches, confusion, changes in  swallowing bowel habits urinary habits.  He has history of prostate cancer treated with prostatectomy in 2009 and reports negligible PSA values.  The patient has history of light smoking during his 20s and 30s.  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  A full body PET scan was performed on 5/3/2024 which showed a hypermetabolic mass in the superior segment of the right lower lobe 3.7 x 2.8 cm SUV 9.3, no hypermetabolic mediastinal or hilar lymph nodes but multiple hypermetabolic bone lesions largest being at T4 vertebral body SUV 9.4, posterior lateral right second rib, posterior right sixth rib, left aspect of the sacrum to the left of the S1 neural foramen.  Spleen was mildly enlarged 15 cm but less activity than liver.    A CT-guided needle biopsy of a rib mass was performed on 5/10/2024 and showed metastatic adenocarcinoma immunohistochemistry supporting a pulmonary primary.    The patient was seen by radiation oncology with plans to treat at least palliatively to T4 and rib lesions possible additional sites pending peer review of case.    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    MRI brain 5/31/2024 showed multiple supratentorial and infratentorial lesions consistent with metastatic disease largest 4-4.5 mm in size.    The patient initiated Tagrisso 6/5/2024.  He was treated with radiation therapy to T4.    Past Medical History:   Diagnosis Date    Acute bronchitis     Allergic Iodine    Allergic rhinitis     Anxiety 2024    Due to sudden cancer diagnosis    Arthritis 2000    BPH (benign prostatic hypertrophy)     Cervical disc disorder 1987    Dislocation, shoulder 1973    Elevated prostate specific antigen (PSA)     Headache 1990    History of bone scan 10/21/2010    normal    History of colonoscopy     never    History of EKG 03/12/2012    also 11-; T wave abnormality    History of medical problems 1978    HL (hearing loss) 2000     Hyperlipidemia     Hypertension 2024    Upon learning of Stage 4 cancer    Hypothyroidism     IFG (impaired fasting glucose)     Kidney calculus     left ureteral stone    Knee swelling 2015    Low back pain     Lung cancer 2024    Right    Malignant neoplasm prostate 11/09/2009    Dr. Mcclelland; lymph nodes negative margins clear    Prostate nodule     right lobe    Rotator cuff syndrome 2009    Scoliosis 2009    Screening for prostate cancer     prostatectomy    Sleep apnea     CPAP machine    URI (upper respiratory infection)         Past Surgical History:   Procedure Laterality Date    CERVICAL DISC SURGERY  1990    also 2006; C5-6, C6-7    HAND SURGERY Left 2001    trigger finger release; left middle finger    NECK SURGERY  1988, 2005    Cervical disk    PROSTATECTOMY  11/09/2009    RHINOPLASTY  1985    ROTATOR CUFF REPAIR      SHOULDER SURGERY Left 09/29/2009    Dr. KRISTEL Jimenez; rotator cuff repair; bone spurs     SPINE SURGERY  2005    TRIGGER POINT INJECTION  2008,2021,2022    URETERAL STENT INSERTION Left 12/04/2013    Dr. Martinez        Current Outpatient Medications on File Prior to Visit   Medication Sig Dispense Refill    aspirin 81 MG chewable tablet Chew 1 tablet Daily.      Diclofenac Sodium (VOLTAREN) 1 % gel gel Apply 4 g topically to the appropriate area as directed 3 (Three) Times a Day. 100 g 2    diphenhydrAMINE (BENADRYL) 50 MG tablet Take one 50 mg tablet 1 hour prior to scan. (Patient not taking: Reported on 12/17/2024) 1 tablet 0    gabapentin (NEURONTIN) 300 MG capsule Take 1 capsule by mouth Take As Directed. 1 capsule PO q evening meal, 1 capsule PO q hs 60 capsule 2    HYDROcodone-acetaminophen (NORCO) 5-325 MG per tablet Take 1 tablet by mouth Daily As Needed for Moderate Pain. 30 tablet 0    levothyroxine (SYNTHROID, LEVOTHROID) 75 MCG tablet       loratadine (CLARITIN) 10 MG tablet Take 1 tablet by mouth Daily.      Norvasc 10 MG tablet Take 1 tablet by mouth Daily.       ondansetron (ZOFRAN) 8 MG tablet Take 1 tablet by mouth 3 (Three) Times a Day As Needed for Nausea or Vomiting. 30 tablet 5    Osimertinib Mesylate (Tagrisso) 80 MG tablet Take 1 tablet by mouth Daily. 30 tablet 5     Current Facility-Administered Medications on File Prior to Visit   Medication Dose Route Frequency Provider Last Rate Last Admin    cyanocobalamin injection 1,000 mcg  1,000 mcg Intramuscular Q28 Days Sherry Mazariegos MD   1,000 mcg at 25 1501        ALLERGIES:    Allergies   Allergen Reactions    Iodinated Contrast Media Anaphylaxis     Patient had a severe reaction in the past / difficulty breathing    Iodine Unknown - High Severity     Difficulty breathing    Molds & Smuts Unknown - High Severity    Shellfish Allergy Unknown - High Severity     Burning inside    Amoxicillin Rash        Social History     Socioeconomic History    Marital status:      Spouse name: Faith   Tobacco Use    Smoking status: Former     Current packs/day: 0.00     Average packs/day: 1 pack/day for 22.0 years (22.0 ttl pk-yrs)     Types: Cigarettes, Pipe     Start date: 1967     Quit date: 1988     Years since quittin.1     Passive exposure: Past    Smokeless tobacco: Never    Tobacco comments:     Quit smoking pipe around    Vaping Use    Vaping status: Never Used   Substance and Sexual Activity    Alcohol use: Yes     Comment: Rarely will drink one glass of wine    Drug use: Never    Sexual activity: Not Currently     Partners: Female        Family History   Problem Relation Age of Onset    Diabetes Mother     Kidney disease Mother         calculus    Hearing loss Mother     Prostate cancer Father     Stomach cancer Father     Heart disease Sister     Cancer Sister         breast    Hypothyroidism Sister     Kidney disease Sister         calculus    Arthritis Sister     Transient ischemic attack Brother     Arthritis Brother     Alzheimer's disease Other         uncle    Diabetes Other       "   uncle        Objective   Vitals:    01/31/25 1047   BP: 149/74   Pulse: 71   Temp: 97.5 °F (36.4 °C)   TempSrc: Oral   SpO2: 95%   Weight: 97.5 kg (215 lb)   Height: 177.8 cm (70\")   PainSc: 0-No pain             1/3/2025     1:42 PM   Current Status   ECOG score 0       Physical Exam    CONSTITUTIONAL: pleasant well-developed adult man  HEENT: no icterus, no thrush, moist membranes  LYMPH: no cervical or supraclavicular lad  CV: RRR, S1S2, no murmur  RESP: CTA bilaterally.   GI: soft, non-tender, no splenomegaly, +bs  MUSC: no edema, normal gait  NEURO: alert and oriented x3, normal strength  PSYCH: Normal mood and mild anxious affect      RECENT LABS:  Results from last 7 days   Lab Units 01/31/25  1020   WBC 10*3/mm3 4.52   NEUTROS ABS 10*3/mm3 2.94   HEMOGLOBIN g/dL 13.8   HEMATOCRIT % 41.8   PLATELETS 10*3/mm3 86*     Results from last 7 days   Lab Units 01/31/25  1020   SODIUM mmol/L 141   POTASSIUM mmol/L 3.8   CHLORIDE mmol/L 104   CO2 mmol/L 24.1   BUN mg/dL 18   CREATININE mg/dL 0.98   CALCIUM mg/dL 9.0   ALBUMIN g/dL 4.5   BILIRUBIN mg/dL 0.4   ALK PHOS U/L 57   ALT (SGPT) U/L 68*   AST (SGOT) U/L 52*   GLUCOSE mg/dL 138*   MAGNESIUM mg/dL 2.0         Assessment & Plan   *Stage IV adenocarcinoma, lung primary  Patient presented with back and chest wall pain, imaging showed and expansile mass T4 vertebral body, posterior right second rib and posterior right sixth rib  PET scan was performed on 5/3/2024 which showed a hypermetabolic mass in the superior segment of the right lower lobe 3.7 x 2.8 cm SUV 9.3, no hypermetabolic mediastinal or hilar lymph nodes but multiple hypermetabolic bone lesions largest being at T4 vertebral body SUV 9.4, posterior lateral right second rib, posterior right sixth rib, left aspect of the sacrum to the left of the S1 neural foramen.  Spleen was mildly enlarged 15 cm but less activity than liver.  CT-guided needle biopsy of a rib mass was performed on 5/10/2024 and showed " metastatic adenocarcinoma immunohistochemistry supporting a pulmonary primary  The patient's next generation sequencing returned with a EGFR exon 19 and frame deletion.  Initiated Tagrisso 80 mg daily on 6/5/2024 7/12/2024-CT chest abdomen pelvis-decreased size of the right upper lobe mass, stable osseous metastatic disease, stable solid and mixed groundglass right lung apex  11/21/2024--stable mixed solid groundglass density in the right lung apex 12 mm, stable right lower lobe mass 2.7 x 2 cm, stable groundglass left upper lobe, calcified mediastinal and right hilar lymph nodes, stable sclerotic lesion right second rib, more sclerotic lesion T4  1/31/2025: Continue Tagrisso 80mg daily. Tolerating well.       *Brain metastases  5/31/2024-MRI brain showed multifocal supratentorial and infratentorial enhancing lesions, largest 4-4.5 mm in size-plan to monitor response to Tagrisso  8/3/2024 MRI brain-minimal small vessel disease, resolution of previous enhancing lesions    *Pain of malignancy  The patient has available [place] 5/325 1 p.o. every 6 hours as needed pain  Status post radiation to T4 with improved pain    *Thrombocytopenia secondary to Tagrisso  1/31/2025: Platelets stable today 86,000.     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 Hold zometa and start K phos 1 tablet twice a day. He is not able to drink milk due to GI upset.     Oncology  plan/recommendations:   Continue Tagrisso 80 mg daily  Hold Zometa today  Start K phos 1 tablet BID  Continue aspirin 81 mg daily  Return to clinic in 1 month for Zometa and CBC, CMP, magnesium, phosphorus.  No provider visit required at this visit.  CT chest abdomen pelvis and MRI brain scheduled on 3/18/2025  Return to clinic on 3/28/2025 for MD visit, scan review, Zometa, cbc/cmp/magnesium/phosphorus  Instructed to reach out sooner with any concerns.     Sanjana Talbert, APRN

## 2025-01-31 NOTE — PROGRESS NOTES
Specialty Pharmacy Patient Management Program  Oncology Reassessment     Nemesio Fitzpatrick was referred by an their provider to the Oncology Patient Management program offered by [facility] for stage IV adenocarcinoma of the lung. A follow-up outreach was conducted, including assessment of continued therapy appropriateness, medication adherence, and side effect incidence and management for Osimerinib ( Tagrisso) 80 mg po daily.    Changes to Insurance Coverage or Financial Support  None at this time    Relevant Past Medical History and Comorbidities  Relevant medical history and concomitant health conditions were discussed with the patient. The patient's chart has been reviewed for relevant past medical history and comorbid health conditions and updated as necessary.   Past Medical History:   Diagnosis Date    Acute bronchitis     Allergic Iodine    Allergic rhinitis     Anxiety 2024    Due to sudden cancer diagnosis    Arthritis 2000    BPH (benign prostatic hypertrophy)     Cervical disc disorder 1987    Dislocation, shoulder 1973    Elevated prostate specific antigen (PSA)     Headache 1990    History of bone scan 10/21/2010    normal    History of colonoscopy     never    History of EKG 03/12/2012    also 11-; T wave abnormality    History of medical problems 1978    HL (hearing loss) 2000    Hyperlipidemia     Hypertension 2024    Upon learning of Stage 4 cancer    Hypothyroidism     IFG (impaired fasting glucose)     Kidney calculus     left ureteral stone    Knee swelling 2015    Low back pain     Lung cancer 2024    Right    Malignant neoplasm prostate 11/09/2009    Dr. Mcclelland; lymph nodes negative margins clear    Prostate nodule     right lobe    Rotator cuff syndrome 2009    Scoliosis 2009    Screening for prostate cancer     prostatectomy    Sleep apnea     CPAP machine    URI (upper respiratory infection)      Social History     Socioeconomic History    Marital status:       Spouse name: Faith   Tobacco Use    Smoking status: Former     Current packs/day: 0.00     Average packs/day: 1 pack/day for 22.0 years (22.0 ttl pk-yrs)     Types: Cigarettes, Pipe     Start date: 1967     Quit date: 1988     Years since quittin.1     Passive exposure: Past    Smokeless tobacco: Never    Tobacco comments:     Quit smoking pipe around    Vaping Use    Vaping status: Never Used   Substance and Sexual Activity    Alcohol use: Yes     Comment: Rarely will drink one glass of wine    Drug use: Never    Sexual activity: Not Currently     Partners: Female     Problem list reviewed by Kath Moreira RPH on 2025 at 11:08 AM    Hospitalizations and Urgent Care Since Last Assessment  ED Visits, Admissions, or Hospitalizations: none  Urgent Office Visits: none    Allergies  Known allergies and reactions were discussed with the patient. The patient's chart has been reviewed for allergy information and updated as necessary.   Allergies   Allergen Reactions    Iodinated Contrast Media Anaphylaxis     Patient had a severe reaction in the past / difficulty breathing    Iodine Unknown - High Severity     Difficulty breathing    Molds & Smuts Unknown - High Severity    Shellfish Allergy Unknown - High Severity     Burning inside    Amoxicillin Rash     Allergies reviewed by Kath Moreira RPH on 2025 at 11:07 AM    Relevant Laboratory Values  Relevant laboratory values were discussed with the patient. The following specialty medication dose adjustment(s) are recommended: No dose adjustments are needed for the oral specialty medication(s) based on the labs.    Lab Results   Component Value Date    GLUCOSE 138 (H) 2025    CALCIUM 9.0 2025     2025    K 3.8 2025    CO2 24.1 2025     2025    BUN 18 2025    CREATININE 0.98 2025    EGFRIFAFRI 86 2022    EGFRIFNONA 75 2022    BCR 18.4 2025    ANIONGAP  12.9 01/31/2025     Lab Results   Component Value Date    WBC 4.52 01/31/2025    RBC 4.74 01/31/2025    HGB 13.8 01/31/2025    HCT 41.8 01/31/2025    MCV 88.2 01/31/2025    MCH 29.1 01/31/2025    MCHC 33.0 01/31/2025    RDW 13.2 01/31/2025    RDWSD 43.3 01/31/2025    MPV 10.5 01/31/2025    PLT 86 (L) 01/31/2025    NEUTRORELPCT 65.1 01/31/2025    LYMPHORELPCT 22.8 01/31/2025    MONORELPCT 8.8 01/31/2025    EOSRELPCT 2.2 01/31/2025    BASORELPCT 0.7 01/31/2025    AUTOIGPER 0.4 01/31/2025    NEUTROABS 2.94 01/31/2025    LYMPHSABS 1.03 01/31/2025    MONOSABS 0.40 01/31/2025    EOSABS 0.10 01/31/2025    BASOSABS 0.03 01/31/2025    AUTOIGNUM 0.02 01/31/2025    NRBC 0.0 01/31/2025       Current Medication List  This medication list has been reviewed with the patient and evaluated for any interactions or necessary modifications/recommendations, and updated to include all prescription medications, OTC medications, and supplements the patient is currently taking.  This list reflects what is contained in the patient's profile, which has also been marked as reviewed to communicate to other providers it is the most up to date version of the patient's current medication therapy.     Current Outpatient Medications:     aspirin 81 MG chewable tablet, Chew 1 tablet Daily., Disp: , Rfl:     Diclofenac Sodium (VOLTAREN) 1 % gel gel, Apply 4 g topically to the appropriate area as directed 3 (Three) Times a Day., Disp: 100 g, Rfl: 2    diphenhydrAMINE (BENADRYL) 50 MG tablet, Take one 50 mg tablet 1 hour prior to scan. (Patient not taking: Reported on 1/31/2025), Disp: 1 tablet, Rfl: 0    gabapentin (NEURONTIN) 300 MG capsule, Take 1 capsule by mouth Take As Directed. 1 capsule PO q evening meal, 1 capsule PO q hs, Disp: 60 capsule, Rfl: 2    HYDROcodone-acetaminophen (NORCO) 5-325 MG per tablet, Take 1 tablet by mouth Daily As Needed for Moderate Pain., Disp: 30 tablet, Rfl: 0    levothyroxine (SYNTHROID, LEVOTHROID) 75 MCG tablet, ,  Disp: , Rfl:     loratadine (CLARITIN) 10 MG tablet, Take 1 tablet by mouth Daily., Disp: , Rfl:     Norvasc 10 MG tablet, Take 1 tablet by mouth Daily., Disp: , Rfl:     ondansetron (ZOFRAN) 8 MG tablet, Take 1 tablet by mouth 3 (Three) Times a Day As Needed for Nausea or Vomiting., Disp: 30 tablet, Rfl: 5    Osimertinib Mesylate (Tagrisso) 80 MG tablet, Take 1 tablet by mouth Daily., Disp: 30 tablet, Rfl: 5    phosphorus (K PHOS NEUTRAL) 155-852-130 MG tablet, Take 1 tablet by mouth 2 (Two) Times a Day., Disp: 60 tablet, Rfl: 0    Current Facility-Administered Medications:     cyanocobalamin injection 1,000 mcg, 1,000 mcg, Intramuscular, Q28 Days, Sherry Mazariegos MD, 1,000 mcg at 01/13/25 1501    Medicines reviewed by Kath Moreira [facility] on 1/31/2025 at 11:07 AM    Drug Interactions  Assessed medication list for interactions, no significant drug interactions noted.   Advised patient to call the clinic if any new medications are started so we can assess for drug-drug interactions.  Drug-food interactions discussed:  none of concern    Adverse Drug Reactions  Medication tolerability: Tolerating with no to minimal ADRs  Medication plan: Continue therapy with normal follow-up  Plan for ADR Management: suggested application of tea tree oil on his nailbeds    Adherence, Self-Administration, and Current Therapy Problems  Adherence related to the patient's specialty therapy was discussed with the patient. The Adherence segment of this outreach has been reviewed and updated.     Adherence Questions  Linked Medication(s) Assessed: Osimertinib Mesylate (Tagrisso)  On average, how many doses/injections does the patient miss per month?: 0  What are the identified reasons for non-adherence or missed doses? : no problems identified  What is the estimated medication adherence level?: %  Based on the patient/caregiver response and refill history, does this patient require an MTP to track adherence improvements?:  no    Additional Barriers to Patient Self-Administration: none  Methods for Supporting Patient Self-Administration: pharmacy calls, his wife takes notes during visits  Patient has had no issues obtaining medication from pharmacy.    Open Medication Therapy Problems  No medication therapy recommendations to display    Goals of Therapy  Goals related to the patient's specialty therapy were discussed with the patient. The Patient Goals segment of this outreach has been reviewed and updated.   Goals Addressed Today        Specialty Pharmacy General Goal      Progression free survival-following CT results  5/31/2024-MRI brain showed multifocal supratentorial and infratentorial enhancing lesions, largest 4-4.5 mm in size-plan to monitor response to Tagrisso  8/3/2024 MRI brain-minimal small vessel disease, resolution of previous enhancing lesions- continue Tagrisso 80 mg po daily  11/21/2024--stable mixed solid groundglass density in the right lung apex 12 mm, stable right lower lobe mass 2.7 x 2 cm, stable groundglass left upper lobe, calcified mediastinal and right hilar lymph nodes, stable sclerotic lesion right second rib, more sclerotic lesion T4               Quality of Life Assessment   Quality of Life related to the patient's enrollment in the patient management program and services provided was discussed with the patient. The QOL segment of this outreach has been reviewed and updated.  Quality of Life Improvement Scale: 7-Somewhat better    Discussed aforementioned material with patient in person, face-to-face, in clinic.     Reassessment Plan & Follow-Up  1. Medication Therapy Changes: none at this time  2. Related Plans, Therapy Recommendations, or Issues to Be Addressed: none  3. Pharmacist to perform regular assessments no more than (6) months from the previous assessment.   4. Care Coordinator to set up future refill outreaches, coordinate prescription delivery, and escalate clinical questions to  pharmacist.    Attestation  Therapeutic appropriateness: Appropriate   I attest the patient was actively involved in and has agreed to the above plan of care.  If the prescribed therapy is at any point deemed not appropriate based on the current or future assessments, a consultation will be initiated with the patient's specialty care provider to determine the best course of action. The revised plan of therapy will be documented along with any required assessments and/or additional patient education provided.     Vesna Moreira Rph, BCOP  Clinical Specialty Pharmacist, Oncology  1/31/2025  11:10 EST

## 2025-02-03 ENCOUNTER — SPECIALTY PHARMACY (OUTPATIENT)
Dept: PHARMACY | Facility: HOSPITAL | Age: 80
End: 2025-02-03
Payer: MEDICARE

## 2025-02-03 NOTE — PROGRESS NOTES
Specialty Pharmacy Note: Tagrisso (osimertinib)    Nemesio Fitzpatrick is a 79 y.o. male with stage IV adenocarinoma of the lung was seen 1/31/25 by APRN. Per provider dictation, no changes to oral oncology regimen Tagrisso (osimertinib) 80 mg po daily.  Labs Review: The CMP and CBC from 1/31/25 have been reviewed. No dose adjustments are needed for the oral specialty medication(s) based on the labs.    Specialty pharmacy will continue to follow patient.    Vesna Moreira Rph, BCOP  2/3/2025  10:44 EST

## 2025-02-12 ENCOUNTER — OFFICE VISIT (OUTPATIENT)
Dept: PSYCHIATRY | Facility: HOSPITAL | Age: 80
End: 2025-02-12
Payer: MEDICARE

## 2025-02-12 DIAGNOSIS — F41.1 GENERALIZED ANXIETY DISORDER: ICD-10-CM

## 2025-02-12 RX ORDER — GABAPENTIN 300 MG/1
300 CAPSULE ORAL TAKE AS DIRECTED
Qty: 60 CAPSULE | Refills: 2 | Status: SHIPPED | OUTPATIENT
Start: 2025-02-12

## 2025-02-12 NOTE — PROGRESS NOTES
Subjective  Patient ID: Nemesio Fitzpatrick is a 79 y.o.. male seen in regularly scheduled group session.  Group participants have consented to group conducted in person    Total Group Time, Face to Face: 65 minutes  Total Group Participants: 4, plus facilitation per ASHLEIGH Moore    Group Therapy  Group topics explored including development of new normal, interpersonal sensitivity, short and long term effects of diagnosis and treatment, significant other or family conflict, intimacy concerns, anticipitory anxiety, physical deconditioning, weight management, and lifestyle choices. Shared disruption in tastes, challenges in relationships, independent goals and barriers to achieving. Considered impact of pain and ongoing FU regarding cancer history on life, also reflecting on limitations, goals of reconciling events of the past, and choosing to live in the present moment. Share personal need to continue personal responsibilities, remain active and engaged as desired and able.    Counseling provided regarding cognitive behavioral therapy (CBT) strategies, behavioral activation/ activity scheduling, reintroduction to activity through graded tasks, balancing avoidance with approach , sleep hygiene, discussion of need for restorative sleep with consideration of sleep apnea, recognition and allowance of need for rest, lifestyle counseling, benefits of exercise and strategies for managing barriers to engagement, allowance of self care, exploration of quality of life goals, survivorship issues, anxiety/ mood management , medication education, risks and expectations alongside prescribing of controlled substances, specifically regarding risk management and safe use, as well as expectations of prescribing, refills, storage, and diversion. , and existential distress. Supported discussion, exploration of past fears and current allowances. Reviewed QOL goals, barriers to achievement, and opportunities of group. Reviewed  "controlled substances, importance of balancing risk and benefit in any strategy, and supporting identification of goals for sx mgmt. Allowed space to share fears, concerns, trauma, and growth of cancer and other experiences.    Discussed current programming available in [facility] and community.    Benefits of group discussed while also acknowledging the need for 1:1 consultation at times. Members invited to discuss any concerns privately with me following group setting or to schedule a 1:1 visit if needs not being met in group.    Next shared medical visit: March 12 at 2 in person    Patient response to group: Pt continues to appreciate group interactions, sharing apporpriately with mature group members.    Medications Management  Pt continues in survivorship of metastatic lung cancer.    CC: Fatigue, anxiety  HPI: Pt is seen in follow up regarding ongoing anxiety, fatigue, demoralization in survivorship of cancer. Continuse to do well, while stating \"some days are better than others.\" Continues to interact often with wife, embracing uncertanting of medical complexity of both. Appreciates ability to run errands on good days, give self permission to rest and relax on days he needs to rest. Has appreciating life review in looking at old photos. Has pain intermittently, benefiting from low dose opioid, while limiting this due to fatigue, negative attributions regarding medications like this.  Exam: As Above  Current medication regimen: gabapentin 300-600 mg q hs   Lab Review:   Infusion on 01/31/2025   Component Date Value    Glucose 01/31/2025 138 (H)     BUN 01/31/2025 18     Creatinine 01/31/2025 0.98     Sodium 01/31/2025 141     Potassium 01/31/2025 3.8     Chloride 01/31/2025 104     CO2 01/31/2025 24.1     Calcium 01/31/2025 9.0     Total Protein 01/31/2025 7.0     Albumin 01/31/2025 4.5     ALT (SGPT) 01/31/2025 68 (H)     AST (SGOT) 01/31/2025 52 (H)     Alkaline Phosphatase 01/31/2025 57     Total " Bilirubin 01/31/2025 0.4     Globulin 01/31/2025 2.5     A/G Ratio 01/31/2025 1.8     BUN/Creatinine Ratio 01/31/2025 18.4     Anion Gap 01/31/2025 12.9     eGFR 01/31/2025 79.4     Magnesium 01/31/2025 2.0     Phosphorus 01/31/2025 1.9 (L)     WBC 01/31/2025 4.52     RBC 01/31/2025 4.74     Hemoglobin 01/31/2025 13.8     Hematocrit 01/31/2025 41.8     MCV 01/31/2025 88.2     MCH 01/31/2025 29.1     MCHC 01/31/2025 33.0     RDW 01/31/2025 13.2     RDW-SD 01/31/2025 43.3     MPV 01/31/2025 10.5     Platelets 01/31/2025 86 (L)     Neutrophil % 01/31/2025 65.1     Lymphocyte % 01/31/2025 22.8     Monocyte % 01/31/2025 8.8     Eosinophil % 01/31/2025 2.2     Basophil % 01/31/2025 0.7     Immature Grans % 01/31/2025 0.4     Neutrophils, Absolute 01/31/2025 2.94     Lymphocytes, Absolute 01/31/2025 1.03     Monocytes, Absolute 01/31/2025 0.40     Eosinophils, Absolute 01/31/2025 0.10     Basophils, Absolute 01/31/2025 0.03     Immature Grans, Absolute 01/31/2025 0.02     nRBC 01/31/2025 0.0    Hospital Outpatient Visit on 01/14/2025   Component Date Value    LVIDd 01/14/2025 4.9     LVIDs 01/14/2025 3.4     IVSd 01/14/2025 1.20     LVPWd 01/14/2025 1.20     FS 01/14/2025 31.3     IVS/LVPW 01/14/2025 1.00     ESV(cubed) 01/14/2025 38.2     EDV(cubed) 01/14/2025 117.6     LV mass(C)d 01/14/2025 226.4     LVOT area 01/14/2025 3.5     LVOT diam 01/14/2025 2.11     SVi (LVOT) 01/14/2025 36.0     MV E max gary 01/14/2025 55.3     MV A max gary 01/14/2025 93.8     MV dec time 01/14/2025 0.18     MV E/A 01/14/2025 0.59     Pulm A Revs Dur 01/14/2025 0.12     MV A dur 01/14/2025 0.10     LA ESV Index (BP) 01/14/2025 28.5     Med Peak E' Gary 01/14/2025 8.1     Lat Peak E' Gary 01/14/2025 6.3     Avg E/e' ratio 01/14/2025 7.68     SV(LVOT) 01/14/2025 77.4     SV(RVOT) 01/14/2025 62.7     Qp/Qs 01/14/2025 0.81     RV Base 01/14/2025 3.3     RV Mid 01/14/2025 2.8     RV Length 01/14/2025 6.5     TAPSE (>1.6) 01/14/2025 2.40     RV S'  01/14/2025 14.6     Pulm Sys Gary 01/14/2025 44.6     Pulm Charlton Gary 01/14/2025 38.6     Pulm S/D 01/14/2025 1.16     Pulm A Revs Gary 01/14/2025 39.0     LV V1 max 01/14/2025 103.0     LV V1 max PG 01/14/2025 4.2     LV V1 mean PG 01/14/2025 2.00     LV V1 VTI 01/14/2025 22.1     Ao pk gary 01/14/2025 134.0     Ao max PG 01/14/2025 7.2     Ao mean PG 01/14/2025 4.0     Ao V2 VTI 01/14/2025 26.6     NIALL(I,D) 01/14/2025 2.9     MV max PG 01/14/2025 5.3     MV mean PG 01/14/2025 2.21     MV V2 VTI 01/14/2025 28.1     MV P1/2t 01/14/2025 68.3     MVA(P1/2t) 01/14/2025 3.2     MVA(VTI) 01/14/2025 2.8     MV dec slope 01/14/2025 389.3     MR max gary 01/14/2025 481.1     MR max PG 01/14/2025 92.6     RVOT diam 01/14/2025 2.07     RV V1 max PG 01/14/2025 3.0     RV V1 max 01/14/2025 87.0     RV V1 VTI 01/14/2025 18.5     PA V2 max 01/14/2025 179.2     PA acc time 01/14/2025 0.14     Ao root diam 01/14/2025 3.7     ACS 01/14/2025 1.84     Sinus 01/14/2025 3.6     STJ 01/14/2025 2.7     EF(MOD-bp) 01/14/2025 61.6     LV Sys Vol (BSA correcte* 01/14/2025 13.0     LV Charlton Vol (BSA correct* 01/14/2025 35.4     EDV(MOD-sp2) 01/14/2025 90.0     EDV(MOD-sp4) 01/14/2025 76.0     ESV(MOD-sp2) 01/14/2025 35.0     ESV(MOD-sp4) 01/14/2025 28.0     SV(MOD-sp2) 01/14/2025 55.0     SV(MOD-sp4) 01/14/2025 48.0     SVi(MOD-SP2) 01/14/2025 25.6     SVi(MOD-SP4) 01/14/2025 22.3     EF(MOD-sp2) 01/14/2025 61.1     EF(MOD-sp4) 01/14/2025 63.2     Ascending aorta 01/14/2025 3.2     Aortic arch 01/14/2025 2.2     Abdo Ao Diam 01/14/2025 2.3    Infusion on 01/03/2025   Component Date Value    Glucose 01/03/2025 132 (H)     BUN 01/03/2025 17     Creatinine 01/03/2025 0.97     Sodium 01/03/2025 141     Potassium 01/03/2025 3.7     Chloride 01/03/2025 106     CO2 01/03/2025 22.6     Calcium 01/03/2025 9.1     Total Protein 01/03/2025 6.7     Albumin 01/03/2025 4.5     ALT (SGPT) 01/03/2025 72 (H)     AST (SGOT) 01/03/2025 47 (H)     Alkaline Phosphatase  01/03/2025 60     Total Bilirubin 01/03/2025 0.4     Globulin 01/03/2025 2.2     A/G Ratio 01/03/2025 2.0     BUN/Creatinine Ratio 01/03/2025 17.5     Anion Gap 01/03/2025 12.4     eGFR 01/03/2025 80.4     Magnesium 01/03/2025 2.0     Phosphorus 01/03/2025 2.3 (L)     WBC 01/03/2025 4.80     RBC 01/03/2025 4.50     Hemoglobin 01/03/2025 12.9 (L)     Hematocrit 01/03/2025 38.8     MCV 01/03/2025 86.2     MCH 01/03/2025 28.7     MCHC 01/03/2025 33.2     RDW 01/03/2025 13.6     RDW-SD 01/03/2025 42.0     MPV 01/03/2025 10.7     Platelets 01/03/2025 84 (L)     Neutrophil % 01/03/2025 71.5     Lymphocyte % 01/03/2025 18.5 (L)     Monocyte % 01/03/2025 7.9     Eosinophil % 01/03/2025 1.3     Basophil % 01/03/2025 0.4     Immature Grans % 01/03/2025 0.4     Neutrophils, Absolute 01/03/2025 3.43     Lymphocytes, Absolute 01/03/2025 0.89     Monocytes, Absolute 01/03/2025 0.38     Eosinophils, Absolute 01/03/2025 0.06     Basophils, Absolute 01/03/2025 0.02     Immature Grans, Absolute 01/03/2025 0.02     nRBC 01/03/2025 0.0    Office Visit on 12/13/2024   Component Date Value    PSA 12/13/2024 <0.014     Glucose 12/13/2024 116 (H)     BUN 12/13/2024 17     Creatinine 12/13/2024 1.04     EGFR Result 12/13/2024 74.0     BUN/Creatinine Ratio 12/13/2024 16.3     Sodium 12/13/2024 138     Potassium 12/13/2024 3.9     Chloride 12/13/2024 103     Total CO2 12/13/2024 26.0     Calcium 12/13/2024 9.5     Total Protein 12/13/2024 6.9     Albumin 12/13/2024 4.7     Globulin 12/13/2024 2.2     A/G Ratio 12/13/2024 2.1     Total Bilirubin 12/13/2024 0.4     Alkaline Phosphatase 12/13/2024 65     AST (SGOT) 12/13/2024 43 (H)     ALT (SGPT) 12/13/2024 64 (H)     Total Cholesterol 12/13/2024 157     Triglycerides 12/13/2024 421 (H)     HDL Cholesterol 12/13/2024 31 (L)     VLDL Cholesterol Fareed 12/13/2024 65 (H)     LDL Chol Calc (NIH) 12/13/2024 61     TSH 12/13/2024 4.630 (H)     Hemoglobin A1C 12/13/2024 5.40     Free T4 12/13/2024 1.23       MDM:  Meds reviewed and renewed as appropriate.  Continue gabapentin as written.  FU scheduled in group setting.    Diagnoses and all orders for this visit:    1. Generalized anxiety disorder  -     gabapentin (NEURONTIN) 300 MG capsule; Take 1 capsule by mouth Take As Directed. 1 capsule PO q evening meal, 1 capsule PO q hs  Dispense: 60 capsule; Refill: 2

## 2025-02-13 ENCOUNTER — CLINICAL SUPPORT (OUTPATIENT)
Dept: FAMILY MEDICINE CLINIC | Facility: CLINIC | Age: 80
End: 2025-02-13
Payer: MEDICARE

## 2025-02-13 DIAGNOSIS — E53.8 B12 DEFICIENCY: Primary | ICD-10-CM

## 2025-02-13 PROCEDURE — 96372 THER/PROPH/DIAG INJ SC/IM: CPT | Performed by: FAMILY MEDICINE

## 2025-02-13 RX ADMIN — CYANOCOBALAMIN 1000 MCG: 1000 INJECTION, SOLUTION INTRAMUSCULAR; SUBCUTANEOUS at 10:57

## 2025-02-17 ENCOUNTER — SPECIALTY PHARMACY (OUTPATIENT)
Dept: PHARMACY | Facility: HOSPITAL | Age: 80
End: 2025-02-17
Payer: MEDICARE

## 2025-02-17 NOTE — PROGRESS NOTES
Specialty Pharmacy Patient Management Program  Oncology / Hematology Refill Outreach      Nemesio was contacted today regarding refills of his medication(s).    Specialty medication(s) and dose(s) confirmed: Tagrisso    Refill Questions      Flowsheet Row Most Recent Value   Changes to allergies? No   Changes to medications? No   New conditions or infections since last clinic visit No   Unplanned office visit, urgent care, ED, or hospital admission in the last 4 weeks  No   How does patient/caregiver feel medication is working? Good   Financial problems or insurance changes  No   Since the previous refill, were any specialty medication doses or scheduled injections missed or delayed?  No   Does this patient require a clinical escalation to a pharmacist? No          Delivery Questions      Flowsheet Row Most Recent Value   Delivery method [employer]   Delivery address verified with patient/caregiver? Yes   Delivery address Home   Number of medications in delivery 1   Medication(s) being filled and delivered Osimertinib Mesylate (Tagrisso)   Doses left of specialty medications 8 to 9   Copay verified? Yes   Copay amount $0   Copay form of payment No copayment ($0)   Ship Date 2/17/2025   Delivery Date Selection 02/18/25   Signature Required No            Follow-Up: 21d [USUALLY TIME FRAME UNTIL NEXT REFILL OUTREACH FOLLOW-UP (APPROX) Ex. 25d, 85d, etc. ]    Blanca Jacobson, Pharmacy Technician  2/17/2025  11:17 EST

## 2025-02-28 ENCOUNTER — APPOINTMENT (OUTPATIENT)
Dept: ONCOLOGY | Facility: HOSPITAL | Age: 80
End: 2025-02-28
Payer: MEDICARE

## 2025-02-28 ENCOUNTER — INFUSION (OUTPATIENT)
Dept: ONCOLOGY | Facility: HOSPITAL | Age: 80
End: 2025-02-28
Payer: MEDICARE

## 2025-02-28 VITALS
WEIGHT: 213.8 LBS | TEMPERATURE: 97.8 F | BODY MASS INDEX: 30.68 KG/M2 | SYSTOLIC BLOOD PRESSURE: 153 MMHG | DIASTOLIC BLOOD PRESSURE: 68 MMHG | RESPIRATION RATE: 16 BRPM | HEART RATE: 82 BPM | OXYGEN SATURATION: 96 %

## 2025-02-28 DIAGNOSIS — I65.23 BILATERAL CAROTID ARTERY STENOSIS: Primary | ICD-10-CM

## 2025-02-28 DIAGNOSIS — C61 MALIGNANT NEOPLASM PROSTATE: ICD-10-CM

## 2025-02-28 DIAGNOSIS — C79.51 CANCER, METASTATIC TO BONE: Primary | ICD-10-CM

## 2025-02-28 LAB
ALBUMIN SERPL-MCNC: 4.3 G/DL (ref 3.5–5.2)
ALBUMIN/GLOB SERPL: 1.7 G/DL
ALP SERPL-CCNC: 53 U/L (ref 39–117)
ALT SERPL W P-5'-P-CCNC: 47 U/L (ref 1–41)
ANION GAP SERPL CALCULATED.3IONS-SCNC: 11 MMOL/L (ref 5–15)
AST SERPL-CCNC: 37 U/L (ref 1–40)
BASOPHILS # BLD AUTO: 0.03 10*3/MM3 (ref 0–0.2)
BASOPHILS NFR BLD AUTO: 0.6 % (ref 0–1.5)
BILIRUB SERPL-MCNC: 0.4 MG/DL (ref 0–1.2)
BUN SERPL-MCNC: 20 MG/DL (ref 8–23)
BUN/CREAT SERPL: 18.9 (ref 7–25)
CALCIUM SPEC-SCNC: 9.5 MG/DL (ref 8.6–10.5)
CHLORIDE SERPL-SCNC: 105 MMOL/L (ref 98–107)
CO2 SERPL-SCNC: 25 MMOL/L (ref 22–29)
CREAT SERPL-MCNC: 1.06 MG/DL (ref 0.76–1.27)
DEPRECATED RDW RBC AUTO: 42.7 FL (ref 37–54)
EGFRCR SERPLBLD CKD-EPI 2021: 72.3 ML/MIN/1.73
EOSINOPHIL # BLD AUTO: 0.09 10*3/MM3 (ref 0–0.4)
EOSINOPHIL NFR BLD AUTO: 1.9 % (ref 0.3–6.2)
ERYTHROCYTE [DISTWIDTH] IN BLOOD BY AUTOMATED COUNT: 13.2 % (ref 12.3–15.4)
GLOBULIN UR ELPH-MCNC: 2.5 GM/DL
GLUCOSE SERPL-MCNC: 109 MG/DL (ref 65–99)
HCT VFR BLD AUTO: 39.7 % (ref 37.5–51)
HGB BLD-MCNC: 13.2 G/DL (ref 13–17.7)
IMM GRANULOCYTES # BLD AUTO: 0.01 10*3/MM3 (ref 0–0.05)
IMM GRANULOCYTES NFR BLD AUTO: 0.2 % (ref 0–0.5)
LYMPHOCYTES # BLD AUTO: 1.08 10*3/MM3 (ref 0.7–3.1)
LYMPHOCYTES NFR BLD AUTO: 22.5 % (ref 19.6–45.3)
MAGNESIUM SERPL-MCNC: 1.8 MG/DL (ref 1.6–2.4)
MCH RBC QN AUTO: 29.3 PG (ref 26.6–33)
MCHC RBC AUTO-ENTMCNC: 33.2 G/DL (ref 31.5–35.7)
MCV RBC AUTO: 88 FL (ref 79–97)
MONOCYTES # BLD AUTO: 0.48 10*3/MM3 (ref 0.1–0.9)
MONOCYTES NFR BLD AUTO: 10 % (ref 5–12)
NEUTROPHILS NFR BLD AUTO: 3.1 10*3/MM3 (ref 1.7–7)
NEUTROPHILS NFR BLD AUTO: 64.8 % (ref 42.7–76)
NRBC BLD AUTO-RTO: 0 /100 WBC (ref 0–0.2)
PHOSPHATE SERPL-MCNC: 3.3 MG/DL (ref 2.5–4.5)
PLATELET # BLD AUTO: 88 10*3/MM3 (ref 140–450)
PMV BLD AUTO: 10.7 FL (ref 6–12)
POTASSIUM SERPL-SCNC: 4.1 MMOL/L (ref 3.5–5.2)
PROT SERPL-MCNC: 6.8 G/DL (ref 6–8.5)
RBC # BLD AUTO: 4.51 10*6/MM3 (ref 4.14–5.8)
SODIUM SERPL-SCNC: 141 MMOL/L (ref 136–145)
WBC NRBC COR # BLD AUTO: 4.79 10*3/MM3 (ref 3.4–10.8)

## 2025-02-28 PROCEDURE — 84100 ASSAY OF PHOSPHORUS: CPT

## 2025-02-28 PROCEDURE — 83735 ASSAY OF MAGNESIUM: CPT

## 2025-02-28 PROCEDURE — 85025 COMPLETE CBC W/AUTO DIFF WBC: CPT

## 2025-02-28 PROCEDURE — 80053 COMPREHEN METABOLIC PANEL: CPT

## 2025-02-28 PROCEDURE — 96374 THER/PROPH/DIAG INJ IV PUSH: CPT

## 2025-02-28 PROCEDURE — 25010000002 ZOLEDRONIC ACID 4 MG/100ML SOLUTION: Performed by: INTERNAL MEDICINE

## 2025-02-28 RX ORDER — SODIUM CHLORIDE 9 MG/ML
20 INJECTION, SOLUTION INTRAVENOUS ONCE
Status: DISCONTINUED | OUTPATIENT
Start: 2025-02-28 | End: 2025-02-28 | Stop reason: HOSPADM

## 2025-02-28 RX ORDER — ZOLEDRONIC ACID 0.04 MG/ML
4 INJECTION, SOLUTION INTRAVENOUS ONCE
Status: COMPLETED | OUTPATIENT
Start: 2025-02-28 | End: 2025-02-28

## 2025-02-28 RX ADMIN — ZOLEDRONIC ACID 4 MG: 0.04 INJECTION, SOLUTION INTRAVENOUS at 15:28

## 2025-03-12 ENCOUNTER — OFFICE VISIT (OUTPATIENT)
Dept: PSYCHIATRY | Facility: HOSPITAL | Age: 80
End: 2025-03-12
Payer: MEDICARE

## 2025-03-12 DIAGNOSIS — C34.90 PRIMARY NONSQUAMOUS NONSMALL CELL NEOPLASM OF LUNG: ICD-10-CM

## 2025-03-12 DIAGNOSIS — F41.1 GENERALIZED ANXIETY DISORDER: Primary | ICD-10-CM

## 2025-03-12 NOTE — PROGRESS NOTES
Subjective  Patient ID: Nemesio Fitzpatrick is a 79 y.o.. male seen in regularly scheduled group session.  Group participants have consented to group conducted in person    Total Group Time, Face to Face: 5   Total Group Participants: 65, plus facilitation per ASHLEIGH Moore    Group Therapy  Group topics explored including development of new normal, interpersonal sensitivity, short and long term effects of diagnosis and treatment, significant other or family conflict, intimacy concerns, anticipitory anxiety, anxiety surrounding adjusted treatment plan or adjusted follow up, physical deconditioning, and lifestyle choices. Members share impact of weather, challenges managing ice, cold, shorter days, lack of sunshine, seasonal affective disorder. Shared excitement for spring, increased engagement. Continue to explore importance of connection to others. Considered existential distress, desired legacy, life review, and goals of this phase of life.    Counseling provided regarding cognitive behavioral therapy (CBT) strategies, behavioral activation/ activity scheduling, reintroduction to activity through graded tasks, balancing avoidance with approach , sleep hygiene, discussion of need for restorative sleep with consideration of sleep apnea, recognition and allowance of need for rest, pharmacological and non pharmacological management of sleep disturbance, lifestyle counseling, benefits of exercise and strategies for managing barriers to engagement, allowance of self care, exploration of quality of life goals, survivorship issues, anxiety/ mood management , medication education, and existential distress.  Explored strategies for managing seasonal affective disorder, benefits of engagement, importance of physical activity.  Reviewed benefits of group, emphasizing importance of connection to others.  Discussed ideas regarding legacy work, introducing concept of dignity therapy.  Questions provided to patients for self  completion and review next group.    Discussed current programming available in [facility] and community.    Benefits of group discussed while also acknowledging the need for 1:1 consultation at times. Members invited to discuss any concerns privately with me following group setting or to schedule a 1:1 visit if needs not being met in group.    Next shared medical visit: April 23 at 2:00 PM    Patient response to group: Pt eagerly participates in conversation, furthering conversation and connectedness to other participants.    Medications Management  Pt continues in survivorship of metastatic lung cancer.    CC: Fatigue, anxiety  HPI: Pt is seen in follow up regarding demoralization, adjusted abilities in survivorship of metastatic lung cancer.  Appreciates well managed pain, requiring limited need for opioids and continuing to appreciate benefit of gabapentin to pain and sleep. Does note importance of taking this at the right time so as not to contribute to early sedation or AM sedation. Share recent stressors, ability to react appropriately and deal with this. Fingers are cracked and nails brittle, both radically acceptable. Does share grief surrounding PCP leaving practice. Is eager to find new PCP, looking at reviews to assist.   Exam: As Above  Current medication regimen: Gabapentin 300 mg bid  Lab Review:   Infusion on 02/28/2025   Component Date Value    Glucose 02/28/2025 109 (H)     BUN 02/28/2025 20     Creatinine 02/28/2025 1.06     Sodium 02/28/2025 141     Potassium 02/28/2025 4.1     Chloride 02/28/2025 105     CO2 02/28/2025 25.0     Calcium 02/28/2025 9.5     Total Protein 02/28/2025 6.8     Albumin 02/28/2025 4.3     ALT (SGPT) 02/28/2025 47 (H)     AST (SGOT) 02/28/2025 37     Alkaline Phosphatase 02/28/2025 53     Total Bilirubin 02/28/2025 0.4     Globulin 02/28/2025 2.5     A/G Ratio 02/28/2025 1.7     BUN/Creatinine Ratio 02/28/2025 18.9     Anion Gap 02/28/2025 11.0     eGFR 02/28/2025 72.3      Magnesium 02/28/2025 1.8     Phosphorus 02/28/2025 3.3     WBC 02/28/2025 4.79     RBC 02/28/2025 4.51     Hemoglobin 02/28/2025 13.2     Hematocrit 02/28/2025 39.7     MCV 02/28/2025 88.0     MCH 02/28/2025 29.3     MCHC 02/28/2025 33.2     RDW 02/28/2025 13.2     RDW-SD 02/28/2025 42.7     MPV 02/28/2025 10.7     Platelets 02/28/2025 88 (L)     Neutrophil % 02/28/2025 64.8     Lymphocyte % 02/28/2025 22.5     Monocyte % 02/28/2025 10.0     Eosinophil % 02/28/2025 1.9     Basophil % 02/28/2025 0.6     Immature Grans % 02/28/2025 0.2     Neutrophils, Absolute 02/28/2025 3.10     Lymphocytes, Absolute 02/28/2025 1.08     Monocytes, Absolute 02/28/2025 0.48     Eosinophils, Absolute 02/28/2025 0.09     Basophils, Absolute 02/28/2025 0.03     Immature Grans, Absolute 02/28/2025 0.01     nRBC 02/28/2025 0.0    Infusion on 01/31/2025   Component Date Value    Glucose 01/31/2025 138 (H)     BUN 01/31/2025 18     Creatinine 01/31/2025 0.98     Sodium 01/31/2025 141     Potassium 01/31/2025 3.8     Chloride 01/31/2025 104     CO2 01/31/2025 24.1     Calcium 01/31/2025 9.0     Total Protein 01/31/2025 7.0     Albumin 01/31/2025 4.5     ALT (SGPT) 01/31/2025 68 (H)     AST (SGOT) 01/31/2025 52 (H)     Alkaline Phosphatase 01/31/2025 57     Total Bilirubin 01/31/2025 0.4     Globulin 01/31/2025 2.5     A/G Ratio 01/31/2025 1.8     BUN/Creatinine Ratio 01/31/2025 18.4     Anion Gap 01/31/2025 12.9     eGFR 01/31/2025 79.4     Magnesium 01/31/2025 2.0     Phosphorus 01/31/2025 1.9 (L)     WBC 01/31/2025 4.52     RBC 01/31/2025 4.74     Hemoglobin 01/31/2025 13.8     Hematocrit 01/31/2025 41.8     MCV 01/31/2025 88.2     MCH 01/31/2025 29.1     MCHC 01/31/2025 33.0     RDW 01/31/2025 13.2     RDW-SD 01/31/2025 43.3     MPV 01/31/2025 10.5     Platelets 01/31/2025 86 (L)     Neutrophil % 01/31/2025 65.1     Lymphocyte % 01/31/2025 22.8     Monocyte % 01/31/2025 8.8     Eosinophil % 01/31/2025 2.2     Basophil % 01/31/2025 0.7      Immature Grans % 01/31/2025 0.4     Neutrophils, Absolute 01/31/2025 2.94     Lymphocytes, Absolute 01/31/2025 1.03     Monocytes, Absolute 01/31/2025 0.40     Eosinophils, Absolute 01/31/2025 0.10     Basophils, Absolute 01/31/2025 0.03     Immature Grans, Absolute 01/31/2025 0.02     nRBC 01/31/2025 0.0    Hospital Outpatient Visit on 01/14/2025   Component Date Value    LVIDd 01/14/2025 4.9     LVIDs 01/14/2025 3.4     IVSd 01/14/2025 1.20     LVPWd 01/14/2025 1.20     FS 01/14/2025 31.3     IVS/LVPW 01/14/2025 1.00     ESV(cubed) 01/14/2025 38.2     EDV(cubed) 01/14/2025 117.6     LV mass(C)d 01/14/2025 226.4     LVOT area 01/14/2025 3.5     LVOT diam 01/14/2025 2.11     SVi (LVOT) 01/14/2025 36.0     MV E max gary 01/14/2025 55.3     MV A max gary 01/14/2025 93.8     MV dec time 01/14/2025 0.18     MV E/A 01/14/2025 0.59     Pulm A Revs Dur 01/14/2025 0.12     MV A dur 01/14/2025 0.10     LA ESV Index (BP) 01/14/2025 28.5     Med Peak E' Gary 01/14/2025 8.1     Lat Peak E' Gary 01/14/2025 6.3     Avg E/e' ratio 01/14/2025 7.68     SV(LVOT) 01/14/2025 77.4     SV(RVOT) 01/14/2025 62.7     Qp/Qs 01/14/2025 0.81     RV Base 01/14/2025 3.3     RV Mid 01/14/2025 2.8     RV Length 01/14/2025 6.5     TAPSE (>1.6) 01/14/2025 2.40     RV S' 01/14/2025 14.6     Pulm Sys Gary 01/14/2025 44.6     Pulm Charlton Gary 01/14/2025 38.6     Pulm S/D 01/14/2025 1.16     Pulm A Revs Gary 01/14/2025 39.0     LV V1 max 01/14/2025 103.0     LV V1 max PG 01/14/2025 4.2     LV V1 mean PG 01/14/2025 2.00     LV V1 VTI 01/14/2025 22.1     Ao pk gary 01/14/2025 134.0     Ao max PG 01/14/2025 7.2     Ao mean PG 01/14/2025 4.0     Ao V2 VTI 01/14/2025 26.6     NIALL(I,D) 01/14/2025 2.9     MV max PG 01/14/2025 5.3     MV mean PG 01/14/2025 2.21     MV V2 VTI 01/14/2025 28.1     MV P1/2t 01/14/2025 68.3     MVA(P1/2t) 01/14/2025 3.2     MVA(VTI) 01/14/2025 2.8     MV dec slope 01/14/2025 389.3     MR max gary 01/14/2025 481.1     MR max PG 01/14/2025  92.6     RVOT diam 01/14/2025 2.07     RV V1 max PG 01/14/2025 3.0     RV V1 max 01/14/2025 87.0     RV V1 VTI 01/14/2025 18.5     PA V2 max 01/14/2025 179.2     PA acc time 01/14/2025 0.14     Ao root diam 01/14/2025 3.7     ACS 01/14/2025 1.84     Sinus 01/14/2025 3.6     STJ 01/14/2025 2.7     EF(MOD-bp) 01/14/2025 61.6     LV Sys Vol (BSA correcte* 01/14/2025 13.0     LV Charlton Vol (BSA correct* 01/14/2025 35.4     EDV(MOD-sp2) 01/14/2025 90.0     EDV(MOD-sp4) 01/14/2025 76.0     ESV(MOD-sp2) 01/14/2025 35.0     ESV(MOD-sp4) 01/14/2025 28.0     SV(MOD-sp2) 01/14/2025 55.0     SV(MOD-sp4) 01/14/2025 48.0     SVi(MOD-SP2) 01/14/2025 25.6     SVi(MOD-SP4) 01/14/2025 22.3     EF(MOD-sp2) 01/14/2025 61.1     EF(MOD-sp4) 01/14/2025 63.2     Ascending aorta 01/14/2025 3.2     Aortic arch 01/14/2025 2.2     Abdo Ao Diam 01/14/2025 2.3      MDM:  Meds reviewed and renewed as appropriate.  Continue gabapentin as written, supporting earlier timing of this to assist with AM sedation.  FU scheduled in group setting.    Diagnoses and all orders for this visit:    1. Generalized anxiety disorder (Primary)    2. Primary nonsquamous nonsmall cell neoplasm of lung

## 2025-03-17 ENCOUNTER — SPECIALTY PHARMACY (OUTPATIENT)
Dept: PHARMACY | Facility: HOSPITAL | Age: 80
End: 2025-03-17
Payer: MEDICARE

## 2025-03-17 ENCOUNTER — OFFICE VISIT (OUTPATIENT)
Dept: FAMILY MEDICINE CLINIC | Facility: CLINIC | Age: 80
End: 2025-03-17
Payer: MEDICARE

## 2025-03-17 VITALS
WEIGHT: 214 LBS | SYSTOLIC BLOOD PRESSURE: 152 MMHG | HEIGHT: 70 IN | DIASTOLIC BLOOD PRESSURE: 74 MMHG | OXYGEN SATURATION: 96 % | TEMPERATURE: 97.5 F | BODY MASS INDEX: 30.64 KG/M2 | HEART RATE: 80 BPM

## 2025-03-17 DIAGNOSIS — C79.51 CANCER, METASTATIC TO BONE: ICD-10-CM

## 2025-03-17 DIAGNOSIS — C34.90 PRIMARY NONSQUAMOUS NONSMALL CELL NEOPLASM OF LUNG: ICD-10-CM

## 2025-03-17 DIAGNOSIS — C61 MALIGNANT NEOPLASM PROSTATE: ICD-10-CM

## 2025-03-17 DIAGNOSIS — E78.2 MIXED HYPERLIPIDEMIA: Primary | ICD-10-CM

## 2025-03-17 DIAGNOSIS — E03.9 ACQUIRED HYPOTHYROIDISM: ICD-10-CM

## 2025-03-17 DIAGNOSIS — M54.32 SCIATICA OF LEFT SIDE: ICD-10-CM

## 2025-03-17 DIAGNOSIS — I10 PRIMARY HYPERTENSION: ICD-10-CM

## 2025-03-17 RX ORDER — HYDROCODONE BITARTRATE AND ACETAMINOPHEN 5; 325 MG/1; MG/1
1 TABLET ORAL DAILY PRN
Qty: 30 TABLET | Refills: 0 | Status: SHIPPED | OUTPATIENT
Start: 2025-03-17

## 2025-03-17 RX ADMIN — CYANOCOBALAMIN 1000 MCG: 1000 INJECTION, SOLUTION INTRAMUSCULAR; SUBCUTANEOUS at 11:08

## 2025-03-17 NOTE — PROGRESS NOTES
Subjective   Nemesio Fitzpatrick is a 79 y.o. male.     Chief Complaint   Patient presents with    Hypertension       Htn- no issues on meds     Pt continues to slowly loose weight but has been stable for 3 months. Has stage 4 cancer with bony mets and palliative chemo. Pt has redone his will and is getting his affairs in order.  He reports tumors in his head are gone. His lung tumor has shrunk. He is in a cancer support group. He has been told his prognosis is 1-15 years. He mostly feels ok as long as his bone pain is controlled.      Having low back pain and using hydrocodone sparingly. Also has bony mets. Wanting to get meds here.      H/o prostate cancer- had prostate removed, follows with urology and is resolved. They follow psa. He reports he has not seen urology in some time. PSA 3 months ago was ok     Allergies- to mold, claritin helps and he feels this is all he needs.      hld- has never been on meds, diet controlled. [facility] and [facility] reports [facility] good.      Hypothyroidism- no cold intolerance., doing well on meds, no fatigue, [facility]. Had labs here three months ago and they were off but pt never contacted us back to let us know if he was taking his meds everyday so his medication did not get adjusted.      High bs in the past, follows [facility], recent A1C ok.      Insomnia/te- No longer taking ambien, trouble falling asleep. Does great with gabapentin now.         The following portions of the patient's history were reviewed and updated as appropriate: allergies, current medications, past family history, past medical history, past social history, past surgical history and problem list.    Past Medical History:   Diagnosis Date    Acute bronchitis     Allergic Iodine    Allergic rhinitis     Anxiety 2024    Due to sudden cancer diagnosis    Arthritis 2000    BPH (benign prostatic hypertrophy)     Cervical disc disorder 1987    Dislocation, shoulder 1973    Elevated prostate specific  antigen (PSA)     Headache 1990    History of bone scan 10/21/2010    normal    History of colonoscopy     never    History of EKG 03/12/2012    also 11-; T wave abnormality    History of medical problems 1978    HL (hearing loss) 2000    Hyperlipidemia     Hypertension 2024    Upon learning of Stage 4 cancer    Hypothyroidism     IFG (impaired fasting glucose)     Kidney calculus     left ureteral stone    Knee swelling 2015    Low back pain     Lung cancer 2024    Right    Malignant neoplasm prostate 11/09/2009    Dr. Mcclelland; lymph nodes negative margins clear    Prostate nodule     right lobe    Rotator cuff syndrome 2009    Scoliosis 2009    Screening for prostate cancer     prostatectomy    Sleep apnea     CPAP machine    URI (upper respiratory infection)        Past Surgical History:   Procedure Laterality Date    CERVICAL DISC SURGERY  1990    also 2006; C5-6, C6-7    HAND SURGERY Left 2001    trigger finger release; left middle finger    NECK SURGERY  1988, 2005    Cervical disk    PROSTATECTOMY  11/09/2009    RHINOPLASTY  1985    ROTATOR CUFF REPAIR      SHOULDER SURGERY Left 09/29/2009    Dr. KRISTEL Jimenez; rotator cuff repair; bone spurs     SPINE SURGERY  2005    TRIGGER POINT INJECTION  2008,2021,2022    URETERAL STENT INSERTION Left 12/04/2013    Dr. Martinez       Family History   Problem Relation Age of Onset    Diabetes Mother     Kidney disease Mother         calculus    Hearing loss Mother     Prostate cancer Father     Stomach cancer Father     Heart disease Sister     Cancer Sister         breast    Hypothyroidism Sister     Kidney disease Sister         calculus    Arthritis Sister     Transient ischemic attack Brother     Arthritis Brother     Alzheimer's disease Other         uncle    Diabetes Other         uncle       Social History     Socioeconomic History    Marital status:      Spouse name: Faith   Tobacco Use    Smoking status: Former     Current packs/day: 0.00      "Average packs/day: 1 pack/day for 22.0 years (22.0 ttl pk-yrs)     Types: Cigarettes, Pipe     Start date: 1967     Quit date: 1988     Years since quittin.2     Passive exposure: Past    Smokeless tobacco: Never    Tobacco comments:     Quit smoking pipe around    Vaping Use    Vaping status: Never Used   Substance and Sexual Activity    Alcohol use: Yes     Comment: Rarely will drink one glass of wine    Drug use: Never    Sexual activity: Not Currently     Partners: Female       Review of Systems   Constitutional:  Negative for fever.   Respiratory:  Negative for shortness of breath.        Objective   Visit Vitals  /74 (BP Location: Left arm, Patient Position: Sitting, Cuff Size: Large Adult)   Pulse 80   Temp 97.5 °F (36.4 °C)   Ht 177.8 cm (70\")   Wt 97.1 kg (214 lb)   SpO2 96%   BMI 30.71 kg/m²     Body mass index is 30.71 kg/m².  Physical Exam  Constitutional:       Appearance: Normal appearance. He is well-developed.   Cardiovascular:      Rate and Rhythm: Normal rate and regular rhythm.      Heart sounds: Normal heart sounds.   Pulmonary:      Effort: Pulmonary effort is normal.      Breath sounds: Normal breath sounds.   Musculoskeletal:         General: No swelling. Normal range of motion.   Skin:     General: Skin is warm and dry.      Findings: No rash.   Neurological:      General: No focal deficit present.      Mental Status: He is alert and oriented to person, place, and time.   Psychiatric:         Mood and Affect: Mood normal.         Behavior: Behavior normal.           Assessment & Plan   Diagnoses and all orders for this visit:    1. Mixed hyperlipidemia (Primary)    2. Cancer, metastatic to bone  -     HYDROcodone-acetaminophen (NORCO) 5-325 MG per tablet; Take 1 tablet by mouth Daily As Needed for Moderate Pain.  Dispense: 30 tablet; Refill: 0    3. Sciatica of left side  -     HYDROcodone-acetaminophen (NORCO) 5-325 MG per tablet; Take 1 tablet by mouth Daily As " Needed for Moderate Pain.  Dispense: 30 tablet; Refill: 0    4. Acquired hypothyroidism  -     TSH Rfx On Abnormal To Free T4    5. Primary hypertension    6. Malignant neoplasm prostate    7. Primary nonsquamous nonsmall cell neoplasm of lung                   Watson, cont meds, f/u in 3 months.

## 2025-03-17 NOTE — PROGRESS NOTES
Specialty Pharmacy Patient Management Program  Oncology / Hematology Refill Outreach      Nemesio was contacted today regarding refills of his medication(s).    Specialty medication(s) and dose(s) confirmed: Tagrisso    Refill Questions      Flowsheet Row Most Recent Value   Changes to allergies? No   Changes to medications? No   New conditions or infections since last clinic visit No   Unplanned office visit, urgent care, ED, or hospital admission in the last 4 weeks  No   How does patient/caregiver feel medication is working? Good   Financial problems or insurance changes  No   Since the previous refill, were any specialty medication doses or scheduled injections missed or delayed?  No   Does this patient require a clinical escalation to a pharmacist? No          Delivery Questions      Flowsheet Row Most Recent Value   Delivery method UPS   Delivery address verified with patient/caregiver? Yes   Delivery address Home   Other address preferred N/A   Number of medications in delivery 1   Medication(s) being filled and delivered Osimertinib Mesylate (Tagrisso)   Doses left of specialty medications 10   Copay verified? Yes   Copay amount $0   Copay form of payment No copayment ($0)   Delivery Date Selection 03/19/25   Signature Required No            Follow-Up: 21 D [USUALLY TIME FRAME UNTIL NEXT REFILL OUTREACH FOLLOW-UP (APPROX) Ex. 25d, 85d, etc. ]    Blanca Jacobson, Pharmacy Technician  3/17/2025  13:59 EDT

## 2025-03-18 ENCOUNTER — HOSPITAL ENCOUNTER (OUTPATIENT)
Dept: CT IMAGING | Facility: HOSPITAL | Age: 80
Discharge: HOME | End: 2025-03-18
Payer: MEDICARE

## 2025-03-18 ENCOUNTER — HOSPITAL ENCOUNTER (OUTPATIENT)
Dept: MRI IMAGING | Facility: HOSPITAL | Age: 80
Discharge: HOME | End: 2025-03-18
Payer: MEDICARE

## 2025-03-18 DIAGNOSIS — C34.90 PRIMARY NONSQUAMOUS NONSMALL CELL NEOPLASM OF LUNG: ICD-10-CM

## 2025-03-18 DIAGNOSIS — D69.6 THROMBOCYTOPENIA: ICD-10-CM

## 2025-03-18 DIAGNOSIS — I65.21 STENOSIS OF RIGHT CAROTID ARTERY: ICD-10-CM

## 2025-03-18 LAB — TSH SERPL DL<=0.005 MIU/L-ACNC: 3.82 UIU/ML (ref 0.27–4.2)

## 2025-03-18 PROCEDURE — 74177 CT ABD & PELVIS W/CONTRAST: CPT

## 2025-03-18 PROCEDURE — 25510000002 GADOBENATE DIMEGLUMINE 529 MG/ML SOLUTION: Performed by: INTERNAL MEDICINE

## 2025-03-18 PROCEDURE — 71260 CT THORAX DX C+: CPT

## 2025-03-18 PROCEDURE — 70553 MRI BRAIN STEM W/O & W/DYE: CPT

## 2025-03-18 PROCEDURE — 25510000001 IOPAMIDOL 61 % SOLUTION: Performed by: INTERNAL MEDICINE

## 2025-03-18 PROCEDURE — A9577 INJ MULTIHANCE: HCPCS | Performed by: INTERNAL MEDICINE

## 2025-03-18 RX ORDER — IOPAMIDOL 612 MG/ML
100 INJECTION, SOLUTION INTRAVASCULAR
Status: COMPLETED | OUTPATIENT
Start: 2025-03-18 | End: 2025-03-18

## 2025-03-18 RX ADMIN — GADOBENATE DIMEGLUMINE 20 ML: 529 INJECTION, SOLUTION INTRAVENOUS at 14:11

## 2025-03-18 RX ADMIN — IOPAMIDOL 85 ML: 612 INJECTION, SOLUTION INTRAVENOUS at 13:44

## 2025-03-18 NOTE — NURSING NOTE
Patient is allergic to iodinated contrast.  Here for outpatient CT, took Prednisone #3 per protocol at 1230 today + Benadryl per protocol at 1230 today. Patient is properly pre-medicated.

## 2025-03-19 NOTE — PROGRESS NOTES
Subjective     Reason for follow-up: Non-small cell lung cancer                             REQUESTING PRACTITIONER:  Chandrakant    RECORDS OBTAINED:  Records of the patients history including those obtained from the referring provider were reviewed and summarized in detail.      History of Present Illness:  The patient returns today for follow-up.  He is doing well.  He reports no major side effects from Tagrisso.  He denies uncontrolled nausea vomiting diarrhea.  He is having no uncontrolled pain.  He denies headache or neurological changes.  He denies shortness of breath or cough.  He denies dental or jaw issues.      ONCOLOGY HISTORY:  This is a pleasant 78-year-old man with impaired fasting glucose, hyperlipidemia, hypothyroidism and history of prostate cancer.  The patient has been experiencing about a 1 year history of progressive back pain in the upper mid thoracic spine stabbing in nature and radiating bilaterally anteriorly.  Symptoms are worse when the patient lays down to rest at night.  He has also had some pain around the right shoulder blade.  He was treated with PT with no improvement.  The patient has also been having left hip pain.  The patient was evaluated by orthopedic surgery and an MRI of the thoracic spine without contrast was performed on 4/25/2024 and showed a large expansile marrow replacing mass along the anterior T4 vertebral body 3.2 x 2.1 cm extending into the anterior paraspinal soft tissue along the T3-T4 space with reactive bone marrow edema.  Another partially visualized expansile mass was seen in the posterior right second rib 2.7 x 1.5 cm and another in the posterior sixth rib 1 cm with surrounding bone marrow edema.  At T7-T8 there is a posterior disc protrusion with mild to moderate canal stenosis.  In the posterior right lung a 3.5 cm mass was noted with additional small nodules also seen but poorly characterized.    The patient denies weight loss, night sweats, shortness of  breath, cough, hemoptysis, headaches, confusion, changes in swallowing bowel habits urinary habits.  He has history of prostate cancer treated with prostatectomy in 2009 and reports negligible PSA values.    The patient has history of light smoking during his 20s and 30s.  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    A full body PET scan was performed on 5/3/2024 which showed a hypermetabolic mass in the superior segment of the right lower lobe 3.7 x 2.8 cm SUV 9.3, no hypermetabolic mediastinal or hilar lymph nodes but multiple hypermetabolic bone lesions largest being at T4 vertebral body SUV 9.4, posterior lateral right second rib, posterior right sixth rib, left aspect of the sacrum to the left of the S1 neural foramen.  Spleen was mildly enlarged 15 cm but less activity than liver.    A CT-guided needle biopsy of a rib mass was performed on 5/10/2024 and showed metastatic adenocarcinoma immunohistochemistry supporting a pulmonary primary.    The patient was seen by radiation oncology with plans to treat at least palliatively to T4 and rib lesions possible additional sites pending peer review of case.    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    MRI brain 5/31/2024 showed multiple supratentorial and infratentorial lesions consistent with metastatic disease largest 4-4.5 mm in size.    The patient initiated Tagrisso 6/5/2024.  He was treated with radiation therapy to T4.          Past Medical History:   Diagnosis Date    Acute bronchitis     Allergic Iodine    Allergic rhinitis     Anxiety 2024    Due to sudden cancer diagnosis    Arthritis 2000    BPH (benign prostatic hypertrophy)     Cervical disc disorder 1987    Dislocation, shoulder 1973    Elevated prostate specific antigen (PSA)     Headache 1990    History of bone scan 10/21/2010    normal    History of colonoscopy     never    History of EKG 03/12/2012    also 11-; T wave abnormality     History of medical problems 1978    HL (hearing loss) 2000    Hyperlipidemia     Hypertension 2024    Upon learning of Stage 4 cancer    Hypothyroidism     IFG (impaired fasting glucose)     Kidney calculus     left ureteral stone    Knee swelling 2015    Low back pain     Lung cancer 2024    Right    Malignant neoplasm prostate 11/09/2009    Dr. Mcclelland; lymph nodes negative margins clear    Prostate nodule     right lobe    Rotator cuff syndrome 2009    Scoliosis 2009    Screening for prostate cancer     prostatectomy    Sleep apnea     CPAP machine    URI (upper respiratory infection)         Past Surgical History:   Procedure Laterality Date    CERVICAL DISC SURGERY  1990    also 2006; C5-6, C6-7    HAND SURGERY Left 2001    trigger finger release; left middle finger    NECK SURGERY  1988, 2005    Cervical disk    PROSTATECTOMY  11/09/2009    RHINOPLASTY  1985    ROTATOR CUFF REPAIR      SHOULDER SURGERY Left 09/29/2009    Dr. KRISTEL Jimenez; rotator cuff repair; bone spurs     SPINE SURGERY  2005    TRIGGER POINT INJECTION  2008,2021,2022    URETERAL STENT INSERTION Left 12/04/2013    Dr. Martinez        Current Outpatient Medications on File Prior to Visit   Medication Sig Dispense Refill    aspirin 81 MG chewable tablet Chew 1 tablet Daily.      Diclofenac Sodium (VOLTAREN) 1 % gel gel Apply 4 g topically to the appropriate area as directed 3 (Three) Times a Day. 100 g 2    diphenhydrAMINE (BENADRYL) 50 MG tablet Take one 50 mg tablet 1 hour prior to scan. 1 tablet 0    gabapentin (NEURONTIN) 300 MG capsule Take 1 capsule by mouth Take As Directed. 1 capsule PO q evening meal, 1 capsule PO q hs 60 capsule 2    HYDROcodone-acetaminophen (NORCO) 5-325 MG per tablet Take 1 tablet by mouth Daily As Needed for Moderate Pain. 30 tablet 0    levothyroxine (SYNTHROID, LEVOTHROID) 75 MCG tablet       loratadine (CLARITIN) 10 MG tablet Take 1 tablet by mouth Daily.      Norvasc 10 MG tablet Take 1 tablet by mouth  Daily.      ondansetron (ZOFRAN) 8 MG tablet Take 1 tablet by mouth 3 (Three) Times a Day As Needed for Nausea or Vomiting. 30 tablet 5    Osimertinib Mesylate (Tagrisso) 80 MG tablet Take 1 tablet by mouth Daily. 30 tablet 5    phosphorus (K PHOS NEUTRAL) 155-852-130 MG tablet Take 1 tablet by mouth 2 (Two) Times a Day. 60 tablet 1     Current Facility-Administered Medications on File Prior to Visit   Medication Dose Route Frequency Provider Last Rate Last Admin    cyanocobalamin injection 1,000 mcg  1,000 mcg Intramuscular Q28 Days Sherry Mazariegos MD   1,000 mcg at 25 1108        ALLERGIES:    Allergies   Allergen Reactions    Iodinated Contrast Media Anaphylaxis     Patient had a severe reaction in the past / difficulty breathing    Iodine Unknown - High Severity     Difficulty breathing    Molds & Smuts Unknown - High Severity    Shellfish Allergy Unknown - High Severity     Burning inside    Amoxicillin Rash        Social History     Socioeconomic History    Marital status:      Spouse name: Faith   Tobacco Use    Smoking status: Former     Current packs/day: 0.00     Average packs/day: 1 pack/day for 22.0 years (22.0 ttl pk-yrs)     Types: Cigarettes, Pipe     Start date: 1967     Quit date: 1988     Years since quittin.2     Passive exposure: Past    Smokeless tobacco: Never    Tobacco comments:     Quit smoking pipe around    Vaping Use    Vaping status: Never Used   Substance and Sexual Activity    Alcohol use: Yes     Comment: Rarely will drink one glass of wine    Drug use: Never    Sexual activity: Not Currently     Partners: Female        Family History   Problem Relation Age of Onset    Diabetes Mother     Kidney disease Mother         calculus    Hearing loss Mother     Prostate cancer Father     Stomach cancer Father     Heart disease Sister     Cancer Sister         breast    Hypothyroidism Sister     Kidney disease Sister         calculus    Arthritis Sister      "Transient ischemic attack Brother     Arthritis Brother     Alzheimer's disease Other         uncle    Diabetes Other         uncle        Review of Systems   Constitutional: Negative.    HENT: Negative.     Respiratory: Negative.     Cardiovascular: Negative.    Gastrointestinal: Negative.    Genitourinary: Negative.    Musculoskeletal:  Negative for back pain.   Neurological: Negative.    Hematological: Negative.    Psychiatric/Behavioral:  Negative for dysphoric mood. The patient is nervous/anxious.          Objective     Vitals:    03/28/25 0950   BP: 138/78   Pulse: 76   Resp: 16   Temp: 97.1 °F (36.2 °C)   TempSrc: Oral   SpO2: 98%   Weight: 96 kg (211 lb 11.2 oz)   Height: 177.8 cm (70\")   PainSc: 0-No pain                     3/28/2025    10:06 AM   Current Status   ECOG score 0       Physical Exam    CONSTITUTIONAL: pleasant well-developed adult man  HEENT: no icterus, no thrush, moist membranes  LYMPH: no cervical or supraclavicular lad  CV: RRR, S1S2, no murmur  RESP: cta bilat, no wheezing, no rales  GI: soft, non-tender, no splenomegaly, +bs  MUSC: no edema, normal gait  NEURO: alert and oriented x3, normal strength  PSYCH: Normal mood and mild anxious affect      RECENT LABS:  Hematology WBC   Date Value Ref Range Status   03/28/2025 5.09 3.40 - 10.80 10*3/mm3 Final     RBC   Date Value Ref Range Status   03/28/2025 4.77 4.14 - 5.80 10*6/mm3 Final     Hemoglobin   Date Value Ref Range Status   03/28/2025 13.9 13.0 - 17.7 g/dL Final     Hematocrit   Date Value Ref Range Status   03/28/2025 42.0 37.5 - 51.0 % Final     Platelets   Date Value Ref Range Status   03/28/2025 91 (L) 140 - 450 10*3/mm3 Final        Lab Results   Component Value Date    GLUCOSE 156 (H) 03/28/2025    BUN 15 03/28/2025    CREATININE 1.06 03/28/2025    EGFR 72.3 03/28/2025    BCR 14.2 03/28/2025    K 3.9 03/28/2025    CO2 24.5 03/28/2025    CALCIUM 9.5 03/28/2025    ALBUMIN 4.4 03/28/2025    BILITOT 0.5 03/28/2025    AST 44 (H) " 03/28/2025    ALT 62 (H) 03/28/2025       CT Chest With Contrast Diagnostic (11/21/2024 9:48 AM)  CT Abdomen Pelvis With Contrast (11/21/2024 9:48 AM)  IMPRESSION:  1. Stable right lower lobe mass  2. Stable mixed solid and groundglass nodular density right lung apex  and small groundglass opacity posterior left upper lobe  3. Bony metastatic disease again noted. The lytic lesion in the T4  vertebral body has become more sclerotic likely treatment related  MRI brain: No evidence of metastatic disease presently    Assessment & Plan     *Stage IV adenocarcinoma, lung primary  Patient presented with back and chest wall pain, imaging showed and expansile mass T4 vertebral body, posterior right second rib and posterior right sixth rib  PET scan was performed on 5/3/2024 which showed a hypermetabolic mass in the superior segment of the right lower lobe 3.7 x 2.8 cm SUV 9.3, no hypermetabolic mediastinal or hilar lymph nodes but multiple hypermetabolic bone lesions largest being at T4 vertebral body SUV 9.4, posterior lateral right second rib, posterior right sixth rib, left aspect of the sacrum to the left of the S1 neural foramen.  Spleen was mildly enlarged 15 cm but less activity than liver.  CT-guided needle biopsy of a rib mass was performed on 5/10/2024 and showed metastatic adenocarcinoma immunohistochemistry supporting a pulmonary primary  The patient's next generation sequencing returned with a EGFR exon 19 and frame deletion.  Initiated Tagrisso 80 mg daily on 6/5/2024 7/12/2024-CT chest abdomen pelvis-decreased size of the right upper lobe mass, stable osseous metastatic disease, stable solid and mixed groundglass right lung apex  11/21/2024--stable mixed solid groundglass density in the right lung apex 12 mm, stable right lower lobe mass 2.7 x 2 cm, stable groundglass left upper lobe, calcified mediastinal and right hilar lymph nodes, stable sclerotic lesion right second rib, more sclerotic lesion T4  CT  chest abdomen pelvis 3/18/2025-stable disease  *Brain metastases  5/31/2024-MRI brain showed multifocal supratentorial and infratentorial enhancing lesions, largest 4-4.5 mm in size-plan to monitor response to Tagrisso  8/3/2024 MRI brain-minimal small vessel disease, resolution of previous enhancing lesions  MRI brain 3/18/2025-no evidence of metastatic disease  *Pain of malignancy  The patient has available [place] 5/325 1 p.o. 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  7/12/2024 CT angiogram head and neck-65% stenosis of the right internal carotid artery, right vertebral artery occluded proximal to the dura with retrograde flow through the PICA, moderate stenosis P1, lytic lesion T4  Vascular surgery recommended to begin aspirin 81 mg daily  *History of prostate cancer status post prostatectomy 2009  *Mild LFT elevation-likely side effect of Tagrisso, stable  Oncology plan/recommendations:  Continue Tagrisso 80 mg daily  Continue monthly Zometa for metastatic bone disease, reduction of skeletal related events  Continue aspirin 81 mg daily  Continue to monitor CBC and CMP monthly  Plan to repeat CT chest abdomen pelvis MRI brain in 4 months

## 2025-03-28 ENCOUNTER — INFUSION (OUTPATIENT)
Dept: ONCOLOGY | Facility: HOSPITAL | Age: 80
End: 2025-03-28
Payer: MEDICARE

## 2025-03-28 ENCOUNTER — OFFICE VISIT (OUTPATIENT)
Dept: ONCOLOGY | Facility: CLINIC | Age: 80
End: 2025-03-28
Payer: MEDICARE

## 2025-03-28 VITALS
HEART RATE: 76 BPM | HEIGHT: 70 IN | TEMPERATURE: 97.1 F | RESPIRATION RATE: 16 BRPM | BODY MASS INDEX: 30.31 KG/M2 | DIASTOLIC BLOOD PRESSURE: 78 MMHG | OXYGEN SATURATION: 98 % | SYSTOLIC BLOOD PRESSURE: 138 MMHG | WEIGHT: 211.7 LBS

## 2025-03-28 DIAGNOSIS — C34.90 PRIMARY NONSQUAMOUS NONSMALL CELL NEOPLASM OF LUNG: ICD-10-CM

## 2025-03-28 DIAGNOSIS — C61 MALIGNANT NEOPLASM PROSTATE: ICD-10-CM

## 2025-03-28 DIAGNOSIS — C79.51 CANCER, METASTATIC TO BONE: Primary | ICD-10-CM

## 2025-03-28 DIAGNOSIS — C79.51 CANCER, METASTATIC TO BONE: ICD-10-CM

## 2025-03-28 LAB
ALBUMIN SERPL-MCNC: 4.4 G/DL (ref 3.5–5.2)
ALBUMIN/GLOB SERPL: 1.6 G/DL
ALP SERPL-CCNC: 60 U/L (ref 39–117)
ALT SERPL W P-5'-P-CCNC: 62 U/L (ref 1–41)
ANION GAP SERPL CALCULATED.3IONS-SCNC: 14.5 MMOL/L (ref 5–15)
AST SERPL-CCNC: 44 U/L (ref 1–40)
BASOPHILS # BLD AUTO: 0.03 10*3/MM3 (ref 0–0.2)
BASOPHILS NFR BLD AUTO: 0.6 % (ref 0–1.5)
BILIRUB SERPL-MCNC: 0.5 MG/DL (ref 0–1.2)
BUN SERPL-MCNC: 15 MG/DL (ref 8–23)
BUN/CREAT SERPL: 14.2 (ref 7–25)
CALCIUM SPEC-SCNC: 9.5 MG/DL (ref 8.6–10.5)
CHLORIDE SERPL-SCNC: 103 MMOL/L (ref 98–107)
CO2 SERPL-SCNC: 24.5 MMOL/L (ref 22–29)
CREAT SERPL-MCNC: 1.06 MG/DL (ref 0.76–1.27)
DEPRECATED RDW RBC AUTO: 42.8 FL (ref 37–54)
EGFRCR SERPLBLD CKD-EPI 2021: 72.3 ML/MIN/1.73
EOSINOPHIL # BLD AUTO: 0.13 10*3/MM3 (ref 0–0.4)
EOSINOPHIL NFR BLD AUTO: 2.6 % (ref 0.3–6.2)
ERYTHROCYTE [DISTWIDTH] IN BLOOD BY AUTOMATED COUNT: 13.2 % (ref 12.3–15.4)
GLOBULIN UR ELPH-MCNC: 2.8 GM/DL
GLUCOSE SERPL-MCNC: 156 MG/DL (ref 65–99)
HCT VFR BLD AUTO: 42 % (ref 37.5–51)
HGB BLD-MCNC: 13.9 G/DL (ref 13–17.7)
IMM GRANULOCYTES # BLD AUTO: 0.03 10*3/MM3 (ref 0–0.05)
IMM GRANULOCYTES NFR BLD AUTO: 0.6 % (ref 0–0.5)
LYMPHOCYTES # BLD AUTO: 1.06 10*3/MM3 (ref 0.7–3.1)
LYMPHOCYTES NFR BLD AUTO: 20.8 % (ref 19.6–45.3)
MAGNESIUM SERPL-MCNC: 2 MG/DL (ref 1.6–2.4)
MCH RBC QN AUTO: 29.1 PG (ref 26.6–33)
MCHC RBC AUTO-ENTMCNC: 33.1 G/DL (ref 31.5–35.7)
MCV RBC AUTO: 88.1 FL (ref 79–97)
MONOCYTES # BLD AUTO: 0.45 10*3/MM3 (ref 0.1–0.9)
MONOCYTES NFR BLD AUTO: 8.8 % (ref 5–12)
NEUTROPHILS NFR BLD AUTO: 3.39 10*3/MM3 (ref 1.7–7)
NEUTROPHILS NFR BLD AUTO: 66.6 % (ref 42.7–76)
NRBC BLD AUTO-RTO: 0 /100 WBC (ref 0–0.2)
PHOSPHATE SERPL-MCNC: 2.6 MG/DL (ref 2.5–4.5)
PLATELET # BLD AUTO: 91 10*3/MM3 (ref 140–450)
PMV BLD AUTO: 11.1 FL (ref 6–12)
POTASSIUM SERPL-SCNC: 3.9 MMOL/L (ref 3.5–5.2)
PROT SERPL-MCNC: 7.2 G/DL (ref 6–8.5)
RBC # BLD AUTO: 4.77 10*6/MM3 (ref 4.14–5.8)
SODIUM SERPL-SCNC: 142 MMOL/L (ref 136–145)
WBC NRBC COR # BLD AUTO: 5.09 10*3/MM3 (ref 3.4–10.8)

## 2025-03-28 PROCEDURE — 96374 THER/PROPH/DIAG INJ IV PUSH: CPT

## 2025-03-28 PROCEDURE — 84100 ASSAY OF PHOSPHORUS: CPT

## 2025-03-28 PROCEDURE — 83735 ASSAY OF MAGNESIUM: CPT

## 2025-03-28 PROCEDURE — 80053 COMPREHEN METABOLIC PANEL: CPT

## 2025-03-28 PROCEDURE — 25010000002 ZOLEDRONIC ACID 4 MG/100ML SOLUTION: Performed by: INTERNAL MEDICINE

## 2025-03-28 PROCEDURE — 85025 COMPLETE CBC W/AUTO DIFF WBC: CPT

## 2025-03-28 RX ORDER — SODIUM CHLORIDE 9 MG/ML
20 INJECTION, SOLUTION INTRAVENOUS ONCE
OUTPATIENT
Start: 2025-04-25

## 2025-03-28 RX ORDER — ZOLEDRONIC ACID 0.04 MG/ML
4 INJECTION, SOLUTION INTRAVENOUS ONCE
OUTPATIENT
Start: 2025-04-25

## 2025-03-28 RX ORDER — ZOLEDRONIC ACID 0.04 MG/ML
4 INJECTION, SOLUTION INTRAVENOUS ONCE
Status: CANCELLED | OUTPATIENT
Start: 2025-03-28

## 2025-03-28 RX ORDER — ZOLEDRONIC ACID 0.04 MG/ML
4 INJECTION, SOLUTION INTRAVENOUS ONCE
Status: COMPLETED | OUTPATIENT
Start: 2025-03-28 | End: 2025-03-28

## 2025-03-28 RX ORDER — SODIUM CHLORIDE 9 MG/ML
20 INJECTION, SOLUTION INTRAVENOUS ONCE
Status: CANCELLED | OUTPATIENT
Start: 2025-03-28

## 2025-03-28 RX ADMIN — ZOLEDRONIC ACID 4 MG: 0.04 INJECTION, SOLUTION INTRAVENOUS at 10:44

## 2025-04-03 RX ORDER — DIBASIC SODIUM PHOSPHATE, MONOBASIC POTASSIUM PHOSPHATE AND MONOBASIC SODIUM PHOSPHATE 852; 155; 130 MG/1; MG/1; MG/1
1 TABLET ORAL 2 TIMES DAILY
Qty: 60 TABLET | Refills: 1 | Status: SHIPPED | OUTPATIENT
Start: 2025-04-03

## 2025-04-14 ENCOUNTER — SPECIALTY PHARMACY (OUTPATIENT)
Dept: PHARMACY | Facility: HOSPITAL | Age: 80
End: 2025-04-14
Payer: MEDICARE

## 2025-04-14 NOTE — PROGRESS NOTES
" Specialty Pharmacy Patient Management Program  Oncology / Hematology Refill Outreach      Nemesio was contacted today regarding refills of his medication(s).    Specialty medication(s) and dose(s) confirmed: Tagrisso    Refill Questions      Flowsheet Row Most Recent Value   Changes to allergies? No   Changes to medications? No   New conditions or infections since last clinic visit Yes   If yes, please describe  Patient is managing an \"unpredictable digestive system\" and \"very weak fingernails\".   Unplanned office visit, urgent care, ED, or hospital admission in the last 4 weeks  No   How does patient/caregiver feel medication is working? Good   Financial problems or insurance changes  No   Since the previous refill, were any specialty medication doses or scheduled injections missed or delayed?  No   Does this patient require a clinical escalation to a pharmacist? No          Delivery Questions      Flowsheet Row Most Recent Value   Delivery method UPS   Delivery address verified with patient/caregiver? Yes   Delivery address Home   Other address preferred N/A   Number of medications in delivery 1   Medication(s) being filled and delivered Osimertinib Mesylate (Tagrisso)   Doses left of specialty medications 14   Copay verified? Yes   Copay amount $0   Copay form of payment No copayment ($0)   Delivery Date Selection 04/22/25   Signature Required No   Do you consent to receive electronic handouts?  No            Follow-Up: 21d [USUALLY TIME FRAME UNTIL NEXT REFILL OUTREACH FOLLOW-UP (APPROX) Ex. 25d, 85d, etc. ]    Blanca Jacobson, Pharmacy Technician  4/14/2025  11:30 EDT    "

## 2025-04-15 ENCOUNTER — CLINICAL SUPPORT (OUTPATIENT)
Dept: FAMILY MEDICINE CLINIC | Facility: CLINIC | Age: 80
End: 2025-04-15
Payer: MEDICARE

## 2025-04-15 ENCOUNTER — SPECIALTY PHARMACY (OUTPATIENT)
Dept: PHARMACY | Facility: HOSPITAL | Age: 80
End: 2025-04-15
Payer: MEDICARE

## 2025-04-15 DIAGNOSIS — E53.8 B12 DEFICIENCY: Primary | ICD-10-CM

## 2025-04-15 PROCEDURE — 96372 THER/PROPH/DIAG INJ SC/IM: CPT | Performed by: FAMILY MEDICINE

## 2025-04-15 RX ADMIN — CYANOCOBALAMIN 1000 MCG: 1000 INJECTION, SOLUTION INTRAMUSCULAR; SUBCUTANEOUS at 13:16

## 2025-04-15 NOTE — PROGRESS NOTES
Specialty Pharmacy Patient Management Program  One-Time Clinical Outreach     Nemesio Fitzpatrick is seen by an their provider for Stage IV adenocarcinoma of the lung and enrolled in the Oncology Patient Management program offered by [facility].      Call placed in response to refill escalation questions on Tagrisso.  No answer. Left VM requesting return call to [phone].     Vesna Moreira Rph, JAN  Clinical Specialty Pharmacist, Oncology  4/15/2025  09:44 EDT        Specialty Pharmacy Patient Management Program  One-Time Clinical Outreach     Nemesio Fitzpatrick is seen by an their provider for stage IV adenocarcinoma of the lung  and enrolled in the Oncology Patient Management program offered by [facility].      Open Medication Therapy Problems  Cancer, metastatic to bone   1 Rationale: Medication requires monitoring - Needs additional monitoring - Safety   Identified Date: 4/15/2025   Note: 4/14/25 Mr Fitzpatrick reported issues with nail bed/brittle peeling nails during his refill coordination call.  He returned our call from this am later this afternoon.  We discussed the application of tea tree oil to his nailbeds.  He also reported that on some days, he experiences diarrhea where he can barely make it on time to the bathroom.  He stated this was sporadic with several days of no issues.  We discussed use of a probiotic to help regulate his GI tract.  He was agreeable to both approaches.              Vesna Moreira Rph, JAN  Clinical Specialty Pharmacist, Oncology  4/15/2025  15:55 EDT

## 2025-04-16 ENCOUNTER — OFFICE VISIT (OUTPATIENT)
Dept: PSYCHIATRY | Facility: HOSPITAL | Age: 80
End: 2025-04-16
Payer: MEDICARE

## 2025-04-16 DIAGNOSIS — C34.90 PRIMARY NONSQUAMOUS NONSMALL CELL NEOPLASM OF LUNG: ICD-10-CM

## 2025-04-16 DIAGNOSIS — F41.1 GENERALIZED ANXIETY DISORDER: Primary | ICD-10-CM

## 2025-04-16 RX ORDER — GABAPENTIN 300 MG/1
300 CAPSULE ORAL TAKE AS DIRECTED
Qty: 60 CAPSULE | Refills: 2 | Status: SHIPPED | OUTPATIENT
Start: 2025-04-16

## 2025-04-16 NOTE — PROGRESS NOTES
Subjective  Patient ID: Nemesio Fitzpatrick is a 79 y.o.. male seen in regularly scheduled group session.  Group participants have consented to group conducted in person    Total Group Time, Face to Face: 75 minutes  Total Group Participants: 5, plus facilitation per ASHLEIGH Moore    Group Therapy  Group topics explored including development of new normal, interpersonal sensitivity, short and long term effects of diagnosis and treatment, significant other or family conflict, physical deconditioning, social determinants of health (finances, living arrangements, etc), and lifestyle choices.  Members reviewed existential distress, specifically considering impact of recent recommended assignment, life review.  Explored feelings of satisfaction, allowance of prior mistakes, use of past as instruction for others moving forward, feelings of regret, grief, and sadness.  Members listened actively and encouraged others to share.  Shared ongoing symptom burden, difficulty moving past specific challenges.    Counseling provided regarding cognitive behavioral therapy (CBT) strategies, behavioral activation/ activity scheduling, reintroduction to activity through graded tasks, balancing avoidance with approach , sleep hygiene, recognition and allowance of need for rest, lifestyle counseling, benefits of exercise and strategies for managing barriers to engagement, allowance of self care, exploration of quality of life goals, survivorship issues, current programming available in community and clinic, anxiety/ mood management , medication education, and existential distress.  Counseling provided surrounding existential distress, acknowledgment of goals, progress, and disappointment.  Explored concept of dominic, self-care strategies, identified strengths, and quality of life goals.    Discussed current programming available in Cancer Center and community.    Benefits of group discussed while also acknowledging the need for 1:1  consultation at times. Members invited to discuss any concerns privately with me following group setting or to schedule a 1:1 visit if needs not being met in group.    Next shared medical visit: May 14 at 2:00 PM    Patient response to group: Pt interacts well with other group members, appreciating ongoing connection to others in similar situations.    Medications Management  Pt continues in survivorship of metastatic lung cancer.    CC: Anxiety, fatigue  HPI: Pt is seen in follow up regarding adjusted normal on continued treatment for metastatic lung cancer. Continues to appreciate regular connection with others, allowing space not to react to carlos stressors while allowing energy for the things that matter. Continues to appreciate care, balancing acceptance of disease with challenges looking well and feeling different than previously. Continues to appreciate gabapentin 300 mg q evening and 300 mg q hs. Typically sleeping adequately, although with disruption from time to time. Continues to mourn pain, appreciating benefit of medication strategy.  Exam: As Above  Current medication regimen: Gabapentin 300 mg q evening and 300 mg q hs  Lab Review:   Infusion on 03/28/2025   Component Date Value    Glucose 03/28/2025 156 (H)     BUN 03/28/2025 15     Creatinine 03/28/2025 1.06     Sodium 03/28/2025 142     Potassium 03/28/2025 3.9     Chloride 03/28/2025 103     CO2 03/28/2025 24.5     Calcium 03/28/2025 9.5     Total Protein 03/28/2025 7.2     Albumin 03/28/2025 4.4     ALT (SGPT) 03/28/2025 62 (H)     AST (SGOT) 03/28/2025 44 (H)     Alkaline Phosphatase 03/28/2025 60     Total Bilirubin 03/28/2025 0.5     Globulin 03/28/2025 2.8     A/G Ratio 03/28/2025 1.6     BUN/Creatinine Ratio 03/28/2025 14.2     Anion Gap 03/28/2025 14.5     eGFR 03/28/2025 72.3     Magnesium 03/28/2025 2.0     Phosphorus 03/28/2025 2.6     WBC 03/28/2025 5.09     RBC 03/28/2025 4.77     Hemoglobin 03/28/2025 13.9     Hematocrit 03/28/2025  42.0     MCV 03/28/2025 88.1     MCH 03/28/2025 29.1     MCHC 03/28/2025 33.1     RDW 03/28/2025 13.2     RDW-SD 03/28/2025 42.8     MPV 03/28/2025 11.1     Platelets 03/28/2025 91 (L)     Neutrophil % 03/28/2025 66.6     Lymphocyte % 03/28/2025 20.8     Monocyte % 03/28/2025 8.8     Eosinophil % 03/28/2025 2.6     Basophil % 03/28/2025 0.6     Immature Grans % 03/28/2025 0.6 (H)     Neutrophils, Absolute 03/28/2025 3.39     Lymphocytes, Absolute 03/28/2025 1.06     Monocytes, Absolute 03/28/2025 0.45     Eosinophils, Absolute 03/28/2025 0.13     Basophils, Absolute 03/28/2025 0.03     Immature Grans, Absolute 03/28/2025 0.03     nRBC 03/28/2025 0.0    Office Visit on 03/17/2025   Component Date Value    TSH 03/17/2025 3.820    Infusion on 02/28/2025   Component Date Value    Glucose 02/28/2025 109 (H)     BUN 02/28/2025 20     Creatinine 02/28/2025 1.06     Sodium 02/28/2025 141     Potassium 02/28/2025 4.1     Chloride 02/28/2025 105     CO2 02/28/2025 25.0     Calcium 02/28/2025 9.5     Total Protein 02/28/2025 6.8     Albumin 02/28/2025 4.3     ALT (SGPT) 02/28/2025 47 (H)     AST (SGOT) 02/28/2025 37     Alkaline Phosphatase 02/28/2025 53     Total Bilirubin 02/28/2025 0.4     Globulin 02/28/2025 2.5     A/G Ratio 02/28/2025 1.7     BUN/Creatinine Ratio 02/28/2025 18.9     Anion Gap 02/28/2025 11.0     eGFR 02/28/2025 72.3     Magnesium 02/28/2025 1.8     Phosphorus 02/28/2025 3.3     WBC 02/28/2025 4.79     RBC 02/28/2025 4.51     Hemoglobin 02/28/2025 13.2     Hematocrit 02/28/2025 39.7     MCV 02/28/2025 88.0     MCH 02/28/2025 29.3     MCHC 02/28/2025 33.2     RDW 02/28/2025 13.2     RDW-SD 02/28/2025 42.7     MPV 02/28/2025 10.7     Platelets 02/28/2025 88 (L)     Neutrophil % 02/28/2025 64.8     Lymphocyte % 02/28/2025 22.5     Monocyte % 02/28/2025 10.0     Eosinophil % 02/28/2025 1.9     Basophil % 02/28/2025 0.6     Immature Grans % 02/28/2025 0.2     Neutrophils, Absolute 02/28/2025 3.10      Lymphocytes, Absolute 02/28/2025 1.08     Monocytes, Absolute 02/28/2025 0.48     Eosinophils, Absolute 02/28/2025 0.09     Basophils, Absolute 02/28/2025 0.03     Immature Grans, Absolute 02/28/2025 0.01     nRBC 02/28/2025 0.0      MDM:  Meds reviewed and renewed as appropriate.  Continue medications as written.  FU scheduled in group setting.    Diagnoses and all orders for this visit:    1. Generalized anxiety disorder (Primary)  -     gabapentin (NEURONTIN) 300 MG capsule; Take 1 capsule by mouth Take As Directed. 1 capsule PO q evening meal, 1 capsule PO q hs  Dispense: 60 capsule; Refill: 2    2. Primary nonsquamous nonsmall cell neoplasm of lung

## 2025-04-25 ENCOUNTER — LAB (OUTPATIENT)
Dept: LAB | Facility: HOSPITAL | Age: 80
End: 2025-04-25
Payer: MEDICARE

## 2025-04-25 ENCOUNTER — INFUSION (OUTPATIENT)
Dept: ONCOLOGY | Facility: HOSPITAL | Age: 80
End: 2025-04-25
Payer: MEDICARE

## 2025-04-25 VITALS
BODY MASS INDEX: 30.71 KG/M2 | WEIGHT: 214 LBS | RESPIRATION RATE: 16 BRPM | OXYGEN SATURATION: 98 % | DIASTOLIC BLOOD PRESSURE: 78 MMHG | TEMPERATURE: 98 F | SYSTOLIC BLOOD PRESSURE: 134 MMHG | HEART RATE: 72 BPM

## 2025-04-25 DIAGNOSIS — C61 MALIGNANT NEOPLASM PROSTATE: ICD-10-CM

## 2025-04-25 DIAGNOSIS — C79.51 CANCER, METASTATIC TO BONE: ICD-10-CM

## 2025-04-25 DIAGNOSIS — C79.51 CANCER, METASTATIC TO BONE: Primary | ICD-10-CM

## 2025-04-25 LAB
ALBUMIN SERPL-MCNC: 4.5 G/DL (ref 3.5–5.2)
ALBUMIN/GLOB SERPL: 1.8 G/DL
ALP SERPL-CCNC: 57 U/L (ref 39–117)
ALT SERPL W P-5'-P-CCNC: 57 U/L (ref 1–41)
ANION GAP SERPL CALCULATED.3IONS-SCNC: 12.7 MMOL/L (ref 5–15)
AST SERPL-CCNC: 39 U/L (ref 1–40)
BASOPHILS # BLD AUTO: 0.03 10*3/MM3 (ref 0–0.2)
BASOPHILS NFR BLD AUTO: 0.8 % (ref 0–1.5)
BILIRUB SERPL-MCNC: 0.4 MG/DL (ref 0–1.2)
BUN SERPL-MCNC: 19 MG/DL (ref 8–23)
BUN/CREAT SERPL: 18.1 (ref 7–25)
CALCIUM SPEC-SCNC: 9.6 MG/DL (ref 8.6–10.5)
CHLORIDE SERPL-SCNC: 104 MMOL/L (ref 98–107)
CO2 SERPL-SCNC: 22.3 MMOL/L (ref 22–29)
CREAT SERPL-MCNC: 1.05 MG/DL (ref 0.76–1.27)
DEPRECATED RDW RBC AUTO: 42.5 FL (ref 37–54)
EGFRCR SERPLBLD CKD-EPI 2021: 73.1 ML/MIN/1.73
EOSINOPHIL # BLD AUTO: 0.1 10*3/MM3 (ref 0–0.4)
EOSINOPHIL NFR BLD AUTO: 2.5 % (ref 0.3–6.2)
ERYTHROCYTE [DISTWIDTH] IN BLOOD BY AUTOMATED COUNT: 13.2 % (ref 12.3–15.4)
GLOBULIN UR ELPH-MCNC: 2.5 GM/DL
GLUCOSE SERPL-MCNC: 168 MG/DL (ref 65–99)
HCT VFR BLD AUTO: 39.5 % (ref 37.5–51)
HGB BLD-MCNC: 13.2 G/DL (ref 13–17.7)
IMM GRANULOCYTES # BLD AUTO: 0.01 10*3/MM3 (ref 0–0.05)
IMM GRANULOCYTES NFR BLD AUTO: 0.3 % (ref 0–0.5)
LYMPHOCYTES # BLD AUTO: 0.92 10*3/MM3 (ref 0.7–3.1)
LYMPHOCYTES NFR BLD AUTO: 23 % (ref 19.6–45.3)
MAGNESIUM SERPL-MCNC: 1.9 MG/DL (ref 1.6–2.4)
MCH RBC QN AUTO: 29.1 PG (ref 26.6–33)
MCHC RBC AUTO-ENTMCNC: 33.4 G/DL (ref 31.5–35.7)
MCV RBC AUTO: 87.2 FL (ref 79–97)
MONOCYTES # BLD AUTO: 0.28 10*3/MM3 (ref 0.1–0.9)
MONOCYTES NFR BLD AUTO: 7 % (ref 5–12)
NEUTROPHILS NFR BLD AUTO: 2.66 10*3/MM3 (ref 1.7–7)
NEUTROPHILS NFR BLD AUTO: 66.4 % (ref 42.7–76)
NRBC BLD AUTO-RTO: 0 /100 WBC (ref 0–0.2)
PHOSPHATE SERPL-MCNC: 3 MG/DL (ref 2.5–4.5)
PLATELET # BLD AUTO: 82 10*3/MM3 (ref 140–450)
PMV BLD AUTO: 10.9 FL (ref 6–12)
POTASSIUM SERPL-SCNC: 4 MMOL/L (ref 3.5–5.2)
PROT SERPL-MCNC: 7 G/DL (ref 6–8.5)
RBC # BLD AUTO: 4.53 10*6/MM3 (ref 4.14–5.8)
SODIUM SERPL-SCNC: 139 MMOL/L (ref 136–145)
WBC NRBC COR # BLD AUTO: 4 10*3/MM3 (ref 3.4–10.8)

## 2025-04-25 PROCEDURE — 25010000002 ZOLEDRONIC ACID 4 MG/100ML SOLUTION: Performed by: INTERNAL MEDICINE

## 2025-04-25 PROCEDURE — 36415 COLL VENOUS BLD VENIPUNCTURE: CPT

## 2025-04-25 PROCEDURE — 96374 THER/PROPH/DIAG INJ IV PUSH: CPT

## 2025-04-25 PROCEDURE — 80053 COMPREHEN METABOLIC PANEL: CPT

## 2025-04-25 PROCEDURE — 84100 ASSAY OF PHOSPHORUS: CPT

## 2025-04-25 PROCEDURE — 85025 COMPLETE CBC W/AUTO DIFF WBC: CPT

## 2025-04-25 PROCEDURE — 83735 ASSAY OF MAGNESIUM: CPT

## 2025-04-25 RX ORDER — ZOLEDRONIC ACID 0.04 MG/ML
4 INJECTION, SOLUTION INTRAVENOUS ONCE
Status: COMPLETED | OUTPATIENT
Start: 2025-04-25 | End: 2025-04-25

## 2025-04-25 RX ADMIN — ZOLEDRONIC ACID 4 MG: 0.04 INJECTION, SOLUTION INTRAVENOUS at 10:50

## 2025-04-29 ENCOUNTER — SPECIALTY PHARMACY (OUTPATIENT)
Dept: PHARMACY | Facility: HOSPITAL | Age: 80
End: 2025-04-29
Payer: MEDICARE

## 2025-05-12 ENCOUNTER — SPECIALTY PHARMACY (OUTPATIENT)
Dept: PHARMACY | Facility: HOSPITAL | Age: 80
End: 2025-05-12
Payer: MEDICARE

## 2025-05-12 NOTE — PROGRESS NOTES
Specialty Note- Tagrisso    Call placed to assess condition of nailbeds and status of GI issues.  No answer.  Left  with request to call [facility] at [phone].

## 2025-05-14 ENCOUNTER — OFFICE VISIT (OUTPATIENT)
Dept: PSYCHIATRY | Facility: HOSPITAL | Age: 80
End: 2025-05-14
Payer: MEDICARE

## 2025-05-14 DIAGNOSIS — F41.1 GENERALIZED ANXIETY DISORDER: Primary | ICD-10-CM

## 2025-05-14 DIAGNOSIS — C34.90 PRIMARY NONSQUAMOUS NONSMALL CELL NEOPLASM OF LUNG: ICD-10-CM

## 2025-05-14 NOTE — PROGRESS NOTES
Subjective  Patient ID: Nemesio Fitzpatrick is a 79 y.o.. male seen in regularly scheduled group session.  Group participants have consented to group conducted in person    Total Group Time, Face to Face: 75 minutes  Total Group Participants: 3, plus facilitation per ASHLEIGH Moore    Group Therapy  Group topics explored including development of new normal, interpersonal sensitivity, short and long term effects of diagnosis and treatment, significant other or family conflict, anticipitory anxiety, physical deconditioning, weight management, and lifestyle choices. Members shared ongoing concerns regarding long term effects of treatment, existential distress, fears of recurrence, ideas of legacy, life review, and goals of full force living. Shares lingering sx of depression and anxiety, appreciating group engagement with allowance for honest engagement.     Counseling provided regarding cognitive behavioral therapy (CBT) strategies, behavioral activation/ activity scheduling, reintroduction to activity through graded tasks, balancing avoidance with approach , recognition and allowance of need for rest, lifestyle counseling, benefits of exercise and strategies for managing barriers to engagement, allowance of self care, exploration of quality of life goals, survivorship issues, anxiety/ mood management , medication education, and existential distress. Continued to foster sense of connection allowing primary facilitation by group members. Explored universality, sharing of information, effective communication, self advocacy, allowance of grief, gratitude, helpful strategies for managing intrusive symptoms.     Discussed current programming available in Cancer Center and community.    Benefits of group discussed while also acknowledging the need for 1:1 consultation at times. Members invited to discuss any concerns privately with me following group setting or to schedule a 1:1 visit if needs not being met in  group.    Next shared medical visit: June 25 at 2:00 PM    Patient response to group: Pt shares openly and eloquently in group setting.     Medications Management  Pt continues in survivorship of metastatic lung cancer.    CC: Anxiety, fatigue  HPI: Pt is seen in follow up regarding anxiety and uncertainty as well as fatigue in survivorship of metastatic lung cancer. Reports gratitude for ongoing treatment with stable disease while mourning eventual decline, uncertainty regarding current response. Continues to do well, while balancing cycles of survival and existential distress. Mourns reduced use of thumbs due to neuropathy. Reports radical acceptance of necessary symptoms and medications, while remaining grateful and able to look out wind shield vs rear view mirror. Continues to endorse complicated emotions,finding these persist for a few days at a time, but with continued perseverance toward goal. Continues to shares historical experiences as , appreciating connection as able.   Exam: As Above  Current medication regimen: gabapentin 300 mg q evening meal and 300 mg q hs   Lab Review:   Lab on 04/25/2025   Component Date Value    Glucose 04/25/2025 168 (H)     BUN 04/25/2025 19     Creatinine 04/25/2025 1.05     Sodium 04/25/2025 139     Potassium 04/25/2025 4.0     Chloride 04/25/2025 104     CO2 04/25/2025 22.3     Calcium 04/25/2025 9.6     Total Protein 04/25/2025 7.0     Albumin 04/25/2025 4.5     ALT (SGPT) 04/25/2025 57 (H)     AST (SGOT) 04/25/2025 39     Alkaline Phosphatase 04/25/2025 57     Total Bilirubin 04/25/2025 0.4     Globulin 04/25/2025 2.5     A/G Ratio 04/25/2025 1.8     BUN/Creatinine Ratio 04/25/2025 18.1     Anion Gap 04/25/2025 12.7     eGFR 04/25/2025 73.1     Magnesium 04/25/2025 1.9     Phosphorus 04/25/2025 3.0     WBC 04/25/2025 4.00     RBC 04/25/2025 4.53     Hemoglobin 04/25/2025 13.2     Hematocrit 04/25/2025 39.5     MCV 04/25/2025 87.2     MCH 04/25/2025 29.1     MCHC  04/25/2025 33.4     RDW 04/25/2025 13.2     RDW-SD 04/25/2025 42.5     MPV 04/25/2025 10.9     Platelets 04/25/2025 82 (L)     Neutrophil % 04/25/2025 66.4     Lymphocyte % 04/25/2025 23.0     Monocyte % 04/25/2025 7.0     Eosinophil % 04/25/2025 2.5     Basophil % 04/25/2025 0.8     Immature Grans % 04/25/2025 0.3     Neutrophils, Absolute 04/25/2025 2.66     Lymphocytes, Absolute 04/25/2025 0.92     Monocytes, Absolute 04/25/2025 0.28     Eosinophils, Absolute 04/25/2025 0.10     Basophils, Absolute 04/25/2025 0.03     Immature Grans, Absolute 04/25/2025 0.01     nRBC 04/25/2025 0.0    Infusion on 03/28/2025   Component Date Value    Glucose 03/28/2025 156 (H)     BUN 03/28/2025 15     Creatinine 03/28/2025 1.06     Sodium 03/28/2025 142     Potassium 03/28/2025 3.9     Chloride 03/28/2025 103     CO2 03/28/2025 24.5     Calcium 03/28/2025 9.5     Total Protein 03/28/2025 7.2     Albumin 03/28/2025 4.4     ALT (SGPT) 03/28/2025 62 (H)     AST (SGOT) 03/28/2025 44 (H)     Alkaline Phosphatase 03/28/2025 60     Total Bilirubin 03/28/2025 0.5     Globulin 03/28/2025 2.8     A/G Ratio 03/28/2025 1.6     BUN/Creatinine Ratio 03/28/2025 14.2     Anion Gap 03/28/2025 14.5     eGFR 03/28/2025 72.3     Magnesium 03/28/2025 2.0     Phosphorus 03/28/2025 2.6     WBC 03/28/2025 5.09     RBC 03/28/2025 4.77     Hemoglobin 03/28/2025 13.9     Hematocrit 03/28/2025 42.0     MCV 03/28/2025 88.1     MCH 03/28/2025 29.1     MCHC 03/28/2025 33.1     RDW 03/28/2025 13.2     RDW-SD 03/28/2025 42.8     MPV 03/28/2025 11.1     Platelets 03/28/2025 91 (L)     Neutrophil % 03/28/2025 66.6     Lymphocyte % 03/28/2025 20.8     Monocyte % 03/28/2025 8.8     Eosinophil % 03/28/2025 2.6     Basophil % 03/28/2025 0.6     Immature Grans % 03/28/2025 0.6 (H)     Neutrophils, Absolute 03/28/2025 3.39     Lymphocytes, Absolute 03/28/2025 1.06     Monocytes, Absolute 03/28/2025 0.45     Eosinophils, Absolute 03/28/2025 0.13     Basophils, Absolute  03/28/2025 0.03     Immature Grans, Absolute 03/28/2025 0.03     nRBC 03/28/2025 0.0    Office Visit on 03/17/2025   Component Date Value    TSH 03/17/2025 3.820      MDM:  Meds reviewed and renewed as appropriate.  Support continuation of medications as written; reviewed opportunity for further assist of pain with duloxetine. Pt feels sx are well managed at this time and is not interested in further increase or adjustment.   FU scheduled in group setting.    Diagnoses and all orders for this visit:    1. Generalized anxiety disorder (Primary)    2. Primary nonsquamous nonsmall cell neoplasm of lung

## 2025-05-15 ENCOUNTER — CLINICAL SUPPORT (OUTPATIENT)
Dept: FAMILY MEDICINE CLINIC | Facility: CLINIC | Age: 80
End: 2025-05-15
Payer: MEDICARE

## 2025-05-15 ENCOUNTER — SPECIALTY PHARMACY (OUTPATIENT)
Dept: PHARMACY | Facility: HOSPITAL | Age: 80
End: 2025-05-15
Payer: MEDICARE

## 2025-05-15 DIAGNOSIS — E53.8 B12 DEFICIENCY: Primary | ICD-10-CM

## 2025-05-15 PROCEDURE — 96372 THER/PROPH/DIAG INJ SC/IM: CPT | Performed by: FAMILY MEDICINE

## 2025-05-15 RX ADMIN — CYANOCOBALAMIN 1000 MCG: 1000 INJECTION, SOLUTION INTRAMUSCULAR; SUBCUTANEOUS at 10:53

## 2025-05-15 NOTE — PROGRESS NOTES
Specialty Pharmacy Patient Management Program  Oncology / Hematology Refill Outreach      Nemesio was contacted today regarding refills of his medication(s).    Specialty medication(s) and dose(s) confirmed: Tagrisso    Refill Questions      Flowsheet Row Most Recent Value   Changes to allergies? No   Changes to medications? No   New conditions or infections since last clinic visit No   If yes, please describe  N/A   Unplanned office visit, urgent care, ED, or hospital admission in the last 4 weeks  No   How does patient/caregiver feel medication is working? Good   Financial problems or insurance changes  No   Since the previous refill, were any specialty medication doses or scheduled injections missed or delayed?  No   Does this patient require a clinical escalation to a pharmacist? No          Delivery Questions      Flowsheet Row Most Recent Value   Delivery method [employer]   Delivery address verified with patient/caregiver? Yes   Delivery address Home   Other address preferred N/A   Number of medications in delivery 1   Medication(s) being filled and delivered Osimertinib Mesylate (Tagrisso)   Doses left of specialty medications Patient was unable to count   Copay verified? Yes   Copay amount $0   Copay form of payment No copayment ($0)   Delivery Date Selection 05/16/25   Signature Required No   Do you consent to receive electronic handouts?  No            Follow-Up: 21d  [USUALLY TIME FRAME UNTIL NEXT REFILL OUTREACH FOLLOW-UP (APPROX) Ex. 25d, 85d, etc. ]    Blanca Jacobson, Pharmacy Technician  5/15/2025  09:25 EDT

## 2025-05-15 NOTE — PROGRESS NOTES
"   Specialty Pharmacy Patient Management Program  One-Time Clinical Outreach     Nemesio Fitzpatrick is seen by an their provider for stage IV adenocarcinoma of th lung and enrolled in the Oncology Patient Management program offered by [facility].      Open Medication Therapy Problems  Cancer, metastatic to bone   1 Rationale: Medication requires monitoring - Needs additional monitoring - Safety   Identified Date: 4/15/2025   Note: 4/14/25 Mr Fitzpatrick reported issues with nail bed/brittle peeling nails during his refill coordination call.  He returned our call from this am later this afternoon.  We discussed the application of tea tree oil to his nailbeds.  He also reported that on some days, he experiences diarrhea where he can barely make it on time to the bathroom.  He stated this was sporadic with several days of no issues.  We discussed use of a probiotic to help regulate his GI tract.  He was agreeable to both approaches.    5/15/25 Mr Fitzpatrick reported improvement of the symptoms discussed above and stated his thumb nail bed is slowly healing ( and he thinks that one was the one that was \"the worst.\")  He reports living a good normal life, and had no further issues of concern.              Vesna Moreira RPH, BCOP  Clinical Specialty Pharmacist, Oncology  5/15/2025  09:54 EDT   "

## 2025-06-02 ENCOUNTER — LAB (OUTPATIENT)
Dept: LAB | Facility: HOSPITAL | Age: 80
End: 2025-06-02
Payer: MEDICARE

## 2025-06-02 ENCOUNTER — OFFICE VISIT (OUTPATIENT)
Dept: ONCOLOGY | Facility: CLINIC | Age: 80
End: 2025-06-02
Payer: MEDICARE

## 2025-06-02 ENCOUNTER — INFUSION (OUTPATIENT)
Dept: ONCOLOGY | Facility: HOSPITAL | Age: 80
End: 2025-06-02
Payer: MEDICARE

## 2025-06-02 VITALS
DIASTOLIC BLOOD PRESSURE: 68 MMHG | SYSTOLIC BLOOD PRESSURE: 153 MMHG | HEART RATE: 85 BPM | WEIGHT: 216.4 LBS | OXYGEN SATURATION: 97 % | BODY MASS INDEX: 30.98 KG/M2 | TEMPERATURE: 98.7 F | RESPIRATION RATE: 16 BRPM | HEIGHT: 70 IN

## 2025-06-02 DIAGNOSIS — C79.51 CANCER, METASTATIC TO BONE: Primary | ICD-10-CM

## 2025-06-02 DIAGNOSIS — C61 MALIGNANT NEOPLASM PROSTATE: ICD-10-CM

## 2025-06-02 DIAGNOSIS — C79.51 CANCER, METASTATIC TO BONE: ICD-10-CM

## 2025-06-02 DIAGNOSIS — C34.90 PRIMARY NONSQUAMOUS NONSMALL CELL NEOPLASM OF LUNG: ICD-10-CM

## 2025-06-02 LAB
ALBUMIN SERPL-MCNC: 4.5 G/DL (ref 3.5–5.2)
ALBUMIN/GLOB SERPL: 2 G/DL
ALP SERPL-CCNC: 55 U/L (ref 39–117)
ALT SERPL W P-5'-P-CCNC: 56 U/L (ref 1–41)
ANION GAP SERPL CALCULATED.3IONS-SCNC: 12.4 MMOL/L (ref 5–15)
AST SERPL-CCNC: 39 U/L (ref 1–40)
BASOPHILS # BLD AUTO: 0.04 10*3/MM3 (ref 0–0.2)
BASOPHILS NFR BLD AUTO: 0.8 % (ref 0–1.5)
BILIRUB SERPL-MCNC: 0.4 MG/DL (ref 0–1.2)
BUN SERPL-MCNC: 15.6 MG/DL (ref 8–23)
BUN/CREAT SERPL: 15.4 (ref 7–25)
CALCIUM SPEC-SCNC: 8.8 MG/DL (ref 8.6–10.5)
CHLORIDE SERPL-SCNC: 108 MMOL/L (ref 98–107)
CO2 SERPL-SCNC: 23.6 MMOL/L (ref 22–29)
CREAT SERPL-MCNC: 1.01 MG/DL (ref 0.76–1.27)
DEPRECATED RDW RBC AUTO: 42.5 FL (ref 37–54)
EGFRCR SERPLBLD CKD-EPI 2021: 76.6 ML/MIN/1.73
EOSINOPHIL # BLD AUTO: 0.12 10*3/MM3 (ref 0–0.4)
EOSINOPHIL NFR BLD AUTO: 2.4 % (ref 0.3–6.2)
ERYTHROCYTE [DISTWIDTH] IN BLOOD BY AUTOMATED COUNT: 13.4 % (ref 12.3–15.4)
GLOBULIN UR ELPH-MCNC: 2.3 GM/DL
GLUCOSE SERPL-MCNC: 105 MG/DL (ref 65–99)
HCT VFR BLD AUTO: 39.8 % (ref 37.5–51)
HGB BLD-MCNC: 13.5 G/DL (ref 13–17.7)
IMM GRANULOCYTES # BLD AUTO: 0.02 10*3/MM3 (ref 0–0.05)
IMM GRANULOCYTES NFR BLD AUTO: 0.4 % (ref 0–0.5)
LYMPHOCYTES # BLD AUTO: 1.17 10*3/MM3 (ref 0.7–3.1)
LYMPHOCYTES NFR BLD AUTO: 23 % (ref 19.6–45.3)
MAGNESIUM SERPL-MCNC: 1.9 MG/DL (ref 1.6–2.4)
MCH RBC QN AUTO: 29.5 PG (ref 26.6–33)
MCHC RBC AUTO-ENTMCNC: 33.9 G/DL (ref 31.5–35.7)
MCV RBC AUTO: 87.1 FL (ref 79–97)
MONOCYTES # BLD AUTO: 0.56 10*3/MM3 (ref 0.1–0.9)
MONOCYTES NFR BLD AUTO: 11 % (ref 5–12)
NEUTROPHILS NFR BLD AUTO: 3.18 10*3/MM3 (ref 1.7–7)
NEUTROPHILS NFR BLD AUTO: 62.4 % (ref 42.7–76)
NRBC BLD AUTO-RTO: 0 /100 WBC (ref 0–0.2)
PHOSPHATE SERPL-MCNC: 2.4 MG/DL (ref 2.5–4.5)
PLATELET # BLD AUTO: 92 10*3/MM3 (ref 140–450)
PMV BLD AUTO: 10.8 FL (ref 6–12)
POTASSIUM SERPL-SCNC: 3.9 MMOL/L (ref 3.5–5.2)
PROT SERPL-MCNC: 6.8 G/DL (ref 6–8.5)
RBC # BLD AUTO: 4.57 10*6/MM3 (ref 4.14–5.8)
SODIUM SERPL-SCNC: 144 MMOL/L (ref 136–145)
WBC NRBC COR # BLD AUTO: 5.09 10*3/MM3 (ref 3.4–10.8)

## 2025-06-02 PROCEDURE — 84100 ASSAY OF PHOSPHORUS: CPT

## 2025-06-02 PROCEDURE — 96374 THER/PROPH/DIAG INJ IV PUSH: CPT

## 2025-06-02 PROCEDURE — 83735 ASSAY OF MAGNESIUM: CPT

## 2025-06-02 PROCEDURE — 1126F AMNT PAIN NOTED NONE PRSNT: CPT | Performed by: INTERNAL MEDICINE

## 2025-06-02 PROCEDURE — 25010000002 ZOLEDRONIC ACID 4 MG/100ML SOLUTION: Performed by: INTERNAL MEDICINE

## 2025-06-02 PROCEDURE — G2211 COMPLEX E/M VISIT ADD ON: HCPCS | Performed by: INTERNAL MEDICINE

## 2025-06-02 PROCEDURE — 80053 COMPREHEN METABOLIC PANEL: CPT

## 2025-06-02 PROCEDURE — 99214 OFFICE O/P EST MOD 30 MIN: CPT | Performed by: INTERNAL MEDICINE

## 2025-06-02 PROCEDURE — 3077F SYST BP >= 140 MM HG: CPT | Performed by: INTERNAL MEDICINE

## 2025-06-02 PROCEDURE — 36415 COLL VENOUS BLD VENIPUNCTURE: CPT

## 2025-06-02 PROCEDURE — 3078F DIAST BP <80 MM HG: CPT | Performed by: INTERNAL MEDICINE

## 2025-06-02 PROCEDURE — 85025 COMPLETE CBC W/AUTO DIFF WBC: CPT

## 2025-06-02 RX ORDER — ZOLEDRONIC ACID 0.04 MG/ML
4 INJECTION, SOLUTION INTRAVENOUS ONCE
Status: CANCELLED | OUTPATIENT
Start: 2025-06-02

## 2025-06-02 RX ORDER — ZOLEDRONIC ACID 0.04 MG/ML
4 INJECTION, SOLUTION INTRAVENOUS ONCE
Status: COMPLETED | OUTPATIENT
Start: 2025-06-02 | End: 2025-06-02

## 2025-06-02 RX ORDER — SODIUM CHLORIDE 9 MG/ML
20 INJECTION, SOLUTION INTRAVENOUS ONCE
Status: CANCELLED | OUTPATIENT
Start: 2025-06-02

## 2025-06-02 RX ORDER — SODIUM CHLORIDE 9 MG/ML
20 INJECTION, SOLUTION INTRAVENOUS ONCE
Status: CANCELLED | OUTPATIENT
Start: 2025-06-20

## 2025-06-02 RX ORDER — ZOLEDRONIC ACID 0.04 MG/ML
4 INJECTION, SOLUTION INTRAVENOUS ONCE
Status: CANCELLED | OUTPATIENT
Start: 2025-06-20

## 2025-06-02 RX ADMIN — ZOLEDRONIC ACID 4 MG: 0.04 INJECTION, SOLUTION INTRAVENOUS at 15:44

## 2025-06-02 NOTE — PROGRESS NOTES
Subjective     Reason for follow-up: Non-small cell lung cancer                             REQUESTING PRACTITIONER:  Chandrakant    RECORDS OBTAINED:  Records of the patients history including those obtained from the referring provider were reviewed and summarized in detail.      History of Present Illness:  The patient returns today for follow-up.  He is doing well.  He reports no major side effects from Tagrisso.  He denies uncontrolled nausea vomiting diarrhea.  He is having no uncontrolled pain.  He denies headache or neurological changes.  He denies shortness of breath or cough.  He denies dental or jaw issues.  He has some inflammation around the cuticle areas.      ONCOLOGY HISTORY:  This is a pleasant 78-year-old man with impaired fasting glucose, hyperlipidemia, hypothyroidism and history of prostate cancer.  The patient has been experiencing about a 1 year history of progressive back pain in the upper mid thoracic spine stabbing in nature and radiating bilaterally anteriorly.  Symptoms are worse when the patient lays down to rest at night.  He has also had some pain around the right shoulder blade.  He was treated with PT with no improvement.  The patient has also been having left hip pain.  The patient was evaluated by orthopedic surgery and an MRI of the thoracic spine without contrast was performed on 4/25/2024 and showed a large expansile marrow replacing mass along the anterior T4 vertebral body 3.2 x 2.1 cm extending into the anterior paraspinal soft tissue along the T3-T4 space with reactive bone marrow edema.  Another partially visualized expansile mass was seen in the posterior right second rib 2.7 x 1.5 cm and another in the posterior sixth rib 1 cm with surrounding bone marrow edema.  At T7-T8 there is a posterior disc protrusion with mild to moderate canal stenosis.  In the posterior right lung a 3.5 cm mass was noted with additional small nodules also seen but poorly characterized.    The  patient denies weight loss, night sweats, shortness of breath, cough, hemoptysis, headaches, confusion, changes in swallowing bowel habits urinary habits.  He has history of prostate cancer treated with prostatectomy in 2009 and reports negligible PSA values.    The patient has history of light smoking during his 20s and 30s.  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    A full body PET scan was performed on 5/3/2024 which showed a hypermetabolic mass in the superior segment of the right lower lobe 3.7 x 2.8 cm SUV 9.3, no hypermetabolic mediastinal or hilar lymph nodes but multiple hypermetabolic bone lesions largest being at T4 vertebral body SUV 9.4, posterior lateral right second rib, posterior right sixth rib, left aspect of the sacrum to the left of the S1 neural foramen.  Spleen was mildly enlarged 15 cm but less activity than liver.    A CT-guided needle biopsy of a rib mass was performed on 5/10/2024 and showed metastatic adenocarcinoma immunohistochemistry supporting a pulmonary primary.    The patient was seen by radiation oncology with plans to treat at least palliatively to T4 and rib lesions possible additional sites pending peer review of case.    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    MRI brain 5/31/2024 showed multiple supratentorial and infratentorial lesions consistent with metastatic disease largest 4-4.5 mm in size.    The patient initiated Tagrisso 6/5/2024.  He was treated with radiation therapy to T4.          Past Medical History:   Diagnosis Date    Acute bronchitis     Allergic Iodine    Allergic rhinitis     Anxiety 2024    Due to sudden cancer diagnosis    Arthritis 2000    BPH (benign prostatic hypertrophy)     Cervical disc disorder 1987    Dislocation, shoulder 1973    Elevated prostate specific antigen (PSA)     Headache 1990    History of bone scan 10/21/2010    normal    History of colonoscopy     never    History  of EKG 03/12/2012    also 11-; T wave abnormality    History of medical problems 1978    HL (hearing loss) 2000    Hyperlipidemia     Hypertension 2024    Upon learning of Stage 4 cancer    Hypothyroidism     IFG (impaired fasting glucose)     Kidney calculus     left ureteral stone    Knee swelling 2015    Low back pain     Lung cancer 2024    Right    Malignant neoplasm prostate 11/09/2009    Dr. Mcclelland; lymph nodes negative margins clear    Prostate nodule     right lobe    Rotator cuff syndrome 2009    Scoliosis 2009    Screening for prostate cancer     prostatectomy    Sleep apnea     CPAP machine    URI (upper respiratory infection)         Past Surgical History:   Procedure Laterality Date    CERVICAL DISC SURGERY  1990    also 2006; C5-6, C6-7    HAND SURGERY Left 2001    trigger finger release; left middle finger    NECK SURGERY  1988, 2005    Cervical disk    PROSTATECTOMY  11/09/2009    RHINOPLASTY  1985    ROTATOR CUFF REPAIR      SHOULDER SURGERY Left 09/29/2009    Dr. KRISTEL Jimenez; rotator cuff repair; bone spurs     SPINE SURGERY  2005    TRIGGER POINT INJECTION  2008,2021,2022    URETERAL STENT INSERTION Left 12/04/2013    Dr. Martinez        Current Outpatient Medications on File Prior to Visit   Medication Sig Dispense Refill    aspirin 81 MG chewable tablet Chew 1 tablet Daily.      Diclofenac Sodium (VOLTAREN) 1 % gel gel Apply 4 g topically to the appropriate area as directed 3 (Three) Times a Day. 100 g 2    diphenhydrAMINE (BENADRYL) 50 MG tablet Take one 50 mg tablet 1 hour prior to scan. 1 tablet 0    gabapentin (NEURONTIN) 300 MG capsule Take 1 capsule by mouth Take As Directed. 1 capsule PO q evening meal, 1 capsule PO q hs 60 capsule 2    HYDROcodone-acetaminophen (NORCO) 5-325 MG per tablet Take 1 tablet by mouth Daily As Needed for Moderate Pain. 30 tablet 0    levothyroxine (SYNTHROID, LEVOTHROID) 75 MCG tablet       loratadine (CLARITIN) 10 MG tablet Take 1 tablet by mouth  Daily.      Norvasc 10 MG tablet Take 1 tablet by mouth Daily.      ondansetron (ZOFRAN) 8 MG tablet Take 1 tablet by mouth 3 (Three) Times a Day As Needed for Nausea or Vomiting. 30 tablet 5    Osimertinib Mesylate (Tagrisso) 80 MG tablet Take 1 tablet by mouth Daily. 30 tablet 5    Phospha 250 Neutral [id number] MG tablet TAKE ONE TABLET BY MOUTH TWICE DAILY 60 tablet 1     Current Facility-Administered Medications on File Prior to Visit   Medication Dose Route Frequency Provider Last Rate Last Admin    cyanocobalamin injection 1,000 mcg  1,000 mcg Intramuscular Q28 Days Sherry Mazariegos MD   1,000 mcg at 05/15/25 1053        ALLERGIES:    Allergies   Allergen Reactions    Iodinated Contrast Media Anaphylaxis     Patient had a severe reaction in the past / difficulty breathing    Iodine Unknown - High Severity     Difficulty breathing    Molds & Smuts Unknown - High Severity    Shellfish Allergy Unknown - High Severity     Burning inside    Amoxicillin Rash        Social History     Socioeconomic History    Marital status:      Spouse name: Faith   Tobacco Use    Smoking status: Former     Current packs/day: 0.00     Average packs/day: 1 pack/day for 22.0 years (22.0 ttl pk-yrs)     Types: Cigarettes, Pipe     Start date: 1967     Quit date: 1988     Years since quittin.4     Passive exposure: Past    Smokeless tobacco: Never    Tobacco comments:     Quit smoking pipe around    Vaping Use    Vaping status: Never Used   Substance and Sexual Activity    Alcohol use: Yes     Comment: Rarely will drink one glass of wine    Drug use: Never    Sexual activity: Not Currently     Partners: Female        Family History   Problem Relation Age of Onset    Diabetes Mother     Kidney disease Mother         calculus    Hearing loss Mother     Prostate cancer Father     Stomach cancer Father     Heart disease Sister     Cancer Sister         breast    Hypothyroidism Sister     Kidney disease Sister   "       calculus    Arthritis Sister     Transient ischemic attack Brother     Arthritis Brother     Alzheimer's disease Other         uncle    Diabetes Other         uncle        Review of Systems   Constitutional: Negative.    HENT: Negative.     Respiratory: Negative.     Cardiovascular: Negative.    Gastrointestinal: Negative.    Genitourinary: Negative.    Musculoskeletal:  Negative for back pain.   Neurological: Negative.    Hematological: Negative.    Psychiatric/Behavioral:  Negative for dysphoric mood. The patient is nervous/anxious.    Skin-see HPI      Objective     Vitals:    06/02/25 1441   BP: 153/68   Pulse: 85   Resp: 16   Temp: 98.7 °F (37.1 °C)   TempSrc: Oral   SpO2: 97%   Weight: 98.2 kg (216 lb 6.4 oz)   Height: 177.8 cm (70\")   PainSc: 0-No pain                       6/2/2025     2:46 PM   Current Status   ECOG score 0       Physical Exam    CONSTITUTIONAL: pleasant well-developed adult man  HEENT: no icterus, no thrush, moist membranes  LYMPH: no cervical or supraclavicular lad  CV: RRR, S1S2, no murmur  RESP: cta bilat, no wheezing, no rales  GI: soft, non-tender, no splenomegaly, +bs  MUSC: no edema, normal gait  NEURO: alert and oriented x3, normal strength  PSYCH: Normal mood and mild anxious affect  Skin-inflammation around the cuticles noted    RECENT LABS:  Hematology WBC   Date Value Ref Range Status   06/02/2025 5.09 3.40 - 10.80 10*3/mm3 Final     RBC   Date Value Ref Range Status   06/02/2025 4.57 4.14 - 5.80 10*6/mm3 Final     Hemoglobin   Date Value Ref Range Status   06/02/2025 13.5 13.0 - 17.7 g/dL Final     Hematocrit   Date Value Ref Range Status   06/02/2025 39.8 37.5 - 51.0 % Final     Platelets   Date Value Ref Range Status   06/02/2025 92 (L) 140 - 450 10*3/mm3 Final        Lab Results   Component Value Date    GLUCOSE 105 (H) 06/02/2025    BUN 15.6 06/02/2025    CREATININE 1.01 06/02/2025    EGFR 76.6 06/02/2025    BCR 15.4 06/02/2025    K 3.9 06/02/2025    CO2 23.6 " 06/02/2025    CALCIUM 8.8 06/02/2025    ALBUMIN 4.5 06/02/2025    BILITOT 0.4 06/02/2025    AST 39 06/02/2025    ALT 56 (H) 06/02/2025       CT Chest With Contrast Diagnostic (11/21/2024 9:48 AM)  CT Abdomen Pelvis With Contrast (11/21/2024 9:48 AM)  IMPRESSION:  1. Stable right lower lobe mass  2. Stable mixed solid and groundglass nodular density right lung apex  and small groundglass opacity posterior left upper lobe  3. Bony metastatic disease again noted. The lytic lesion in the T4  vertebral body has become more sclerotic likely treatment related  MRI brain: No evidence of metastatic disease presently    Assessment & Plan     *Stage IV adenocarcinoma, lung primary  Patient presented with back and chest wall pain, imaging showed and expansile mass T4 vertebral body, posterior right second rib and posterior right sixth rib  PET scan was performed on 5/3/2024 which showed a hypermetabolic mass in the superior segment of the right lower lobe 3.7 x 2.8 cm SUV 9.3, no hypermetabolic mediastinal or hilar lymph nodes but multiple hypermetabolic bone lesions largest being at T4 vertebral body SUV 9.4, posterior lateral right second rib, posterior right sixth rib, left aspect of the sacrum to the left of the S1 neural foramen.  Spleen was mildly enlarged 15 cm but less activity than liver.  CT-guided needle biopsy of a rib mass was performed on 5/10/2024 and showed metastatic adenocarcinoma immunohistochemistry supporting a pulmonary primary  The patient's next generation sequencing returned with a EGFR exon 19 and frame deletion.  Initiated Tagrisso 80 mg daily on 6/5/2024 7/12/2024-CT chest abdomen pelvis-decreased size of the right upper lobe mass, stable osseous metastatic disease, stable solid and mixed groundglass right lung apex  11/21/2024--stable mixed solid groundglass density in the right lung apex 12 mm, stable right lower lobe mass 2.7 x 2 cm, stable groundglass left upper lobe, calcified mediastinal and  right hilar lymph nodes, stable sclerotic lesion right second rib, more sclerotic lesion T4  CT chest abdomen pelvis 3/18/2025-stable disease  *Brain metastases  5/31/2024-MRI brain showed multifocal supratentorial and infratentorial enhancing lesions, largest 4-4.5 mm in size-plan to monitor response to Tagrisso  8/3/2024 MRI brain-minimal small vessel disease, resolution of previous enhancing lesions  MRI brain 3/18/2025-no evidence of metastatic disease  *Pain of malignancy  The patient has available [place] 5/325 1 p.o. every 6 hours as needed pain (rarely taking at this point)  Status post radiation to T4 with improved pain  *Thrombocytopenia secondary to Tagrisso-platelet count stable 92  *Significant anxiety related to malignancy  The patient is being followed by the psychosocial clinic which he enjoys and benefits from  *Decreased flow right vertebral artery by MRI  7/12/2024 CT angiogram head and neck-65% stenosis of the right internal carotid artery, right vertebral artery occluded proximal to the dura with retrograde flow through the PICA, moderate stenosis P1, lytic lesion T4  Vascular surgery recommended to begin aspirin 81 mg daily  *History of prostate cancer status post prostatectomy 2009  *Mild LFT elevation-likely side effect of Tagrisso, stable  *Cuticle inflammation  Oncology plan/recommendations:  Continue Tagrisso 80 mg daily  Continue monthly Zometa for metastatic bone disease, reduction of skeletal related events  Continue aspirin 81 mg daily  Continue to monitor CBC and CMP monthly  Plan to repeat CT chest abdomen pelvis MRI brain in 2 months-ordered today prior to MD follow-up in 2 months  Epsom salt soaks for cuticle inflammation

## 2025-06-03 ENCOUNTER — SPECIALTY PHARMACY (OUTPATIENT)
Dept: PHARMACY | Facility: HOSPITAL | Age: 80
End: 2025-06-03
Payer: MEDICARE

## 2025-06-03 NOTE — PROGRESS NOTES
Specialty Pharmacy Note: Tagrisso (osimertinib)    Nemesio Fitzpatrick is a 79 y.o. male with stage IV adenocarcinoma of the lung was seen 6/2/25 by Dr. Austin. Per provider dictation, no changes to oral oncology regimen Tagrisso (osimertinib) 80 mg po daily.  Labs Review: The CMP and CBC from 6/2/25 have been reviewed. No dose adjustments are needed for the oral specialty medication(s) based on the labs.    Specialty pharmacy will continue to follow patient.    Vesna Moreira Rph, BCOP  6/3/2025  09:31 EDT

## 2025-06-12 RX ORDER — DIBASIC SODIUM PHOSPHATE, MONOBASIC POTASSIUM PHOSPHATE AND MONOBASIC SODIUM PHOSPHATE 852; 155; 130 MG/1; MG/1; MG/1
1 TABLET ORAL 2 TIMES DAILY
Qty: 60 TABLET | Refills: 1 | Status: SHIPPED | OUTPATIENT
Start: 2025-06-12

## 2025-06-13 DIAGNOSIS — C34.90 PRIMARY NONSQUAMOUS NONSMALL CELL NEOPLASM OF LUNG: ICD-10-CM

## 2025-06-13 RX ORDER — OSIMERTINIB 80 MG/1
80 TABLET, FILM COATED ORAL DAILY
Qty: 30 TABLET | Refills: 5 | Status: SHIPPED | OUTPATIENT
Start: 2025-06-13

## 2025-06-13 NOTE — TELEPHONE ENCOUNTER
Specialty Pharmacy Patient Management Program  Per Protocol Prescription Order or Refill     Patient will be filling or currently fills medications at [facility] and is enrolled in the Patient Management Program.    Requested Prescriptions     Signed Prescriptions Disp Refills    Osimertinib Mesylate (Tagrisso) 80 MG tablet 30 tablet 5     Sig: Take 1 tablet by mouth Daily. Indications: Non-Small-Cell Lung Cancer     Authorizing Provider: SUSU NEWTON     Ordering User: JANINA SCALES     Prescription orders above were sent to the pharmacy per Collaborative Care Agreement Protocol.     Last Office Visit: 6/2/25  Next Office Visit: 7/28/25    Vesna Scales Rph, BCOP  Clinical Specialty Pharmacist, Oncology  6/13/2025  14:41 EDT        Yes

## 2025-06-13 NOTE — TELEPHONE ENCOUNTER
Specialty Pharmacy Patient Management Program  Specialty Pharmacy Note                       Authorizing Provider: SUSU NEWTON   Drug: Osimertinib Mesylate (Tagrisso)  Strength: 80 mg  Directions: Take 1 tablet by mouth daily  QTY: 30  RF:5    Released to pharmacy: [facility]    Completed independent double check on medication order/RX.        Thanks,    Mariel CHOWDHURY, PharmD

## 2025-06-18 ENCOUNTER — SPECIALTY PHARMACY (OUTPATIENT)
Dept: PHARMACY | Facility: HOSPITAL | Age: 80
End: 2025-06-18
Payer: MEDICARE

## 2025-06-18 ENCOUNTER — OFFICE VISIT (OUTPATIENT)
Dept: FAMILY MEDICINE CLINIC | Facility: CLINIC | Age: 80
End: 2025-06-18
Payer: MEDICARE

## 2025-06-18 VITALS
HEART RATE: 74 BPM | DIASTOLIC BLOOD PRESSURE: 62 MMHG | WEIGHT: 216 LBS | OXYGEN SATURATION: 98 % | SYSTOLIC BLOOD PRESSURE: 130 MMHG | BODY MASS INDEX: 30.92 KG/M2 | HEIGHT: 70 IN | TEMPERATURE: 98 F

## 2025-06-18 DIAGNOSIS — R73.01 IFG (IMPAIRED FASTING GLUCOSE): ICD-10-CM

## 2025-06-18 DIAGNOSIS — C34.90 PRIMARY NONSQUAMOUS NONSMALL CELL NEOPLASM OF LUNG: ICD-10-CM

## 2025-06-18 DIAGNOSIS — E03.9 ACQUIRED HYPOTHYROIDISM: ICD-10-CM

## 2025-06-18 DIAGNOSIS — C61 MALIGNANT NEOPLASM PROSTATE: ICD-10-CM

## 2025-06-18 DIAGNOSIS — G47.30 SLEEP APNEA, UNSPECIFIED TYPE: ICD-10-CM

## 2025-06-18 DIAGNOSIS — I10 PRIMARY HYPERTENSION: Primary | ICD-10-CM

## 2025-06-18 DIAGNOSIS — E78.2 MIXED HYPERLIPIDEMIA: ICD-10-CM

## 2025-06-18 DIAGNOSIS — C79.51 CANCER, METASTATIC TO BONE: ICD-10-CM

## 2025-06-18 DIAGNOSIS — M54.32 SCIATICA OF LEFT SIDE: ICD-10-CM

## 2025-06-18 RX ORDER — HYDROCODONE BITARTRATE AND ACETAMINOPHEN 5; 325 MG/1; MG/1
1 TABLET ORAL DAILY PRN
Qty: 30 TABLET | Refills: 0 | Status: SHIPPED | OUTPATIENT
Start: 2025-06-18

## 2025-06-18 RX ORDER — LEVOTHYROXINE SODIUM 75 UG/1
75 TABLET ORAL
Qty: 90 TABLET | Refills: 1 | Status: SHIPPED | OUTPATIENT
Start: 2025-06-18

## 2025-06-18 RX ADMIN — CYANOCOBALAMIN 1000 MCG: 1000 INJECTION, SOLUTION INTRAMUSCULAR; SUBCUTANEOUS at 12:49

## 2025-06-18 NOTE — PROGRESS NOTES
Subjective   Nemesio Fitzpatrick is a 79 y.o. male.     Chief Complaint   Patient presents with    3 month office visit       History of Present Illness          Htn- no issues on meds     Pt continues to slowly loose weight but has been stable for 3 months. Has stage 4 cancer with bony mets and palliative chemo. Pt has redone his will and is getting his affairs in order.  He reports tumors in his head are gone. His lung tumor has shrunk. He is in a cancer support group. He has been told his prognosis is 1-15 years. He mostly feels ok as long as his bone pain is controlled.      Having low back pain and using hydrocodone sparingly. Also has bony mets. Wanting to get meds here. He tries to use sparingly.      H/o prostate cancer- had prostate removed, follows with urology and is resolved. They follow psa. He reports he has not seen urology in some time. PSA 6 months ago was ok     hld- has never been on meds, diet controlled. [facility] and [facility] reports this is good.      Hypothyroidism- no cold intolerance., doing well on meds, no fatigue, [facility] with [facility]. Recent labs ok.      High bs in the past, follows with the [facility], recent A1C ok.      Insomnia/te- No longer taking ambien, trouble falling asleep. Does great with gabapentin now.         The following portions of the patient's history were reviewed and updated as appropriate: allergies, current medications, past family history, past medical history, past social history, past surgical history and problem list.    Past Medical History:   Diagnosis Date    Acute bronchitis     Allergic Iodine    Allergic rhinitis     Anxiety 2024    Due to sudden cancer diagnosis    Arthritis 2000    BPH (benign prostatic hypertrophy)     Cervical disc disorder 1987    Dislocation, shoulder 1973    Elevated prostate specific antigen (PSA)     Headache 1990    History of bone scan 10/21/2010    normal    History of colonoscopy     never    History of EKG 03/12/2012    also  11-; T wave abnormality    History of medical problems 1978    HL (hearing loss) 2000    Hyperlipidemia     Hypertension 2024    Upon learning of Stage 4 cancer    Hypothyroidism     IFG (impaired fasting glucose)     Kidney calculus     left ureteral stone    Knee swelling 2015    Low back pain     Lung cancer 2024    Right    Malignant neoplasm prostate 11/09/2009    Dr. Mcclelland; lymph nodes negative margins clear    Prostate nodule     right lobe    Rotator cuff syndrome 2009    Scoliosis 2009    Screening for prostate cancer     prostatectomy    Sleep apnea     CPAP machine    URI (upper respiratory infection)        Past Surgical History:   Procedure Laterality Date    CERVICAL DISC SURGERY  1990    also 2006; C5-6, C6-7    HAND SURGERY Left 2001    trigger finger release; left middle finger    NECK SURGERY  1988, 2005    Cervical disk    PROSTATECTOMY  11/09/2009    RHINOPLASTY  1985    ROTATOR CUFF REPAIR      SHOULDER SURGERY Left 09/29/2009    Dr. KRISTEL Jimenez; rotator cuff repair; bone spurs     SPINE SURGERY  2005    TRIGGER POINT INJECTION  2008,2021,2022    URETERAL STENT INSERTION Left 12/04/2013    Dr. Martinez       Family History   Problem Relation Age of Onset    Diabetes Mother     Kidney disease Mother         calculus    Hearing loss Mother     Prostate cancer Father     Stomach cancer Father     Heart disease Sister     Cancer Sister         breast    Hypothyroidism Sister     Kidney disease Sister         calculus    Arthritis Sister     Transient ischemic attack Brother     Arthritis Brother     Alzheimer's disease Other         uncle    Diabetes Other         uncle       Social History     Socioeconomic History    Marital status:      Spouse name: Faith   Tobacco Use    Smoking status: Former     Current packs/day: 0.00     Average packs/day: 1 pack/day for 22.0 years (22.0 ttl pk-yrs)     Types: Cigarettes, Pipe     Start date: 1/9/1967     Quit date: 12/30/1988     Years  "since quittin.4     Passive exposure: Past    Smokeless tobacco: Never    Tobacco comments:     Quit smoking pipe around    Vaping Use    Vaping status: Never Used   Substance and Sexual Activity    Alcohol use: Yes     Comment: Rarely will drink one glass of wine    Drug use: Never    Sexual activity: Not Currently     Partners: Female       Review of Systems   Constitutional:  Positive for fatigue.   Respiratory:  Negative for shortness of breath.    Cardiovascular:  Negative for chest pain.   Gastrointestinal:  Negative for abdominal pain.   Musculoskeletal:  Positive for neck pain.       Objective   Visit Vitals  /62 (BP Location: Left arm, Patient Position: Sitting, Cuff Size: Adult)   Pulse 74   Temp 98 °F (36.7 °C)   Ht 177.8 cm (70\")   Wt 98 kg (216 lb)   SpO2 98%   BMI 30.99 kg/m²     Body mass index is 30.99 kg/m².  Physical Exam  Constitutional:       Appearance: Normal appearance. He is well-developed.   Cardiovascular:      Rate and Rhythm: Normal rate and regular rhythm.      Heart sounds: Normal heart sounds.   Pulmonary:      Effort: Pulmonary effort is normal.      Breath sounds: Normal breath sounds.   Musculoskeletal:         General: No swelling. Normal range of motion.   Skin:     General: Skin is warm and dry.      Findings: No rash.   Neurological:      General: No focal deficit present.      Mental Status: He is alert and oriented to person, place, and time.   Psychiatric:         Mood and Affect: Mood normal.         Behavior: Behavior normal.           Assessment & Plan   Diagnoses and all orders for this visit:    1. Primary hypertension (Primary)    2. Mixed hyperlipidemia    3. Cancer, metastatic to bone  -     HYDROcodone-acetaminophen (NORCO) 5-325 MG per tablet; Take 1 tablet by mouth Daily As Needed for Moderate Pain.  Dispense: 30 tablet; Refill: 0    4. Sciatica of left side  -     HYDROcodone-acetaminophen (NORCO) 5-325 MG per tablet; Take 1 tablet by mouth Daily As " Needed for Moderate Pain.  Dispense: 30 tablet; Refill: 0    5. Malignant neoplasm prostate    6. Acquired hypothyroidism  -     levothyroxine (SYNTHROID, LEVOTHROID) 75 MCG tablet; Take 1 tablet by mouth Every Morning.  Dispense: 90 tablet; Refill: 1    7. IFG (impaired fasting glucose)    8. Primary nonsquamous nonsmall cell neoplasm of lung    9. Sleep apnea, unspecified type          erwin Chaudhari, f/u in 3 months. Will need labs.

## 2025-06-18 NOTE — PROGRESS NOTES
Specialty Pharmacy Patient Management Program  Oncology / Hematology Refill Outreach      Nemesio was contacted today regarding refills of his medication(s).    Specialty medication(s) and dose(s) confirmed: Tagrisso    Refill Questions      Flowsheet Row Most Recent Value   Changes to allergies? No   Changes to medications? No   New conditions or infections since last clinic visit No   If yes, please describe  N/A   Unplanned office visit, urgent care, ED, or hospital admission in the last 4 weeks  No   How does patient/caregiver feel medication is working? Good   Financial problems or insurance changes  No   Since the previous refill, were any specialty medication doses or scheduled injections missed or delayed?  No   Does this patient require a clinical escalation to a pharmacist? No          Delivery Questions      Flowsheet Row Most Recent Value   Delivery method [employer]   Delivery address verified with patient/caregiver? Yes   Delivery address Home   Other address preferred N/A   Number of medications in delivery 1   Medication(s) being filled and delivered Osimertinib Mesylate (Tagrisso)   Doses left of specialty medications 10   Copay verified? Yes   Copay amount $0   Copay form of payment No copayment ($0)   Delivery Date Selection 06/19/25   Signature Required No   Do you consent to receive electronic handouts?  No            Follow-Up: 21d [USUALLY TIME FRAME UNTIL NEXT REFILL OUTREACH FOLLOW-UP (APPROX) Ex. 25d, 85d, etc. ]    Blanca Jacobson, Pharmacy Technician  6/18/2025  10:13 EDT

## 2025-06-25 ENCOUNTER — OFFICE VISIT (OUTPATIENT)
Dept: PSYCHIATRY | Facility: HOSPITAL | Age: 80
End: 2025-06-25
Payer: MEDICARE

## 2025-06-25 DIAGNOSIS — F41.1 GENERALIZED ANXIETY DISORDER: ICD-10-CM

## 2025-06-25 RX ORDER — GABAPENTIN 300 MG/1
300 CAPSULE ORAL TAKE AS DIRECTED
Qty: 60 CAPSULE | Refills: 2 | Status: SHIPPED | OUTPATIENT
Start: 2025-06-25

## 2025-06-25 NOTE — PROGRESS NOTES
Subjective  Patient ID: Nemesio Fitzpatrick is a 79 y.o.. male seen in regularly scheduled group session.  Group participants have consented to group conducted in person    Total Group Time, Face to Face: 75 minutes  Total Group Participants: 7, plus facilitation per ASHLEIGH Moore     Group Therapy  Group topics explored including development of new normal, short and long term effects of diagnosis and treatment, anticipitory anxiety, anxiety surrounding adjusted treatment plan or adjusted follow up, physical deconditioning, social determinants of health (finances, living arrangements, etc), and lifestyle choices.  Members welcome new member, supporting others in current challenges, both medical and social.  Members shared continued grief surrounding adjusted physicality, medical complexity, intrusive mood experience and symptoms of isolation.  Personal experiences regarding infection, altered mental status, radical acceptance, and desire to maximize quality of life and independence discussed at length.  Members shared appreciation of recent travel, balance between desires and abilities, and permission to adjust plans as necessary.     Counseling provided regarding behavioral activation/ activity scheduling, reintroduction to activity through graded tasks, balancing avoidance with approach , sleep hygiene, recognition and allowance of need for rest, pharmacological and non pharmacological management of sleep disturbance, lifestyle counseling, benefits of exercise and strategies for managing barriers to engagement, allowance of self care, exploration of quality of life goals, survivorship issues, current programming available in community and clinic, anxiety/ mood management , medication education, and existential distress.  Explored cognitive distortions and therapeutic factors of group.  Supported developing group dynamics and support of members.  Identified importance of future oriented thinking and having things  to look forward to.  Reviewed somatic symptoms of depression versus side effects of disease and treatment.     Discussed current programming available in [facility] and community.     Benefits of group discussed while also acknowledging the need for 1:1 consultation at times. Members invited to discuss any concerns privately with me following group setting or to schedule a 1:1 visit if needs not being met in group.     Next shared medical visit: July 30 at 2:00 PM in person    Patient response to group: Pt contributes openly to group conversation, listening and offering insight appropriately.     Medications Management  Pt continues in survivorship of lung cancer.    CC: Anxiety  HPI: Pt is seen in follow up regarding ongoing demoralization in survivorship of metastatic lung cancer. Continues to endorse burden of sx, also processing incurable nature of disease. Appreciates feeling happier now than prior to diagnosis, able to manage current sx acceptably. Continues to report good days and more challenging days if he overdoes it. Generally reports radical acceptance of current situation, and denies unmet needs at this time. Continues to allow permission to live full force, embracing current uncertainty amidst ongoing treatment. Continuse to endorse impactful neuopathy, managing this well with use of gabapentin. Has attempted to hold this at times, although is sleeping better when adherent to 600 mg daily.   Exam: As Above  Current medication regimen: gabapentin 300 mg q evening and q hs  Lab Review:   Lab on 06/02/2025   Component Date Value    Glucose 06/02/2025 105 (H)     BUN 06/02/2025 15.6     Creatinine 06/02/2025 1.01     Sodium 06/02/2025 144     Potassium 06/02/2025 3.9     Chloride 06/02/2025 108 (H)     CO2 06/02/2025 23.6     Calcium 06/02/2025 8.8     Total Protein 06/02/2025 6.8     Albumin 06/02/2025 4.5     ALT (SGPT) 06/02/2025 56 (H)     AST (SGOT) 06/02/2025 39     Alkaline Phosphatase 06/02/2025  55     Total Bilirubin 06/02/2025 0.4     Globulin 06/02/2025 2.3     A/G Ratio 06/02/2025 2.0     BUN/Creatinine Ratio 06/02/2025 15.4     Anion Gap 06/02/2025 12.4     eGFR 06/02/2025 76.6     WBC 06/02/2025 5.09     RBC 06/02/2025 4.57     Hemoglobin 06/02/2025 13.5     Hematocrit 06/02/2025 39.8     MCV 06/02/2025 87.1     MCH 06/02/2025 29.5     MCHC 06/02/2025 33.9     RDW 06/02/2025 13.4     RDW-SD 06/02/2025 42.5     MPV 06/02/2025 10.8     Platelets 06/02/2025 92 (L)     Neutrophil % 06/02/2025 62.4     Lymphocyte % 06/02/2025 23.0     Monocyte % 06/02/2025 11.0     Eosinophil % 06/02/2025 2.4     Basophil % 06/02/2025 0.8     Immature Grans % 06/02/2025 0.4     Neutrophils, Absolute 06/02/2025 3.18     Lymphocytes, Absolute 06/02/2025 1.17     Monocytes, Absolute 06/02/2025 0.56     Eosinophils, Absolute 06/02/2025 0.12     Basophils, Absolute 06/02/2025 0.04     Immature Grans, Absolute 06/02/2025 0.02     nRBC 06/02/2025 0.0     Phosphorus 06/02/2025 2.4 (L)     Magnesium 06/02/2025 1.9      MDM:  Meds reviewed and renewed as appropriate.  Continue medication as as written.  FU scheduled in group setting.    Diagnoses and all orders for this visit:    1. Generalized anxiety disorder  -     gabapentin (NEURONTIN) 300 MG capsule; Take 1 capsule by mouth Take As Directed. 1 capsule PO q evening meal, 1 capsule PO q hs  Dispense: 60 capsule; Refill: 2

## 2025-06-30 ENCOUNTER — INFUSION (OUTPATIENT)
Dept: ONCOLOGY | Facility: HOSPITAL | Age: 80
End: 2025-06-30
Payer: MEDICARE

## 2025-06-30 VITALS
HEART RATE: 87 BPM | OXYGEN SATURATION: 97 % | WEIGHT: 215.4 LBS | SYSTOLIC BLOOD PRESSURE: 148 MMHG | RESPIRATION RATE: 16 BRPM | DIASTOLIC BLOOD PRESSURE: 70 MMHG | BODY MASS INDEX: 30.91 KG/M2 | TEMPERATURE: 97.7 F

## 2025-06-30 DIAGNOSIS — C79.51 CANCER, METASTATIC TO BONE: Primary | ICD-10-CM

## 2025-06-30 DIAGNOSIS — C61 MALIGNANT NEOPLASM PROSTATE: ICD-10-CM

## 2025-06-30 LAB
ALBUMIN SERPL-MCNC: 4.8 G/DL (ref 3.5–5.2)
ALBUMIN/GLOB SERPL: 2.2 G/DL
ALP SERPL-CCNC: 63 U/L (ref 39–117)
ALT SERPL W P-5'-P-CCNC: 76 U/L (ref 1–41)
ANION GAP SERPL CALCULATED.3IONS-SCNC: 9.5 MMOL/L (ref 5–15)
AST SERPL-CCNC: 47 U/L (ref 1–40)
BASOPHILS # BLD AUTO: 0.03 10*3/MM3 (ref 0–0.2)
BASOPHILS NFR BLD AUTO: 0.6 % (ref 0–1.5)
BILIRUB SERPL-MCNC: 0.4 MG/DL (ref 0–1.2)
BUN SERPL-MCNC: 15.8 MG/DL (ref 8–23)
BUN/CREAT SERPL: 15.8 (ref 7–25)
CALCIUM SPEC-SCNC: 9.5 MG/DL (ref 8.6–10.5)
CHLORIDE SERPL-SCNC: 104 MMOL/L (ref 98–107)
CO2 SERPL-SCNC: 25.5 MMOL/L (ref 22–29)
CREAT SERPL-MCNC: 1 MG/DL (ref 0.76–1.27)
DEPRECATED RDW RBC AUTO: 42.1 FL (ref 37–54)
EGFRCR SERPLBLD CKD-EPI 2021: 77.5 ML/MIN/1.73
EOSINOPHIL # BLD AUTO: 0.11 10*3/MM3 (ref 0–0.4)
EOSINOPHIL NFR BLD AUTO: 2.3 % (ref 0.3–6.2)
ERYTHROCYTE [DISTWIDTH] IN BLOOD BY AUTOMATED COUNT: 13.2 % (ref 12.3–15.4)
GLOBULIN UR ELPH-MCNC: 2.2 GM/DL
GLUCOSE SERPL-MCNC: 128 MG/DL (ref 65–99)
HCT VFR BLD AUTO: 41.7 % (ref 37.5–51)
HGB BLD-MCNC: 13.9 G/DL (ref 13–17.7)
IMM GRANULOCYTES # BLD AUTO: 0.01 10*3/MM3 (ref 0–0.05)
IMM GRANULOCYTES NFR BLD AUTO: 0.2 % (ref 0–0.5)
LYMPHOCYTES # BLD AUTO: 0.98 10*3/MM3 (ref 0.7–3.1)
LYMPHOCYTES NFR BLD AUTO: 20.8 % (ref 19.6–45.3)
MAGNESIUM SERPL-MCNC: 1.9 MG/DL (ref 1.6–2.4)
MCH RBC QN AUTO: 29.1 PG (ref 26.6–33)
MCHC RBC AUTO-ENTMCNC: 33.3 G/DL (ref 31.5–35.7)
MCV RBC AUTO: 87.4 FL (ref 79–97)
MONOCYTES # BLD AUTO: 0.42 10*3/MM3 (ref 0.1–0.9)
MONOCYTES NFR BLD AUTO: 8.9 % (ref 5–12)
NEUTROPHILS NFR BLD AUTO: 3.17 10*3/MM3 (ref 1.7–7)
NEUTROPHILS NFR BLD AUTO: 67.2 % (ref 42.7–76)
NRBC BLD AUTO-RTO: 0 /100 WBC (ref 0–0.2)
PHOSPHATE SERPL-MCNC: 2.2 MG/DL (ref 2.5–4.5)
PLATELET # BLD AUTO: 86 10*3/MM3 (ref 140–450)
PMV BLD AUTO: 11.3 FL (ref 6–12)
POTASSIUM SERPL-SCNC: 3.9 MMOL/L (ref 3.5–5.2)
PROT SERPL-MCNC: 7 G/DL (ref 6–8.5)
RBC # BLD AUTO: 4.77 10*6/MM3 (ref 4.14–5.8)
SODIUM SERPL-SCNC: 139 MMOL/L (ref 136–145)
WBC NRBC COR # BLD AUTO: 4.72 10*3/MM3 (ref 3.4–10.8)

## 2025-06-30 PROCEDURE — 80053 COMPREHEN METABOLIC PANEL: CPT

## 2025-06-30 PROCEDURE — 83735 ASSAY OF MAGNESIUM: CPT

## 2025-06-30 PROCEDURE — 85025 COMPLETE CBC W/AUTO DIFF WBC: CPT

## 2025-06-30 PROCEDURE — 96374 THER/PROPH/DIAG INJ IV PUSH: CPT

## 2025-06-30 PROCEDURE — 25010000002 ZOLEDRONIC ACID 4 MG/100ML SOLUTION: Performed by: INTERNAL MEDICINE

## 2025-06-30 PROCEDURE — 84100 ASSAY OF PHOSPHORUS: CPT

## 2025-06-30 RX ORDER — PREDNISONE 50 MG/1
TABLET ORAL
Qty: 3 TABLET | Refills: 0 | Status: SHIPPED | OUTPATIENT
Start: 2025-06-30

## 2025-06-30 RX ORDER — ZOLEDRONIC ACID 0.04 MG/ML
4 INJECTION, SOLUTION INTRAVENOUS ONCE
Status: COMPLETED | OUTPATIENT
Start: 2025-06-30 | End: 2025-06-30

## 2025-06-30 RX ADMIN — ZOLEDRONIC ACID 4 MG: 0.04 INJECTION, SOLUTION INTRAVENOUS at 14:25

## 2025-07-17 ENCOUNTER — CLINICAL SUPPORT (OUTPATIENT)
Dept: FAMILY MEDICINE CLINIC | Facility: CLINIC | Age: 80
End: 2025-07-17
Payer: MEDICARE

## 2025-07-17 PROCEDURE — 96372 THER/PROPH/DIAG INJ SC/IM: CPT | Performed by: FAMILY MEDICINE

## 2025-07-17 RX ADMIN — CYANOCOBALAMIN 1000 MCG: 1000 INJECTION, SOLUTION INTRAMUSCULAR; SUBCUTANEOUS at 10:59

## 2025-07-21 ENCOUNTER — HOSPITAL ENCOUNTER (OUTPATIENT)
Dept: CT IMAGING | Facility: HOSPITAL | Age: 80
Discharge: HOME | End: 2025-07-21
Payer: MEDICARE

## 2025-07-21 ENCOUNTER — HOSPITAL ENCOUNTER (OUTPATIENT)
Dept: MRI IMAGING | Facility: HOSPITAL | Age: 80
Discharge: HOME | End: 2025-07-21
Payer: MEDICARE

## 2025-07-21 ENCOUNTER — SPECIALTY PHARMACY (OUTPATIENT)
Dept: PHARMACY | Facility: HOSPITAL | Age: 80
End: 2025-07-21
Payer: MEDICARE

## 2025-07-21 DIAGNOSIS — C79.51 CANCER, METASTATIC TO BONE: ICD-10-CM

## 2025-07-21 PROCEDURE — 70553 MRI BRAIN STEM W/O & W/DYE: CPT

## 2025-07-21 PROCEDURE — 25510000002 GADOBENATE DIMEGLUMINE 529 MG/ML SOLUTION: Performed by: INTERNAL MEDICINE

## 2025-07-21 PROCEDURE — 74177 CT ABD & PELVIS W/CONTRAST: CPT

## 2025-07-21 PROCEDURE — 71260 CT THORAX DX C+: CPT

## 2025-07-21 PROCEDURE — A9577 INJ MULTIHANCE: HCPCS | Performed by: INTERNAL MEDICINE

## 2025-07-21 PROCEDURE — 76014 MR SFTY IMPLT&/FB ASMT STF 1: CPT

## 2025-07-21 PROCEDURE — 25510000001 IOPAMIDOL 61 % SOLUTION: Performed by: INTERNAL MEDICINE

## 2025-07-21 RX ORDER — IOPAMIDOL 612 MG/ML
85 INJECTION, SOLUTION INTRAVASCULAR
Status: COMPLETED | OUTPATIENT
Start: 2025-07-21 | End: 2025-07-21

## 2025-07-21 RX ADMIN — IOPAMIDOL 85 ML: 612 INJECTION, SOLUTION INTRAVENOUS at 10:48

## 2025-07-21 RX ADMIN — GADOBENATE DIMEGLUMINE 20 ML: 529 INJECTION, SOLUTION INTRAVENOUS at 11:36

## 2025-07-21 NOTE — PROGRESS NOTES
Specialty Pharmacy Patient Management Program  Oncology / Hematology Refill Outreach      Nemesio was contacted today regarding refills of his medication(s).    Specialty medication(s) and dose(s) confirmed: Tagrisso    Refill Questions      Flowsheet Row Most Recent Value   Changes to allergies? No   Changes to medications? No   New conditions or infections since last clinic visit No   If yes, please describe  N/A   Unplanned office visit, urgent care, ED, or hospital admission in the last 4 weeks  No   How does patient/caregiver feel medication is working? Good   Financial problems or insurance changes  No   Since the previous refill, were any specialty medication doses or scheduled injections missed or delayed?  No   Does this patient require a clinical escalation to a pharmacist? No          Delivery Questions      Flowsheet Row Most Recent Value   Delivery method UPS   Delivery address verified with patient/caregiver? Yes   Delivery address Home   Other address preferred N/A   Number of medications in delivery 1   Medication(s) being filled and delivered Osimertinib Mesylate (Tagrisso)   Doses left of specialty medications 9   Copay verified? Yes   Copay amount $0.00   Copay form of payment No copayment ($0)   Delivery Date Selection 07/23/25   Signature Required No   Do you consent to receive electronic handouts?  No            Follow-Up: 23d [USUALLY TIME FRAME UNTIL NEXT REFILL OUTREACH FOLLOW-UP (APPROX) Ex. 25d, 85d, etc. ]    Blanca Jacobson, Pharmacy Technician  7/21/2025  13:38 EDT

## 2025-07-24 ENCOUNTER — SPECIALTY PHARMACY (OUTPATIENT)
Dept: PHARMACY | Facility: HOSPITAL | Age: 80
End: 2025-07-24
Payer: MEDICARE

## 2025-07-24 NOTE — PROGRESS NOTES
Specialty Pharmacy Patient Management Program  Oncology Reassessment     Nemesio Fitzpatrick was referred by an their provider to the Oncology Patient Management program offered by [facility] for stage IV adenocarcinoma of lung. A follow-up outreach was conducted, including assessment of continued therapy appropriateness, medication adherence, and side effect incidence and management for Osimertinib ( Tagrisso) 80 mg po daily.    Changes to Insurance Coverage or Financial Support  None at this time    Relevant Past Medical History and Comorbidities  Relevant medical history and concomitant health conditions were discussed with the patient. The patient's chart has been reviewed for relevant past medical history and comorbid health conditions and updated as necessary.   Past Medical History:   Diagnosis Date    Acute bronchitis     Allergic Iodine    Allergic rhinitis     Anxiety 2024    Due to sudden cancer diagnosis    Arthritis 2000    BPH (benign prostatic hypertrophy)     Cervical disc disorder 1987    Dislocation, shoulder 1973    Elevated prostate specific antigen (PSA)     Headache 1990    History of bone scan 10/21/2010    normal    History of colonoscopy     never    History of EKG 03/12/2012    also 11-; T wave abnormality    History of medical problems 1978    HL (hearing loss) 2000    Hyperlipidemia     Hypertension 2024    Upon learning of Stage 4 cancer    Hypothyroidism     IFG (impaired fasting glucose)     Kidney calculus     left ureteral stone    Knee swelling 2015    Low back pain     Lung cancer 2024    Right    Malignant neoplasm prostate 11/09/2009    Dr. Mcclelland; lymph nodes negative margins clear    Prostate nodule     right lobe    Rotator cuff syndrome 2009    Scoliosis 2009    Screening for prostate cancer     prostatectomy    Sleep apnea     CPAP machine    URI (upper respiratory infection)      Social History     Socioeconomic History    Marital status:       Spouse name: Faith   Tobacco Use    Smoking status: Former     Current packs/day: 0.00     Average packs/day: 1 pack/day for 22.0 years (22.0 ttl pk-yrs)     Types: Cigarettes, Pipe     Start date: 1967     Quit date: 1988     Years since quittin.5     Passive exposure: Past    Smokeless tobacco: Never    Tobacco comments:     Quit smoking pipe around    Vaping Use    Vaping status: Never Used   Substance and Sexual Activity    Alcohol use: Yes     Comment: Rarely will drink one glass of wine    Drug use: Never    Sexual activity: Not Currently     Partners: Female     Problem list reviewed by Kath Moreira RPH on 2025 at 12:18 PM    Hospitalizations and Urgent Care Since Last Assessment  ED Visits, Admissions, or Hospitalizations: none  Urgent Office Visits: none    Allergies  Known allergies and reactions were discussed with the patient. The patient's chart has been reviewed for allergy information and updated as necessary.   Allergies   Allergen Reactions    Iodinated Contrast Media Anaphylaxis     Patient had a severe reaction in the past / difficulty breathing    Iodine Unknown - High Severity     Difficulty breathing    Molds & Smuts Unknown - High Severity    Shellfish Allergy Unknown - High Severity     Burning inside    Amoxicillin Rash     Allergies reviewed by Kath Moreira RPH on 2025 at 12:17 PM    Relevant Laboratory Values  Relevant laboratory values were discussed with the patient. The following specialty medication dose adjustment(s) are recommended: No dose adjustments are needed for the oral specialty medication(s) based on the labs.    Lab Results   Component Value Date    GLUCOSE 128 (H) 2025    CALCIUM 9.5 2025     2025    K 3.9 2025    CO2 25.5 2025     2025    BUN 15.8 2025    CREATININE 1.00 2025    EGFRIFAFRI 86 2022    EGFRIFNONA 75 2022    BCR 15.8 2025    ANIONGAP 9.5  06/30/2025     Lab Results   Component Value Date    WBC 4.72 06/30/2025    RBC 4.77 06/30/2025    HGB 13.9 06/30/2025    HCT 41.7 06/30/2025    MCV 87.4 06/30/2025    MCH 29.1 06/30/2025    MCHC 33.3 06/30/2025    RDW 13.2 06/30/2025    RDWSD 42.1 06/30/2025    MPV 11.3 06/30/2025    PLT 86 (L) 06/30/2025    NEUTRORELPCT 67.2 06/30/2025    LYMPHORELPCT 20.8 06/30/2025    MONORELPCT 8.9 06/30/2025    EOSRELPCT 2.3 06/30/2025    BASORELPCT 0.6 06/30/2025    AUTOIGPER 0.2 06/30/2025    NEUTROABS 3.17 06/30/2025    LYMPHSABS 0.98 06/30/2025    MONOSABS 0.42 06/30/2025    EOSABS 0.11 06/30/2025    BASOSABS 0.03 06/30/2025    AUTOIGNUM 0.01 06/30/2025    NRBC 0.0 06/30/2025       Current Medication List  This medication list has been reviewed with the patient and evaluated for any interactions or necessary modifications/recommendations, and updated to include all prescription medications, OTC medications, and supplements the patient is currently taking.  This list reflects what is contained in the patient's profile, which has also been marked as reviewed to communicate to other providers it is the most up to date version of the patient's current medication therapy.     Current Outpatient Medications:     aspirin 81 MG chewable tablet, Chew 1 tablet Daily., Disp: , Rfl:     Diclofenac Sodium (VOLTAREN) 1 % gel gel, Apply 4 g topically to the appropriate area as directed 3 (Three) Times a Day., Disp: 100 g, Rfl: 2    diphenhydrAMINE (BENADRYL) 50 MG tablet, Take one 50 mg tablet 1 hour prior to scan., Disp: 1 tablet, Rfl: 0    gabapentin (NEURONTIN) 300 MG capsule, Take 1 capsule by mouth Take As Directed. 1 capsule PO q evening meal, 1 capsule PO q hs, Disp: 60 capsule, Rfl: 2    HYDROcodone-acetaminophen (NORCO) 5-325 MG per tablet, Take 1 tablet by mouth Daily As Needed for Moderate Pain., Disp: 30 tablet, Rfl: 0    levothyroxine (SYNTHROID, LEVOTHROID) 75 MCG tablet, Take 1 tablet by mouth Every Morning., Disp: 90  tablet, Rfl: 1    loratadine (CLARITIN) 10 MG tablet, Take 1 tablet by mouth Daily., Disp: , Rfl:     Norvasc 10 MG tablet, Take 1 tablet by mouth Daily., Disp: , Rfl:     ondansetron (ZOFRAN) 8 MG tablet, Take 1 tablet by mouth 3 (Three) Times a Day As Needed for Nausea or Vomiting., Disp: 30 tablet, Rfl: 5    Osimertinib Mesylate (Tagrisso) 80 MG tablet, Take 1 tablet by mouth Daily. Indications: Non-Small-Cell Lung Cancer, Disp: 30 tablet, Rfl: 5    phosphorus (Phospha 250 Neutral) 155-852-130 MG tablet, Take 1 tablet by mouth 2 (Two) Times a Day., Disp: 60 tablet, Rfl: 1    predniSONE (DELTASONE) 50 MG tablet, Take 1 tablet by mouth 13 hours prior to scan, 7 hours prior to scan, and 1 hour prior to scan. Take Benadryl (or store brand equivalent) 50mg 1 hour prior to scan., Disp: 3 tablet, Rfl: 0    Current Facility-Administered Medications:     cyanocobalamin injection 1,000 mcg, 1,000 mcg, Intramuscular, Q28 Days, Sherry Mazariegos MD, 1,000 mcg at 07/17/25 1059    Medicines reviewed by Kath Moreira [facility] on 7/24/2025 at 12:18 PM    Drug Interactions  Assessed medication list for interactions, no significant drug interactions noted.   Advised patient to call the clinic if any new medications are started so we can assess for drug-drug interactions.  Drug-food interactions discussed: none of concern    Adverse Drug Reactions  Medication tolerability: Patient reported common adverse drug reaction  Medication plan: Continue therapy with normal follow-up  Plan for ADR Management: uses Imodium for diarrhea and tea tree oil for nail thinning and cuticle inflammation    Adherence, Self-Administration, and Current Therapy Problems  Adherence related to the patient's specialty therapy was discussed with the patient. The Adherence segment of this outreach has been reviewed and updated.     Adherence Questions  Linked Medication(s) Assessed: Osimertinib Mesylate (Tagrisso)  On average, how many doses/injections does the  patient miss per month?: 0  What are the identified reasons for non-adherence or missed doses? : no problems identified  What is the estimated medication adherence level?: %  Based on the patient/caregiver response and refill history, does this patient require an MTP to track adherence improvements?: no    Additional Barriers to Patient Self-Administration: none  Methods for Supporting Patient Self-Administration: pharmacy calls  Patient has had no issues obtaining medication from pharmacy.    Open Medication Therapy Problems  No medication therapy recommendations to display    Goals of Therapy  Goals related to the patient's specialty therapy were discussed with the patient. The Patient Goals segment of this outreach has been reviewed and updated.   Goals Addressed Today        Specialty Pharmacy General Goal      Progression free survival-following CT results  5/31/2024-MRI brain showed multifocal supratentorial and infratentorial enhancing lesions, largest 4-4.5 mm in size-plan to monitor response to Tagrisso  8/3/2024 MRI brain-minimal small vessel disease, resolution of previous enhancing lesions- continue Tagrisso 80 mg po daily  11/21/2024--stable mixed solid groundglass density in the right lung apex 12 mm, stable right lower lobe mass 2.7 x 2 cm, stable groundglass left upper lobe, calcified mediastinal and right hilar lymph nodes, stable sclerotic lesion right second rib, more sclerotic lesion T4               Quality of Life Assessment   Quality of Life related to the patient's enrollment in the patient management program and services provided was discussed with the patient. The QOL segment of this outreach has been reviewed and updated.  Quality of Life Improvement Scale: 7-Somewhat better    Discussed aforementioned material with patient over the phone.     Reassessment Plan & Follow-Up  1. Medication Therapy Changes: none  2. Related Plans, Therapy Recommendations, or Issues to Be Addressed:  none  3. Pharmacist to perform regular assessments no more than (6) months from the previous assessment.   4. Care Coordinator to set up future refill outreaches, coordinate prescription delivery, and escalate clinical questions to pharmacist.    Attestation  Therapeutic appropriateness: Appropriate   I attest the patient was actively involved in and has agreed to the above plan of care.  If the prescribed therapy is at any point deemed not appropriate based on the current or future assessments, a consultation will be initiated with the patient's specialty care provider to determine the best course of action. The revised plan of therapy will be documented along with any required assessments and/or additional patient education provided.     Vesna Moreira Rph, BCOP  Clinical Specialty Pharmacist, Oncology  7/24/2025  12:19 EDT

## 2025-07-28 ENCOUNTER — OFFICE VISIT (OUTPATIENT)
Dept: ONCOLOGY | Facility: CLINIC | Age: 80
End: 2025-07-28
Payer: MEDICARE

## 2025-07-28 ENCOUNTER — INFUSION (OUTPATIENT)
Dept: ONCOLOGY | Facility: HOSPITAL | Age: 80
End: 2025-07-28
Payer: MEDICARE

## 2025-07-28 ENCOUNTER — APPOINTMENT (OUTPATIENT)
Dept: LAB | Facility: HOSPITAL | Age: 80
End: 2025-07-28
Payer: MEDICARE

## 2025-07-28 VITALS
HEIGHT: 70 IN | WEIGHT: 214.2 LBS | TEMPERATURE: 98.3 F | BODY MASS INDEX: 30.67 KG/M2 | HEART RATE: 69 BPM | SYSTOLIC BLOOD PRESSURE: 137 MMHG | OXYGEN SATURATION: 96 % | DIASTOLIC BLOOD PRESSURE: 77 MMHG | RESPIRATION RATE: 16 BRPM

## 2025-07-28 DIAGNOSIS — C61 MALIGNANT NEOPLASM PROSTATE: ICD-10-CM

## 2025-07-28 DIAGNOSIS — C79.51 CANCER, METASTATIC TO BONE: ICD-10-CM

## 2025-07-28 DIAGNOSIS — C34.90 PRIMARY NONSQUAMOUS NONSMALL CELL NEOPLASM OF LUNG: ICD-10-CM

## 2025-07-28 DIAGNOSIS — C79.51 CANCER, METASTATIC TO BONE: Primary | ICD-10-CM

## 2025-07-28 LAB
ALBUMIN SERPL-MCNC: 4.8 G/DL (ref 3.5–5.2)
ALBUMIN/GLOB SERPL: 2.1 G/DL
ALP SERPL-CCNC: 61 U/L (ref 39–117)
ALT SERPL W P-5'-P-CCNC: 84 U/L (ref 1–41)
ANION GAP SERPL CALCULATED.3IONS-SCNC: 12.4 MMOL/L (ref 5–15)
AST SERPL-CCNC: 47 U/L (ref 1–40)
BASOPHILS # BLD AUTO: 0.03 10*3/MM3 (ref 0–0.2)
BASOPHILS NFR BLD AUTO: 0.5 % (ref 0–1.5)
BILIRUB SERPL-MCNC: 0.4 MG/DL (ref 0–1.2)
BUN SERPL-MCNC: 15.1 MG/DL (ref 8–23)
BUN/CREAT SERPL: 13.6 (ref 7–25)
CALCIUM SPEC-SCNC: 9.6 MG/DL (ref 8.6–10.5)
CHLORIDE SERPL-SCNC: 104 MMOL/L (ref 98–107)
CO2 SERPL-SCNC: 24.6 MMOL/L (ref 22–29)
CREAT SERPL-MCNC: 1.11 MG/DL (ref 0.76–1.27)
DEPRECATED RDW RBC AUTO: 42 FL (ref 37–54)
EGFRCR SERPLBLD CKD-EPI 2021: 68.4 ML/MIN/1.73
EOSINOPHIL # BLD AUTO: 0.15 10*3/MM3 (ref 0–0.4)
EOSINOPHIL NFR BLD AUTO: 2.6 % (ref 0.3–6.2)
ERYTHROCYTE [DISTWIDTH] IN BLOOD BY AUTOMATED COUNT: 13.2 % (ref 12.3–15.4)
GLOBULIN UR ELPH-MCNC: 2.3 GM/DL
GLUCOSE SERPL-MCNC: 107 MG/DL (ref 65–99)
HCT VFR BLD AUTO: 41.7 % (ref 37.5–51)
HGB BLD-MCNC: 13.8 G/DL (ref 13–17.7)
IMM GRANULOCYTES # BLD AUTO: 0.04 10*3/MM3 (ref 0–0.05)
IMM GRANULOCYTES NFR BLD AUTO: 0.7 % (ref 0–0.5)
LYMPHOCYTES # BLD AUTO: 1.12 10*3/MM3 (ref 0.7–3.1)
LYMPHOCYTES NFR BLD AUTO: 19.6 % (ref 19.6–45.3)
MAGNESIUM SERPL-MCNC: 1.9 MG/DL (ref 1.6–2.4)
MCH RBC QN AUTO: 28.9 PG (ref 26.6–33)
MCHC RBC AUTO-ENTMCNC: 33.1 G/DL (ref 31.5–35.7)
MCV RBC AUTO: 87.2 FL (ref 79–97)
MONOCYTES # BLD AUTO: 0.59 10*3/MM3 (ref 0.1–0.9)
MONOCYTES NFR BLD AUTO: 10.3 % (ref 5–12)
NEUTROPHILS NFR BLD AUTO: 3.78 10*3/MM3 (ref 1.7–7)
NEUTROPHILS NFR BLD AUTO: 66.3 % (ref 42.7–76)
NRBC BLD AUTO-RTO: 0 /100 WBC (ref 0–0.2)
PHOSPHATE SERPL-MCNC: 3.1 MG/DL (ref 2.5–4.5)
PLATELET # BLD AUTO: 88 10*3/MM3 (ref 140–450)
PMV BLD AUTO: 11.4 FL (ref 6–12)
POTASSIUM SERPL-SCNC: 4.2 MMOL/L (ref 3.5–5.2)
PROT SERPL-MCNC: 7.1 G/DL (ref 6–8.5)
RBC # BLD AUTO: 4.78 10*6/MM3 (ref 4.14–5.8)
SODIUM SERPL-SCNC: 141 MMOL/L (ref 136–145)
WBC NRBC COR # BLD AUTO: 5.71 10*3/MM3 (ref 3.4–10.8)

## 2025-07-28 PROCEDURE — 3078F DIAST BP <80 MM HG: CPT | Performed by: INTERNAL MEDICINE

## 2025-07-28 PROCEDURE — 85025 COMPLETE CBC W/AUTO DIFF WBC: CPT

## 2025-07-28 PROCEDURE — G2211 COMPLEX E/M VISIT ADD ON: HCPCS | Performed by: INTERNAL MEDICINE

## 2025-07-28 PROCEDURE — 3075F SYST BP GE 130 - 139MM HG: CPT | Performed by: INTERNAL MEDICINE

## 2025-07-28 PROCEDURE — 99214 OFFICE O/P EST MOD 30 MIN: CPT | Performed by: INTERNAL MEDICINE

## 2025-07-28 PROCEDURE — 83735 ASSAY OF MAGNESIUM: CPT

## 2025-07-28 PROCEDURE — 80053 COMPREHEN METABOLIC PANEL: CPT

## 2025-07-28 PROCEDURE — 84100 ASSAY OF PHOSPHORUS: CPT

## 2025-07-28 PROCEDURE — 96374 THER/PROPH/DIAG INJ IV PUSH: CPT

## 2025-07-28 PROCEDURE — 25010000002 ZOLEDRONIC ACID 4 MG/100ML SOLUTION: Performed by: INTERNAL MEDICINE

## 2025-07-28 PROCEDURE — 1126F AMNT PAIN NOTED NONE PRSNT: CPT | Performed by: INTERNAL MEDICINE

## 2025-07-28 RX ORDER — ZOLEDRONIC ACID 0.04 MG/ML
4 INJECTION, SOLUTION INTRAVENOUS ONCE
Status: COMPLETED | OUTPATIENT
Start: 2025-07-28 | End: 2025-07-28

## 2025-07-28 RX ORDER — SODIUM CHLORIDE 9 MG/ML
20 INJECTION, SOLUTION INTRAVENOUS ONCE
Status: CANCELLED | OUTPATIENT
Start: 2025-07-28

## 2025-07-28 RX ORDER — ZOLEDRONIC ACID 0.04 MG/ML
4 INJECTION, SOLUTION INTRAVENOUS ONCE
Status: CANCELLED | OUTPATIENT
Start: 2025-07-28

## 2025-07-28 RX ADMIN — ZOLEDRONIC ACID 4 MG: 0.04 INJECTION, SOLUTION INTRAVENOUS at 15:57

## 2025-07-28 NOTE — PROGRESS NOTES
Subjective     Reason for follow-up: Non-small cell lung cancer                             REQUESTING PRACTITIONER:  Chandrakant    RECORDS OBTAINED:  Records of the patients history including those obtained from the referring provider were reviewed and summarized in detail.      History of Present Illness:  The patient returns today for follow-up.  He is doing well.  He reports no major side effects from Tagrisso.  He denies uncontrolled nausea vomiting diarrhea.  He is having no uncontrolled pain.  He denies headache or neurological changes.  He denies shortness of breath or cough.  He denies dental or jaw issues.  He continues to have some fatigue and nasal brittleness and cuticle irritation.      ONCOLOGY HISTORY:  This is a pleasant 78-year-old man with impaired fasting glucose, hyperlipidemia, hypothyroidism and history of prostate cancer.  The patient has been experiencing about a 1 year history of progressive back pain in the upper mid thoracic spine stabbing in nature and radiating bilaterally anteriorly.  Symptoms are worse when the patient lays down to rest at night.  He has also had some pain around the right shoulder blade.  He was treated with PT with no improvement.  The patient has also been having left hip pain.  The patient was evaluated by orthopedic surgery and an MRI of the thoracic spine without contrast was performed on 4/25/2024 and showed a large expansile marrow replacing mass along the anterior T4 vertebral body 3.2 x 2.1 cm extending into the anterior paraspinal soft tissue along the T3-T4 space with reactive bone marrow edema.  Another partially visualized expansile mass was seen in the posterior right second rib 2.7 x 1.5 cm and another in the posterior sixth rib 1 cm with surrounding bone marrow edema.  At T7-T8 there is a posterior disc protrusion with mild to moderate canal stenosis.  In the posterior right lung a 3.5 cm mass was noted with additional small nodules also seen but poorly  characterized.    The patient denies weight loss, night sweats, shortness of breath, cough, hemoptysis, headaches, confusion, changes in swallowing bowel habits urinary habits.  He has history of prostate cancer treated with prostatectomy in 2009 and reports negligible PSA values.    The patient has history of light smoking during his 20s and 30s.  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    A full body PET scan was performed on 5/3/2024 which showed a hypermetabolic mass in the superior segment of the right lower lobe 3.7 x 2.8 cm SUV 9.3, no hypermetabolic mediastinal or hilar lymph nodes but multiple hypermetabolic bone lesions largest being at T4 vertebral body SUV 9.4, posterior lateral right second rib, posterior right sixth rib, left aspect of the sacrum to the left of the S1 neural foramen.  Spleen was mildly enlarged 15 cm but less activity than liver.    A CT-guided needle biopsy of a rib mass was performed on 5/10/2024 and showed metastatic adenocarcinoma immunohistochemistry supporting a pulmonary primary.    The patient was seen by radiation oncology with plans to treat at least palliatively to T4 and rib lesions possible additional sites pending peer review of case.    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    MRI brain 5/31/2024 showed multiple supratentorial and infratentorial lesions consistent with metastatic disease largest 4-4.5 mm in size.    The patient initiated Tagrisso 6/5/2024.  He was treated with radiation therapy to T4.          Past Medical History:   Diagnosis Date    Acute bronchitis     Allergic Iodine    Allergic rhinitis     Anxiety 2024    Due to sudden cancer diagnosis    Arthritis 2000    BPH (benign prostatic hypertrophy)     Cervical disc disorder 1987    Dislocation, shoulder 1973    Elevated prostate specific antigen (PSA)     Headache 1990    History of bone scan 10/21/2010    normal    History of colonoscopy      never    History of EKG 03/12/2012    also 11-; T wave abnormality    History of medical problems 1978    HL (hearing loss) 2000    Hyperlipidemia     Hypertension 2024    Upon learning of Stage 4 cancer    Hypothyroidism     IFG (impaired fasting glucose)     Kidney calculus     left ureteral stone    Knee swelling 2015    Low back pain     Lung cancer 2024    Right    Malignant neoplasm prostate 11/09/2009    Dr. Mcclelland; lymph nodes negative margins clear    Prostate nodule     right lobe    Rotator cuff syndrome 2009    Scoliosis 2009    Screening for prostate cancer     prostatectomy    Sleep apnea     CPAP machine    URI (upper respiratory infection)         Past Surgical History:   Procedure Laterality Date    CERVICAL DISC SURGERY  1990    also 2006; C5-6, C6-7    HAND SURGERY Left 2001    trigger finger release; left middle finger    NECK SURGERY  1988, 2005    Cervical disk    PROSTATECTOMY  11/09/2009    RHINOPLASTY  1985    ROTATOR CUFF REPAIR      SHOULDER SURGERY Left 09/29/2009    Dr. KRISTEL Jimenez; rotator cuff repair; bone spurs     SPINE SURGERY  2005    TRIGGER POINT INJECTION  2008,2021,2022    URETERAL STENT INSERTION Left 12/04/2013    Dr. Martinez        Current Outpatient Medications on File Prior to Visit   Medication Sig Dispense Refill    aspirin 81 MG chewable tablet Chew 1 tablet Daily.      Diclofenac Sodium (VOLTAREN) 1 % gel gel Apply 4 g topically to the appropriate area as directed 3 (Three) Times a Day. 100 g 2    diphenhydrAMINE (BENADRYL) 50 MG tablet Take one 50 mg tablet 1 hour prior to scan. 1 tablet 0    gabapentin (NEURONTIN) 300 MG capsule Take 1 capsule by mouth Take As Directed. 1 capsule PO q evening meal, 1 capsule PO q hs 60 capsule 2    HYDROcodone-acetaminophen (NORCO) 5-325 MG per tablet Take 1 tablet by mouth Daily As Needed for Moderate Pain. 30 tablet 0    levothyroxine (SYNTHROID, LEVOTHROID) 75 MCG tablet Take 1 tablet by mouth Every Morning. 90  tablet 1    loratadine (CLARITIN) 10 MG tablet Take 1 tablet by mouth Daily.      Norvasc 10 MG tablet Take 1 tablet by mouth Daily.      ondansetron (ZOFRAN) 8 MG tablet Take 1 tablet by mouth 3 (Three) Times a Day As Needed for Nausea or Vomiting. 30 tablet 5    Osimertinib Mesylate (Tagrisso) 80 MG tablet Take 1 tablet by mouth Daily. Indications: Non-Small-Cell Lung Cancer 30 tablet 5    phosphorus (Phospha 250 Neutral) 155-852-130 MG tablet Take 1 tablet by mouth 2 (Two) Times a Day. 60 tablet 1    predniSONE (DELTASONE) 50 MG tablet Take 1 tablet by mouth 13 hours prior to scan, 7 hours prior to scan, and 1 hour prior to scan. Take Benadryl (or store brand equivalent) 50mg 1 hour prior to scan. 3 tablet 0     Current Facility-Administered Medications on File Prior to Visit   Medication Dose Route Frequency Provider Last Rate Last Admin    cyanocobalamin injection 1,000 mcg  1,000 mcg Intramuscular Q28 Days Sherry Mazariegos MD   1,000 mcg at 25 1059        ALLERGIES:    Allergies   Allergen Reactions    Iodinated Contrast Media Anaphylaxis     Patient had a severe reaction in the past / difficulty breathing    Iodine Unknown - High Severity     Difficulty breathing    Molds & Smuts Unknown - High Severity    Shellfish Allergy Unknown - High Severity     Burning inside    Amoxicillin Rash        Social History     Socioeconomic History    Marital status:      Spouse name: Faith   Tobacco Use    Smoking status: Former     Current packs/day: 0.00     Average packs/day: 1 pack/day for 22.0 years (22.0 ttl pk-yrs)     Types: Cigarettes, Pipe     Start date: 1967     Quit date: 1988     Years since quittin.6     Passive exposure: Past    Smokeless tobacco: Never    Tobacco comments:     Quit smoking pipe around    Vaping Use    Vaping status: Never Used   Substance and Sexual Activity    Alcohol use: Yes     Comment: Rarely will drink one glass of wine    Drug use: Never    Sexual  "activity: Not Currently     Partners: Female        Family History   Problem Relation Age of Onset    Diabetes Mother     Kidney disease Mother         calculus    Hearing loss Mother     Prostate cancer Father     Stomach cancer Father     Heart disease Sister     Cancer Sister         breast    Hypothyroidism Sister     Kidney disease Sister         calculus    Arthritis Sister     Transient ischemic attack Brother     Arthritis Brother     Alzheimer's disease Other         uncle    Diabetes Other         uncle        Review of Systems   Constitutional: Negative.    HENT: Negative.     Respiratory: Negative.     Cardiovascular: Negative.    Gastrointestinal: Negative.    Genitourinary: Negative.    Musculoskeletal:  Negative for back pain.   Neurological: Negative.    Hematological: Negative.    Psychiatric/Behavioral:  Negative for dysphoric mood. The patient is nervous/anxious.    Skin-see HPI  Otherwise unchanged 7/28/2025    Objective     Vitals:    07/28/25 1413   BP: 137/77   Pulse: 69   Resp: 16   Temp: 98.3 °F (36.8 °C)   TempSrc: Infrared   SpO2: 96%   Weight: 97.2 kg (214 lb 3.2 oz)   Height: 177.8 cm (70\")   PainSc: 0-No pain                       7/28/2025     2:25 PM   Current Status   ECOG score 0       Physical Exam    CONSTITUTIONAL: pleasant well-developed adult man  HEENT: no icterus, no thrush, moist membranes  LYMPH: no cervical or supraclavicular lad  CV: RRR, S1S2, no murmur  RESP: cta bilat, no wheezing, no rales  GI: soft, non-tender, no splenomegaly, +bs  MUSC: no edema, normal gait  NEURO: alert and oriented x3, normal strength  PSYCH: Normal mood and mild anxious affect  Skin-inflammation around the cuticles noted, brittle nails    RECENT LABS:  Hematology WBC   Date Value Ref Range Status   07/28/2025 5.71 3.40 - 10.80 10*3/mm3 Final     RBC   Date Value Ref Range Status   07/28/2025 4.78 4.14 - 5.80 10*6/mm3 Final     Hemoglobin   Date Value Ref Range Status   07/28/2025 13.8 13.0 - " 17.7 g/dL Final     Hematocrit   Date Value Ref Range Status   07/28/2025 41.7 37.5 - 51.0 % Final     Platelets   Date Value Ref Range Status   07/28/2025 88 (L) 140 - 450 10*3/mm3 Final        Lab Results   Component Value Date    GLUCOSE 128 (H) 06/30/2025    BUN 15.8 06/30/2025    CREATININE 1.00 06/30/2025    EGFR 77.5 06/30/2025    BCR 15.8 06/30/2025    K 3.9 06/30/2025    CO2 25.5 06/30/2025    CALCIUM 9.5 06/30/2025    ALBUMIN 4.8 06/30/2025    BILITOT 0.4 06/30/2025    AST 47 (H) 06/30/2025    ALT 76 (H) 06/30/2025     CT chest abdomen pelvis 7/21/2025:  IMPRESSION:  1. 12 mm right apical nodule and 2.5 cm right lower lobe mass are again  seen and stable since the 03/18/2025 study.  2. T4 and S1 lesions are again seen and appear stable.  3. No new neoplastic lesions are seen since the 03/18/2025 study.    MRI brain 7/21/2025:  IMPRESSION:  1. No change over the 2 most recent MRIs of the brain dating back to  08/03/2024, other than minimal small vessel disease in the cerebral  white matter. The remainder of the MRI of the brain is within normal  limits with no evidence of residual or recurrent enhancing metastatic  lesions in the brain and no evidence of metastatic disease to the  calvarium or skull base.      Assessment & Plan     *Stage IV adenocarcinoma, lung primary  Patient presented with back and chest wall pain, imaging showed and expansile mass T4 vertebral body, posterior right second rib and posterior right sixth rib  PET scan was performed on 5/3/2024 which showed a hypermetabolic mass in the superior segment of the right lower lobe 3.7 x 2.8 cm SUV 9.3, no hypermetabolic mediastinal or hilar lymph nodes but multiple hypermetabolic bone lesions largest being at T4 vertebral body SUV 9.4, posterior lateral right second rib, posterior right sixth rib, left aspect of the sacrum to the left of the S1 neural foramen.  Spleen was mildly enlarged 15 cm but less activity than liver.  CT-guided needle  biopsy of a rib mass was performed on 5/10/2024 and showed metastatic adenocarcinoma immunohistochemistry supporting a pulmonary primary  The patient's next generation sequencing returned with a EGFR exon 19  deletion.  Initiated Tagrisso 80 mg daily on 6/5/2024 7/12/2024-CT chest abdomen pelvis-decreased size of the right upper lobe mass, stable osseous metastatic disease, stable solid and mixed groundglass right lung apex  11/21/2024--stable mixed solid groundglass density in the right lung apex 12 mm, stable right lower lobe mass 2.7 x 2 cm, stable groundglass left upper lobe, calcified mediastinal and right hilar lymph nodes, stable sclerotic lesion right second rib, more sclerotic lesion T4  CT chest abdomen pelvis 3/18/2025-stable disease  CT chest abdomen pelvis 7/21/2025-stable findings  *Brain metastases  5/31/2024-MRI brain showed multifocal supratentorial and infratentorial enhancing lesions, largest 4-4.5 mm in size-plan to monitor response to Tagrisso  8/3/2024 MRI brain-minimal small vessel disease, resolution of previous enhancing lesions  MRI brain 3/18/2025-no evidence of metastatic disease  MRI brain 7/21/2025-no evidence of metastatic disease  *Pain of malignancy  The patient has available [place] 5/325 1 p.o. every 6 hours as needed pain (rarely taking at this point)  Status post radiation to T4 with improved pain  *Thrombocytopenia secondary to Tagrisso-platelet count stable 88  *Significant anxiety related to malignancy  The patient is being followed by the psychosocial clinic which he enjoys and benefits from  *Decreased flow right vertebral artery by MRI  7/12/2024 CT angiogram head and neck-65% stenosis of the right internal carotid artery, right vertebral artery occluded proximal to the dura with retrograde flow through the PICA, moderate stenosis P1, lytic lesion T4  Vascular surgery recommended to begin aspirin 81 mg daily  *History of prostate cancer status post prostatectomy 2009  *Mild  LFT elevation-likely side effect of Tagrisso, stable  *Cuticle inflammation-utilizing Epsom salt soaks  Oncology plan/recommendations:  Continue Tagrisso 80 mg daily  Continue Zometa for metastatic bone disease, reduction of skeletal related events-space now to every 2 months  Continue aspirin 81 mg daily  Continue to monitor CBC and CMP every 2 months  Plan to repeat CT chest abdomen pelvis MRI brain in 4 months  Epsom salt soaks for cuticle inflammation

## 2025-07-29 ENCOUNTER — SPECIALTY PHARMACY (OUTPATIENT)
Dept: PHARMACY | Facility: HOSPITAL | Age: 80
End: 2025-07-29
Payer: MEDICARE

## 2025-07-29 NOTE — PROGRESS NOTES
Specialty Pharmacy Note: Tagrisso (osimertinib)    Nemesio Fitzpatrick is a 79 y.o. male with lung cancer was seen 7/28/25 by Dr. Austin. Per provider dictation, no changes to oral oncology regimen Tagrisso (osimertinib).  Labs Review: The CMP and CBC from 7/28/25 have been reviewed. No dose adjustments are needed for the oral specialty medication(s) based on the labs.    Specialty pharmacy will continue to follow patient.    Joann Haddad, CecileD, BCPS  7/29/2025  07:56 EDT

## 2025-07-30 ENCOUNTER — OFFICE VISIT (OUTPATIENT)
Dept: PSYCHIATRY | Facility: HOSPITAL | Age: 80
End: 2025-07-30
Payer: MEDICARE

## 2025-07-30 DIAGNOSIS — C34.90 PRIMARY NONSQUAMOUS NONSMALL CELL NEOPLASM OF LUNG: ICD-10-CM

## 2025-07-30 DIAGNOSIS — F41.1 GENERALIZED ANXIETY DISORDER: Primary | ICD-10-CM

## 2025-07-30 NOTE — PROGRESS NOTES
"Subjective  Patient ID: Nemesio Fitzpatrick is a 79 y.o.. male seen in regularly scheduled group session.  Group participants have consented to group conducted in person    Total Group Time, Face to Face: 80 minutes  Total Group Participants: 7, plus facilitation per ASHLEIGH Moore    Group Therapy  Group topics explored including development of new normal, short and long term effects of diagnosis and treatment, significant other or family conflict, anxiety surrounding adjusted treatment plan or adjusted follow up, physical deconditioning, weight management, social determinants of health (finances, living arrangements, etc), and lifestyle choices. Members shares burden of ongoing medical complexity, sharing worsening, stable, and improving symptoms in survivorship. Members share additional medical complexities, balancing this, cancer, and age as realistic reminders of mortality. Shared impact of perspective, sharing goals of not creating new regrets. Members share appreciation of group in terms of collective perspective, connection to others, change from the \"why me\" to \"why us\" mentality, with goals of pushing forward to live today.     Counseling provided regarding behavioral activation/ activity scheduling, reintroduction to activity through graded tasks, balancing avoidance with approach , recognition and allowance of need for rest, pharmacological and non pharmacological management of sleep disturbance, lifestyle counseling, benefits of exercise and strategies for managing barriers to engagement, allowance of self care, exploration of quality of life goals, survivorship issues, current programming available in community and clinic, anxiety/ mood management , medication education, and existential distress. Counseling provided supporting developing group dynamics, reiterating therapeutic factors of universality, sharing of information, instillation of hope. Supported sharing of fears, identifying personal goals, " and considering limitations to ability approach goals with full force. Allowed exploration of existential distress.    Discussed current programming available in [facility] and community.    Benefits of group discussed while also acknowledging the need for 1:1 consultation at times. Members invited to discuss any concerns privately with me following group setting or to schedule a 1:1 visit if needs not being met in group.    Next shared medical visit: September 3 at 2:00 PM in person    Patient response to group: Pt listens, engages, and shares appropriately with other group members.    Medications Management  Pt continues in survivorship of metastatic lung cancer.    CC: Fatigue, demoralization  HPI: Pt is seen in follow up regarding weariness, neuropathy, and fatigue in survivorship of metastatic lung cancer. Fortuantely feels well today, while recognzing limitations in what he is able to do. Continues to see coping with illness as being both psychological and physical, embracing ability to live today at his best, radically accepting of energy conservation needs. Feels symptoms are well managed at this time, specifically appreciating improvement to sleep and neuropathy when adherent to gabapentin. Uses this as needed, taking 300-600 mg daily.  Exam: As Above  Current medication regimen: gabapentin up to 300 mg q evening meal and 300 mg q hs, continuing to benefit neuropathy and sleep, noting disruption when he doesn't take  Lab Review:   Infusion on 07/28/2025   Component Date Value    Glucose 07/28/2025 107 (H)     BUN 07/28/2025 15.1     Creatinine 07/28/2025 1.11     Sodium 07/28/2025 141     Potassium 07/28/2025 4.2     Chloride 07/28/2025 104     CO2 07/28/2025 24.6     Calcium 07/28/2025 9.6     Total Protein 07/28/2025 7.1     Albumin 07/28/2025 4.8     ALT (SGPT) 07/28/2025 84 (H)     AST (SGOT) 07/28/2025 47 (H)     Alkaline Phosphatase 07/28/2025 61     Total Bilirubin 07/28/2025 0.4     Globulin  07/28/2025 2.3     A/G Ratio 07/28/2025 2.1     BUN/Creatinine Ratio 07/28/2025 13.6     Anion Gap 07/28/2025 12.4     eGFR 07/28/2025 68.4     Magnesium 07/28/2025 1.9     Phosphorus 07/28/2025 3.1     WBC 07/28/2025 5.71     RBC 07/28/2025 4.78     Hemoglobin 07/28/2025 13.8     Hematocrit 07/28/2025 41.7     MCV 07/28/2025 87.2     MCH 07/28/2025 28.9     MCHC 07/28/2025 33.1     RDW 07/28/2025 13.2     RDW-SD 07/28/2025 42.0     MPV 07/28/2025 11.4     Platelets 07/28/2025 88 (L)     Neutrophil % 07/28/2025 66.3     Lymphocyte % 07/28/2025 19.6     Monocyte % 07/28/2025 10.3     Eosinophil % 07/28/2025 2.6     Basophil % 07/28/2025 0.5     Immature Grans % 07/28/2025 0.7 (H)     Neutrophils, Absolute 07/28/2025 3.78     Lymphocytes, Absolute 07/28/2025 1.12     Monocytes, Absolute 07/28/2025 0.59     Eosinophils, Absolute 07/28/2025 0.15     Basophils, Absolute 07/28/2025 0.03     Immature Grans, Absolute 07/28/2025 0.04     nRBC 07/28/2025 0.0    Infusion on 06/30/2025   Component Date Value    Glucose 06/30/2025 128 (H)     BUN 06/30/2025 15.8     Creatinine 06/30/2025 1.00     Sodium 06/30/2025 139     Potassium 06/30/2025 3.9     Chloride 06/30/2025 104     CO2 06/30/2025 25.5     Calcium 06/30/2025 9.5     Total Protein 06/30/2025 7.0     Albumin 06/30/2025 4.8     ALT (SGPT) 06/30/2025 76 (H)     AST (SGOT) 06/30/2025 47 (H)     Alkaline Phosphatase 06/30/2025 63     Total Bilirubin 06/30/2025 0.4     Globulin 06/30/2025 2.2     A/G Ratio 06/30/2025 2.2     BUN/Creatinine Ratio 06/30/2025 15.8     Anion Gap 06/30/2025 9.5     eGFR 06/30/2025 77.5     Magnesium 06/30/2025 1.9     Phosphorus 06/30/2025 2.2 (L)     WBC 06/30/2025 4.72     RBC 06/30/2025 4.77     Hemoglobin 06/30/2025 13.9     Hematocrit 06/30/2025 41.7     MCV 06/30/2025 87.4     MCH 06/30/2025 29.1     MCHC 06/30/2025 33.3     RDW 06/30/2025 13.2     RDW-SD 06/30/2025 42.1     MPV 06/30/2025 11.3     Platelets 06/30/2025 86 (L)      Neutrophil % 06/30/2025 67.2     Lymphocyte % 06/30/2025 20.8     Monocyte % 06/30/2025 8.9     Eosinophil % 06/30/2025 2.3     Basophil % 06/30/2025 0.6     Immature Grans % 06/30/2025 0.2     Neutrophils, Absolute 06/30/2025 3.17     Lymphocytes, Absolute 06/30/2025 0.98     Monocytes, Absolute 06/30/2025 0.42     Eosinophils, Absolute 06/30/2025 0.11     Basophils, Absolute 06/30/2025 0.03     Immature Grans, Absolute 06/30/2025 0.01     nRBC 06/30/2025 0.0    Lab on 06/02/2025   Component Date Value    Glucose 06/02/2025 105 (H)     BUN 06/02/2025 15.6     Creatinine 06/02/2025 1.01     Sodium 06/02/2025 144     Potassium 06/02/2025 3.9     Chloride 06/02/2025 108 (H)     CO2 06/02/2025 23.6     Calcium 06/02/2025 8.8     Total Protein 06/02/2025 6.8     Albumin 06/02/2025 4.5     ALT (SGPT) 06/02/2025 56 (H)     AST (SGOT) 06/02/2025 39     Alkaline Phosphatase 06/02/2025 55     Total Bilirubin 06/02/2025 0.4     Globulin 06/02/2025 2.3     A/G Ratio 06/02/2025 2.0     BUN/Creatinine Ratio 06/02/2025 15.4     Anion Gap 06/02/2025 12.4     eGFR 06/02/2025 76.6     WBC 06/02/2025 5.09     RBC 06/02/2025 4.57     Hemoglobin 06/02/2025 13.5     Hematocrit 06/02/2025 39.8     MCV 06/02/2025 87.1     MCH 06/02/2025 29.5     MCHC 06/02/2025 33.9     RDW 06/02/2025 13.4     RDW-SD 06/02/2025 42.5     MPV 06/02/2025 10.8     Platelets 06/02/2025 92 (L)     Neutrophil % 06/02/2025 62.4     Lymphocyte % 06/02/2025 23.0     Monocyte % 06/02/2025 11.0     Eosinophil % 06/02/2025 2.4     Basophil % 06/02/2025 0.8     Immature Grans % 06/02/2025 0.4     Neutrophils, Absolute 06/02/2025 3.18     Lymphocytes, Absolute 06/02/2025 1.17     Monocytes, Absolute 06/02/2025 0.56     Eosinophils, Absolute 06/02/2025 0.12     Basophils, Absolute 06/02/2025 0.04     Immature Grans, Absolute 06/02/2025 0.02     nRBC 06/02/2025 0.0     Phosphorus 06/02/2025 2.4 (L)     Magnesium 06/02/2025 1.9      MDM:  Meds reviewed and renewed as  appropriate.  Continue gabapentin as written.   FU scheduled in group setting.    Diagnoses and all orders for this visit:    1. Generalized anxiety disorder (Primary)    2. Primary nonsquamous nonsmall cell neoplasm of lung

## 2025-08-06 ENCOUNTER — TELEPHONE (OUTPATIENT)
Dept: ONCOLOGY | Facility: CLINIC | Age: 80
End: 2025-08-06
Payer: MEDICARE

## 2025-08-06 RX ORDER — DIBASIC SODIUM PHOSPHATE, MONOBASIC POTASSIUM PHOSPHATE AND MONOBASIC SODIUM PHOSPHATE 852; 155; 130 MG/1; MG/1; MG/1
1 TABLET ORAL 2 TIMES DAILY
Qty: 60 TABLET | Refills: 1 | Status: SHIPPED | OUTPATIENT
Start: 2025-08-06

## 2025-08-18 ENCOUNTER — SPECIALTY PHARMACY (OUTPATIENT)
Dept: PHARMACY | Facility: HOSPITAL | Age: 80
End: 2025-08-18
Payer: MEDICARE

## 2025-08-18 ENCOUNTER — CLINICAL SUPPORT (OUTPATIENT)
Dept: FAMILY MEDICINE CLINIC | Facility: CLINIC | Age: 80
End: 2025-08-18
Payer: MEDICARE

## 2025-08-18 DIAGNOSIS — E53.8 B12 DEFICIENCY: Primary | ICD-10-CM

## 2025-08-18 PROCEDURE — 96372 THER/PROPH/DIAG INJ SC/IM: CPT | Performed by: FAMILY MEDICINE

## 2025-08-18 RX ADMIN — CYANOCOBALAMIN 1000 MCG: 1000 INJECTION, SOLUTION INTRAMUSCULAR; SUBCUTANEOUS at 10:43
